# Patient Record
Sex: FEMALE | Race: WHITE | NOT HISPANIC OR LATINO | Employment: UNEMPLOYED | URBAN - METROPOLITAN AREA
[De-identification: names, ages, dates, MRNs, and addresses within clinical notes are randomized per-mention and may not be internally consistent; named-entity substitution may affect disease eponyms.]

---

## 2018-06-15 ENCOUNTER — OFFICE VISIT (OUTPATIENT)
Dept: URGENT CARE | Facility: URGENT CARE | Age: 23
End: 2018-06-15
Payer: COMMERCIAL

## 2018-06-15 VITALS
SYSTOLIC BLOOD PRESSURE: 115 MMHG | TEMPERATURE: 97.7 F | HEART RATE: 98 BPM | DIASTOLIC BLOOD PRESSURE: 82 MMHG | OXYGEN SATURATION: 99 % | WEIGHT: 235.8 LBS

## 2018-06-15 DIAGNOSIS — Z77.098 EXPOSURE TO CHEMICAL IRRITANT: ICD-10-CM

## 2018-06-15 DIAGNOSIS — J98.01 ACUTE BRONCHOSPASM: Primary | ICD-10-CM

## 2018-06-15 PROCEDURE — 99203 OFFICE O/P NEW LOW 30 MIN: CPT | Mod: 25 | Performed by: PHYSICIAN ASSISTANT

## 2018-06-15 PROCEDURE — 94640 AIRWAY INHALATION TREATMENT: CPT | Performed by: PHYSICIAN ASSISTANT

## 2018-06-15 RX ORDER — PREDNISONE 20 MG/1
20 TABLET ORAL DAILY
Qty: 5 TABLET | Refills: 0 | Status: SHIPPED | OUTPATIENT
Start: 2018-06-15 | End: 2018-06-20

## 2018-06-15 RX ORDER — ALBUTEROL SULFATE 90 UG/1
AEROSOL, METERED RESPIRATORY (INHALATION)
Qty: 1 INHALER | Refills: 0 | OUTPATIENT
Start: 2018-06-15 | End: 2022-04-13

## 2018-06-15 RX ORDER — IPRATROPIUM BROMIDE AND ALBUTEROL SULFATE 2.5; .5 MG/3ML; MG/3ML
1 SOLUTION RESPIRATORY (INHALATION) ONCE
Qty: 3 ML | Refills: 0 | COMMUNITY
Start: 2018-06-15 | End: 2022-01-13

## 2018-06-15 ASSESSMENT — ENCOUNTER SYMPTOMS
BACK PAIN: 0
WHEEZING: 0
FATIGUE: 0
CHEST TIGHTNESS: 0
COUGH: 0
UNEXPECTED WEIGHT CHANGE: 0
EYE PAIN: 0
CHILLS: 0
RHINORRHEA: 0
EYE REDNESS: 0
SHORTNESS OF BREATH: 0
MYALGIAS: 0
TROUBLE SWALLOWING: 0
SINUS PRESSURE: 1
ARTHRALGIAS: 0
PALPITATIONS: 0
VOMITING: 0
NAUSEA: 0
FEVER: 0
DIARRHEA: 0
ABDOMINAL PAIN: 0
SORE THROAT: 0

## 2018-06-15 NOTE — MR AVS SNAPSHOT
After Visit Summary   6/15/2018    Lidia Gallegos    MRN: [id number]           Patient Information     Date Of Birth          1995        Visit Information        Provider Department      6/15/2018 6:00 PM Erin [address] PA-C [facility]        Today's Diagnoses     Acute bronchospasm    -  1    Exposure to chemical irritant           Follow-ups after your visit        Follow-up notes from your care team     Return if symptoms worsen or fail to improve.      Who to contact     If you have questions or need follow up information about today's clinic visit or your schedule please contact [facility] directly at [phone].  Normal or non-critical lab and imaging results will be communicated to you by MyChart, letter or phone within 4 business days after the clinic has received the results. If you do not hear from us within 7 days, please contact the clinic through MyChart or phone. If you have a critical or abnormal lab result, we will notify you by phone as soon as possible.  Submit refill requests through Answer.To or call your pharmacy and they will forward the refill request to us. Please allow 3 business days for your refill to be completed.          Additional Information About Your Visit        Care EveryWhere ID     This is your Care EveryWhere ID. This could be used by other organizations to access your [facility] medical records  [id number]        Your Vitals Were     Pulse Temperature Pulse Oximetry             98 97.7  F (36.5  C) (Tympanic) 99%          Blood Pressure from Last 3 Encounters:   06/15/18 115/82    Weight from Last 3 Encounters:   06/15/18 235 lb 12.8 oz (107 kg)              We Performed the Following     ALBUTEROL/IPRATROPIUM 3ML NEB - .001     INHALATION/NEBULIZER TREATMENT, INITIAL          Today's Medication Changes          These changes are accurate as of 6/15/18  8:33 PM.  If you have any questions, ask your nurse or doctor.                Start taking these medicines.        Dose/Directions    albuterol 108 (90 Base) MCG/ACT Inhaler   Commonly known as:  PROAIR HFA/PROVENTIL HFA/VENTOLIN HFA   Used for:  Acute bronchospasm        Inhale 2 puffs every 4-6 hours as needed for cough, wheezing, or shortness of breath   Quantity:  1 Inhaler   Refills:  0       ipratropium - albuterol 0.5 mg/2.5 mg/3 mL 0.5-2.5 (3) MG/3ML neb solution   Commonly known as:  DUONEB   Used for:  Acute bronchospasm        Dose:  1 vial   Take 1 vial (3 mLs) by nebulization once for 1 dose   Quantity:  3 mL   Refills:  0       predniSONE 20 MG tablet   Commonly known as:  DELTASONE   Used for:  Acute bronchospasm        Dose:  20 mg   Take 1 tablet (20 mg) by mouth daily for 5 days   Quantity:  5 tablet   Refills:  0            Where to get your medicines      These medications were sent to [place] [id number] [place] [address] [address]     Phone:  [phone]     albuterol 108 (90 Base) MCG/ACT Inhaler    predniSONE 20 MG tablet         Some of these will need a paper prescription and others can be bought over the counter.  Ask your nurse if you have questions.     You don't need a prescription for these medications     ipratropium - albuterol 0.5 mg/2.5 mg/3 mL 0.5-2.5 (3) MG/3ML neb solution                Primary Care Provider Office Phone # Fax #    [facility] [phone] [address] [facility] [address]        Equal Access to Services     [facility] : Hadii francy hare Sodilshad, waaxda luqadaha, qaybta kaalmada sean, cony mcerloy. So [facility] [phone].    ATENCIÓN: Si habla español, tiene a vela disposición servicios gratuitos de asistencia lingüística. Llame al [phone].    We comply with applicable federal civil rights laws and [place] laws. We do not discriminate on the basis of race, color, national origin, age, disability, sex,  sexual orientation, or gender identity.            Thank you!     [facility] you for [facility]  [facility]. Our goal is always to provide you with excellent care. Hearing back from our patients is one way we can continue to improve our services. Please take a few minutes to complete the written survey that you may receive in the mail after your visit with us. Thank you!             Your Updated Medication List - Protect others around you: Learn how to safely use, store and throw away your medicines at www.disposemymeds.org.          This list is accurate as of 6/15/18  8:33 PM.  Always use your most recent med list.                   Brand Name Dispense Instructions for use Diagnosis    albuterol 108 (90 Base) MCG/ACT Inhaler    PROAIR HFA/PROVENTIL HFA/VENTOLIN HFA    1 Inhaler    Inhale 2 puffs every 4-6 hours as needed for cough, wheezing, or shortness of breath    Acute bronchospasm       ipratropium - albuterol 0.5 mg/2.5 mg/3 mL 0.5-2.5 (3) MG/3ML neb solution    DUONEB    3 mL    Take 1 vial (3 mLs) by nebulization once for 1 dose    Acute bronchospasm       predniSONE 20 MG tablet    DELTASONE    5 tablet    Take 1 tablet (20 mg) by mouth daily for 5 days    Acute bronchospasm

## 2018-06-15 NOTE — PROGRESS NOTES
SUBJECTIVE:   Lidia Gallegos is a 23 year old female presenting with a chief complaint of   Chief Complaint   Patient presents with     Sinus Problem     Patient states there is bilateral pain under eyes, and a very dry nose since 2pm today. She was spraying an insectiside earlier today and was wearing eye protection.        She is a new patient of [place].    Sinus and Lung Problem    Onset of symptoms was 4 hour(s) ago.   Location: feels pain in sinus area and burning in lungs   Course of illness is worsening.    Severity mild/moderate  Current and Associated symptoms: lung tightness  Treatment measures tried include none  Context: recently started working at [employer] and just started applying talstar mosquito repellent 2 days ago. Was wearing eye protection. This afternoon she started having sinus pain and a burning in her lungs. No shortness of breath or difficulty breathing. No neurological symptoms. She was not wearing a respirator.     Patient is generally healthy with no significant past medical history, surgical history, or family history.      Review of Systems   Constitutional: Negative for chills, fatigue, fever and unexpected weight change.   HENT: Positive for sinus pressure. Negative for congestion, ear pain, postnasal drip, rhinorrhea, sore throat and trouble swallowing.    Eyes: Negative for pain, redness and visual disturbance.   Respiratory: Negative for cough, chest tightness, shortness of breath and wheezing.         Lung tightness   Cardiovascular: Negative for chest pain and palpitations.   Gastrointestinal: Negative for abdominal pain, diarrhea, nausea and vomiting.   Musculoskeletal: Negative for arthralgias, back pain and myalgias.   Skin: Negative for rash.       No past medical history on file.  No family history on file.  Current Outpatient Prescriptions   Medication Sig Dispense Refill     albuterol (PROAIR HFA/PROVENTIL HFA/VENTOLIN HFA) 108 (90 Base) MCG/ACT Inhaler Inhale 2 puffs  every 4-6 hours as needed for cough, wheezing, or shortness of breath 1 Inhaler 0     ipratropium - albuterol 0.5 mg/2.5 mg/3 mL (DUONEB) 0.5-2.5 (3) MG/3ML neb solution Take 1 vial (3 mLs) by nebulization once for 1 dose 3 mL 0     predniSONE (DELTASONE) 20 MG tablet Take 1 tablet (20 mg) by mouth daily for 5 days 5 tablet 0     Social History   Substance Use Topics     Smoking status: Not on file     Smokeless tobacco: Not on file     Alcohol use Not on file       OBJECTIVE  /82  Pulse 98  Temp 97.7  F (36.5  C) (Tympanic)  Wt 235 lb 12.8 oz (107 kg)  SpO2 99%    Physical Exam   Constitutional: She appears well-developed and well-nourished.   HENT:   Head: Normocephalic.   Right Ear: Tympanic membrane and ear canal normal.   Left Ear: Tympanic membrane and ear canal normal.   Mouth/Throat: Oropharynx is clear and moist.   Eyes: Conjunctivae are normal. Pupils are equal, round, and reactive to light.   Cardiovascular: Normal rate and normal heart sounds.    Pulmonary/Chest: Effort normal and breath sounds normal.   Skin: Skin is warm and dry. No rash noted.   Psychiatric: She has a normal mood and affect. Her behavior is normal.       Labs:  No results found for this or any previous visit (from the past 24 hour(s)).    X-Ray was not done.    ASSESSMENT:      ICD-10-CM    1. Acute bronchospasm J98.01 INHALATION/NEBULIZER TREATMENT, INITIAL     ipratropium - albuterol 0.5 mg/2.5 mg/3 mL (DUONEB) 0.5-2.5 (3) MG/3ML neb solution     ALBUTEROL/IPRATROPIUM 3ML NEB - .001     predniSONE (DELTASONE) 20 MG tablet     albuterol (PROAIR HFA/PROVENTIL HFA/VENTOLIN HFA) 108 (90 Base) MCG/ACT Inhaler   2. Exposure to chemical irritant Z77.098         Medical Decision Making:    Differential Diagnosis:  URI Adult/Peds:  Bronchitis-viral, Bronchospasm and chemical irritation     Serious Comorbid Conditions:  Adult:  None    PLAN:    Likely acute bronchospasm from chemical irritant. She had significant improvement in  symptoms following duoneb given in clinic. SpO2 went from 97% to 99% following neb. Will treat with prednisone 20mg daily x 5 days and albuterol every 4-6 hrs as needed. Discussed how to take these medications and what to expect.  Discussed in detail symptoms that would warrant emergent evaluation in the ED. Patient agrees with plan and will follow up as needed.  Would recommend she wear a respirator at work. Follow up with primary care provider in 1 week to recheck symptoms or sooner if needed.     Followup:    If not improving or if condition worsens, follow up with your Primary Care Provider    There are no Patient Instructions on file for this visit.

## 2018-06-16 NOTE — NURSING NOTE
The following nebulizer treatment was given:     MEDICATION: Duoneb  : [place]  LOT #: [id number]  EXPIRATION DATE:  01/2020  NDC # 2606-7530-17     Nebulizer Start Time:  7:08pm  Nebulizer Stop Time:  7:18pm  Zita Rose MA

## 2021-04-19 ENCOUNTER — HOSPITAL ENCOUNTER (EMERGENCY)
Facility: CLINIC | Age: 26
Discharge: HOME | End: 2021-04-19
Attending: EMERGENCY MEDICINE | Admitting: EMERGENCY MEDICINE
Payer: COMMERCIAL

## 2021-04-19 VITALS
SYSTOLIC BLOOD PRESSURE: 148 MMHG | WEIGHT: 270 LBS | OXYGEN SATURATION: 99 % | RESPIRATION RATE: 18 BRPM | DIASTOLIC BLOOD PRESSURE: 100 MMHG | HEART RATE: 90 BPM | TEMPERATURE: 98.2 F

## 2021-04-19 DIAGNOSIS — M54.50 ACUTE RIGHT-SIDED LOW BACK PAIN WITHOUT SCIATICA: ICD-10-CM

## 2021-04-19 DIAGNOSIS — N30.00 ACUTE CYSTITIS WITHOUT HEMATURIA: ICD-10-CM

## 2021-04-19 LAB
ALBUMIN UR-MCNC: NEGATIVE MG/DL
ANION GAP SERPL CALCULATED.3IONS-SCNC: 5 MMOL/L (ref 3–14)
APPEARANCE UR: CLEAR
BASOPHILS # BLD AUTO: 0.1 10E9/L (ref 0–0.2)
BASOPHILS NFR BLD AUTO: 0.5 %
BILIRUB UR QL STRIP: NEGATIVE
BUN SERPL-MCNC: 10 MG/DL (ref 7–30)
CALCIUM SERPL-MCNC: 8.3 MG/DL (ref 8.5–10.1)
CHLORIDE SERPL-SCNC: 113 MMOL/L (ref 94–109)
CO2 SERPL-SCNC: 25 MMOL/L (ref 20–32)
COLOR UR AUTO: YELLOW
CREAT SERPL-MCNC: 0.77 MG/DL (ref 0.52–1.04)
CRP SERPL-MCNC: 3.6 MG/L (ref 0–8)
D DIMER PPP FEU-MCNC: 0.3 UG/ML FEU (ref 0–0.5)
DIFFERENTIAL METHOD BLD: NORMAL
EOSINOPHIL # BLD AUTO: 0.4 10E9/L (ref 0–0.7)
EOSINOPHIL NFR BLD AUTO: 4 %
ERYTHROCYTE [DISTWIDTH] IN BLOOD BY AUTOMATED COUNT: 13.2 % (ref 10–15)
GFR SERPL CREATININE-BSD FRML MDRD: >90 ML/MIN/{1.73_M2}
GLUCOSE SERPL-MCNC: 82 MG/DL (ref 70–99)
GLUCOSE UR STRIP-MCNC: NEGATIVE MG/DL
HCT VFR BLD AUTO: 41.9 % (ref 35–47)
HGB BLD-MCNC: 14 G/DL (ref 11.7–15.7)
HGB UR QL STRIP: NEGATIVE
IMM GRANULOCYTES # BLD: 0 10E9/L (ref 0–0.4)
IMM GRANULOCYTES NFR BLD: 0.4 %
KETONES UR STRIP-MCNC: NEGATIVE MG/DL
LEUKOCYTE ESTERASE UR QL STRIP: ABNORMAL
LYMPHOCYTES # BLD AUTO: 2.6 10E9/L (ref 0.8–5.3)
LYMPHOCYTES NFR BLD AUTO: 23.3 %
MCH RBC QN AUTO: 27.8 PG (ref 26.5–33)
MCHC RBC AUTO-ENTMCNC: 33.4 G/DL (ref 31.5–36.5)
MCV RBC AUTO: 83 FL (ref 78–100)
MONOCYTES # BLD AUTO: 1 10E9/L (ref 0–1.3)
MONOCYTES NFR BLD AUTO: 9.5 %
MUCOUS THREADS #/AREA URNS LPF: PRESENT /LPF
NEUTROPHILS # BLD AUTO: 6.9 10E9/L (ref 1.6–8.3)
NEUTROPHILS NFR BLD AUTO: 62.3 %
NITRATE UR QL: NEGATIVE
NRBC # BLD AUTO: 0 10*3/UL
NRBC BLD AUTO-RTO: 0 /100
PH UR STRIP: 5 PH (ref 5–7)
PLATELET # BLD AUTO: 343 10E9/L (ref 150–450)
POTASSIUM SERPL-SCNC: 4 MMOL/L (ref 3.4–5.3)
RBC # BLD AUTO: 5.03 10E12/L (ref 3.8–5.2)
RBC #/AREA URNS AUTO: 1 /HPF (ref 0–2)
SODIUM SERPL-SCNC: 143 MMOL/L (ref 133–144)
SOURCE: ABNORMAL
SP GR UR STRIP: 1.02 (ref 1–1.03)
SQUAMOUS #/AREA URNS AUTO: 2 /HPF (ref 0–1)
UROBILINOGEN UR STRIP-MCNC: 2 MG/DL (ref 0–2)
WBC # BLD AUTO: 11 10E9/L (ref 4–11)
WBC #/AREA URNS AUTO: 6 /HPF (ref 0–5)

## 2021-04-19 PROCEDURE — 81001 URINALYSIS AUTO W/SCOPE: CPT | Performed by: EMERGENCY MEDICINE

## 2021-04-19 PROCEDURE — 80048 BASIC METABOLIC PNL TOTAL CA: CPT | Performed by: EMERGENCY MEDICINE

## 2021-04-19 PROCEDURE — 85379 FIBRIN DEGRADATION QUANT: CPT | Performed by: EMERGENCY MEDICINE

## 2021-04-19 PROCEDURE — 250N000011 HC RX IP 250 OP 636: Performed by: EMERGENCY MEDICINE

## 2021-04-19 PROCEDURE — 99285 EMERGENCY DEPT VISIT HI MDM: CPT | Performed by: EMERGENCY MEDICINE

## 2021-04-19 PROCEDURE — 96375 TX/PRO/DX INJ NEW DRUG ADDON: CPT | Performed by: EMERGENCY MEDICINE

## 2021-04-19 PROCEDURE — 99285 EMERGENCY DEPT VISIT HI MDM: CPT | Mod: 25 | Performed by: EMERGENCY MEDICINE

## 2021-04-19 PROCEDURE — 86140 C-REACTIVE PROTEIN: CPT | Performed by: EMERGENCY MEDICINE

## 2021-04-19 PROCEDURE — 96374 THER/PROPH/DIAG INJ IV PUSH: CPT | Performed by: EMERGENCY MEDICINE

## 2021-04-19 PROCEDURE — 36415 COLL VENOUS BLD VENIPUNCTURE: CPT | Performed by: EMERGENCY MEDICINE

## 2021-04-19 PROCEDURE — 85025 COMPLETE CBC W/AUTO DIFF WBC: CPT | Performed by: EMERGENCY MEDICINE

## 2021-04-19 RX ORDER — METHYLPREDNISOLONE 4 MG
TABLET, DOSE PACK ORAL
Qty: 21 TABLET | Refills: 0 | Status: SHIPPED | OUTPATIENT
Start: 2021-04-19 | End: 2022-01-13

## 2021-04-19 RX ORDER — HYDROCODONE BITARTRATE AND ACETAMINOPHEN 5; 325 MG/1; MG/1
1 TABLET ORAL EVERY 6 HOURS PRN
Qty: 12 TABLET | Refills: 0 | Status: SHIPPED | OUTPATIENT
Start: 2021-04-19 | End: 2021-04-22

## 2021-04-19 RX ORDER — DEXAMETHASONE SODIUM PHOSPHATE 10 MG/ML
10 INJECTION, SOLUTION INTRAMUSCULAR; INTRAVENOUS ONCE
Status: COMPLETED | OUTPATIENT
Start: 2021-04-19 | End: 2021-04-19

## 2021-04-19 RX ORDER — IBUPROFEN 200 MG
800 TABLET ORAL 2 TIMES DAILY PRN
COMMUNITY
End: 2022-01-13

## 2021-04-19 RX ORDER — HYDROMORPHONE HYDROCHLORIDE 1 MG/ML
0.5 INJECTION, SOLUTION INTRAMUSCULAR; INTRAVENOUS; SUBCUTANEOUS
Status: DISCONTINUED | OUTPATIENT
Start: 2021-04-19 | End: 2021-04-19 | Stop reason: HOSPADM

## 2021-04-19 RX ORDER — SULFAMETHOXAZOLE/TRIMETHOPRIM 800-160 MG
1 TABLET ORAL 2 TIMES DAILY
Qty: 6 TABLET | Refills: 0 | Status: SHIPPED | OUTPATIENT
Start: 2021-04-19 | End: 2021-04-22

## 2021-04-19 RX ORDER — ONDANSETRON 2 MG/ML
4 INJECTION INTRAMUSCULAR; INTRAVENOUS EVERY 30 MIN PRN
Status: DISCONTINUED | OUTPATIENT
Start: 2021-04-19 | End: 2021-04-19 | Stop reason: HOSPADM

## 2021-04-19 RX ADMIN — DEXAMETHASONE SODIUM PHOSPHATE 10 MG: 10 INJECTION, SOLUTION INTRAMUSCULAR; INTRAVENOUS at 15:01

## 2021-04-19 RX ADMIN — ONDANSETRON 4 MG: 2 INJECTION INTRAMUSCULAR; INTRAVENOUS at 15:00

## 2021-04-19 RX ADMIN — HYDROMORPHONE HYDROCHLORIDE 0.5 MG: 1 INJECTION, SOLUTION INTRAMUSCULAR; INTRAVENOUS; SUBCUTANEOUS at 15:03

## 2021-04-19 NOTE — DISCHARGE INSTRUCTIONS
"Your urine showed evidence of infection, so a prescription for an antibiotic was sent to the pharmacy.  You may start this today and you should finish the complete course even if you begin to feel better    You were given medication for pain, Norco, which can make you drowsy so do not drive or drink alcohol while taking this medicine.  You may take 1 tablet every 6 hours as needed for severe pain.  If you have more mild or moderate pain you may take Tylenol or ibuprofen per bottle instructions but be aware that Norco has Tylenol within it, do not take more than 4000 mg of Tylenol in a 24-hour period.    You were also given a prescription for steroid.  Since you were given a dose of steroid in the emergency room you do not need to take any more today and may start this prescription tomorrow.  Follow the tapering instructions on the package.  Take the dose of medication as early in the morning as possible since it can make you feel \"wound up\" and have difficulty with sleep    Follow-up closely with your primary provider so they may discuss next best steps, and if additional imaging is appropriate for your back.  They may also recommend referral to physical therapy or to a spine specialist to discuss options for your back pain management    If you develop any numbness or tingling in between your thighs or in your groin, if you have any incontinence of bowel or bladder, have any weakness in your legs, or have any new or concerning symptoms, do not hesitate to return to the emergency room for evaluation  "

## 2021-04-19 NOTE — Clinical Note
Lidia Gallegos was seen and treated in our emergency department on 4/19/2021.  She may return to work on 04/21/2021.  May return to work sooner if symptoms have improved     If you have any questions or concerns, please don't hesitate to call.      Jaclyn Price, DO

## 2021-04-19 NOTE — ED PROVIDER NOTES
History     Chief Complaint   Patient presents with     Back Pain     Shortness of Breath     HPI  Lidia Gallegos is a 26 year old female who resents to the emergency room with low back pain and shortness of breath.  She says the low back pain has been ongoing for the last 2 days.  Has had issues with her back in the past, said that she had a disc bulging and degenerative disc disease.  She had been doing back exercises it had improved but recently feels like this injury flared again.  She was seen in urgent care yesterday and was given a muscle relaxer.  She took a dose at 8 PM and midnight, which did not help much with her pain but also made her feel dizzy.  When she was at work today she had more pain and felt short of breath.  She says that when she tries to take a deep breath it makes the pain flareup worse.  Has not been running a fever.  Denies any dysuria.  Denies any known injury to the area.  No numbness or tingling down her leg or in her groin or in between her thighs.  Denies any bowel or bladder incontinence.  Has not been vomiting.  Denies chest pain.       Allergies:  No Known Allergies    Problem List:    There are no active problems to display for this patient.       Past Medical History:    History reviewed. No pertinent past medical history.    Past Surgical History:    History reviewed. No pertinent surgical history.    Family History:    History reviewed. No pertinent family history.    Social History:  Marital Status:  Single [1]  Social History     Tobacco Use     Smoking status: Never Smoker     Smokeless tobacco: Never Used   Substance Use Topics     Alcohol use: Yes     Comment: occ     Drug use: Never        Medications:    HYDROcodone-acetaminophen (NORCO) 5-325 MG tablet  ibuprofen (ADVIL/MOTRIN) 200 MG tablet  methylPREDNISolone (MEDROL DOSEPAK) 4 MG tablet therapy pack  omeprazole (PRILOSEC) 20 MG DR capsule  sulfamethoxazole-trimethoprim (BACTRIM DS) 800-160 MG tablet  albuterol  (PROAIR HFA/PROVENTIL HFA/VENTOLIN HFA) 108 (90 Base) MCG/ACT Inhaler  ipratropium - albuterol 0.5 mg/2.5 mg/3 mL (DUONEB) 0.5-2.5 (3) MG/3ML neb solution          Review of Systems   All other systems reviewed and are negative.      Physical Exam   BP: (!) 152/107  Pulse: 92  Temp: 98.2  F (36.8  C)  Resp: 16  Weight: 122.5 kg (270 lb)  SpO2: 100 %      Physical Exam  Vitals signs and nursing note reviewed.   Constitutional:       Appearance: She is obese.   Neck:      Musculoskeletal: Normal range of motion and neck supple.   Cardiovascular:      Rate and Rhythm: Normal rate and regular rhythm.   Pulmonary:      Effort: Pulmonary effort is normal.      Breath sounds: Normal breath sounds.   Abdominal:      General: Bowel sounds are normal.      Palpations: Abdomen is soft.   Musculoskeletal:      Lumbar back: She exhibits no bony tenderness.        Back:    Skin:     General: Skin is warm and dry.   Neurological:      General: No focal deficit present.      Mental Status: She is alert.      Sensory: Sensation is intact.      Motor: No weakness.      Gait: Gait is intact.         ED Course        Procedures               Critical Care time:  none               Results for orders placed or performed during the hospital encounter of 04/19/21 (from the past 24 hour(s))   Basic metabolic panel   Result Value Ref Range    Sodium 143 133 - 144 mmol/L    Potassium 4.0 3.4 - 5.3 mmol/L    Chloride 113 (H) 94 - 109 mmol/L    Carbon Dioxide 25 20 - 32 mmol/L    Anion Gap 5 3 - 14 mmol/L    Glucose 82 70 - 99 mg/dL    Urea Nitrogen 10 7 - 30 mg/dL    Creatinine 0.77 0.52 - 1.04 mg/dL    GFR Estimate >90 >60 mL/min/[1.73_m2]    GFR Estimate If Black >90 >60 mL/min/[1.73_m2]    Calcium 8.3 (L) 8.5 - 10.1 mg/dL   CBC with platelets differential   Result Value Ref Range    WBC 11.0 4.0 - 11.0 10e9/L    RBC Count 5.03 3.8 - 5.2 10e12/L    Hemoglobin 14.0 11.7 - 15.7 g/dL    Hematocrit 41.9 35.0 - 47.0 %    MCV 83 78 - 100 fl     MCH 27.8 26.5 - 33.0 pg    MCHC 33.4 31.5 - 36.5 g/dL    RDW 13.2 10.0 - 15.0 %    Platelet Count 343 150 - 450 10e9/L    Diff Method Automated Method     % Neutrophils 62.3 %    % Lymphocytes 23.3 %    % Monocytes 9.5 %    % Eosinophils 4.0 %    % Basophils 0.5 %    % Immature Granulocytes 0.4 %    Nucleated RBCs 0 0 /100    Absolute Neutrophil 6.9 1.6 - 8.3 10e9/L    Absolute Lymphocytes 2.6 0.8 - 5.3 10e9/L    Absolute Monocytes 1.0 0.0 - 1.3 10e9/L    Absolute Eosinophils 0.4 0.0 - 0.7 10e9/L    Absolute Basophils 0.1 0.0 - 0.2 10e9/L    Abs Immature Granulocytes 0.0 0 - 0.4 10e9/L    Absolute Nucleated RBC 0.0    CRP inflammation   Result Value Ref Range    CRP Inflammation 3.6 0.0 - 8.0 mg/L   D dimer quantitative   Result Value Ref Range    D Dimer 0.3 0.0 - 0.50 ug/ml FEU   UA with Microscopic   Result Value Ref Range    Color Urine Yellow     Appearance Urine Clear     Glucose Urine Negative NEG^Negative mg/dL    Bilirubin Urine Negative NEG^Negative    Ketones Urine Negative NEG^Negative mg/dL    Specific Gravity Urine 1.024 1.003 - 1.035    Blood Urine Negative NEG^Negative    pH Urine 5.0 5.0 - 7.0 pH    Protein Albumin Urine Negative NEG^Negative mg/dL    Urobilinogen mg/dL 2.0 0.0 - 2.0 mg/dL    Nitrite Urine Negative NEG^Negative    Leukocyte Esterase Urine Moderate (A) NEG^Negative    Source Midstream Urine     WBC Urine 6 (H) 0 - 5 /HPF    RBC Urine 1 0 - 2 /HPF    Squamous Epithelial /HPF Urine 2 (H) 0 - 1 /HPF    Mucous Urine Present (A) NEG^Negative /LPF       Medications   ondansetron (ZOFRAN) injection 4 mg (4 mg Intravenous Given 4/19/21 1500)   HYDROmorphone (PF) (DILAUDID) injection 0.5 mg (0.5 mg Intravenous Given 4/19/21 1503)   dexamethasone PF (DECADRON) injection 10 mg (10 mg Intravenous Given 4/19/21 1501)       Assessments & Plan (with Medical Decision Making)  Lidia is a 26-year-old female with past medical history of low back pain and degenerative disc disease who presents to the  emergency room with a flare of her low back pain and shortness of breath.  See history and focused physical exam as above  26-year-old female in no acute distress but is standing and pacing in pain, blood pressure is elevated at 152/107, but remainder of vital signs are stable.  Oxygen saturation is 99% on room air.  Patient does indicate that she feels the shortness of breath is more due to the amount of pain that she is in and not a separate symptom.  She has normal gait and muscle tone, no midline bony tenderness, no appreciable muscle spasm.  Suspect that this is due to to her known degenerative disc disease and a flare of her low back pain with sciatica.  She is agreeable to trying Decadron as well as Dilaudid for pain, and does have a ride home with her  who is waiting.  She is also agreeable to checking some lab work  Results as above.  CBC, chemistry, CRP, and D-dimer are all within normal limits.  Urine does show moderate leukocyte esterase without nitrites or hematuria.  When questioned further, she does admit to some mild dysuria that has been intermittent but she did not think that it had anything to do with her symptoms today.  She did get relief with the Dilaudid and Decadron given in the emergency room.  Has been up and ambulatory to the bathroom several times.  Since her symptoms are improved and work-up has been negative thus far, will plan for discharge.  I did reassure her that since she did not have any acute injury and does not have any red flag symptoms that imaging would likely not be very helpful here in the emergency room.  She may need to have further imaging with an MRI of her spine scheduled at a later date if she continues to have symptoms or if her provider indicates that it is necessary for for further work-up.  She is agreeable to this.  Will discharge with a small quantity of Norco to help with severe pain, a prescription for Medrol Dosepak, and also 3 days of Bactrim to treat  mildly symptomatic bacteriuria.  Discussed reasons to return to the emergency room.  She understands and agrees and is discharged in no acute distress     I have reviewed the nursing notes.    I have reviewed the findings, diagnosis, plan and need for follow up with the patient.       Discharge Medication List as of 4/19/2021  5:09 PM      START taking these medications    Details   HYDROcodone-acetaminophen (NORCO) 5-325 MG tablet Take 1 tablet by mouth every 6 hours as needed for moderate to severe pain, Disp-12 tablet, R-0, E-Prescribe      methylPREDNISolone (MEDROL DOSEPAK) 4 MG tablet therapy pack Follow Package Directions, Disp-21 tablet, R-0, E-Prescribe      sulfamethoxazole-trimethoprim (BACTRIM DS) 800-160 MG tablet Take 1 tablet by mouth 2 times daily for 3 days, Disp-6 tablet, R-0, E-Prescribe             Final diagnoses:   Acute right-sided low back pain without sciatica   Acute cystitis without hematuria       4/19/2021   [facility] EMERGENCY DEPT     Jaclyn Price DO  04/22/21 9622

## 2021-06-22 ENCOUNTER — TRANSFERRED RECORDS (OUTPATIENT)
Dept: MULTI SPECIALTY CLINIC | Facility: CLINIC | Age: 26
End: 2021-06-22

## 2021-06-22 LAB — PAP-ABSTRACT: ABNORMAL

## 2021-08-08 ENCOUNTER — HEALTH MAINTENANCE LETTER (OUTPATIENT)
Age: 26
End: 2021-08-08

## 2021-10-03 ENCOUNTER — HEALTH MAINTENANCE LETTER (OUTPATIENT)
Age: 26
End: 2021-10-03

## 2022-01-04 ENCOUNTER — NURSE TRIAGE (OUTPATIENT)
Dept: NURSING | Facility: CLINIC | Age: 27
End: 2022-01-04

## 2022-01-04 ENCOUNTER — VIRTUAL VISIT (OUTPATIENT)
Dept: FAMILY MEDICINE | Facility: CLINIC | Age: 27
End: 2022-01-04
Payer: COMMERCIAL

## 2022-01-04 ENCOUNTER — NURSE TRIAGE (OUTPATIENT)
Dept: NURSING | Facility: CLINIC | Age: 27
End: 2022-01-04
Payer: COMMERCIAL

## 2022-01-04 DIAGNOSIS — J01.00 ACUTE NON-RECURRENT MAXILLARY SINUSITIS: Primary | ICD-10-CM

## 2022-01-04 PROCEDURE — 99202 OFFICE O/P NEW SF 15 MIN: CPT | Mod: 95 | Performed by: NURSE PRACTITIONER

## 2022-01-04 NOTE — TELEPHONE ENCOUNTER
Has a cold.    Was seen [facility], robatussin with codeine.    Neg strep, covid and mono.    Not better, seen again prednisone.    Now ST still , tongue burning, ears pounding, congestion in head.    Sinus pain and pressure.      Additional Information    Negative: Sounds like a life-threatening emergency to the triager    Negative: Difficulty breathing, and not from stuffy nose (e.g., not relieved by cleaning out the nose)    Negative: SEVERE headache and has fever    Negative: Patient sounds very sick or weak to the triager    SEVERE sinus pain    Protocols used: SINUS PAIN AND CONGESTION-A-OH

## 2022-01-04 NOTE — PROGRESS NOTES
Lidia is a 26 year old who is being evaluated via a billable telephone visit.      What phone number would you like to be contacted at?   How would you like to obtain your AVS?     Assessment & Plan     (J01.00) Acute non-recurrent maxillary sinusitis  (primary encounter diagnosis)  Comment:   Plan: amoxicillin-clavulanate (AUGMENTIN) 875-125 MG         tablet        Discussed symptomatic treatment measures.  Start Augmentin, discussed the use and indication of this medication as well as potential side effects.   Return to clinic if symptoms persist or worsen.        16 minutes spent on the date of the encounter doing patient visit and documentation            Return if symptoms worsen or fail to improve.    Sindi Sampson NP  [facility]   Lidia is a 26 year old who presents for the following health issues     HPI     Symptoms She was seen at  on 12/22, 12/27 and 12/28.    She did have a sore throat, laryngitis, cough, congestion.   She did have a negative COVID, strep and mono test.   She was treated with Robitussin and codeine, Medrol Dosepak.  She is having a lot nasal congestion, sinus pressure, tongue burning, post nasal drainage.            Review of Systems         Objective           Vitals:  No vitals were obtained today due to virtual visit.    Physical Exam   healthy, alert and no distress  PSYCH: Alert and oriented times 3; coherent speech, normal   rate and volume, able to articulate logical thoughts, able   to abstract reason, no tangential thoughts, no hallucinations   or delusions  Her affect is normal  RESP: No cough, no audible wheezing, able to talk in full sentences  Remainder of exam unable to be completed due to telephone visits                Phone call duration: 15 minutes

## 2022-01-05 NOTE — TELEPHONE ENCOUNTER
"Triage Call:    They got a prescription for 7 tablets, but instructions are to take 1 tablet twice a day for 7 days.  Per medication policy, after double checked math with pharmacist calling, Mireille, advised that could fill for 14 tablets as she was requesting.        Sindi Foreman RN on 1/4/2022 at 6:04 PM        Reason for Disposition    Pharmacy calling with prescription question and triager answers question    Additional Information    Negative: Drug overdose and triager unable to answer question    Negative: Caller requesting information unrelated to medicine    Negative: Caller requesting a prescription for Strep throat and has a positive culture result    Negative: Rash while taking a medication or within 3 days of stopping it    Negative: Immunization reaction suspected    Negative: [1] Asthma and [2] having symptoms of asthma (cough, wheezing, etc.)    Negative: [1] Influenza symptoms AND [2] anti-viral med prescription request, such as Tamiflu    Negative: [1] Symptom of illness (e.g., headache, abdominal pain, earache, vomiting) AND [2] more than mild    Negative: MORE THAN A DOUBLE DOSE of a prescription or over-the-counter (OTC) drug    Negative: [1] DOUBLE DOSE (an extra dose or lesser amount) of over-the-counter (OTC) drug AND [2] any symptoms (e.g., dizziness, nausea, pain, sleepiness)    Negative: [1] DOUBLE DOSE (an extra dose or lesser amount) of prescription drug AND [2] any symptoms (e.g., dizziness, nausea, pain, sleepiness)    Negative: Took another person's prescription drug    Negative: [1] DOUBLE DOSE (an extra dose or lesser amount) of prescription drug AND [2] NO symptoms (Exception: a double dose of antibiotics)    Negative: Diabetes drug error or overdose (e.g., took wrong type of insulin or took extra dose)    Negative: [1] Request for URGENT new prescription or refill of \"essential\" medication (i.e., likelihood of harm to patient if not taken) AND [2] triager unable to fill per " unit policy    Negative: [1] Prescription not at pharmacy AND [2] was prescribed by PCP recently    Negative: [1] Pharmacy calling with prescription questions AND [2] triager unable to answer question    Negative: [1] Caller has URGENT medication question about med that PCP or specialist prescribed AND [2] triager unable to answer question    Negative: [1] Caller has NON-URGENT medication question about med that PCP prescribed AND [2] triager unable to answer question    Negative: [1] Caller requesting a NON-URGENT new prescription or refill AND [2] triager unable to refill per unit policy    Negative: [1] Caller has medication question about med not prescribed by PCP AND [2] triager unable to answer question (e.g., compatibility with other med, storage)    Negative: Caller requesting a CONTROLLED substance prescription refill (e.g., narcotics, ADHD medicines)    Negative: Caller wants to use a complementary or alternative medicine    Negative: [1] Prescription prescribed recently is not at pharmacy AND [2] triager has access to patient's EMR AND [3] prescription is recorded in the EMR    Negative: [1] DOUBLE DOSE (an extra dose or lesser amount) of over-the-counter (OTC) drug AND [2] NO symptoms    Negative: [1] DOUBLE DOSE (an extra dose or lesser amount) of antibiotic drug AND [2] NO symptoms    Negative: Caller requesting a refill, no triage required, and triager able to refill per unit policy    Negative: Caller requesting information about medication use with breastfeeding; neither adult nor infant is ill, triager answers question    Negative: Caller requesting information about medication during pregnancy; adult is not ill AND triager answers question    Negative: Caller has medication question, adult has minor symptoms, caller declines triage, AND triager answers question    Negative: Caller has medication question only, adult not sick, and triager answers question    Protocols used: MEDICATION QUESTION  CALL-A-AH

## 2022-01-11 ENCOUNTER — VIRTUAL VISIT (OUTPATIENT)
Dept: FAMILY MEDICINE | Facility: CLINIC | Age: 27
End: 2022-01-11
Payer: COMMERCIAL

## 2022-01-11 ENCOUNTER — TELEPHONE (OUTPATIENT)
Dept: FAMILY MEDICINE | Facility: CLINIC | Age: 27
End: 2022-01-11
Payer: COMMERCIAL

## 2022-01-11 DIAGNOSIS — J02.9 SORE THROAT: Primary | ICD-10-CM

## 2022-01-11 DIAGNOSIS — R09.82 POSTNASAL DRIP: ICD-10-CM

## 2022-01-11 DIAGNOSIS — R09.81 SINUS CONGESTION: ICD-10-CM

## 2022-01-11 PROCEDURE — 99213 OFFICE O/P EST LOW 20 MIN: CPT | Mod: GT | Performed by: FAMILY MEDICINE

## 2022-01-11 NOTE — TELEPHONE ENCOUNTER
PT has been seen in UC a few times.  Was given prednisone in UC.  Pt has sx like no voice, taste buds on fire.  Mono was negative.        Nicolle Sampson rx amoxicillin.  Pt finished the antibiotic yesterday.  Now sinuses are fine.  Today, scratchy throat.  Congestion, phlegm in throat.  Now has concentrated sx. Feels like strep.    Strep test in  was negative.  Works from home.  covid tests are negative.  Cold and flu sx. C/o of generic sx.    Made video appt for f/u on these sx for today.    Are other tests needed at this time?  DOMINIK Miller

## 2022-01-11 NOTE — PROGRESS NOTES
Lidia is a 26 year old who is being evaluated via a billable video visit.      How would you like to obtain your AVS? MyChart  If the video visit is dropped, the invitation should be resent by: Text to cell phone: [phone]  Will anyone else be joining your video visit? No     Video Start Time: 1:06 PM    Assessment & Plan     Sore throat  Postnasal drip  Sinus congestion  Upper respiratory symptoms started a few weeks ago.  She has been tested for COVID, strep throat and mono.  All were negative.  She was given prednisone, which seemed to make the symptoms worse and she thinks this may have been secondary to acid reflux causing sore throat and burning time.  Then she was given Augmentin and seem to be improving over the past week, but ended the Augmentin yesterday and feels congestion and sore throat again today.  Did recently move into a new place a few weeks ago.  She is unaware of whether she has any allergies.  She did notice improvement previously when she tried Claritin.    Will treat as possible allergies and start Claritin 10 mg daily as well as Flonase 2 sprays each nostril once daily.  If not improving after 1 to 2 weeks, she will follow-up for further evaluation.  To recheck meantime symptoms are worsening despite these treatments.          Return in about 2 weeks (around 1/25/2022) for Follow up if not improving or worsening.    Katty Schreiber MD   [facility]   Lidia is a 26 year old who presents for the following health issues      HPI        Symptoms present since 12/21.  Negative COVID 12/22.  At the time of her urgent care visit on 12/27/2021, she reported she had had a cough for a week.  Associated symptoms include voice hoarseness, sore throat, nasal congestion, sinus congestion, rhinorrhea and vomiting.  She vomited 1 time.  Felt like she had difficulty catching her breath at times.  No fever.  No history of asthma.  She reported she had quit  smoking.  She was given Robitussin-AC for symptoms and oral Sudafed and Flonase nasal spray were recommended for symptoms as well.  She was seen again on the next day in urgent care for sore throat.  She reported those symptoms were worsening the past couple of days.  Reported fatigue and body aches.  She had a strep test at that time as well as a mono spot test.  She was given prednisone for symptomatic relief.  On 1/4/2022 she was seen virtually for ongoing symptoms and was started on Augmentin for sinusitis.  At that time she reported she was having a lot of nasal congestion, sinus pressure, tongue burning, postnasal drainage.    Today, she says symptoms improved on Augmentin which ended yesterday.  Symptoms of sore throat and nasal congestion seem worse and today.  Symptoms have been up and down over the past few weeks.  She notes today that the day after she started prednisone she felt a lot worse including sore throat and burning tongue.  She stopped the prednisone after that.    Denies swollen glands when she touches her neck, though a little bit of tenderness.  She has not noticed any redness or white patches in her throat.    Angela Tamayo RN     DO    1/11/22 9:51 AM  Note  PT has been seen in UC a few times.  Was given prednisone in UC.  Pt has sx like no voice, taste buds on fire.  Mono was negative.           Nicolle Sampson rx amoxicillin.  Pt finished the antibiotic yesterday.  Now sinuses are fine.  Today, scratchy throat.  Congestion, phlegm in throat.  Now has concentrated sx. Feels like strep.     Strep test in  was negative.  Works from home.  covid tests are negative.  Cold and flu sx. C/o of generic sx.     Made video appt for f/u on these sx for today.     Are other tests needed at this time?  DOMINIK Miller            Review of Systems          Objective           Vitals:  No vitals were obtained today due to virtual visit.    Physical Exam   GENERAL: Healthy, alert and no distress,  sounds nasally congested  EYES: Eyes grossly normal to inspection.  No discharge or erythema, or obvious scleral/conjunctival abnormalities.  RESP: No audible wheeze, cough, or visible cyanosis.  No visible retractions or increased work of breathing.    SKIN: Visible skin clear. No significant rash, abnormal pigmentation or lesions.  NEURO: Cranial nerves grossly intact.  Mentation and speech appropriate for age.  PSYCH: Mentation appears normal, affect normal/bright, judgement and insight intact, normal speech and appearance well-groomed.    Admission on 04/19/2021, Discharged on 04/19/2021   Component Date Value Ref Range Status     Color Urine 04/19/2021 Yellow   Final     Appearance Urine 04/19/2021 Clear   Final     Glucose Urine 04/19/2021 Negative  NEG^Negative mg/dL Final     Bilirubin Urine 04/19/2021 Negative  NEG^Negative Final     Ketones Urine 04/19/2021 Negative  NEG^Negative mg/dL Final     Specific Gravity Urine 04/19/2021 1.024  1.003 - 1.035 Final     Blood Urine 04/19/2021 Negative  NEG^Negative Final     pH Urine 04/19/2021 5.0  5.0 - 7.0 pH Final     Protein Albumin Urine 04/19/2021 Negative  NEG^Negative mg/dL Final     Urobilinogen mg/dL 04/19/2021 2.0  0.0 - 2.0 mg/dL Final     Nitrite Urine 04/19/2021 Negative  NEG^Negative Final     Leukocyte Esterase Urine 04/19/2021 Moderate* NEG^Negative Final     Source 04/19/2021 Midstream Urine   Final     WBC Urine 04/19/2021 6* 0 - 5 /HPF Final     RBC Urine 04/19/2021 1  0 - 2 /HPF Final     Squamous Epithelial /HPF Urine 04/19/2021 2* 0 - 1 /HPF Final     Mucous Urine 04/19/2021 Present* NEG^Negative /LPF Final     Sodium 04/19/2021 143  133 - 144 mmol/L Final     Potassium 04/19/2021 4.0  3.4 - 5.3 mmol/L Final     Chloride 04/19/2021 113* 94 - 109 mmol/L Final     Carbon Dioxide 04/19/2021 25  20 - 32 mmol/L Final     Anion Gap 04/19/2021 5  3 - 14 mmol/L Final     Glucose 04/19/2021 82  70 - 99 mg/dL Final     Urea Nitrogen 04/19/2021 10  7 -  30 mg/dL Final     Creatinine 04/19/2021 0.77  0.52 - 1.04 mg/dL Final     GFR Estimate 04/19/2021 >90  >60 mL/min/[1.73_m2] Final    Comment: Non  GFR Calc  Starting 12/18/2018, serum creatinine based estimated GFR (eGFR) will be   calculated using the Chronic Kidney Disease Epidemiology Collaboration   (CKD-EPI) equation.       GFR Estimate If Black 04/19/2021 >90  >60 mL/min/[1.73_m2] Final    Comment:  GFR Calc  Starting 12/18/2018, serum creatinine based estimated GFR (eGFR) will be   calculated using the Chronic Kidney Disease Epidemiology Collaboration   (CKD-EPI) equation.       Calcium 04/19/2021 8.3* 8.5 - 10.1 mg/dL Final     WBC 04/19/2021 11.0  4.0 - 11.0 10e9/L Final     RBC Count 04/19/2021 5.03  3.8 - 5.2 10e12/L Final     Hemoglobin 04/19/2021 14.0  11.7 - 15.7 g/dL Final     Hematocrit 04/19/2021 41.9  35.0 - 47.0 % Final     MCV 04/19/2021 83  78 - 100 fl Final     MCH 04/19/2021 27.8  26.5 - 33.0 pg Final     MCHC 04/19/2021 33.4  31.5 - 36.5 g/dL Final     RDW 04/19/2021 13.2  10.0 - 15.0 % Final     Platelet Count 04/19/2021 343  150 - 450 10e9/L Final     Diff Method 04/19/2021 Automated Method   Final     % Neutrophils 04/19/2021 62.3  % Final     % Lymphocytes 04/19/2021 23.3  % Final     % Monocytes 04/19/2021 9.5  % Final     % Eosinophils 04/19/2021 4.0  % Final     % Basophils 04/19/2021 0.5  % Final     % Immature Granulocytes 04/19/2021 0.4  % Final     Nucleated RBCs 04/19/2021 0  0 /100 Final     Absolute Neutrophil 04/19/2021 6.9  1.6 - 8.3 10e9/L Final     Absolute Lymphocytes 04/19/2021 2.6  0.8 - 5.3 10e9/L Final     Absolute Monocytes 04/19/2021 1.0  0.0 - 1.3 10e9/L Final     Absolute Eosinophils 04/19/2021 0.4  0.0 - 0.7 10e9/L Final     Absolute Basophils 04/19/2021 0.1  0.0 - 0.2 10e9/L Final     Abs Immature Granulocytes 04/19/2021 0.0  0 - 0.4 10e9/L Final     Absolute Nucleated RBC 04/19/2021 0.0   Final     CRP Inflammation 04/19/2021 3.6   0.0 - 8.0 mg/L Final     D Dimer 04/19/2021 0.3  0.0 - 0.50 ug/ml FEU Final    Comment: This D-dimer assay is intended for use in conjunction with a clinical pretest   probability assessment model to exclude pulmonary embolism (PE) and deep   venous thrombosis (DVT) in outpatients suspected of PE or DVT. The cut-off   value is 0.5 ug/mL FEU.                   Video-Visit Details    Type of service:  Video Visit    Video End Time:1:33 PM    Originating Location (pt. Location): Home    Distant Location (provider location):  [facility]     Platform used for Video Visit: [employer]

## 2022-01-11 NOTE — PATIENT INSTRUCTIONS
1) Start flonase 2 sprays each nostril once daily  2) Start claritin 10mg once daily  3) Schedule virtual follow up in about 2 weeks. If persistent symptoms, then additional evaluation is needed.   If worse in the meantime, let us know right away.

## 2022-01-13 ENCOUNTER — APPOINTMENT (OUTPATIENT)
Dept: GENERAL RADIOLOGY | Facility: CLINIC | Age: 27
End: 2022-01-13
Attending: EMERGENCY MEDICINE
Payer: COMMERCIAL

## 2022-01-13 ENCOUNTER — HOSPITAL ENCOUNTER (EMERGENCY)
Facility: CLINIC | Age: 27
Discharge: HOME | End: 2022-01-13
Attending: EMERGENCY MEDICINE | Admitting: EMERGENCY MEDICINE
Payer: COMMERCIAL

## 2022-01-13 VITALS
RESPIRATION RATE: 20 BRPM | SYSTOLIC BLOOD PRESSURE: 165 MMHG | WEIGHT: 275 LBS | TEMPERATURE: 98.9 F | HEART RATE: 110 BPM | OXYGEN SATURATION: 98 % | DIASTOLIC BLOOD PRESSURE: 99 MMHG

## 2022-01-13 DIAGNOSIS — U07.1 SARS-COV-2 POSITIVE: ICD-10-CM

## 2022-01-13 LAB
FLUAV RNA SPEC QL NAA+PROBE: NEGATIVE
FLUBV RNA RESP QL NAA+PROBE: NEGATIVE
SARS-COV-2 RNA RESP QL NAA+PROBE: POSITIVE

## 2022-01-13 PROCEDURE — 71045 X-RAY EXAM CHEST 1 VIEW: CPT

## 2022-01-13 PROCEDURE — 99284 EMERGENCY DEPT VISIT MOD MDM: CPT | Performed by: EMERGENCY MEDICINE

## 2022-01-13 PROCEDURE — 87636 SARSCOV2 & INF A&B AMP PRB: CPT | Performed by: EMERGENCY MEDICINE

## 2022-01-13 PROCEDURE — C9803 HOPD COVID-19 SPEC COLLECT: HCPCS | Performed by: EMERGENCY MEDICINE

## 2022-01-13 PROCEDURE — 99284 EMERGENCY DEPT VISIT MOD MDM: CPT | Mod: 25 | Performed by: EMERGENCY MEDICINE

## 2022-01-13 NOTE — ED PROVIDER NOTES
History     Chief Complaint   Patient presents with     Cough     HPI  Lidia Gallegos is a 26 year old female who presents with continued cough.  States that dates back off-and-on to December 21.  She has been tried on antibiotics with Augmentin, prednisone, Flonase nasal spray.  Continues to have a dry nonproductive cough.  Occasionally produces a scant amount of white to clear phlegm.  Still having chills and fatigue.  Is vaccinated against COVID.    Allergies:  No Known Allergies    Problem List:    There are no problems to display for this patient.       Past Medical History:    No past medical history on file.    Past Surgical History:    No past surgical history on file.    Family History:    No family history on file.    Social History:  Marital Status:   [2]  Social History     Tobacco Use     Smoking status: Never Smoker     Smokeless tobacco: Never Used   Substance Use Topics     Alcohol use: Yes     Comment: occ     Drug use: Never        Medications:    albuterol (PROAIR HFA/PROVENTIL HFA/VENTOLIN HFA) 108 (90 Base) MCG/ACT Inhaler  ibuprofen (ADVIL/MOTRIN) 200 MG tablet  ipratropium - albuterol 0.5 mg/2.5 mg/3 mL (DUONEB) 0.5-2.5 (3) MG/3ML neb solution  methylPREDNISolone (MEDROL DOSEPAK) 4 MG tablet therapy pack  omeprazole (PRILOSEC) 20 MG DR capsule          Review of Systems   All other systems reviewed and are negative.      Physical Exam   BP: 139/79  Pulse: 120  Temp: 98.9  F (37.2  C)  Resp: 24  Weight: 124.7 kg (275 lb)  SpO2: 98 %      Physical Exam  Vitals and nursing note reviewed.   Constitutional:       Appearance: She is obese. She is not ill-appearing.   HENT:      Right Ear: Tympanic membrane normal.      Left Ear: Tympanic membrane normal.      Nose: Congestion and rhinorrhea present.      Mouth/Throat:      Mouth: Mucous membranes are moist.      Pharynx: No oropharyngeal exudate or posterior oropharyngeal erythema.   Eyes:      Conjunctiva/sclera: Conjunctivae normal.       Pupils: Pupils are equal, round, and reactive to light.   Cardiovascular:      Rate and Rhythm: Tachycardia present.      Heart sounds: No murmur heard.      Pulmonary:      Effort: Pulmonary effort is normal.      Breath sounds: Normal breath sounds. No wheezing, rhonchi or rales.   Chest:      Chest wall: No tenderness.   Musculoskeletal:      Cervical back: Normal range of motion and neck supple.   Lymphadenopathy:      Cervical: No cervical adenopathy.   Skin:     General: Skin is warm and dry.      Capillary Refill: Capillary refill takes less than 2 seconds.   Neurological:      General: No focal deficit present.      Mental Status: She is alert and oriented to person, place, and time.   Psychiatric:         Mood and Affect: Mood normal.         Behavior: Behavior normal.           ED Course                 Procedures                Results for orders placed or performed during the hospital encounter of 01/13/22 (from the past 24 hour(s))   Symptomatic; Unknown Influenza A/B & SARS-CoV2 (COVID-19) Virus PCR Multiplex Nose    Specimen: Nose; Swab   Result Value Ref Range    Influenza A PCR Negative Negative    Influenza B PCR Negative Negative    SARS CoV2 PCR Positive (A) Negative    Narrative    Testing was performed using the ankit SARS-CoV-2 & Influenza A/B Assay on the ankit Latricia System. This test should be ordered for the detection of SARS-CoV-2 and influenza viruses in individuals who meet clinical and/or epidemiological criteria. Test performance is unknown in asymptomatic patients. This test is for in vitro diagnostic use under the FDA EUA for laboratories certified under CLIA to perform moderate and/or high complexity testing. This test has not been FDA cleared or approved. A negative result does not rule out the presence of PCR inhibitors in the specimen or target RNA in concentration below the limit of detection for the assay. If only one viral target is positive but coinfection with multiple  targets is suspected, the sample should be re-tested with another FDA cleared, approved or authorized test, if coinfection would change clinical management. [facility] are certified under the Clinical Laboratory Improvement Amendments of 1988 (CLIA-88) as  qualified to perform moderate and/or high complexity laboratory testing.   XR Chest Port 1 View    Narrative    CHEST ONE VIEW  1/13/2022 11:02 AM     HISTORY: Cough x30 days    COMPARISON: None.      Impression    IMPRESSION: No acute disease.    CHAS MELGAR MD         SYSTEM ID:  [id number]       Medications - No data to display    Assessments & Plan (with Medical Decision Making)  Congestion with cough dating back to December 21.  Symptoms been off and on.  Recent recurrence.  She has been on Augmentin, prednisone, Claritin, Flonase nasal spray.  Today she had a fair moderate nasal congestion.  Infra turbinates are boggy.  Cough appear to be primarily postnasal drip.  Her lungs are clear throughout auscultation.  Chest x-ray showed no acute disease and her COVID PCR screening was positive.      Anticipatory guidance and care discussed with patient regarding COVID positive status per       I have reviewed the nursing notes.    I have reviewed the findings, diagnosis, plan and need for follow up with the patient.      New Prescriptions    No medications on file       Final diagnoses:   SARS-CoV-2 positive       1/13/2022   [facility] EMERGENCY DEPT     Buzz Baig,   01/13/22 1220

## 2022-01-13 NOTE — DISCHARGE INSTRUCTIONS
Your chest x-ray does not show any COVID pneumonia.  Your chest x-ray was normal.  COVID PCR test results are positive.  Your lingering cough and congestion is secondary to such.    Continue to rest, cover your cough, wear a mask, wash your hands frequently.  Continue with Flonase nasal spray for sinus inflammation and congestion.  No antibiotics are necessary.  No additional steroids.

## 2022-01-13 NOTE — ED TRIAGE NOTES
Patient presents with month long history of upper respiratory problems, multiple visits, meds etc. She reports since finishing her antibiotics she has continued with cough and now fever X 2 days. She feels SOA. Dipti Bae RN

## 2022-01-17 ENCOUNTER — HOSPITAL ENCOUNTER (EMERGENCY)
Facility: CLINIC | Age: 27
Discharge: HOME | End: 2022-01-17
Attending: EMERGENCY MEDICINE | Admitting: EMERGENCY MEDICINE
Payer: COMMERCIAL

## 2022-01-17 VITALS
OXYGEN SATURATION: 98 % | RESPIRATION RATE: 22 BRPM | BODY MASS INDEX: 39.06 KG/M2 | HEART RATE: 112 BPM | SYSTOLIC BLOOD PRESSURE: 147 MMHG | TEMPERATURE: 98.4 F | HEIGHT: 71 IN | DIASTOLIC BLOOD PRESSURE: 95 MMHG | WEIGHT: 279 LBS

## 2022-01-17 DIAGNOSIS — J01.00 ACUTE NON-RECURRENT MAXILLARY SINUSITIS: ICD-10-CM

## 2022-01-17 DIAGNOSIS — U07.1 INFECTION DUE TO 2019 NOVEL CORONAVIRUS: ICD-10-CM

## 2022-01-17 PROCEDURE — 99284 EMERGENCY DEPT VISIT MOD MDM: CPT | Performed by: EMERGENCY MEDICINE

## 2022-01-17 PROCEDURE — 99283 EMERGENCY DEPT VISIT LOW MDM: CPT | Performed by: EMERGENCY MEDICINE

## 2022-01-17 ASSESSMENT — MIFFLIN-ST. JEOR: SCORE: 2096.67

## 2022-01-17 NOTE — ED TRIAGE NOTES
Has been having shortness of breath since about 1230 today, has been sick since last Monday. Tested positive for covid on Thursday.

## 2022-01-17 NOTE — DISCHARGE INSTRUCTIONS
COVID infection:  -Continue to rest  -Continue to maintain good hydration  -Return if you start having increasing shortness of breath  -Continue to isolate the remainder of this week.  If you have to go out wear a mask    Sinusitis:  -Recommend treating with antibiotic therapy.  Augmentin 875 mg twice daily for a week.    -Continue with your Flonase nasal spray  -Mucinex

## 2022-01-17 NOTE — ED PROVIDER NOTES
"  History     Chief Complaint   Patient presents with     Shortness of Breath     HPI  Lidia Gallegos is a 26 year old female who presents with known COVID-positive status.  She was confirmed on the 13th in the emergency department at [facility].  Came today with sinus pressure, purulent nasal drainage.  Today she had coughing event where she was clearing phlegm from postnasal drip and had a hard time catching her breath.  She came in wanting reassurance that she is not having any worsening of her COVID symptoms.  Reports no fever.  Cough for most part has been minimal.  Appetite good.  Staying well-hydrated.  High resting.  Working from home part-time while she remains in isolation.  Chest x-ray on January 13 was- clear    Allergies:  No Known Allergies    Problem List:    There are no problems to display for this patient.       Past Medical History:    History reviewed. No pertinent past medical history.    Past Surgical History:    History reviewed. No pertinent surgical history.    Family History:    No family history on file.    Social History:  Marital Status:   [2]  Social History     Tobacco Use     Smoking status: Never Smoker     Smokeless tobacco: Never Used   Substance Use Topics     Alcohol use: Yes     Comment: occ     Drug use: Never        Medications:    amoxicillin-clavulanate (AUGMENTIN) 875-125 MG tablet  albuterol (PROAIR HFA/PROVENTIL HFA/VENTOLIN HFA) 108 (90 Base) MCG/ACT Inhaler  omeprazole (PRILOSEC) 20 MG DR capsule          Review of Systems   All other systems reviewed and are negative.      Physical Exam   BP: (!) 147/95  Pulse: 112  Temp: 98.4  F (36.9  C)  Resp: 22  Height: 180.3 cm (5' 11\")  Weight: 126.6 kg (279 lb)  SpO2: 98 %      Physical Exam  Vitals and nursing note reviewed.   Constitutional:       Appearance: She is obese. She is not ill-appearing.   HENT:      Nose: Mucosal edema present.      Right Turbinates: Swollen.      Left Turbinates: Swollen.      " Right Sinus: Maxillary sinus tenderness present.      Left Sinus: Maxillary sinus tenderness present.      Mouth/Throat:      Mouth: Mucous membranes are moist.      Pharynx: No pharyngeal swelling or oropharyngeal exudate.   Eyes:      Extraocular Movements: Extraocular movements intact.      Pupils: Pupils are equal, round, and reactive to light.   Neck:      Vascular: No JVD.   Cardiovascular:      Rate and Rhythm: Regular rhythm.      Heart sounds: No murmur heard.      Pulmonary:      Effort: Pulmonary effort is normal.      Breath sounds: No decreased breath sounds, rhonchi or rales.      Comments: Hyperventilating when she first arrived  Musculoskeletal:      Cervical back: Neck supple.      Right lower leg: No tenderness. No edema.      Left lower leg: No tenderness. No edema.   Neurological:      Mental Status: She is alert.      Comments: Facial and hand paresthesias secondary to hyperventilation.  Had a lot of carpal spasms do the same otherwise neuro exam normal   Psychiatric:         Mood and Affect: Mood is anxious.         Behavior: Behavior normal.         ED Course            Procedures                  No results found for this or any previous visit (from the past 24 hour(s)).    Medications - No data to display    Assessments & Plan (with Medical Decision Making)  Covid + without complications  Secondary sinus infection- treated with Augmentin     I have reviewed the nursing notes.    I have reviewed the findings, diagnosis, plan and need for follow up with the patient.      Discharge Medication List as of 1/17/2022  2:50 PM      START taking these medications    Details   amoxicillin-clavulanate (AUGMENTIN) 875-125 MG tablet Take 1 tablet by mouth 2 times daily for 7 days, Disp-14 tablet, R-0, E-Prescribe             Final diagnoses:   Infection due to 2019 novel coronavirus   Acute non-recurrent maxillary sinusitis       1/17/2022   [facility] EMERGENCY DEPT     Buzz Baig  DO Dorian  01/17/22 0017

## 2022-03-15 ENCOUNTER — HOSPITAL ENCOUNTER (EMERGENCY)
Facility: CLINIC | Age: 27
Discharge: HOME | End: 2022-03-15
Attending: EMERGENCY MEDICINE | Admitting: EMERGENCY MEDICINE
Payer: MEDICAID

## 2022-03-15 VITALS
OXYGEN SATURATION: 98 % | HEART RATE: 102 BPM | RESPIRATION RATE: 20 BRPM | SYSTOLIC BLOOD PRESSURE: 154 MMHG | WEIGHT: 281.7 LBS | TEMPERATURE: 99.1 F | BODY MASS INDEX: 39.29 KG/M2 | DIASTOLIC BLOOD PRESSURE: 91 MMHG

## 2022-03-15 DIAGNOSIS — M54.50 ACUTE BILATERAL LOW BACK PAIN WITHOUT SCIATICA: ICD-10-CM

## 2022-03-15 LAB
ALBUMIN UR-MCNC: 30 MG/DL
APPEARANCE UR: ABNORMAL
BACTERIA #/AREA URNS HPF: ABNORMAL /HPF
BILIRUB UR QL STRIP: NEGATIVE
COLOR UR AUTO: YELLOW
GLUCOSE UR STRIP-MCNC: NEGATIVE MG/DL
HCG UR QL: NEGATIVE
HGB UR QL STRIP: NEGATIVE
KETONES UR STRIP-MCNC: NEGATIVE MG/DL
LEUKOCYTE ESTERASE UR QL STRIP: ABNORMAL
MUCOUS THREADS #/AREA URNS LPF: PRESENT /LPF
NITRATE UR QL: NEGATIVE
PH UR STRIP: 5 [PH] (ref 5–7)
RBC URINE: 0 /HPF
SP GR UR STRIP: 1.03 (ref 1–1.03)
SQUAMOUS EPITHELIAL: 7 /HPF
UROBILINOGEN UR STRIP-MCNC: NORMAL MG/DL
WBC URINE: 4 /HPF

## 2022-03-15 PROCEDURE — 81003 URINALYSIS AUTO W/O SCOPE: CPT | Performed by: EMERGENCY MEDICINE

## 2022-03-15 PROCEDURE — 99282 EMERGENCY DEPT VISIT SF MDM: CPT | Performed by: EMERGENCY MEDICINE

## 2022-03-15 PROCEDURE — 81025 URINE PREGNANCY TEST: CPT | Performed by: EMERGENCY MEDICINE

## 2022-03-15 PROCEDURE — 99283 EMERGENCY DEPT VISIT LOW MDM: CPT | Performed by: EMERGENCY MEDICINE

## 2022-03-15 RX ORDER — CYCLOBENZAPRINE HCL 10 MG
10 TABLET ORAL 3 TIMES DAILY PRN
Qty: 20 TABLET | Refills: 0 | Status: SHIPPED | OUTPATIENT
Start: 2022-03-15 | End: 2022-03-21

## 2022-03-15 RX ORDER — TRAMADOL HYDROCHLORIDE 50 MG/1
50-100 TABLET ORAL EVERY 6 HOURS PRN
Qty: 20 TABLET | Refills: 0 | Status: SHIPPED | OUTPATIENT
Start: 2022-03-15 | End: 2022-03-18

## 2022-03-15 RX ORDER — LANSOPRAZOLE 30 MG/1
30 CAPSULE, DELAYED RELEASE ORAL DAILY
COMMUNITY
Start: 2022-02-14 | End: 2023-08-29

## 2022-03-15 RX ORDER — IBUPROFEN 200 MG
800 TABLET ORAL EVERY 8 HOURS PRN
COMMUNITY
End: 2022-04-28

## 2022-03-15 NOTE — ED TRIAGE NOTES
Pt reports low back pain, mostly on the left but does go to right some, has hx of degenerative disc disease and feels she is having a flare, she was unable to get out of bed for an hour this am

## 2022-03-15 NOTE — DISCHARGE INSTRUCTIONS
May use ibuprofen up to 600 mg 4 times a day.  May also use Tylenol up to 1000 mg 4 times a day.  May try Salonpas patches to the area.

## 2022-03-15 NOTE — ED PROVIDER NOTES
History     Chief Complaint   Patient presents with     Back Pain     HPI  Lidia Gallegos is a 26 year old female who presents for evaluation of low back pain.  This is been present for 10 years but worse this morning.  Hurts to move.  There is been no recent trauma.  No fever.  No dysuria.  No loss of bowel or bladder.  Legs to work.  She tried ibuprofen this morning without relief.  Diagnosis previous degenerative disc disease.    Allergies:  No Known Allergies    Problem List:    There are no problems to display for this patient.       Past Medical History:    History reviewed. No pertinent past medical history.    Past Surgical History:    History reviewed. No pertinent surgical history.    Family History:    History reviewed. No pertinent family history.    Social History:  Marital Status:   [2]  Social History     Tobacco Use     Smoking status: Never Smoker     Smokeless tobacco: Never Used   Substance Use Topics     Alcohol use: Yes     Comment: occ     Drug use: Never        Medications:    albuterol (PROAIR HFA/PROVENTIL HFA/VENTOLIN HFA) 108 (90 Base) MCG/ACT Inhaler  ascorbic acid 1000 MG TABS tablet  cyclobenzaprine (FLEXERIL) 10 MG tablet  ibuprofen (ADVIL/MOTRIN) 200 MG tablet  LANsoprazole (PREVACID) 30 MG DR capsule  traMADol (ULTRAM) 50 MG tablet  omeprazole (PRILOSEC) 20 MG DR capsule          Review of Systems  All other systems are reviewed and are negative    Physical Exam   BP: (!) 157/97  Pulse: 104  Temp: 99.1  F (37.3  C)  Resp: 20  Weight: 127.8 kg (281 lb 11.2 oz)  SpO2: 98 %      Physical Exam  Vitals and nursing note reviewed.   Constitutional:       General: She is not in acute distress.     Appearance: She is well-developed. She is not diaphoretic.   HENT:      Head: Normocephalic and atraumatic.   Eyes:      General: No scleral icterus.     Pupils: Pupils are equal, round, and reactive to light.   Cardiovascular:      Rate and Rhythm: Normal rate and regular rhythm.       Heart sounds: Normal heart sounds. No murmur heard.  Pulmonary:      Effort: No respiratory distress.      Breath sounds: No stridor. No wheezing or rales.   Abdominal:      Palpations: Abdomen is soft.      Tenderness: There is no abdominal tenderness.   Musculoskeletal:      Cervical back: Normal range of motion and neck supple.      Comments: Some tenderness in the bilateral lumbar musculature of the low back.  No ecchymosis, swelling or deformity.  Strength intact in lower extremities   Skin:     General: Skin is warm and dry.      Coloration: Skin is not pale.      Findings: No erythema or rash.   Neurological:      Mental Status: She is alert.         ED Course                 Procedures              Critical Care time:  none               Results for orders placed or performed during the hospital encounter of 03/15/22 (from the past 24 hour(s))   UA with Microscopic reflex to Culture    Specimen: Urine, Clean Catch   Result Value Ref Range    Color Urine Yellow Colorless, Straw, Light Yellow, Yellow    Appearance Urine Slightly Cloudy (A) Clear    Glucose Urine Negative Negative mg/dL    Bilirubin Urine Negative Negative    Ketones Urine Negative Negative mg/dL    Specific Gravity Urine 1.034 1.003 - 1.035    Blood Urine Negative Negative    pH Urine 5.0 5.0 - 7.0    Protein Albumin Urine 30  (A) Negative mg/dL    Urobilinogen Urine Normal Normal, 2.0 mg/dL    Nitrite Urine Negative Negative    Leukocyte Esterase Urine Small (A) Negative    Bacteria Urine Few (A) None Seen /HPF    Mucus Urine Present (A) None Seen /LPF    RBC Urine 0 <=2 /HPF    WBC Urine 4 <=5 /HPF    Squamous Epithelials Urine 7 (H) <=1 /HPF    Narrative    Urine Culture not indicated   HCG qualitative urine (UPT)   Result Value Ref Range    hCG Urine Qualitative Negative Negative       Medications - No data to display    Assessments & Plan (with Medical Decision Making)  26-year-old female with low back pain- acute exacerbation of chronic  pain.  No prescribed tramadol and Flexeril as needed.  Red flag symptoms.  No trauma.  Urine does not appear infected.  Follow-up in primary care as needed if not improving in 1 week.  Return anytime sooner if condition worsens or other concern     I have reviewed the nursing notes.    I have reviewed the findings, diagnosis, plan and need for follow up with the patient.       New Prescriptions    CYCLOBENZAPRINE (FLEXERIL) 10 MG TABLET    Take 1 tablet (10 mg) by mouth 3 times daily as needed for muscle spasms    TRAMADOL (ULTRAM) 50 MG TABLET    Take 1-2 tablets ( mg) by mouth every 6 hours as needed for severe pain       Final diagnoses:   Acute bilateral low back pain without sciatica       3/15/2022   [facility] EMERGENCY DEPT     Jim Bear MD  03/15/22 [id number]

## 2022-04-13 ENCOUNTER — HOSPITAL ENCOUNTER (EMERGENCY)
Facility: CLINIC | Age: 27
Discharge: HOME | End: 2022-04-13
Attending: EMERGENCY MEDICINE | Admitting: EMERGENCY MEDICINE
Payer: MEDICAID

## 2022-04-13 ENCOUNTER — APPOINTMENT (OUTPATIENT)
Dept: GENERAL RADIOLOGY | Facility: CLINIC | Age: 27
End: 2022-04-13
Attending: EMERGENCY MEDICINE
Payer: MEDICAID

## 2022-04-13 VITALS
WEIGHT: 281.7 LBS | DIASTOLIC BLOOD PRESSURE: 95 MMHG | TEMPERATURE: 97 F | RESPIRATION RATE: 16 BRPM | HEART RATE: 84 BPM | OXYGEN SATURATION: 100 % | SYSTOLIC BLOOD PRESSURE: 138 MMHG | BODY MASS INDEX: 39.29 KG/M2

## 2022-04-13 DIAGNOSIS — R06.02 SHORTNESS OF BREATH: ICD-10-CM

## 2022-04-13 LAB
ALBUMIN SERPL-MCNC: 3.2 G/DL (ref 3.4–5)
ALP SERPL-CCNC: 93 U/L (ref 40–150)
ALT SERPL W P-5'-P-CCNC: 67 U/L (ref 0–50)
ANION GAP SERPL CALCULATED.3IONS-SCNC: 7 MMOL/L (ref 3–14)
AST SERPL W P-5'-P-CCNC: 29 U/L (ref 0–45)
BASOPHILS # BLD AUTO: 0.1 10E3/UL (ref 0–0.2)
BASOPHILS NFR BLD AUTO: 1 %
BILIRUB SERPL-MCNC: 0.7 MG/DL (ref 0.2–1.3)
BUN SERPL-MCNC: 9 MG/DL (ref 7–30)
CALCIUM SERPL-MCNC: 8.5 MG/DL (ref 8.5–10.1)
CHLORIDE BLD-SCNC: 110 MMOL/L (ref 94–109)
CO2 SERPL-SCNC: 24 MMOL/L (ref 20–32)
CREAT SERPL-MCNC: 0.73 MG/DL (ref 0.52–1.04)
D DIMER PPP FEU-MCNC: 0.4 UG/ML FEU (ref 0–0.5)
EOSINOPHIL # BLD AUTO: 0.4 10E3/UL (ref 0–0.7)
EOSINOPHIL NFR BLD AUTO: 3 %
ERYTHROCYTE [DISTWIDTH] IN BLOOD BY AUTOMATED COUNT: 13.8 % (ref 10–15)
GFR SERPL CREATININE-BSD FRML MDRD: >90 ML/MIN/1.73M2
GLUCOSE BLD-MCNC: 100 MG/DL (ref 70–99)
HCT VFR BLD AUTO: 43 % (ref 35–47)
HGB BLD-MCNC: 14.7 G/DL (ref 11.7–15.7)
IMM GRANULOCYTES # BLD: 0.1 10E3/UL
IMM GRANULOCYTES NFR BLD: 0 %
LIPASE SERPL-CCNC: 77 U/L (ref 73–393)
LYMPHOCYTES # BLD AUTO: 2.7 10E3/UL (ref 0.8–5.3)
LYMPHOCYTES NFR BLD AUTO: 22 %
MCH RBC QN AUTO: 27.8 PG (ref 26.5–33)
MCHC RBC AUTO-ENTMCNC: 34.2 G/DL (ref 31.5–36.5)
MCV RBC AUTO: 81 FL (ref 78–100)
MONOCYTES # BLD AUTO: 0.9 10E3/UL (ref 0–1.3)
MONOCYTES NFR BLD AUTO: 7 %
NEUTROPHILS # BLD AUTO: 8 10E3/UL (ref 1.6–8.3)
NEUTROPHILS NFR BLD AUTO: 67 %
NRBC # BLD AUTO: 0 10E3/UL
NRBC BLD AUTO-RTO: 0 /100
PLATELET # BLD AUTO: 364 10E3/UL (ref 150–450)
POTASSIUM BLD-SCNC: 4 MMOL/L (ref 3.4–5.3)
PROT SERPL-MCNC: 6.5 G/DL (ref 6.8–8.8)
RBC # BLD AUTO: 5.28 10E6/UL (ref 3.8–5.2)
SODIUM SERPL-SCNC: 141 MMOL/L (ref 133–144)
TROPONIN I SERPL HS-MCNC: 8 NG/L
WBC # BLD AUTO: 12 10E3/UL (ref 4–11)

## 2022-04-13 PROCEDURE — 71046 X-RAY EXAM CHEST 2 VIEWS: CPT

## 2022-04-13 PROCEDURE — 80053 COMPREHEN METABOLIC PANEL: CPT | Performed by: EMERGENCY MEDICINE

## 2022-04-13 PROCEDURE — 99285 EMERGENCY DEPT VISIT HI MDM: CPT | Mod: 25 | Performed by: EMERGENCY MEDICINE

## 2022-04-13 PROCEDURE — 84484 ASSAY OF TROPONIN QUANT: CPT | Performed by: EMERGENCY MEDICINE

## 2022-04-13 PROCEDURE — 93005 ELECTROCARDIOGRAM TRACING: CPT | Performed by: EMERGENCY MEDICINE

## 2022-04-13 PROCEDURE — 36415 COLL VENOUS BLD VENIPUNCTURE: CPT | Performed by: EMERGENCY MEDICINE

## 2022-04-13 PROCEDURE — 85025 COMPLETE CBC W/AUTO DIFF WBC: CPT | Performed by: EMERGENCY MEDICINE

## 2022-04-13 PROCEDURE — 93010 ELECTROCARDIOGRAM REPORT: CPT | Performed by: EMERGENCY MEDICINE

## 2022-04-13 PROCEDURE — 83690 ASSAY OF LIPASE: CPT | Performed by: EMERGENCY MEDICINE

## 2022-04-13 PROCEDURE — 85379 FIBRIN DEGRADATION QUANT: CPT | Performed by: EMERGENCY MEDICINE

## 2022-04-13 NOTE — ED TRIAGE NOTES
Pt presents with shortness of breath that started while giving herself enema for constipation 1 hour ago.  Pt states called nurse line and they said to come right in. Pt had some results after enema however still feels bloated. Pt concerned she may be allergic. Pt states skin seems redder. No hives noted.

## 2022-04-13 NOTE — ED PROVIDER NOTES
History     Chief Complaint   Patient presents with     Shortness of Breath     Pt states while giving herself fleets enema pt became short of breath. Pt still feels constipated and feels short of breath.      HPI  History per patient    This is a 27-year-old female, reported history of GERD, asthma presenting with shortness of breath.  Patient states that she has been concerned that she has not been able to completely empty herself of stool recently.  She is having bowel movements but still has been feeling slightly constipated.  She is also noted a lot of acid reflux.  She has taken a lot of different medications to try to manage this including PPIs.  She decided to try an over-the-counter enema, sodium phosphate/saline, tonight.  She was lying on her side after giving herself the enema and noted that her chest began to feel very tight and she had a hard time catching her breath.  She tried changing positions to see if this would improve it but it seemed to continue.  She sat up and had some improvement and then took a cool shower which seemed to improve things even more.  However she still has some sensation of not being able to get a good breath.  She did pass some stool but still has a little bit of stomach discomfort.  She notes she has had history of panic attack in the past but this does not feel the same.  She did not use an inhaler or any medications for breathing during this time.  She does not have any reported cardiac history.  She does not smoke.  She had COVID 2 months ago and notes that when today's symptoms were at its worst it felt similar to when she had COVID.  No other recent illnesses, fevers, chills, vomiting, cough.    Allergies:  Allergies   Allergen Reactions     No Known Allergies        Problem List:    There are no problems to display for this patient.       Past Medical History:    No past medical history on file.    Past Surgical History:    No past surgical history on  file.    Family History:    No family history on file.    Social History:  Marital Status:   [2]  Social History     Tobacco Use     Smoking status: Never Smoker     Smokeless tobacco: Never Used   Substance Use Topics     Alcohol use: Yes     Comment: occ     Drug use: Never        Medications:    ascorbic acid 1000 MG TABS tablet  ibuprofen (ADVIL/MOTRIN) 200 MG tablet  LANsoprazole (PREVACID) 30 MG DR capsule  omeprazole (PRILOSEC) 20 MG DR capsule          Review of Systems   All other ROS reviewed and are negative or non-contributory except as stated in HPI.     Physical Exam   BP: (!) 138/107  Pulse: 100  Temp: 97  F (36.1  C)  Resp: 16  Weight: 127.8 kg (281 lb 11.2 oz)  SpO2: 99 %      Physical Exam  Vitals and nursing note reviewed.   Constitutional:       General: She is not in acute distress.     Appearance: She is not diaphoretic.      Comments: Pleasant, obese, healthy-appearing female sitting upright in the bed.  She is able to speak in full and complete sentences.   HENT:      Head: Normocephalic.      Nose: Nose normal.      Mouth/Throat:      Mouth: Mucous membranes are moist.      Pharynx: Oropharynx is clear.      Comments: No tongue swelling  Eyes:      Extraocular Movements: Extraocular movements intact.      Conjunctiva/sclera: Conjunctivae normal.   Cardiovascular:      Rate and Rhythm: Normal rate and regular rhythm.      Pulses: Normal pulses.      Heart sounds: Normal heart sounds.   Pulmonary:      Effort: Pulmonary effort is normal.      Breath sounds: Normal breath sounds.   Abdominal:      Palpations: Abdomen is soft.      Tenderness: There is no abdominal tenderness.   Musculoskeletal:         General: No tenderness. Normal range of motion.      Cervical back: Normal range of motion and neck supple.      Right lower leg: No edema.      Left lower leg: No edema.   Skin:     General: Skin is warm and dry.      Findings: No rash.   Neurological:      General: No focal deficit  "present.      Mental Status: She is alert and oriented to person, place, and time.   Psychiatric:         Behavior: Behavior normal.      Comments: Mildly anxious         ED Course (with Medical Decision Making)    Pt seen and examined by me.  RN and EPIC notes reviewed.       Patient with sensation of shortness of breath while lying on her side after giving herself an enema.  She was initially concerned with possible allergic reaction although I think this would be very unusual with this sort of enema.  I discussed this with her and noted she had no tongue swelling, no throat swelling, normal breath sounds, 100% oxygen saturations, no significant bowel changes to suggest anaphylaxis.  She does have history of GERD and this may be the actual cause of her symptoms.  She had COVID a couple of months ago and this can put 1 at risk for DVT/PE.  Review of medical record shows that patient has family history of DVT on her father's side.    I am going to err on the side of caution and check labs, EKG.  If D-dimer is elevated plan PE study.    EKG shows normal sinus rhythm with a rate of 82.  There is no ectopy.  No acute ST segment elevation.  Machine reads \"poor R wave progression\".  Mild T wave flattening in lead*3.  Nothing concerning.  No old EKG to compare.  This was read by myself at 1753.    CMP shows very slight elevation in ALT but no other abnormalities, lipase normal.  Troponin is normal.  White count mildly elevated 12.  D-dimer normal.    Chest x-ray was done which shows no infiltrates, normal heart size.    Results discussed with the patient.  We talked about her reflux.  She is under the care of a gastroenterologist so I defer to that provider for her continued symptoms.  With regards to the constipation, recommend MiraLAX or magnesium citrate or similar oral medication.  Drink plenty of fluids.  Consider increasing fiber.  She is currently working with the dietitian so I will defer to the dietitian for " further recommendations.    Follow-up with her provider for continued symptoms and return at anytime for worsening, changes or concerns.   Procedures      Results for orders placed or performed during the hospital encounter of 04/13/22 (from the past 24 hour(s))   D dimer quantitative   Result Value Ref Range    D-Dimer Quantitative 0.40 0.00 - 0.50 ug/mL FEU    Narrative    This D-dimer assay is intended for use in conjunction with a clinical pretest probability assessment model to exclude pulmonary embolism (PE) and deep venous thrombosis (DVT) in outpatients suspected of PE or DVT. The cut-off value is 0.50 ug/mL FEU.   Comprehensive metabolic panel   Result Value Ref Range    Sodium 141 133 - 144 mmol/L    Potassium 4.0 3.4 - 5.3 mmol/L    Chloride 110 (H) 94 - 109 mmol/L    Carbon Dioxide (CO2) 24 20 - 32 mmol/L    Anion Gap 7 3 - 14 mmol/L    Urea Nitrogen 9 7 - 30 mg/dL    Creatinine 0.73 0.52 - 1.04 mg/dL    Calcium 8.5 8.5 - 10.1 mg/dL    Glucose 100 (H) 70 - 99 mg/dL    Alkaline Phosphatase 93 40 - 150 U/L    AST 29 0 - 45 U/L    ALT 67 (H) 0 - 50 U/L    Protein Total 6.5 (L) 6.8 - 8.8 g/dL    Albumin 3.2 (L) 3.4 - 5.0 g/dL    Bilirubin Total 0.7 0.2 - 1.3 mg/dL    GFR Estimate >90 >60 mL/min/1.73m2   Lipase   Result Value Ref Range    Lipase 77 73 - 393 U/L   Troponin I   Result Value Ref Range    Troponin I High Sensitivity 8 <54 ng/L   CBC with platelets differential    Narrative    The following orders were created for panel order CBC with platelets differential.  Procedure                               Abnormality         Status                     ---------                               -----------         ------                     CBC with platelets and d...[[id number]]  Abnormal            Final result                 Please view results for these tests on the individual orders.   CBC with platelets and differential   Result Value Ref Range    WBC Count 12.0 (H) 4.0 - 11.0 10e3/uL    RBC Count 5.28  (H) 3.80 - 5.20 10e6/uL    Hemoglobin 14.7 11.7 - 15.7 g/dL    Hematocrit 43.0 35.0 - 47.0 %    MCV 81 78 - 100 fL    MCH 27.8 26.5 - 33.0 pg    MCHC 34.2 31.5 - 36.5 g/dL    RDW 13.8 10.0 - 15.0 %    Platelet Count 364 150 - 450 10e3/uL    % Neutrophils 67 %    % Lymphocytes 22 %    % Monocytes 7 %    % Eosinophils 3 %    % Basophils 1 %    % Immature Granulocytes 0 %    NRBCs per 100 WBC 0 <1 /100    Absolute Neutrophils 8.0 1.6 - 8.3 10e3/uL    Absolute Lymphocytes 2.7 0.8 - 5.3 10e3/uL    Absolute Monocytes 0.9 0.0 - 1.3 10e3/uL    Absolute Eosinophils 0.4 0.0 - 0.7 10e3/uL    Absolute Basophils 0.1 0.0 - 0.2 10e3/uL    Absolute Immature Granulocytes 0.1 <=0.4 10e3/uL    Absolute NRBCs 0.0 10e3/uL   XR Chest 2 Views    Narrative    EXAM: XR CHEST 2 VW  LOCATION: [facility]  DATE/TIME: 4/13/2022 6:51 PM    INDICATION: Shortness of breath.  COMPARISON: 01/13/2022.      Impression    IMPRESSION: Negative chest. Lungs are clear. Heart size is normal.       Medications - No data to display    Assessments & Plan      I have reviewed the findings, diagnosis, plan and need for follow up with the patient.    Discharge Medication List as of 4/13/2022  7:41 PM          Final diagnoses:   Shortness of breath     Disposition: Patient discharged home in stable condition.  Plan as above.  Return for concerns.     Note: Chart documentation done in part with Dragon Voice Recognition software. Although reviewed after completion, some word and grammatical errors may remain.     4/13/2022   [facility] EMERGENCY DEPT     Joellen Young MD  04/14/22 0121

## 2022-04-14 NOTE — DISCHARGE INSTRUCTIONS
Your chest x-ray is clear.    I do not think this was an allergic reaction, but if you are concerned I would not use that version of an enema in the future.  If you are having issues with constipation I recommend trying MiraLAX orally or magnesium citrate.    Follow-up with your primary care provider for continued symptoms.  Return for significant new, changes or concerns.    I hope that you feel better and enjoy the rest of the week!!

## 2022-04-26 ENCOUNTER — OFFICE VISIT (OUTPATIENT)
Dept: FAMILY MEDICINE | Facility: CLINIC | Age: 27
End: 2022-04-26
Payer: MEDICAID

## 2022-04-26 ENCOUNTER — HOSPITAL ENCOUNTER (OUTPATIENT)
Dept: GENERAL RADIOLOGY | Facility: CLINIC | Age: 27
Discharge: HOME | End: 2022-04-26
Attending: FAMILY MEDICINE | Admitting: FAMILY MEDICINE
Payer: MEDICAID

## 2022-04-26 VITALS
HEART RATE: 136 BPM | SYSTOLIC BLOOD PRESSURE: 132 MMHG | WEIGHT: 280 LBS | TEMPERATURE: 97.6 F | DIASTOLIC BLOOD PRESSURE: 82 MMHG | OXYGEN SATURATION: 99 % | BODY MASS INDEX: 39.05 KG/M2

## 2022-04-26 DIAGNOSIS — Z97.5 NEXPLANON IN PLACE: ICD-10-CM

## 2022-04-26 DIAGNOSIS — Z97.5 NEXPLANON IN PLACE: Primary | ICD-10-CM

## 2022-04-26 PROCEDURE — 73060 X-RAY EXAM OF HUMERUS: CPT | Mod: LT

## 2022-04-26 PROCEDURE — 99214 OFFICE O/P EST MOD 30 MIN: CPT | Performed by: FAMILY MEDICINE

## 2022-04-26 ASSESSMENT — PAIN SCALES - GENERAL: PAINLEVEL: NO PAIN (0)

## 2022-04-26 NOTE — PROGRESS NOTES
"  Assessment & Plan     ASSESSMENT/ORDERS:    ICD-10-CM    1. Nexplanon in place  Z97.5 XR Humerus Left G/E 2 Views     PLAN:  1.  I was unable to palpate patient's Nexplanon today.  I verified this with another colleague who also was unable to palpate device.  Therefore, I am unable to remove it.  I recommended x-ray today to verify that it is physically still in the intended place of the left upper extremity.  If seen on x-ray, will refer to surgery for discussion on plan for removal is it was likely placed too deep.             BMI:   Estimated body mass index is 39.05 kg/m  as calculated from the following:    Height as of 1/17/22: 1.803 m (5' 11\").    Weight as of this encounter: 127 kg (280 lb).           No follow-ups on file.    Deion Price MD  [facility] CenterMILLICENT Murillo is a 27 year old who presents for the following health issues     History of Present Illness       Reason for visit:  Taking out nexplanon    She eats 0-1 servings of fruits and vegetables daily.She consumes 1 sweetened beverage(s) daily.She exercises with enough effort to increase her heart rate 20 to 29 minutes per day.  She exercises with enough effort to increase her heart rate 4 days per week. She is missing 7 dose(s) of medications per week.             Review of Systems         Objective    /82   Pulse (!) 136   Temp 97.6  F (36.4  C) (Temporal)   Wt 127 kg (280 lb)   LMP 03/01/2022   SpO2 99%   BMI 39.05 kg/m    Body mass index is 39.05 kg/m .  Physical Exam  Musculoskeletal:      Comments: Area of previously placed Nexplanon device inspected.  Arm palpated and no Nexplanon device was felt.  Scar where device was placed was noted in typical location of recommended device insertion.                        "

## 2022-04-28 DIAGNOSIS — Z97.5 NEXPLANON IN PLACE: Primary | ICD-10-CM

## 2022-04-28 NOTE — RESULT ENCOUNTER NOTE
Lidia,  Your results do indeed show that your Nexplanon device is in your arm.  As we discussed at your visit, since I cannot feel it, I will need to send you to one our surgeons to discuss removal.  I placed referral and you will need to set up an appointment for evaluation and discussion.  They will let you know what the next steps are in removal.  Please let me know if you have any questions.    Sincerely,  Dr. Price

## 2022-05-09 ENCOUNTER — VIRTUAL VISIT (OUTPATIENT)
Dept: FAMILY MEDICINE | Facility: CLINIC | Age: 27
End: 2022-05-09
Payer: COMMERCIAL

## 2022-05-09 DIAGNOSIS — G89.29 CHRONIC BILATERAL LOW BACK PAIN, UNSPECIFIED WHETHER SCIATICA PRESENT: Primary | ICD-10-CM

## 2022-05-09 DIAGNOSIS — M54.50 CHRONIC BILATERAL LOW BACK PAIN, UNSPECIFIED WHETHER SCIATICA PRESENT: Primary | ICD-10-CM

## 2022-05-09 PROBLEM — F41.8 ANXIETY WITH DEPRESSION: Status: ACTIVE | Noted: 2017-11-15

## 2022-05-09 PROBLEM — Z97.5 NEXPLANON IN PLACE: Status: ACTIVE | Noted: 2019-09-17

## 2022-05-09 PROBLEM — F84.5 ASPERGER'S SYNDROME: Status: ACTIVE | Noted: 2017-11-15

## 2022-05-09 PROCEDURE — 99213 OFFICE O/P EST LOW 20 MIN: CPT | Mod: GT | Performed by: PHYSICIAN ASSISTANT

## 2022-05-09 NOTE — PROGRESS NOTES
Lidia is a 27 year old who is being evaluated via a billable video visit.      How would you like to obtain your AVS? MyChart  If the video visit is dropped, the invitation should be resent by: Text to cell phone: [phone]  Will anyone else be joining your video visit? No    Video Start Time: Start: 05/09/2022 11:32 am  Stop: 05/09/2022 11:41 am    Assessment & Plan     Chronic bilateral low back pain, unspecified whether sciatica present  Patient has been struggling with chronic back pain for many years. She had initially been following with [facility]. Unfortunately, I do not see that the previous MRI scans are available in care everywhere.I do see two lumbar xrays which are normal. The patient is interested in meeting with PT to work on her low back pain prior to pursuing any additional imaging or other workup. I am in support of this request and have placed the order. I also attached back care tips to her AVS. She will reach out if not improving as expected.    - Physical Therapy Referral; Future    Bin Johnson PA-C  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  Did not know much about safe lifting methods  Per the patient this caused severe muscle spasms   Seen at an ED following this first episode   She had been working with [facility] over the years to repeat imaging and completed several sessions PT/chiro care   She has been told that she has degenerative disc disease  Has not needed PT or similar therapy for the past 3 years  Was sedentary over the covid years, both at work and at home  Reception and phone management at work  Did not continue the home therapy exercises   Had an episode of spasm and increased  pain about 1 month ago   Patient was seen at [facility] on 03/15   Treated with flexeril ands tramadol     Recently   Interested in removal of nexaplanon due to desire to become pregnant    Review of Systems   Constitutional, HEENT, cardiovascular, pulmonary, gi and gu systems are negative, except as otherwise noted.      Objective           Vitals:  No vitals were obtained today due to virtual visit.    Physical Exam   GENERAL: Healthy, alert and no distress  EYES: Eyes grossly normal to inspection.  No discharge or erythema, or obvious scleral/conjunctival abnormalities.  RESP: No audible wheeze, cough, or visible cyanosis.  No visible retractions or increased work of breathing.    SKIN: Visible skin clear. No significant rash, abnormal pigmentation or lesions.  NEURO: Cranial nerves grossly intact.  Mentation and speech appropriate for age.  PSYCH: Mentation appears normal, affect normal/bright, judgement and insight intact, normal speech and appearance well-groomed.            Video-Visit Details    Type of service:  Video Visit    Video End Time:Start: 05/09/2022 11:32 am  Stop: 05/09/2022 11:41 am    Originating Location (pt. Location): Home    Distant Location (provider location):  [facility]     Platform used for Video Visit: [employer]

## 2022-05-11 ENCOUNTER — OFFICE VISIT (OUTPATIENT)
Dept: SURGERY | Facility: CLINIC | Age: 27
End: 2022-05-11
Attending: FAMILY MEDICINE
Payer: COMMERCIAL

## 2022-05-11 ENCOUNTER — TELEPHONE (OUTPATIENT)
Dept: SURGERY | Facility: CLINIC | Age: 27
End: 2022-05-11

## 2022-05-11 VITALS
BODY MASS INDEX: 39.06 KG/M2 | TEMPERATURE: 97.8 F | HEIGHT: 71 IN | WEIGHT: 279 LBS | SYSTOLIC BLOOD PRESSURE: 114 MMHG | DIASTOLIC BLOOD PRESSURE: 72 MMHG

## 2022-05-11 DIAGNOSIS — Z97.5 NEXPLANON IN PLACE: ICD-10-CM

## 2022-05-11 PROCEDURE — 99202 OFFICE O/P NEW SF 15 MIN: CPT | Performed by: SURGERY

## 2022-05-11 NOTE — LETTER
5/11/2022         RE: Lidia Gallegos  [address] [facility] [address]        Dear Colleague,    Thank you for referring your patient, Lidia Gallegos, to the [facility]. Please see a copy of my visit note below.    Patient seen in consultation for nexplanon removal    HPI:  Patient is a 27 year old female with history of nexplanon in place for ~3 years. She is interested in trying for pregnancy so would like this removed. It is no longer palpable but seen on xray so I have been consulted for removal. No blood thinning medications. No allergies.    Review Of Systems    Skin: negative  Ears/Nose/Throat: negative  Respiratory: No shortness of breath, dyspnea on exertion, cough, or hemoptysis  Cardiovascular: negative  Gastrointestinal: negative  Genitourinary: negative  Musculoskeletal: negative  Neurologic: negative  Hematologic/Lymphatic/Immunologic: negative  Endocrine: negative      No past medical history on file.    No past surgical history on file.    No family history on file.    Social History     Socioeconomic History     Marital status:      Spouse name: Not on file     Number of children: Not on file     Years of education: Not on file     Highest education level: Not on file   Occupational History     Not on file   Tobacco Use     Smoking status: Never Smoker     Smokeless tobacco: Never Used   Vaping Use     Vaping Use: Never used   Substance and Sexual Activity     Alcohol use: Yes     Comment: occ     Drug use: Never     Sexual activity: Not on file   Other Topics Concern     Not on file   Social History Narrative     Not on file     Social Determinants of Health     Financial Resource Strain: Not on file   Food Insecurity: Not on file   Transportation Needs: Not on file   Physical Activity: Not on file   Stress: Not on file   Social Connections: Not on file   Intimate Partner Violence: Not on file   Housing Stability: Not on file       Current  "Outpatient Medications   Medication Sig Dispense Refill     ascorbic acid 1000 MG TABS tablet Take 1,000 mg by mouth every other day       LANsoprazole (PREVACID) 30 MG DR capsule Take 30 mg by mouth daily         Medications and history reviewed    Physical exam:  Vitals: /72   Temp 97.8  F (36.6  C) (Temporal)   Ht 1.803 m (5' 11\")   Wt 126.6 kg (279 lb)   BMI 38.91 kg/m    BMI= Body mass index is 38.91 kg/m .    Constitutional: Healthy, alert, non-distressed   Head: Normo-cephalic, atraumatic, no lesions, masses or tenderness   Cardiovascular: RRR, no new murmurs, +S1, +S2 heart sounds, no clicks, rubs or gallops   Respiratory: CTAB, no rales, rhonchi or wheezing, equal chest rise, good respiratory effort   Gastrointestinal: Soft, non-tender, non distended, no rebound rigidity or guarding, no masses or hernias palpated   : Deferred  Musculoskeletal: Moves all extremities, normal  strength, no deformities noted   Skin: I could also not definitively palpate the implant   Psychiatric: Mentation appears normal, affect appropriate   Hematologic/Lymphatic/Immunologic: Normal cervical and supraclavicular lymph nodes   Patient able to get up on table without difficulty.    Labs show:  No results found for this or any previous visit (from the past 24 hour(s)).    Imaging shows:  Recent Results (from the past 744 hour(s))   XR Chest 2 Views    Narrative    EXAM: XR CHEST 2 VW  LOCATION: [facility]  DATE/TIME: 4/13/2022 6:51 PM    INDICATION: Shortness of breath.  COMPARISON: 01/13/2022.      Impression    IMPRESSION: Negative chest. Lungs are clear. Heart size is normal.   XR Humerus Left G/E 2 Views    Narrative    HUMERUS LEFT TWO OR MORE VIEWS  DATE/TIME: 4/26/2022 4:47 PM    INDICATION: Nexplanon not palpable in the upper extremity; Nexplanon  in place.    COMPARISON: None available.      Impression    IMPRESSION: Linear radiopaque presumed contraceptive device " projects  in the medial left arm soft tissues. Anatomic alignment left humerus.  No left humerus fracture identified. No appreciable elbow joint  effusion.    MATIAS SCHNEIDER MD         SYSTEM ID:  [id number]        Assessment:     ICD-10-CM    1. Nexplanon in place  Z97.5 Adult General Surg Referral     Case Request: ultrasound guided nexplanon removal left upper extremity     Case Request: ultrasound guided nexplanon removal left upper extremity     Plan: I recommend US guided removal of the implant in the operating room with local anesthesia. We discussed the procedure in detail. We also discussed the risks, benefits, alternatives and post-op care and restrictions. After our informed discussion we decided to proceed with the proposed surgery.    20 minutes spent on the date of the encounter doing chart review, history and exam, documentation and further activities per the note    Pankaj Ziegler DO        Again, thank you for allowing me to participate in the care of your patient.        Sincerely,        Pankaj Ziegler DO

## 2022-05-11 NOTE — PROGRESS NOTES
Patient seen in consultation for nexplanon removal    HPI:  Patient is a 27 year old female with history of nexplanon in place for ~3 years. She is interested in trying for pregnancy so would like this removed. It is no longer palpable but seen on xray so I have been consulted for removal. No blood thinning medications. No allergies.    Review Of Systems    Skin: negative  Ears/Nose/Throat: negative  Respiratory: No shortness of breath, dyspnea on exertion, cough, or hemoptysis  Cardiovascular: negative  Gastrointestinal: negative  Genitourinary: negative  Musculoskeletal: negative  Neurologic: negative  Hematologic/Lymphatic/Immunologic: negative  Endocrine: negative      No past medical history on file.    No past surgical history on file.    No family history on file.    Social History     Socioeconomic History     Marital status:      Spouse name: Not on file     Number of children: Not on file     Years of education: Not on file     Highest education level: Not on file   Occupational History     Not on file   Tobacco Use     Smoking status: Never Smoker     Smokeless tobacco: Never Used   Vaping Use     Vaping Use: Never used   Substance and Sexual Activity     Alcohol use: Yes     Comment: occ     Drug use: Never     Sexual activity: Not on file   Other Topics Concern     Not on file   Social History Narrative     Not on file     Social Determinants of Health     Financial Resource Strain: Not on file   Food Insecurity: Not on file   Transportation Needs: Not on file   Physical Activity: Not on file   Stress: Not on file   Social Connections: Not on file   Intimate Partner Violence: Not on file   Housing Stability: Not on file       Current Outpatient Medications   Medication Sig Dispense Refill     ascorbic acid 1000 MG TABS tablet Take 1,000 mg by mouth every other day       LANsoprazole (PREVACID) 30 MG DR capsule Take 30 mg by mouth daily         Medications and history reviewed    Physical  "exam:  Vitals: /72   Temp 97.8  F (36.6  C) (Temporal)   Ht 1.803 m (5' 11\")   Wt 126.6 kg (279 lb)   BMI 38.91 kg/m    BMI= Body mass index is 38.91 kg/m .    Constitutional: Healthy, alert, non-distressed   Head: Normo-cephalic, atraumatic, no lesions, masses or tenderness   Cardiovascular: RRR, no new murmurs, +S1, +S2 heart sounds, no clicks, rubs or gallops   Respiratory: CTAB, no rales, rhonchi or wheezing, equal chest rise, good respiratory effort   Gastrointestinal: Soft, non-tender, non distended, no rebound rigidity or guarding, no masses or hernias palpated   : Deferred  Musculoskeletal: Moves all extremities, normal  strength, no deformities noted   Skin: I could also not definitively palpate the implant   Psychiatric: Mentation appears normal, affect appropriate   Hematologic/Lymphatic/Immunologic: Normal cervical and supraclavicular lymph nodes   Patient able to get up on table without difficulty.    Labs show:  No results found for this or any previous visit (from the past 24 hour(s)).    Imaging shows:  Recent Results (from the past 744 hour(s))   XR Chest 2 Views    Narrative    EXAM: XR CHEST 2 VW  LOCATION: [facility]  DATE/TIME: 4/13/2022 6:51 PM    INDICATION: Shortness of breath.  COMPARISON: 01/13/2022.      Impression    IMPRESSION: Negative chest. Lungs are clear. Heart size is normal.   XR Humerus Left G/E 2 Views    Narrative    HUMERUS LEFT TWO OR MORE VIEWS  DATE/TIME: 4/26/2022 4:47 PM    INDICATION: Nexplanon not palpable in the upper extremity; Nexplanon  in place.    COMPARISON: None available.      Impression    IMPRESSION: Linear radiopaque presumed contraceptive device projects  in the medial left arm soft tissues. Anatomic alignment left humerus.  No left humerus fracture identified. No appreciable elbow joint  effusion.    MATIAS SCHNEIDER MD         SYSTEM ID:  [id number]        Assessment:     ICD-10-CM    1. Nexplanon in place  " Z97.5 Adult General Surg Referral     Case Request: ultrasound guided nexplanon removal left upper extremity     Case Request: ultrasound guided nexplanon removal left upper extremity     Plan: I recommend US guided removal of the implant in the operating room with local anesthesia. We discussed the procedure in detail. We also discussed the risks, benefits, alternatives and post-op care and restrictions. After our informed discussion we decided to proceed with the proposed surgery.    20 minutes spent on the date of the encounter doing chart review, history and exam, documentation and further activities per the note    Pankaj Ziegler, DO

## 2022-05-11 NOTE — TELEPHONE ENCOUNTER
Type of surgery: ultrasound guided nexplanon removal left upper extremity (Left    Location of surgery: [facility] OR  Date and time of surgery: 6/3  Surgeon: Toney  Pre-Op Appt Date: NA Local  Post-Op Appt Date: NA   Packet sent out: Yes  Pre-cert/Authorization completed:  Not Applicable  Date: na

## 2022-05-17 ENCOUNTER — HOSPITAL ENCOUNTER (OUTPATIENT)
Dept: PHYSICAL THERAPY | Facility: CLINIC | Age: 27
Setting detail: THERAPIES SERIES
Discharge: HOME | End: 2022-05-17
Attending: PHYSICIAN ASSISTANT
Payer: COMMERCIAL

## 2022-05-17 DIAGNOSIS — M54.50 CHRONIC BILATERAL LOW BACK PAIN, UNSPECIFIED WHETHER SCIATICA PRESENT: ICD-10-CM

## 2022-05-17 DIAGNOSIS — G89.29 CHRONIC BILATERAL LOW BACK PAIN, UNSPECIFIED WHETHER SCIATICA PRESENT: ICD-10-CM

## 2022-05-17 PROCEDURE — 97112 NEUROMUSCULAR REEDUCATION: CPT | Mod: GP | Performed by: PHYSICAL THERAPIST

## 2022-05-17 PROCEDURE — 97161 PT EVAL LOW COMPLEX 20 MIN: CPT | Mod: GP | Performed by: PHYSICAL THERAPIST

## 2022-05-18 NOTE — PROGRESS NOTES
[facility]    OUTPATIENT PHYSICAL THERAPY ORTHOPEDIC EVALUATION  PLAN OF TREATMENT FOR OUTPATIENT REHABILITATION  (COMPLETE FOR INITIAL CLAIMS ONLY)  Patient's Last Name, First Name, M.I.  YOB: 1995  Lidia Gallegos    Provider s Name:  [facility]   Medical Record No.  [id number]   Start of Care Date:  05/17/22   Onset Date:  05/03/22 (Approximate date of recurrence)   Type:     _X__PT   ___OT   ___SLP Medical Diagnosis:  Chronic bilateral low back pain, unspecified whether sciatica present (M54.50, G89.29)     PT Diagnosis:  Low back pain with decreased core, posture   Visits from SOC:  1      _________________________________________________________________________________  Plan of Treatment/Functional Goals:  manual therapy, neuromuscular re-education, ROM, strengthening, stretching  Complete pelvic assessment,        Goals  Goal Identifier: Home program  Goal Description: Lidia will complete a home program to improve her core strength, allow better pelvic mobility allowing her to change position in sitting (school and home work) and tools to use if she has a return of the spasms.  Target Date: 06/09/22       Therapy Frequency:     Predicted Duration of Therapy Intervention:  1 time every 1-2 weeks x 6 weeks    Nicole Oneal, PT                 I CERTIFY THE NEED FOR THESE SERVICES FURNISHED UNDER        THIS PLAN OF TREATMENT AND WHILE UNDER MY CARE     (Physician co-signature of this document indicates review and certification of the therapy plan).                       Certification Date From:  03/17/22   Certification Date To:  06/28/22    Referring Provider:  Bin DOW    Initial Assessment        See Epic Evaluation Start of Care Date: 05/17/22

## 2022-05-18 NOTE — PROGRESS NOTES
"   05/17/22 1107   General Information   Type of Visit Initial OP Ortho PT Evaluation   Start of Care Date 05/17/22   Referring Physician Bin DOW   Patient/Family Goals Statement Prevent spasms and pain from returning   Orders Evaluate and Treat   Orders Comment H/o of chronic low back pain. Spasm of muscles in March 2022. Has benefited from therapy   Date of Order 05/09/22   Certification Required? Yes  ([facility], [facility])   Medical Diagnosis Chronic bilateral low back pain, unspecified whether sciatica present (M54.50, G89.29)   Surgical/Medical history reviewed Yes   Precautions/Limitations no known precautions/limitations   Weight-Bearing Status - LUE full weight-bearing   Weight-Bearing Status - RUE full weight-bearing   Weight-Bearing Status - LLE full weight-bearing   Weight-Bearing Status - RLE full weight-bearing   General Information Comments History: chronic back pain, anxiety, Nexplanon in place (plans to have it removed in future)       Present No   Body Part(s)   Body Part(s) Lumbar Spine/SI   Presentation and Etiology   Pertinent history of current problem (include personal factors and/or comorbidities that impact the POC) 28 yo female referred to PT for low back pain. 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 Her legs were sore from the run so she was lifting with her back. She noted muscle spasms and her back seizing with inability to get off the floor. She went to the ED and understands her \"disc is out of socket\" and \"went into the muscles on her L side\" causing her to lean to her L.  Early May 2022, she got  and went to [email or web address] for [employer] and noted slight spasms again. She did work x 1 month at [employer] and thinks she  messed something up . PAST TREATMENT: PT at [facility] x 3 yrs for exercises and " "it \"seemed fix\". DIET: eating less and choosing healthy food. She has lost 60# at this time. ACTIVITY: walks from [employer] in [place] (approx. 30 minutes) every other day, swimming 30-45 minutes 1 time every few weeks. Wants to get back into biking and completed the squat challenge of 100 squats x day. She continues the squatting with less reps and frequency, sit ups for TA. 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 She is studying for her [employer] and plans to attend school in the fall. No red flags.   Impairments A. Pain;D. Decreased ROM;F. Decreased strength and endurance;E. Decreased flexibility   Functional Limitations perform activities of daily living;perform required work activities;perform desired leisure / sports activities   Symptom Location Mid to lower quads, around knees, notes legs hurt and then back pain from relying on back to move/lift to calm legs. Low back: L3-5 bilateral paraspinal muscles, not along spine. Spasms.   How/Where did it occur Other  (see above)   Onset date of current episode/exacerbation 05/03/22  (Approximate date of recurrence)   Chronicity Chronic   Pain rating (0-10 point scale) Denies pain  (at this time.)   Pain quality H. Other   Pain quality comment Spasm   Frequency of pain/symptoms B. Intermittent   Pain/symptoms exacerbated by M. Other   Pain exacerbation comment Lifting, walking on treadmill and less so on firm ground, running,   Pain/symptoms eased by I. OTC medication(s);K. Other   Pain eased by comment squatting   Progression of symptoms since onset: Improved   Prior Level of Function   Functional Level Prior Comment independent. Is careful to prevent back symptoms   Current Level of Function   Current Community Support Family/friend caregiver  (significant other)   Patient role/employment history B. 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 No   Have you fallen and had an injury in the past year? No   Is patient a fall risk? No   Abuse Screen (yes response referral indicated)   Feels Unsafe at Home or Work/School no   Feels Threatened by Someone no   Does Anyone Try to Keep You From Having Contact with Others or Doing Things Outside Your Home? no   Physical Signs of Abuse Present no   System Outcome Measures   Outcome Measures Low Back Pain (see Oswestry and Sonia)   Lumbar Spine/SI Objective Findings   Gait/Locomotion increased frontal plane motion   Flexion ROM standing: flexion AROM - 60->80% with repeated movements, L deviation; extension AROM: WNL no change; L SB tight and \"awkward\", R: SB: full   Lumbar ROM Comment standing: flexion AROM - 60->80% with repeated movements, L deviation; extension AROM: WNL no change; L SB tight and \"awkward\", R: SB: full   Lumbar/SI Special Tests Comments poor ability to engage the TAs.   Observation sitting comfortable on mat   Integumentary negative   Posture Standing: L hip ER>R, L foot eversion, L hip abduction, elevation L iliac crest, increased lumbar lordosis, forward head and shoulders.   Planned Therapy Interventions   Planned Therapy Interventions manual therapy;neuromuscular re-education;ROM;strengthening;stretching   Planned Therapy Interventions Comment Complete pelvic assessment,   Clinical Impression   Criteria for Skilled Therapeutic Interventions Met yes, treatment indicated   PT Diagnosis Low back pain with decreased core, posture   Influenced by the following impairments posture, weak core, chronic pain with concern around activities   Functional limitations due to impairments lifting, sitting long period, walking, running   Clinical Presentation Stable/Uncomplicated   Clinical Presentation Rationale clinical judgement   Clinical Decision Making (Complexity) Low complexity   Predicted Duration of Therapy Intervention (days/wks) 1 " time every 1-2 weeks x 6 weeks   Risk & Benefits of therapy have been explained Yes   Patient, Family & other staff in agreement with plan of care Yes   Clinical Impression Comments Lidia is not having low back pain that is stopping her from activities. She is completing a home program as noted above and was given other exercises for core from previous PTs. She has difficulty engaging her core so at this time she is asked to focus on that. She is planning to go to school in the fall and she and her  are planning for a child. Given this we discussed importance of improving her core and pelvic awareness. Planning on setting her up with a home program for core, flexibilty especially hip flexors and low back to counter act sitting time.   Education Assessment   Preferred Learning Style Listening;Reading;Demonstration;Pictures/video   Barriers to Learning No barriers   ORTHO GOALS   PT Ortho Eval Goals 1   Ortho Goal 1   Goal Identifier Home program   Goal Description Lidia will complete a home program to improve her core strength, allow better pelvic mobility allowing her to change position in sitting (school and home work) and tools to use if she has a return of the spasms.   Target Date 06/09/22   Total Evaluation Time   PT Eval, Low Complexity Minutes (33067) 36   Therapy Certification   Certification date from 03/17/22   Certification date to 06/28/22   Medical Diagnosis Chronic bilateral low back pain, unspecified whether sciatica present (M54.50, G89.29)

## 2022-05-26 ENCOUNTER — MYC MEDICAL ADVICE (OUTPATIENT)
Dept: SURGERY | Facility: CLINIC | Age: 27
End: 2022-05-26
Payer: COMMERCIAL

## 2022-05-26 DIAGNOSIS — Z11.59 ENCOUNTER FOR SCREENING FOR OTHER VIRAL DISEASES: Primary | ICD-10-CM

## 2022-05-27 ENCOUNTER — MYC MEDICAL ADVICE (OUTPATIENT)
Dept: PHYSICAL THERAPY | Facility: CLINIC | Age: 27
End: 2022-05-27
Payer: COMMERCIAL

## 2022-05-31 ENCOUNTER — MYC MEDICAL ADVICE (OUTPATIENT)
Dept: FAMILY MEDICINE | Facility: CLINIC | Age: 27
End: 2022-05-31
Payer: COMMERCIAL

## 2022-06-01 ENCOUNTER — HOSPITAL ENCOUNTER (OUTPATIENT)
Dept: PHYSICAL THERAPY | Facility: CLINIC | Age: 27
Setting detail: THERAPIES SERIES
Discharge: HOME | End: 2022-06-01
Attending: PHYSICIAN ASSISTANT
Payer: COMMERCIAL

## 2022-06-01 PROCEDURE — 97112 NEUROMUSCULAR REEDUCATION: CPT | Mod: GP | Performed by: PHYSICAL THERAPIST

## 2022-06-03 ENCOUNTER — HOSPITAL ENCOUNTER (OUTPATIENT)
Facility: CLINIC | Age: 27
Discharge: HOME | End: 2022-06-03
Attending: SURGERY | Admitting: SURGERY
Payer: COMMERCIAL

## 2022-06-03 VITALS
SYSTOLIC BLOOD PRESSURE: 148 MMHG | HEIGHT: 71 IN | HEART RATE: 84 BPM | BODY MASS INDEX: 32.2 KG/M2 | WEIGHT: 230 LBS | OXYGEN SATURATION: 99 % | RESPIRATION RATE: 17 BRPM | DIASTOLIC BLOOD PRESSURE: 95 MMHG

## 2022-06-03 PROCEDURE — 88300 SURGICAL PATH GROSS: CPT | Mod: 26 | Performed by: PATHOLOGY

## 2022-06-03 PROCEDURE — 88300 SURGICAL PATH GROSS: CPT | Mod: TC | Performed by: SURGERY

## 2022-06-03 PROCEDURE — 999N000141 HC STATISTIC PRE-PROCEDURE NURSING ASSESSMENT: Performed by: SURGERY

## 2022-06-03 PROCEDURE — 360N000075 HC SURGERY LEVEL 2, PER MIN: Performed by: SURGERY

## 2022-06-03 PROCEDURE — 76998 US GUIDE INTRAOP: CPT | Mod: 26 | Performed by: SURGERY

## 2022-06-03 PROCEDURE — 272N000001 HC OR GENERAL SUPPLY STERILE: Performed by: SURGERY

## 2022-06-03 PROCEDURE — 710N000012 HC RECOVERY PHASE 2, PER MINUTE: Performed by: SURGERY

## 2022-06-03 PROCEDURE — 250N000009 HC RX 250: Performed by: SURGERY

## 2022-06-03 PROCEDURE — 11982 REMOVE DRUG IMPLANT DEVICE: CPT | Mod: LT | Performed by: SURGERY

## 2022-06-03 RX ORDER — BUPIVACAINE HYDROCHLORIDE AND EPINEPHRINE 2.5; 5 MG/ML; UG/ML
INJECTION, SOLUTION INFILTRATION; PERINEURAL PRN
Status: DISCONTINUED | OUTPATIENT
Start: 2022-06-03 | End: 2022-06-03 | Stop reason: HOSPADM

## 2022-06-03 RX ORDER — LIDOCAINE HYDROCHLORIDE AND EPINEPHRINE 10; 10 MG/ML; UG/ML
INJECTION, SOLUTION INFILTRATION; PERINEURAL PRN
Status: DISCONTINUED | OUTPATIENT
Start: 2022-06-03 | End: 2022-06-03 | Stop reason: HOSPADM

## 2022-06-03 NOTE — BRIEF OP NOTE
[facility] Brief Operative Note    Pre-operative diagnosis: Nexplanon in place [Z97.5]   Post-operative diagnosis nexplanon in place    Procedure: Procedure(s):  ultrasound guided nexplanon removal left upper extremity   Surgeon(s): Surgeon(s) and Role:     * Pankaj Ziegler,  - Primary   Estimated blood loss: * No values recorded between 6/3/2022  1:24 PM and 6/3/2022  1:38 PM *    Specimens: ID Type Source Tests Collected by Time Destination   1 : Nexplanon from Left Arm Tissue Explant SURGICAL PATHOLOGY EXAM Pankaj Ziegler,  6/3/2022  1:29 PM       Findings: Successful removal of intact nexplanon

## 2022-06-03 NOTE — DISCHARGE INSTRUCTIONS
[facility]      Care of the Incision:  If surgical glue was used on your incision, keep it dry for 24 hours.  Then you may shower but don t submerge under water for at least 2 week.  Gently pat your incision dry with a freshly laundered towel.  Do not touch your incision with bare hands or pick at scabs.  Leave your incision open to air.  Cover it only if draining, clothing rubs or irritates it.    Activity:  Gradually increase your activity.  Walk short distances several times each day and increase the distance as your strength allows.    Diet:  Return to the diet you were on before surgery.    Call Your Physician if You Have:  Redness, increased swelling or cloudy drainage from your incision.  A temperature of more than 101 degrees F.  Worsening pain in your incision not relieved by your prescription pain pills and/or a short rest.  Any questions or concerns about your recovery, please call      Business hours [phone]     After hours [phone] Nurse Advice Line (24 hours a day)

## 2022-06-03 NOTE — OP NOTE
Date of Service: 6/3/2022     STAFF SURGEON: Pankaj Ziegler DO     ASSISTANT:  None.     PREOPERATIVE DIAGNOSIS:  nexplanon in place     POSTOPERATIVE DIAGNOSIS:  Same     NAME OF PROCEDURE(S):   Ultrasound-guided removal of Nexplanon of the left upper extremity     INDICATIONS FOR PROCEDURE:  The patient is a 27-year-old female who no longer wants her Nexplanon.  Unfortunately this is not palpable so could not be done in the office.  We discussed ultrasound-guided removal in the operating room with local anesthesia.     EBL: 1 cc    ANESTHESIA: Local anesthesia only    COMPLICATIONS: None     DRAINS:  None.     SPECIMENS:   Nexplanon implant     PROCEDURE DETAIL:  Following consent, the patient was brought from the preoperative holding area to the operating suite and laid in supine position.  Her left upper extremity was prepped and draped in the normal sterile fashion.  Timeout was performed.  After the correct patient and correct procedure was verified we began by locating the Nexplanon using the ultrasound.  We marked this area with a marking pen.  This area was then anesthetized with quarter percent Marcaine with epinephrine.  Small incision was made.  Using ultrasound guidance blunt dissection was used to find the Nexplanon.  It was grasped and  retrieved without issue.  Minimal to no bleeding.  The incision was closed with inverted interrupted 4-0 Monocryl suture.  Exofin dressing was applied.           Pankaj Ziegler DO

## 2022-06-07 LAB
PATH REPORT.COMMENTS IMP SPEC: NORMAL
PATH REPORT.COMMENTS IMP SPEC: NORMAL
PATH REPORT.FINAL DX SPEC: NORMAL
PATH REPORT.GROSS SPEC: NORMAL
PATH REPORT.RELEVANT HX SPEC: NORMAL
PHOTO IMAGE: NORMAL

## 2022-06-27 ENCOUNTER — MYC MEDICAL ADVICE (OUTPATIENT)
Dept: FAMILY MEDICINE | Facility: CLINIC | Age: 27
End: 2022-06-27

## 2022-06-29 ENCOUNTER — MYC MEDICAL ADVICE (OUTPATIENT)
Dept: FAMILY MEDICINE | Facility: CLINIC | Age: 27
End: 2022-06-29

## 2022-06-29 NOTE — TELEPHONE ENCOUNTER
I have replied to pt's [employer] message to call us as we cannot document in her chart. Rebecca Solorzano, CMA

## 2022-06-30 NOTE — DISCHARGE SUMMARY
"[facility]    Outpatient Physical Therapy Discharge Note  Patient: Lidia Gallegos  : 1995    Beginning/End Dates of Reporting Period:  2 sessions 2022 and 2022      Referring Provider: Bin Johnson PAC    Therapy Diagnosis: Low back pain with decreased core, posture     Client Self Report: See initial evaluation. Canceled 3 rd session as she was \"feeling much better\". DId not feel more PT was needed.     Objective Measurements:   See initial eval. No recent measurements.      Goals:  Goal Identifier Home program   Goal Description Lidia will complete a home program to improve her core strength, allow better pelvic mobility allowing her to change position in sitting (school and home work) and tools to use if she has a return of the spasms.   Target Date 22   Date Met   2022   Progress (detail required for progress note):       Plan:  Discharge from therapy.    Discharge:    Reason for Discharge: Patient has met all goals.    Equipment Issued: none    Discharge Plan: Patient to continue home program.  "

## 2022-07-27 ENCOUNTER — TELEPHONE (OUTPATIENT)
Dept: FAMILY MEDICINE | Facility: CLINIC | Age: 27
End: 2022-07-27

## 2022-07-27 NOTE — TELEPHONE ENCOUNTER
Please abstract the following data from this visit with this patient into the appropriate field in Epic:    Tests that can be patient reported without a hard copy:        Other Tests found in the patient's chart through Chart Review/Care Everywhere:    Pap smear done by [facility] this date: 06/22/2021    Note to Abstraction: If this section is blank, no results were found via Chart Review/Care Everywhere.

## 2022-09-10 ENCOUNTER — HEALTH MAINTENANCE LETTER (OUTPATIENT)
Age: 27
End: 2022-09-10

## 2022-09-15 ENCOUNTER — MYC MEDICAL ADVICE (OUTPATIENT)
Dept: FAMILY MEDICINE | Facility: CLINIC | Age: 27
End: 2022-09-15

## 2022-10-02 ENCOUNTER — HOSPITAL ENCOUNTER (EMERGENCY)
Facility: CLINIC | Age: 27
Discharge: HOME | End: 2022-10-03
Attending: FAMILY MEDICINE | Admitting: FAMILY MEDICINE
Payer: COMMERCIAL

## 2022-10-02 DIAGNOSIS — M54.50 LEFT-SIDED LOW BACK PAIN WITHOUT SCIATICA, UNSPECIFIED CHRONICITY: ICD-10-CM

## 2022-10-02 PROCEDURE — 96372 THER/PROPH/DIAG INJ SC/IM: CPT | Performed by: FAMILY MEDICINE

## 2022-10-02 PROCEDURE — 99285 EMERGENCY DEPT VISIT HI MDM: CPT | Performed by: FAMILY MEDICINE

## 2022-10-02 PROCEDURE — 250N000011 HC RX IP 250 OP 636: Performed by: FAMILY MEDICINE

## 2022-10-02 PROCEDURE — 250N000013 HC RX MED GY IP 250 OP 250 PS 637: Performed by: FAMILY MEDICINE

## 2022-10-02 RX ORDER — LIDOCAINE 4 G/G
1 PATCH TOPICAL ONCE
Status: DISCONTINUED | OUTPATIENT
Start: 2022-10-02 | End: 2022-10-03 | Stop reason: HOSPADM

## 2022-10-02 RX ORDER — ORPHENADRINE CITRATE 30 MG/ML
60 INJECTION INTRAMUSCULAR; INTRAVENOUS ONCE
Status: COMPLETED | OUTPATIENT
Start: 2022-10-02 | End: 2022-10-02

## 2022-10-02 RX ADMIN — LIDOCAINE 1 PATCH: 560 PATCH PERCUTANEOUS; TOPICAL; TRANSDERMAL at 23:51

## 2022-10-02 RX ADMIN — HYDROMORPHONE HYDROCHLORIDE 1 MG: 1 INJECTION, SOLUTION INTRAMUSCULAR; INTRAVENOUS; SUBCUTANEOUS at 23:51

## 2022-10-02 RX ADMIN — ORPHENADRINE CITRATE 60 MG: 30 INJECTION INTRAMUSCULAR; INTRAVENOUS at 23:51

## 2022-10-03 VITALS
SYSTOLIC BLOOD PRESSURE: 135 MMHG | BODY MASS INDEX: 30.68 KG/M2 | RESPIRATION RATE: 18 BRPM | WEIGHT: 220 LBS | OXYGEN SATURATION: 98 % | TEMPERATURE: 97.9 F | DIASTOLIC BLOOD PRESSURE: 96 MMHG | HEART RATE: 86 BPM

## 2022-10-03 PROCEDURE — 250N000011 HC RX IP 250 OP 636: Performed by: FAMILY MEDICINE

## 2022-10-03 RX ORDER — ONDANSETRON 4 MG/1
4 TABLET, ORALLY DISINTEGRATING ORAL ONCE
Status: COMPLETED | OUTPATIENT
Start: 2022-10-03 | End: 2022-10-03

## 2022-10-03 RX ORDER — ONDANSETRON 4 MG/1
4 TABLET, ORALLY DISINTEGRATING ORAL EVERY 8 HOURS PRN
Qty: 10 TABLET | Refills: 0 | Status: SHIPPED | OUTPATIENT
Start: 2022-10-03 | End: 2023-04-18

## 2022-10-03 RX ORDER — CYCLOBENZAPRINE HCL 10 MG
10 TABLET ORAL 3 TIMES DAILY PRN
Qty: 15 TABLET | Refills: 0 | Status: SHIPPED | OUTPATIENT
Start: 2022-10-03 | End: 2023-02-11

## 2022-10-03 RX ORDER — TRAMADOL HYDROCHLORIDE 50 MG/1
50 TABLET ORAL EVERY 6 HOURS PRN
Qty: 15 TABLET | Refills: 0 | Status: SHIPPED | OUTPATIENT
Start: 2022-10-03 | End: 2023-04-18

## 2022-10-03 RX ADMIN — ONDANSETRON 4 MG: 4 TABLET, ORALLY DISINTEGRATING ORAL at 00:54

## 2022-10-03 ASSESSMENT — ACTIVITIES OF DAILY LIVING (ADL): ADLS_ACUITY_SCORE: 37

## 2022-10-03 NOTE — ED TRIAGE NOTES
Patient is here with lower back pain for the past 20 minutes. She states she was twisting wrong and now has pain.      Triage Assessment     Row Name 10/02/22 3841       Triage Assessment (Adult)    Airway WDL WDL       Respiratory WDL    Respiratory WDL WDL       Skin Circulation/Temperature WDL    Skin Circulation/Temperature WDL WDL       Cardiac WDL    Cardiac WDL WDL       Peripheral/Neurovascular WDL    Peripheral Neurovascular WDL WDL       Cognitive/Neuro/Behavioral WDL    Cognitive/Neuro/Behavioral WDL WDL
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
## 2022-10-21 ENCOUNTER — TRANSFERRED RECORDS (OUTPATIENT)
Dept: HEALTH INFORMATION MANAGEMENT | Facility: CLINIC | Age: 27
End: 2022-10-21

## 2022-11-01 ASSESSMENT — ANXIETY QUESTIONNAIRES
IF YOU CHECKED OFF ANY PROBLEMS ON THIS QUESTIONNAIRE, HOW DIFFICULT HAVE THESE PROBLEMS MADE IT FOR YOU TO DO YOUR WORK, TAKE CARE OF THINGS AT HOME, OR GET ALONG WITH OTHER PEOPLE: SOMEWHAT DIFFICULT
GAD7 TOTAL SCORE: 14
7. FEELING AFRAID AS IF SOMETHING AWFUL MIGHT HAPPEN: NEARLY EVERY DAY
8. IF YOU CHECKED OFF ANY PROBLEMS, HOW DIFFICULT HAVE THESE MADE IT FOR YOU TO DO YOUR WORK, TAKE CARE OF THINGS AT HOME, OR GET ALONG WITH OTHER PEOPLE?: SOMEWHAT DIFFICULT
5. BEING SO RESTLESS THAT IT IS HARD TO SIT STILL: SEVERAL DAYS
1. FEELING NERVOUS, ANXIOUS, OR ON EDGE: NEARLY EVERY DAY
7. FEELING AFRAID AS IF SOMETHING AWFUL MIGHT HAPPEN: NEARLY EVERY DAY
4. TROUBLE RELAXING: MORE THAN HALF THE DAYS
2. NOT BEING ABLE TO STOP OR CONTROL WORRYING: MORE THAN HALF THE DAYS
GAD7 TOTAL SCORE: 14
3. WORRYING TOO MUCH ABOUT DIFFERENT THINGS: MORE THAN HALF THE DAYS
6. BECOMING EASILY ANNOYED OR IRRITABLE: SEVERAL DAYS
GAD7 TOTAL SCORE: 14

## 2022-11-08 ENCOUNTER — VIRTUAL VISIT (OUTPATIENT)
Dept: PSYCHOLOGY | Facility: CLINIC | Age: 27
End: 2022-11-08
Payer: COMMERCIAL

## 2022-11-08 DIAGNOSIS — F33.1 MAJOR DEPRESSIVE DISORDER, RECURRENT EPISODE, MODERATE (H): ICD-10-CM

## 2022-11-08 DIAGNOSIS — F88 DEVELOPMENTAL MENTAL DISORDER: Primary | ICD-10-CM

## 2022-11-08 DIAGNOSIS — F41.1 GENERALIZED ANXIETY DISORDER: ICD-10-CM

## 2022-11-08 DIAGNOSIS — F84.0 AUTISM SPECTRUM DISORDER: ICD-10-CM

## 2022-11-08 PROCEDURE — 90834 PSYTX W PT 45 MINUTES: CPT | Performed by: PSYCHOLOGIST

## 2022-11-08 ASSESSMENT — PATIENT HEALTH QUESTIONNAIRE - PHQ9
SUM OF ALL RESPONSES TO PHQ QUESTIONS 1-9: 9
10. IF YOU CHECKED OFF ANY PROBLEMS, HOW DIFFICULT HAVE THESE PROBLEMS MADE IT FOR YOU TO DO YOUR WORK, TAKE CARE OF THINGS AT HOME, OR GET ALONG WITH OTHER PEOPLE: SOMEWHAT DIFFICULT
SUM OF ALL RESPONSES TO PHQ QUESTIONS 1-9: 9

## 2022-11-08 ASSESSMENT — COLUMBIA-SUICIDE SEVERITY RATING SCALE - C-SSRS
4. HAVE YOU HAD THESE THOUGHTS AND HAD SOME INTENTION OF ACTING ON THEM?: NO
REASONS FOR IDEATION PAST MONTH: DOES NOT APPLY
2. HAVE YOU ACTUALLY HAD ANY THOUGHTS OF KILLING YOURSELF?: NO
5. HAVE YOU STARTED TO WORK OUT OR WORKED OUT THE DETAILS OF HOW TO KILL YOURSELF? DO YOU INTEND TO CARRY OUT THIS PLAN?: NO
1. HAVE YOU WISHED YOU WERE DEAD OR WISHED YOU COULD GO TO SLEEP AND NOT WAKE UP?: YES
ATTEMPT LIFETIME: NO
3. HAVE YOU BEEN THINKING ABOUT HOW YOU MIGHT KILL YOURSELF?: NO
TOTAL  NUMBER OF ABORTED OR SELF INTERRUPTED ATTEMPTS LIFETIME: NO
6. HAVE YOU EVER DONE ANYTHING, STARTED TO DO ANYTHING, OR PREPARED TO DO ANYTHING TO END YOUR LIFE?: NO
1. IN THE PAST MONTH, HAVE YOU WISHED YOU WERE DEAD OR WISHED YOU COULD GO TO SLEEP AND NOT WAKE UP?: NO
2. HAVE YOU ACTUALLY HAD ANY THOUGHTS OF KILLING YOURSELF?: YES
TOTAL  NUMBER OF INTERRUPTED ATTEMPTS LIFETIME: NO

## 2022-11-08 NOTE — PROGRESS NOTES
[facility]   Mental Health & Addiction Services     Progress Note - Initial Visit    Client Name:  Lidia Gallegos Date: 2022         Service Type: Individual     Visit Start Time: 9:01AM  Visit End Time: 9:52am    Visit #: 1    Attendees: Client attended alone    Service Modality:  Video Visit:      Provider verified identity through the following two step process.  Patient provided:  Patient  and Patient address    Telemedicine Visit: The patient's condition can be safely assessed and treated via synchronous audio and visual telemedicine encounter.      Reason for Telemedicine Visit: Services only offered telehealth    Originating Site (Patient Location): Patient's home    Distant Site (Provider Location): Provider Remote Setting- Home Office    Consent:  The patient/guardian has verbally consented to: the potential risks and benefits of telemedicine (video visit) versus in person care; bill my insurance or make self-payment for services provided; and responsibility for payment of non-covered services.     Patient would like the video invitation sent by:  Send to e-mail at: [email or web address]    Mode of Communication:  Video Conference via [facility]    Distant Location (Provider):  Off-site    As the provider I attest to compliance with applicable laws and regulations related to telemedicine.       DATA:  Extended Session (53+ minutes): No  Interactive Complexity: No   Crisis: No     Presenting Concerns/Current Stressors:   Patient presented to session to initiate the ADHD evaluation process.       PHQ 2022   PHQ-9 Total Score 9   Q9: Thoughts of better off dead/self-harm past 2 weeks Not at all        JIGAR-7 SCORE 2022   Total Score 14 (moderate anxiety)   Total Score 14       ASSESSMENT:  Mental Status Assessment:  Appearance:   Appropriate   Eye Contact:   Good   Psychomotor Behavior: Normal   Attitude:   Cooperative   Orientation:   All  Speech   Rate /  Production: Normal/ Responsive   Volume:  Normal   Mood:    Normal  Affect:    Appropriate   Thought Content:  Clear   Thought Form:  Logical   Insight:    Good       Safety Issues and Plan for Safety and Risk Management:   [place] Suicide Severity Rating Scale (Lifetime/Recent)  [place] Suicide Severity Rating (Lifetime/Recent) 11/8/2022   1. Wish to be Dead (Lifetime) 1   Wish to be Dead Description (Lifetime) In 2017. Had ideas to cut self with a knife or jump off a bridge. Closest bridge is 30 minutes away.   1. Wish to be Dead (Past 1 Month) 0   2. Non-Specific Active Suicidal Thoughts (Lifetime) 1   2. Non-Specific Active Suicidal Thoughts (Past 1 Month) 0   3. Active Suicidal Ideation with any Methods (Not Plan) Without Intent to Act (Lifetime) 0   4. Active Suicidal Ideation with Some Intent to Act, Without Specific Plan (Lifetime) 0   5. Active Suicidal Ideation with Specific Plan and Intent (Lifetime) 0   Deterrents (Past 1 Month) 0   Reasons for Ideation (Past 1 Month) 0   Actual Attempt (Lifetime) 0   Has subject engaged in non-suicidal self-injurious behavior? (Lifetime) 0   Interrupted Attempts (Lifetime) 0   Aborted or Self-Interrupted Attempt (Lifetime) 0   Preparatory Acts or Behavior (Lifetime) 0   Calculated C-SSRS Risk Score (Lifetime/Recent) No Risk Indicated     Patient denies current fears or concerns for personal safety.  Patient denies current or recent suicidal ideation or behaviors.  Patient denies current or recent homicidal ideation or behaviors.  Patient denies current or recent self injurious behavior or ideation.  Patient denies other safety concerns.  Recommended that patient call 911 or go to the local ED should there be a change in any of these risk factors.   Patient reports there are no firearms in the house.    Diagnostic Criteria:  F88:  A. A persistent pattern of inattention and/or hyperactivity-impulsivity that interferes with functioning or development, as characterized by  (1) and/or (2):   1. Six or more inattention symptoms that have persisted for at least 6 months to a degree that is inconsistent with developmental level and that negatively impacts directly on social and academic/occupational activities.   2. Six or more hyperactivity and impulsivity symptoms that have persisted for at least 6 months to a degree that is inconsistent with developmental level and that negatively impacts directly on social and academic/occupational activities.  B. Several symptoms (inattentive or hyperactive/impulsive) were present before the age of 12 years.  C. Several symptoms (inattentive or hyperactive/impulsive) present in ?2 settings (eg, at home, school, or work; with friends or relatives; in other activities).  D. There is clear evidence that the symptoms interfere with or reduce the quality of social, academic, or occupational functioning.  E. Symptoms do not occur exclusively during the course of schizophrenia or another psychotic disorder, and are not better explained by another mental disorder (eg, mood disorder, anxiety disorder, dissociative disorder, personality disorder, substance intoxication, or withdrawal).    F41.1:  A. Excessive anxiety and worry, occurring more days than not for at least 6 months about a number of events or activities.   B. The individual finds it difficult to control the worry.  C. The anxiety and worry are associated with 3 or more of 6 symptoms.  D. The anxiety, worry, or physical symptoms cause clinically significant distress or impairment in social, occupational, or other important areas of functioning.  E. The disturbance is not attributable to the physiological effects of a substance (e.g., a drug of abuse, a medication) or another medical condition (e.g., hyperthyroidism).  F. The disturbance is not better explained by another mental disorder (e.g., anxiety or worry about having panic attacks in panic disorder, negative evaluation in social anxiety  disorder [social phobia], contamination or other obsessions in obsessive-compulsive disorder, separation from attachment figures in separation anxiety disorder, reminders of traumatic events in posttraumatic stress disorder, gaining weight in anorexia nervosa, physical complaints in somatic symptom disorder, perceived appearance flaws in body dysmorphic disorder, having a serious illness in illness anxiety disorder, or the content of delusional beliefs in schizophrenia or delusional disorder).    F33.1:  A. Five (or more) symptoms have been present during the same 2-week period and represent a change from previous functioning; at least one of the symptoms is either (1) depressed mood or (2) loss of interest or pleasure.   1. Depressed mood.   2. Diminished interest or pleasure in all, or almost all, activities.   3. Significant appetite change.  4. Significant sleep change.   5. Fatigue or loss of energy.   6. Feelings of worthlessness or inappropriate guilt.    B. The symptoms cause clinically significant distress or impairment in social, occupational, or other important areas of functioning.  C. The episode is not attributable to the physiological effects of a substance or to another medical condition.  D. The occurence of major depressive episode is not better explained by other thought / psychotic disorders.  E. There has never been a manic episode or hypomanic episode.     DSM5 Diagnoses: (Sustained by DSM5 Criteria Listed Above)  Diagnoses:   1. Developmental mental disorder    2. Generalized anxiety disorder    3. Major depressive disorder, recurrent episode, moderate (H)    4. Autism spectrum disorder    Rule out trauma disorder  Psychosocial & Contextual Factors: social support, new step-parent, in grad school  WHODAS 2.0 (12 item):   WHODAS 2.0 Total Score 11/1/2022   Total Score 27   Total Score [facility] 27       PROMIS-10 Scores  Global Mental Health Score: 10  Global Physical Health Score: 15   PROMIS  TOTAL - SUBSCORES: 25      Intervention:              Reviewed symptoms and history of presenting concern. Patient endorsed symptoms consistent with depression , anxiety , trauma, ADHD and Autism. ASD reportedly diagnosed at 7 years old. Asked patient to obtain report. Patient denied symptoms associated with enriqueta, panic, OCD and perceptual difficulties. Unable to complete diagnostic intake, will be completed in next session.  CBT: socratic questioning, positive reinforcement  EFT: empathetic attunement, emotion checking, emotion naming  MI: open ended questions, affirmations, reflections        Attendance Agreement:  Client has not signed the attendance agreement. Discussed expectations at beginning of this first session and patient agreed.       PLAN:  Provider will continue Diagnostic Assessment in next session. Patient will complete Isabel questionnaires  and CNS Vital Signs prior to next session (11/29/2022).    Patient meets the following risk assessment and triage: Patient denied any current/recent/lifetime history of suicidal ideation and/or behaviors.  No safety plan indicated at this time.     Medical necessity criteria is warranted in order to: Measure a psychological disorder and its severity and functional impairment to determine psychiatric diagnosis when a mental illness is suspected, or to achieve a differential diagnosis from a range of medical/psychological disorders that present with similar constellations of symptoms (e.g., determination and measurement of anxiety severity and impact in the presence of ongoing asthma or heart disease), Perform symptom measurement to objectively measure treatment effectiveness and/or determine the need to refer for pharmacological treatment or other medical evaluation (e.g., based on severity and chronicity of symptoms) and Evaluate primary symptoms of impaired attention and concentration that can occur in many neurological and psychiatric conditions.    Medical  necessity for psychological assessment is warranted as a result of the following: (1) A specific clinical question is posed that relates to the condition/symptoms being addressed (2) The question cannot be adequately addressed by clinical interview and/or behavioral observation (3) Results of psychological testing are reasonably expected to provide an answer to the query (4) It is reasonably expected that the testing will provide information leading to a clearer diagnosis and/or guide treatment planning with an expectation of improved clinical outcome.    I acknowledge that, based upon current clinical information, the patient and I have reviewed and discussed issues pertaining to the purpose of therapy/testing, potential therapeutic goals, procedures, risks and benefits, and estimated duration of therapy/testing. Issues pertaining to fees/insurance and confidentiality were also addressed with the patient, who indicated understanding and elected to continue with appointments. I will not be providing any experimental procedures and, if we agree that a change in clinical procedure would be more beneficial, I will obtain specific consent for that procedure or refer you to another provider who has expertise in that area.       Hanane Lux PsyD,   Clinical Psychologist

## 2022-11-25 ENCOUNTER — OFFICE VISIT (OUTPATIENT)
Dept: SURGERY | Facility: CLINIC | Age: 27
End: 2022-11-25
Payer: COMMERCIAL

## 2022-11-25 VITALS
HEIGHT: 71 IN | BODY MASS INDEX: 41.02 KG/M2 | SYSTOLIC BLOOD PRESSURE: 122 MMHG | WEIGHT: 293 LBS | DIASTOLIC BLOOD PRESSURE: 74 MMHG

## 2022-11-25 DIAGNOSIS — G89.18 POST-OP PAIN: Primary | ICD-10-CM

## 2022-11-25 PROCEDURE — 99212 OFFICE O/P EST SF 10 MIN: CPT | Performed by: SURGERY

## 2022-11-25 NOTE — PROGRESS NOTES
General Surgery Follow Up    Pt returns for follow up visit s/p nexplanon removal    HPI:  Lidia returns to discuss occasional pain at her nexplanon site. She describes it as sharp, brief and sometimes only a small action will cause the pain. She denies drainage or a lump. She also reports more strength exercise recently.      No past medical history on file.    No past surgical history on file.    Social History     Socioeconomic History     Marital status:      Spouse name: Not on file     Number of children: Not on file     Years of education: Not on file     Highest education level: Not on file   Occupational History     Not on file   Tobacco Use     Smoking status: Never     Smokeless tobacco: Never   Vaping Use     Vaping Use: Never used   Substance and Sexual Activity     Alcohol use: Yes     Comment: occ     Drug use: Never     Sexual activity: Not on file   Other Topics Concern     Not on file   Social History Narrative     Not on file     Social Determinants of Health     Financial Resource Strain: Not on file   Food Insecurity: Not on file   Transportation Needs: Not on file   Physical Activity: Not on file   Stress: Not on file   Social Connections: Not on file   Intimate Partner Violence: Not on file   Housing Stability: Not on file       Current Outpatient Medications   Medication Sig Dispense Refill     ascorbic acid 1000 MG TABS tablet Take 1,000 mg by mouth every other day       cyclobenzaprine (FLEXERIL) 10 MG tablet Take 1 tablet (10 mg) by mouth 3 times daily as needed for muscle spasms 15 tablet 0     LANsoprazole (PREVACID) 30 MG DR capsule Take 30 mg by mouth daily       ondansetron (ZOFRAN ODT) 4 MG ODT tab Take 1 tablet (4 mg) by mouth every 8 hours as needed for nausea 10 tablet 0     traMADol (ULTRAM) 50 MG tablet Take 1 tablet (50 mg) by mouth every 6 hours as needed for severe pain 15 tablet 0       Medications and history reviewed    Physical exam:  Vitals: /74   Ht 1.803  "m (5' 11\")   Wt 133.8 kg (295 lb)   BMI 41.14 kg/m    BMI= Body mass index is 41.14 kg/m .    HEART: RRR, no new murmurs  LUNGS: CTAB, equal chest rise, good effort  ABD: soft, non tender, non distended  INCISIONS: c/d/i looks well healed, no erythema, no induration  EXT: ZAPATA, no deformities    PATHOLOGY:  None new    Assessment:     ICD-10-CM    1. Post-op pain  G89.18         Plan: I reassured the patient that overall this seems to be healing well.  We talked about the healing process and how there is not any remaining suture material in the incision.  No signs of infection, hematoma, seroma or any other complication of surgery.  Sometimes hypersensitivity of an incision can occur which at times will resolve but other times may persist.  She understands.    Pankaj Ziegler, DO    "

## 2022-11-25 NOTE — LETTER
11/25/2022         RE: Lidia Gallegos  [address] [facility] [address]        Dear Colleague,    Thank you for referring your patient, Lidia Gallegos, to the [facility]. Please see a copy of my visit note below.    General Surgery Follow Up    Pt returns for follow up visit s/p nexplanon removal    HPI:  Lidia returns to discuss occasional pain at her nexplanon site. She describes it as sharp, brief and sometimes only a small action will cause the pain. She denies drainage or a lump. She also reports more strength exercise recently.      No past medical history on file.    No past surgical history on file.    Social History     Socioeconomic History     Marital status:      Spouse name: Not on file     Number of children: Not on file     Years of education: Not on file     Highest education level: Not on file   Occupational History     Not on file   Tobacco Use     Smoking status: Never     Smokeless tobacco: Never   Vaping Use     Vaping Use: Never used   Substance and Sexual Activity     Alcohol use: Yes     Comment: occ     Drug use: Never     Sexual activity: Not on file   Other Topics Concern     Not on file   Social History Narrative     Not on file     Social Determinants of Health     Financial Resource Strain: Not on file   Food Insecurity: Not on file   Transportation Needs: Not on file   Physical Activity: Not on file   Stress: Not on file   Social Connections: Not on file   Intimate Partner Violence: Not on file   Housing Stability: Not on file       Current Outpatient Medications   Medication Sig Dispense Refill     ascorbic acid 1000 MG TABS tablet Take 1,000 mg by mouth every other day       cyclobenzaprine (FLEXERIL) 10 MG tablet Take 1 tablet (10 mg) by mouth 3 times daily as needed for muscle spasms 15 tablet 0     LANsoprazole (PREVACID) 30 MG DR capsule Take 30 mg by mouth daily       ondansetron (ZOFRAN ODT) 4 MG ODT tab Take 1 tablet (4  "mg) by mouth every 8 hours as needed for nausea 10 tablet 0     traMADol (ULTRAM) 50 MG tablet Take 1 tablet (50 mg) by mouth every 6 hours as needed for severe pain 15 tablet 0       Medications and history reviewed    Physical exam:  Vitals: /74   Ht 1.803 m (5' 11\")   Wt 133.8 kg (295 lb)   BMI 41.14 kg/m    BMI= Body mass index is 41.14 kg/m .    HEART: RRR, no new murmurs  LUNGS: CTAB, equal chest rise, good effort  ABD: soft, non tender, non distended  INCISIONS: c/d/i looks well healed, no erythema, no induration  EXT: ZAPATA, no deformities    PATHOLOGY:  None new    Assessment:     ICD-10-CM    1. Post-op pain  G89.18         Plan: I reassured the patient that overall this seems to be healing well.  We talked about the healing process and how there is not any remaining suture material in the incision.  No signs of infection, hematoma, seroma or any other complication of surgery.  Sometimes hypersensitivity of an incision can occur which at times will resolve but other times may persist.  She understands.    Pankaj Ziegler, DO        Again, thank you for allowing me to participate in the care of your patient.        Sincerely,        Pankaj Ziegler, DO    "

## 2022-11-28 ASSESSMENT — PATIENT HEALTH QUESTIONNAIRE - PHQ9
10. IF YOU CHECKED OFF ANY PROBLEMS, HOW DIFFICULT HAVE THESE PROBLEMS MADE IT FOR YOU TO DO YOUR WORK, TAKE CARE OF THINGS AT HOME, OR GET ALONG WITH OTHER PEOPLE: SOMEWHAT DIFFICULT
SUM OF ALL RESPONSES TO PHQ QUESTIONS 1-9: 6
SUM OF ALL RESPONSES TO PHQ QUESTIONS 1-9: 6

## 2022-11-29 ENCOUNTER — VIRTUAL VISIT (OUTPATIENT)
Dept: PSYCHOLOGY | Facility: CLINIC | Age: 27
End: 2022-11-29
Payer: COMMERCIAL

## 2022-11-29 DIAGNOSIS — F33.1 MAJOR DEPRESSIVE DISORDER, RECURRENT EPISODE, MODERATE (H): ICD-10-CM

## 2022-11-29 DIAGNOSIS — F84.0 AUTISM SPECTRUM DISORDER: ICD-10-CM

## 2022-11-29 DIAGNOSIS — F41.1 GENERALIZED ANXIETY DISORDER: ICD-10-CM

## 2022-11-29 DIAGNOSIS — F88 DEVELOPMENTAL MENTAL DISORDER: Primary | ICD-10-CM

## 2022-11-29 PROCEDURE — 90791 PSYCH DIAGNOSTIC EVALUATION: CPT | Performed by: PSYCHOLOGIST
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
## 2022-12-22 ENCOUNTER — VIRTUAL VISIT (OUTPATIENT)
Dept: PSYCHOLOGY | Facility: CLINIC | Age: 27
End: 2022-12-22
Payer: COMMERCIAL

## 2022-12-22 DIAGNOSIS — F41.1 GENERALIZED ANXIETY DISORDER: Primary | ICD-10-CM

## 2022-12-22 DIAGNOSIS — F84.0 AUTISM SPECTRUM DISORDER: ICD-10-CM

## 2022-12-22 PROCEDURE — 96130 PSYCL TST EVAL PHYS/QHP 1ST: CPT | Performed by: PSYCHOLOGIST

## 2022-12-22 PROCEDURE — 96131 PSYCL TST EVAL PHYS/QHP EA: CPT | Performed by: PSYCHOLOGIST

## 2022-12-22 NOTE — PROGRESS NOTES
[facility]   Mental Health & Addiction Services     Progress Note - ADHD Feedback Session     Patient Name: Lidia Gallegos  Date: 2022       Service Type:  Individual       Session Start Time: 3:00pm  Session End Time:  3:23pm     Session Length: 23 minutes    Session #: 3    Attendees: Patient attended alone    Service Modality: Video Visit:      Provider verified identity through the following two step process.  Patient provided:  Patient  and Patient address    Telemedicine Visit: The patient's condition can be safely assessed and treated via synchronous audio and visual telemedicine encounter.      Reason for Telemedicine Visit: Services only offered telehealth    Originating Site (Patient Location): Patient's home    Distant Site (Provider Location): Provider Remote Setting- Home Office    Consent:  The patient/guardian has verbally consented to: the potential risks and benefits of telemedicine (video visit) versus in person care; bill my insurance or make self-payment for services provided; and responsibility for payment of non-covered services.     Patient would like the video invitation sent by:  Send to e-mail at: [email or web address]    Mode of Communication:  Video Conference via [facility]    Distant Location (Provider):  Off-site    As the provider I attest to compliance with applicable laws and regulations related to telemedicine.      PHQ 2022   PHQ-9 Total Score 9 6   Q9: Thoughts of better off dead/self-harm past 2 weeks Not at all Not at all       JIGAR-7 SCORE 2022   Total Score 14 (moderate anxiety)   Total Score 14         DATA      Progress Since Last Session (Related to Symptoms / Goals / Homework):   Symptoms: Stable.    Homework: Completed.      Treatment Objective(s) Addressed in This Session:   Provided feedback on ADHD evaluation. Reviewed test results in depth. Plan of care and recommendations  were discussed based on testing data. See full report attached on secondary note in this encounter.     Intervention:   Provided feedback to patient regarding testing results, diagnoses, and treatment recommendations. Test results are not consistent with an ADHD diagnosis. Symptoms are better explained by ASD. Personalized suggestions regarding symptoms were offered. Patient had the opportunity to ask questions; she expressed understanding.        ASSESSMENT: Current Emotional / Mental Status (status of significant symptoms):   Risk status (Self / Other harm or suicidal ideation)   Patient denies current fears or concerns for personal safety.   Patient denies current or recent suicidal ideation or behaviors.   Patient denies current or recent homicidal ideation or behaviors.   Patient denies current or recent self injurious behavior or ideation.   Patient denies other safety concerns.   Patient reports there has been no change in risk factors since their last session.     Patient reports there has been no change in protective factors since their last session.     Recommended that patient call 911 or go to the local ED should there be a change in any of these risk factors.     Appearance:   Appropriate    Eye Contact:   Good    Psychomotor Behavior: Normal    Attitude:   Cooperative    Orientation:   All   Speech    Rate / Production: Normal     Volume:  Normal    Mood:    Normal   Affect:    Appropriate    Thought Content:  Clear    Thought Form:  Coherent  Logical    Insight:    Good      Medication Review:   No current psychiatric medications prescribed     Medication Compliance:   NA     Changes in Health Issues:   None reported     Chemical Use Review:   Substance Use: Chemical use reviewed, no active concerns identified      Nicotine Use: No current tobacco use.      Diagnosis:  1. Generalized anxiety disorder    2. Autism spectrum disorder        PLAN:   Recommendations are outlined in full evaluation report  (attached to this encounter).   Patient indicated understanding and will contact the clinic if there are further questions.    Report routed to referring provider.    Parts of this documentation may have been completed using dictation software. Potential errors may result and are unintentional.       Hanane Lux PsyD,   Clinical Psychologist         Psychological Testing Services Summary       Testing Evaluation Services Base: 76278  (first 60 mins) Add-on: 98659  (each addtl 60 mins)   Record Review and Clarify Referral Question   7:50am-8:00am on 11/8/2022 10 minutes   Clinical Decision Making/Battery Modification   2:45pm-3:00pm on 11/29/2022 15 minutes   Integration/Report Generation   5:00pm-6:00pm on 12/21/2022 ([facility])  6:00pm-6:30pm on 12/21/2022 ([facility])  6:30pm-7:30pm on 12/21/2022 (Final Report)   60 minutes  30 minutes  60 minutes   Interactive Feedback Session   3:00pm-3:23pm on 12/22/2022 23 minutes   Post-Service Work   3:23pm-3:38pm on 12/22/2022 15 minutes   Total Time: 213 minutes   Total Units: 1 3       Diagnoses:   F41.1 Generalized Anxiety Disorder  History of F84.0 Autism spectrum disorder

## 2022-12-22 NOTE — PROGRESS NOTES
Psychological Assessment Report    Patient: Lidia Gallegos  YOB: 1995  MRN: [id number]  Date(s) of assessment: 11/8/2022 and 11/29/2022  Referral Source: self  Reason for Referral: assessing reported deficits in executive functioning     IDENTIFYING INFORMATION AND BRIEF HISTORY OF PRESENTING PROBLEM: Lidia Gallegos is a 27-year-old White woman who presented to the initial diagnostic intake appointments on 11/8/2022 and 11/29/2022 (see diagnostic intake dated 11/29/2022 for more detailed background information) due to primary concerns with increasing self-awareness and needing to shift priorities. The patient stated that she has recently officially become a step-parent and has had difficulties adjusting. She described inattention as being a barrier in adjusting and is seeking diagnostic clarification.     As a child, the patient reported that she was a bit  day-dreamy  and engaged in repetitive behaviors, consistent with her autism spectrum disorder diagnosis. She denied problems with organization and stated that she was  militant  about order. She further denied problems with being disruptive in class, misplacing/losing items, and completing homework. She had an IEP in place all through school. After high school, the patient completed a bachelor s degree in biology at [employer]. She described that paying attention was  fine  unless he was in a longer, 3-hour lecture. She is currently enrolled in an MA program and is unemployed. She has previously worked a variety of contract jobs. The patient denied significant problems with inattention or hyperactivity at work. Currently, she reported experiencing the following symtpoms: difficulty sustaining attention (needs mental break every 20-30 minutes), avoiding/disliking tasks that require sustained mental effort (procrastinates coursework), is fidgety, talk excessively, blurts out information, and often interrupts others. The patient is  seeking diagnostic clarification and updated treatment recommendations.    Mental Health History: The patient reported a history of depression and anxiety symptoms that began around 2017. She has previously worked in individual psychotherapy to help manage symptoms. Her previous Prozac prescription caused hallucinations. She was also diagnosed with autism spectrum disorder at 7 years old. The patient denied a history of manic symptoms, social anxiety, phobic responses, symptoms of obsessive-compulsive disorder, trauma, and perceptual difficulties. The patient denied issues with sexual compulsivity, gambling, and disordered eating.    Developmental History: The patient reported that she believes that she is the product of a full-term pregnancy and there were no complications during her mother's pregnancy or birth. The patient reported that she walked on time, but was not talking until 2-2.5 years old. As a result of the ASD diagnosis, she had an IEP in place all through school. She grew up with her mother, father, older sister, and younger brother in the home. Parents remain . She recalled that her family moved often in an attempt to find the best school possible for her. Relationships remain positive.     Chemical Dependence/Substance Abuse History: The patient denied a history of chemical or substance abuse.     SOURCES OF DATA/ASSESSMENT: Review of medical and psychiatric records, consideration of behavioral observations during the testing (if applicable), and the results of the psychological tests are all considered in the preparation of this psychological test report. It is important to note that test results comprise a hypothesis of the patient s mental health concerns and are not an independent or conclusive assessment. Test results are combined with the patient s available medical, psychological, behavioral data for an integrated interpretation and report. Due to virtual/remote administration, certain  aspects of the assessment process were impacted, such as access to direct patient observation, and maintaining an environment conducive to testing. As such, external factors have the potential to affect the validity of data collected.    TESTS ADMINISTERED:    CNS Vital Signs Neurocognitive Battery    Isabel Adult ADHD Rating Scale-IV: Self and Other Reports (BAARS-IV)    Isabel Functional Impairment Scale: Self and Other Reports (BFIS)    Isabel Deficits in Executive Functioning Scale (BDEFS)    Generalized Anxiety Disorder Questionnaire (JIGAR-7)    Patient Health Questionnaire- 9 (PHQ-9)    [place] Multiphasic Personality Inventory - 2 (MMPI - 2)     BEHAVIORAL OBSERVATIONS: The patient was pleasant and cooperative throughout all interview and explanation of testing process. The patient was oriented to person, place, and time. Mood was neutral. Eye contact was adequate and speech was at normal rate and rhythm. Motor activity was appropriate. Due to virtual/remote administration, direct patient observation was not possible during the testing process, and it is unknown if the patient was able to maintain an environment conducive to testing. As such, external factors have the potential to affect the validity of data collected.     TEST RESULTS: Test results comprise a hypothesis of the patient s mental health concerns and are not an independent or conclusive assessment. Test results are combined with the patient s available medical, psychological, behavioral, and observational data for an integrated interpretation and report.    CNS Vital Signs Neurocognitive Battery  The CNS Vital Signs Neurocognitive Battery is a remotely-administered assessment comprised of seven core subtests to individually measure the patient's verbal memory, visual memory, motor speed, psychomotor speed, reaction time, focus, ability to sustain attention and ability to adapt to changing rules and tasks.      Above average domain scores  indicate a standard score (SS) greater than 109 or a Percentile Rank (MD) greater than 74, indicating a high functioning test subject. Average is a SS  or MD 25-74, indicating normal function. Low Average is a SS 80-89 or MD 9-24 indicating a slight deficit or impairment. Below Average is a SS 70-79 or MD 2-8, indicating a moderate level of deficit or impairment. Very Low is a SS less than 70 or a MD less than 2, indicating a deficit and impairment.  Validity Indicator denotes a guideline for representing the possibility of an invalid test or domain score, and can be influenced by patient understanding, effort, or other conditions.    The patient s results are detailed below:    Domain Standard Score Percentile Description Validity   Neurocognitive Index 101 53 Average Yes   Composite Memory Measure 113 81 Above Average Yes   Verbal Memory 111 77 Above Average Yes   Visual Memory 110 75 Above Average Yes   Psychomotor Speed 118 88 Above Average Yes   Reaction Time 98 45 Average Yes   Complex Attention 95 37 Average Yes   Cognitive Flexibility 82 12 Low Average Yes   Processing Speed 113 81 Above Average Yes   Executive Function 84 14 Low Average Yes   Reasoning 98 45 Average Yes   Working Memory 119 90 Above Average Yes   Sustained Attention  115 84 Above Average Yes   Simple Attention 85 16 Low Average Yes   Motor Speed 116 86 Above Average Yes     Neurocognitive Index (NCI): Measures an average score derived from the domain scores or a general assessment of the overall neurocognitive status of the patient. The patient's NCI score is 101, with a percentile of 53, and falls within the average range.    Composite Memory: Measures how well subject can recognize, remember, and retrieve words and geometric figures, and is comprised of the Visual and Verbal Memory domains. The patient's Composite Memory score is 113, with a percentile of 81, and falls within the above average range.    Verbal Memory: Measures how  well subject can recognize, remember, and retrieve words. The patient's Verbal Memory score is 111, with a percentile of 77, and falls within the above average range.    Visual Memory: Measures how well subject can recognize, remember and retrieve geometric figures. The patient's Visual Memory score is 110, with a percentile of 75, and falls within the above average range.    Psychomotor Speed: Measures how well a subject perceives, attends, responds to complex visual-perceptual information and performs simple fine motor coordination, and is comprised of the Motor Speed and Processing Speed indexes. The patient's Psychomotor Speed score is 118, with a percentile of 88, and falls within the above average range.    Reaction Time: Measures how quickly the subject can react, in milliseconds, to a simple and increasingly complex direction set. The patient's Reaction Time score is 98, with a percentile of 45, and falls within the average range.    Complex Attention: Measures the ability to track and respond to a variety of stimuli over lengthy periods of time and/or perform complex mental tasks requiring vigilance quickly and accurately. The patient's Complex Attention score is 95, with a percentile of 37, and falls within the average range.    Cognitive Flexibility: Measures how well subject is able to adapt to rapidly changing and increasingly complex set of directions and/or to manipulate the information. The patient's Cognitive Flexibility score is 82, with a percentile of 12, and falls within the low average range.    Processing Speed: Measures how well a subject recognizes and processes information i.e., perceiving, attending/responding to incoming information, motor speed, fine motor coordination, and visual-perceptual ability. The patient's Processing Speed score is 113, with a percentile of 81, and falls within the above average range.    Executive Function: Measures how well a subject recognizes rules,  categories, and manages or navigates rapid decision making. The patient's Executive Function score is 84, with a percentile of 14, and falls within the low average range.    Reasoning: Measures how well a subject can perceive and understand the meaning of visual or abstract information and recognizing relationships between visual-abstract concepts. The patient's Reasoning score is 98, with a percentile of 45, and falls in the average range.     Working Memory: Measures how well a subject can perceive and attend to symbols using short-term memory processes. Also measures the ability to carry out short-term memory tasks that support decision making, problem solving, planning, and execution. The patient's Working Memory score is 119, with a percentile of 90, and falls in the above average range.    Sustained Attention: Measures how well a subject can direct and focus cognitive activity on specific stimuli. Also measurs how well a subject can focus and complete task or activity, sequence action, and focus during complex thought. The patient's Sustained Attention score is 115, with a percentile of 84, and falls in the above average range.    Simple Attention: Measures the ability to track and respond to a single defined stimulus over lengthy periods of time while performing vigilance and response inhibition quickly and accurately to a simple task. The patient's Simple Attention score is 85, with a percentile of 16, and falls within the low average range.    Motor Speed: Measure: Ability to perform simple movements to produce and satisfy an intention towards a manual action and goal. The patient's Motor Speed score is 116, with a percentile of 86, and falls within the above average range.    Isabel Adult ADHD Rating Scale-IV: Self and Other Reports (BAARS-IV)  The BAARS-IV assesses for symptoms of ADHD that are experienced in one's daily life. This assessment measure includes self and collateral rating scales designed to  "provide information regarding current and childhood symptoms of ADHD including inattention, hyperactivity, and impulsivity. Self-report scores are reported as percentiles. Scores at the 76th-83rd percentile are considered marginal, scores at the 84th-92nd percentile are considered borderline, scores at the 93rd-95th percentile are considered mild, scores at the 96th-98th percentile are considered moderate, and those at the 99th percentile are considered severe. Collateral or \"other\" rating scales are reported as number of symptoms observed in comparison to those reported by the client. Norms and percentile scores are not available for collateral reports.     Current Symptoms Scale--Self Report:   Client completed the self-report inventory of current symptoms. The results indicate that the client's Total ADHD Score was 40 which places the patient in the 93rd percentile for overall ADHD symptoms. In addition, the client endorsed 3/9 (92nd percentile) Inattention symptoms, 3/9 (93rd percentile) Hyperactivity-Impulsivity symptoms, and 3/9 (88th percentile) Sluggish Cognitive Tempo symptoms. Client indicated that the reported symptoms have resulted in impaired functioning in school, home, and social relationships. Overall, the results suggest the client is experiencing borderline ADHD symptoms.     Current Symptoms Scale--Other Report:  Client's  completed the collateral report inventory of current symptoms. Based on the collateral contact's observation of symptoms, the client demonstrates 0/9 Inattention symptoms, 1/5 Hyperactivity symptoms, 0/4 Impulsivity symptoms, and 0/9 Sluggish Cognitive Tempo symptoms. The client's Total ADHD Score was 29. The collateral contact indicated the client demonstrates impaired functioning in no areas.  The collateral- and self-report scores are not significantly different.    Childhood Symptoms Scale--Self-Report:  Client completed the self-report inventory of childhood " "symptoms. The results indicate that the client's Total ADHD Score was 38 which places the patient in the 85th percentile for overall ADHD symptoms in childhood. In addition, the client endorsed 2/9 (83rd percentile) Inattention symptoms and 4/9 (89th percentile) Hyperactivity-Impulsivity symptoms. Client indicated that the reported symptoms resulted in impaired functioning in school, home, and social relationships. Overall, the results suggest the client experienced borderline symptoms of ADHD as a child.     Childhood Symptoms Scale--Other Report:  Client's mother completed the collateral report inventory of childhood symptoms. Based on the collateral contact's recollection of client's childhood symptoms, the client demonstrated 0/9 Inattention symptoms and 4/9 Hyperactivity-Impulsivity symptoms. The client's Total ADHD Score was 35. The collateral contact indicated the client demonstrates impaired functioning in no areas.  The collateral- and self-report scores are not significantly different.                           Isabel Functional Impairment Scale: Self and Other Reports (BFIS)  The BFIS is used to assess an individuals' psychosocial impairment in major life/daily activities that may be due to a mental health disorder. This assessment measure includes self and collateral rating scales. Self-report scores are reported as percentiles. Scores at the 76th-83rd percentile are considered marginal, scores at the 84th-92nd percentile are considered borderline, scores at the 93rd-95th percentile are considered mild, scores at the 96th-98th percentile are considered moderate, and those at the 99th percentile are considered severe. Collateral or \"other\" rating scales are reported as number of symptoms observed in comparison to those reported by the client. Norms and percentile scores are not available for collateral reports.     Results indicate the client identified impairment (scores at or greater than 93rd " "percentile) in the following areas: social-strangers and marriage/cohabiting/dating.  The client's Mean Impairment Score was 3.1 (51-75th percentile) indicating the client is reporting 13% impairment in functioning across domains. Client's  completed the collateral rating scale, which indicated similar results.      Isabel Deficits in Executive Functioning Scale (BDEFS)  The BDEFS is a measure used for evaluating dimensions of adult executive functioning in daily life. This assessment measure includes self and collateral rating scales. Self-report scores are reported as percentiles. Scores at the 76th-83rd percentile are considered marginal, scores at the 84th-92nd percentile are considered borderline, scores at the 93rd-95th percentile are considered mild, scores at the 96th-98th percentile are considered moderate, and those at the 99th percentile are considered severe. Collateral or \"other\" rating scales are reported as number of symptoms observed in comparison to those reported by the client. Norms and percentile scores are not available for collateral reports.     Results indicate the client's Total Executive Functioning Score was 188 (90th percentile). The ADHD-Executive Functioning Index score was 22 (81st percentile). These scores suggest the client has marginal deficits in executive functioning. Results indicate the client identified significant deficits in the following areas: self-management to time (no significant deficits), self-organization/problem-solving (borderline deficits), self-restraint (borderline deficits), self-motivation (no significant deficits) and self-regulation of emotions (moderate deficits). Client's  completed the collateral rating scale, which indicated similar results. The collateral contact's scores were generally lower than the client's report.    Generalized Anxiety Disorder Questionnaire (JGIAR-7)  This questionnaire is designed to assess for anxiety in adults. " Based on the score, the patient is experiencing moderate symptoms of anxiety. Client identified the following symptoms of anxiety: feeling on edge/nervous/anxious, difficulty controlling worry, worrying about many different things, trouble relaxing, being restless, becoming easily annoyed or irritable, and feeling something awful might happen.    Patient Health Questionnaire- 9 (PHQ-9)   This questionnaire is designed to assess for depression in adults. Based on the score, the patient is experiencing mild symptoms of depression. Client identified the following symptoms of depression: depressed mood, difficulty with sleep, feeling tired or having little energy, and feeling bad about self.    [place] Multiphasic Personality Inventory - 2 (MMPI-2)    The MMPI-2 was administered to evaluate current level of emotional distress. Validity profile indicates that the patient appears to have answered in an inconsistent manner.  At these levels, results cannot be considered a reliable measure of the patient's current psychological functioning.  As a result, no interpretation is able to be provided.  No items were omitted.    SUMMARY: Lidia Gallegos is a 27-year-old White woman who completed psychological testing remotely/virtually due to the COVID-19 pandemic. Testing was requested to provide updated diagnostic clarification and necessary treatment recommendations.    Patient first completed a diagnostic interview in which mental health symptoms, ADHD symptoms, and background information was gathered. Patient self-reported two symptoms of inattention, four symptoms of hyperactivity, and indicated that her abilities to function effectively at home and school are significantly impaired. Further, her self-reported symptoms on Isabel measures of ADHD symptoms were consistent with this information. Both the patient and her mother recalled some hyperacitivity symptoms in childhood.     An objective measure of neurocognitive  functioning was also administered. Results first indicate verbal memory capabilities to be scored in the above average range. Indeed, the patient was able to recognize words from a target word list immediately and after a delay in the above average range. Visual memory also scored to be in the overall above average range. She was able to recognize target shapes immediately in the average range and after a delay in the above average range. Motor functioning appears to be in tact, as motor speed and psychomotor speed were scored to be in the above average range. Reasoning was scored to be in the average range, indicating the patient was able to solve nonverbal puzzle matrices comparable to peers.  Processing speed was scored to be in the above average range.  This indicates the patient was able to scan and respond to visual stimuli more effectively than peers.  On the Stroop test, the patient responded to stimuli in a timely manner and was able to inhibit the salient, but incorrect, response in the low average range.  On a shifting attention test, the patient was able to adjust her responses according to changing rules sets in the average range.  As such, complex attention was scored to be in the average range while cognitive flexibility and executive functioning were scored to be in the low average range.  Finally, on a continuous performance test, the patient was able to hold her attention in the average range but made impulsive errors in the low range.  Simple attention was then overall scored to be in the low average range.  On CNS vital signs, ADHD is associated with significant deficits in complex attention, cognitive flexibility, processing speed, executive functioning, and simple attention capabilities.  Although the patient demonstrated deficits in 3 of these 5 areas, it is likely that her performance in these areas is better attributed to her previous ASD diagnosis.  That is especially the case in light of  lack of current and childhood ADHD symptoms.  See recommendations below.    Referral Question Response: DSM-5 criteria for ADHD:   A. Symptom Count - Are there sufficient symptoms for the diagnosis? No, patient did not endorse sufficient inattention or hyperactivity symptoms.   B. Onset - Were several symptoms present before 12 years of age? No, hyperactivity symptoms were subclinical.  C. Pervasiveness - Are several symptoms present in at least two settings? Yes, patient reported that symptoms are problematic at home and school.  D. Impairment - Do symptoms interfere with or reduce the quality of functioning? Yes, patient is unable to complete daily tasks and coursework effectively.   E. Exclusions - Are symptoms better explained by another disorder or factor? Yes, symptoms are better explained by anxiety and ASD symptoms.     The patient meets the following DSM-5 criteria for generalized anxiety disorder:  A. Excessive anxiety and worry, occurring more days than not for at least 6 months about a number of events or activities.   B. The individual finds it difficult to control the worry.  C. The anxiety and worry are associated with 3 or more of 6 symptoms.  D. The anxiety, worry, or physical symptoms cause clinically significant distress or impairment in social, occupational, or other important areas of functioning.  E. The disturbance is not attributable to the physiological effects of a substance (e.g., a drug of abuse, a medication) or another medical condition (e.g., hyperthyroidism).  F. The disturbance is not better explained by another mental disorder.    DIAGNOSES:  F41.1 Generalized Anxiety Disorder  History of F84.0 Autism spectrum disorder    PLAN OF CARE:  1. Discuss the following with your primary care provider:  a. Consider a trial of a stimulant medication. This may help alleviate some of the patient's attentional symptoms.  Research indicates that stimulant medication may be indicated for autism  spectrum disorder.    2. Consider initiating individual psychotherapy to help alleviate mood symptoms. Research indicates that outcomes are best with both medication and therapy. You can call the  [facility] line at [phone].     RECOMMENDATIONS:  1. Due to the patient's reported attention, concentration, and mood difficulties, the following health/lifestyle changes when combined, can significantly improve symptoms:   a. Avoid simple carbohydrates at breakfast. Aim for only complex carbohydrates and lean protein for your morning meal.   b. Engage in aerobic exercise 3 times per week for 30 minutes, ensuring that your heart rate stays within your training zone. Further, reading the book,  Spark,  by Jeffry Keith M.D can help the patient understand the benefits of exercise on the brain.   c. Research suggest that taking a high-quality multi-vitamin and antioxidant (1/2 cup of blueberries) daily in conjunction with balanced nutrition can be helpful.  d. Aim for the high end of daily water intake: around 72 ounces per day.  e. Ensure regular meals and snacks to maintain optimal attention.    2. The following may be beneficial in managing some of the patient's attention and concentration difficulties:  a. Due to the patient's difficulties with attention and concentration, consider working in a completely distraction-free area while completing tasks. Workspaces should be completely clear except for the materials needed for the current task. Both visual and auditory distractions should be decreased as much as possible.  b. Considering decreased ability to focus and maintain attention, it is recommended that the patient take frequent breaks while completing tasks. This will help to maintain attention and effort. The patient may benefit from the use of a [employer]ara[employer] Timer. The timer works by using built-in break times. After working on a task consistently for 25 minutes, the timer reminds the user to  "take a five-minute break before continuing, etc. A [employer] timer can be downloaded as a free leatha to a phone or tablet.  c. Due to the patient's attentional and concentration symptoms, it is recommended to increase organization with the use of lists and calendars. Significantly increasing structure to the day and adhering to a set schedule can increase your ability to complete responsibilities, track deadline, etc. Breaking these tasks down into their component parts and recording them in a calendar/planner will likely be beneficial. Patient would benefit from setting feasible timelines for completion of activities. By establishing clear priorities for completing tasks, you can more likely complete the most important tasks first. The patient may also choose to elect to a friend or family member to help hold them accountable.    3. Avoid multitasking. Attempting to work on multiple tasks and projects the same increases the likelihood that an error will occur. Focus on one task at a time.    4. The patient may benefit from engaging in mindfulness practices. This may include breathing techniques, apps that provide guided meditation, or more interactive activities such as coloring.    5. Develop a \"coping skills jar/box.\" This entails designating a certain container to hold slips of paper with distraction technique ideas written on each slip of paper. Distraction techniques may include listening to a certain type of music, playing on game on your phone, doing a breathing exercise, spending time with a pet, calling a certain individual, looking at a magazine, working on a puzzle, etc. When feeling distressed, choose a slip of paper from the container and engage in that activity rather than focusing on the problem.      Hanane Lux PsyD,   Clinical Psychologist    "

## 2023-02-11 ENCOUNTER — HOSPITAL ENCOUNTER (EMERGENCY)
Facility: CLINIC | Age: 28
Discharge: HOME | End: 2023-02-11
Attending: FAMILY MEDICINE | Admitting: FAMILY MEDICINE
Payer: COMMERCIAL

## 2023-02-11 ENCOUNTER — NURSE TRIAGE (OUTPATIENT)
Dept: NURSING | Facility: CLINIC | Age: 28
End: 2023-02-11
Payer: COMMERCIAL

## 2023-02-11 VITALS
SYSTOLIC BLOOD PRESSURE: 133 MMHG | OXYGEN SATURATION: 100 % | BODY MASS INDEX: 41.14 KG/M2 | HEIGHT: 71 IN | TEMPERATURE: 98.3 F | RESPIRATION RATE: 18 BRPM | HEART RATE: 78 BPM | DIASTOLIC BLOOD PRESSURE: 91 MMHG

## 2023-02-11 DIAGNOSIS — R07.9 ACUTE CHEST PAIN: ICD-10-CM

## 2023-02-11 DIAGNOSIS — U07.1 INFECTION DUE TO 2019 NOVEL CORONAVIRUS: ICD-10-CM

## 2023-02-11 LAB
ANION GAP SERPL CALCULATED.3IONS-SCNC: 10 MMOL/L (ref 7–15)
BASOPHILS # BLD AUTO: 0.1 10E3/UL (ref 0–0.2)
BASOPHILS NFR BLD AUTO: 1 %
BUN SERPL-MCNC: 7.5 MG/DL (ref 6–20)
CALCIUM SERPL-MCNC: 8.7 MG/DL (ref 8.6–10)
CHLORIDE SERPL-SCNC: 103 MMOL/L (ref 98–107)
CREAT SERPL-MCNC: 0.68 MG/DL (ref 0.51–0.95)
CRP SERPL-MCNC: <3 MG/L
D DIMER PPP FEU-MCNC: 0.52 UG/ML FEU (ref 0–0.5)
DEPRECATED HCO3 PLAS-SCNC: 22 MMOL/L (ref 22–29)
EOSINOPHIL # BLD AUTO: 0.5 10E3/UL (ref 0–0.7)
EOSINOPHIL NFR BLD AUTO: 5 %
ERYTHROCYTE [DISTWIDTH] IN BLOOD BY AUTOMATED COUNT: 14.2 % (ref 10–15)
GFR SERPL CREATININE-BSD FRML MDRD: >90 ML/MIN/1.73M2
GLUCOSE SERPL-MCNC: 157 MG/DL (ref 70–99)
HCT VFR BLD AUTO: 39.2 % (ref 35–47)
HGB BLD-MCNC: 12.9 G/DL (ref 11.7–15.7)
IMM GRANULOCYTES # BLD: 0.1 10E3/UL
IMM GRANULOCYTES NFR BLD: 1 %
LACTATE SERPL-SCNC: 1.6 MMOL/L (ref 0.7–2)
LYMPHOCYTES # BLD AUTO: 2.4 10E3/UL (ref 0.8–5.3)
LYMPHOCYTES NFR BLD AUTO: 23 %
MCH RBC QN AUTO: 27.4 PG (ref 26.5–33)
MCHC RBC AUTO-ENTMCNC: 32.9 G/DL (ref 31.5–36.5)
MCV RBC AUTO: 83 FL (ref 78–100)
MONOCYTES # BLD AUTO: 0.8 10E3/UL (ref 0–1.3)
MONOCYTES NFR BLD AUTO: 8 %
NEUTROPHILS # BLD AUTO: 6.5 10E3/UL (ref 1.6–8.3)
NEUTROPHILS NFR BLD AUTO: 62 %
NRBC # BLD AUTO: 0 10E3/UL
NRBC BLD AUTO-RTO: 0 /100
PLATELET # BLD AUTO: 328 10E3/UL (ref 150–450)
POTASSIUM SERPL-SCNC: 3.9 MMOL/L (ref 3.4–5.3)
RBC # BLD AUTO: 4.71 10E6/UL (ref 3.8–5.2)
SODIUM SERPL-SCNC: 135 MMOL/L (ref 136–145)
TROPONIN T SERPL HS-MCNC: <6 NG/L
WBC # BLD AUTO: 10.3 10E3/UL (ref 4–11)

## 2023-02-11 PROCEDURE — 85025 COMPLETE CBC W/AUTO DIFF WBC: CPT | Performed by: FAMILY MEDICINE

## 2023-02-11 PROCEDURE — 93010 ELECTROCARDIOGRAM REPORT: CPT | Performed by: FAMILY MEDICINE

## 2023-02-11 PROCEDURE — 99283 EMERGENCY DEPT VISIT LOW MDM: CPT | Mod: CS | Performed by: FAMILY MEDICINE

## 2023-02-11 PROCEDURE — 80048 BASIC METABOLIC PNL TOTAL CA: CPT | Performed by: FAMILY MEDICINE

## 2023-02-11 PROCEDURE — 99284 EMERGENCY DEPT VISIT MOD MDM: CPT | Performed by: FAMILY MEDICINE

## 2023-02-11 PROCEDURE — 83605 ASSAY OF LACTIC ACID: CPT | Performed by: FAMILY MEDICINE

## 2023-02-11 PROCEDURE — 93005 ELECTROCARDIOGRAM TRACING: CPT | Performed by: FAMILY MEDICINE

## 2023-02-11 PROCEDURE — 84484 ASSAY OF TROPONIN QUANT: CPT | Performed by: FAMILY MEDICINE

## 2023-02-11 PROCEDURE — 85379 FIBRIN DEGRADATION QUANT: CPT | Performed by: FAMILY MEDICINE

## 2023-02-11 PROCEDURE — 86140 C-REACTIVE PROTEIN: CPT | Performed by: FAMILY MEDICINE

## 2023-02-11 PROCEDURE — 36415 COLL VENOUS BLD VENIPUNCTURE: CPT | Performed by: FAMILY MEDICINE

## 2023-02-11 RX ORDER — ACETAMINOPHEN 500 MG
500-1000 TABLET ORAL EVERY 6 HOURS PRN
Refills: 0 | COMMUNITY
Start: 2023-02-11 | End: 2023-02-15

## 2023-02-11 ASSESSMENT — ENCOUNTER SYMPTOMS
MYALGIAS: 1
FATIGUE: 1
PALPITATIONS: 0

## 2023-02-11 ASSESSMENT — ACTIVITIES OF DAILY LIVING (ADL): ADLS_ACUITY_SCORE: 35

## 2023-02-11 NOTE — ED TRIAGE NOTES
"Pt tested positive for COVID Thursday.  C/o nausea, headache, body aches, and SOB.  Woke up from sleep due to left arm pain, pt states \"deep pain like someone hit me.\"  RR 16-20, Sp02 99% on RA.  Alert and oriented.     Triage Assessment     Row Name 02/11/23 0554       Triage Assessment (Adult)    Airway WDL WDL       Respiratory WDL    Respiratory WDL WDL       Cognitive/Neuro/Behavioral WDL    Cognitive/Neuro/Behavioral WDL WDL              "

## 2023-02-11 NOTE — TELEPHONE ENCOUNTER
Nurse Triage    Is this a 2nd Level Triage? NO    Situation: Patient calling with concerns for chest pain.  Consent: not needed    Background: Patient reports that she just tested positive for Covid yesterday with 2 at home Covid tests. Her top concerns tonight is chest pain. She reports she has a history of acid reflux, but she isn't sure if this is the same thing.     Assessment: Pt denies severe difficulty breathing, feeling like she's in shock or a personal history of heart disease. She states that her chest pain is pressure-like, but intermittent and lasting 1 - 2 minutes at a time. She states that it's happened x4 in the past hour. She also has pain in her left arm, from her fingers to her shoulder.     Protocol Recommended Disposition:   Go to ED Now    Recommendation: Advised patient to Call 911. Care advice given. Reviewed concerning symptoms and when to call back.     Anastasia Kee RN [facility] 2/11/2023 4:51 AM    Reason for Disposition    Pain also in shoulder(s) or arm(s) or jaw (Exception: pain is clearly made worse by movement)    [1] Chest pain lasts > 5 minutes AND [2] described as crushing, pressure-like, or heavy     Intermittent pain, pain lasts 1 -2 minutes at a time. Pain rated at a 4-5/10    Additional Information    Negative: SEVERE difficulty breathing (e.g., struggling for each breath, speaks in single words)    Negative: Difficult to awaken or acting confused (e.g., disoriented, slurred speech)    Negative: Shock suspected (e.g., cold/pale/clammy skin, too weak to stand, low BP, rapid pulse)    Negative: Passed out (i.e., lost consciousness, collapsed and was not responding)    Negative: [1] Chest pain lasts > 5 minutes AND [2] age > 44    Negative: [1] Chest pain lasts > 5 minutes AND [2] age > 30 AND [3] one or more cardiac risk factors (e.g., diabetes, high blood pressure, high cholesterol, smoker, or strong family history of heart disease)    Negative: [1] Chest pain  "lasts > 5 minutes AND [2] history of heart disease (i.e., angina, heart attack, heart failure, bypass surgery, takes nitroglycerin)    Negative: Heart beating < 50 beats per minute OR > 140 beats per minute    Negative: Visible sweat on face or sweat dripping down face    Negative: Sounds like a life-threatening emergency to the triager    Negative: SEVERE chest pain    Negative: Followed a chest injury    Negative: [1] Chest pain (or \"angina\") comes and goes AND [2] is happening more often (increasing in frequency) or getting worse (increasing in severity) (Exception: chest pains that last only a few seconds)    Protocols used: CHEST PAIN-A-AH      "

## 2023-02-11 NOTE — ED PROVIDER NOTES
History     Chief Complaint   Patient presents with     Arm Pain     Chest Wall Pain     HPI  Lidia Gallegos is a 27 year old female who presents to the emergency room today secondary to concerns of being COVID-positive.  She stated she tested positive last Thursday, 2 days ago.  She states that she has had a hard time sleeping through the night starting about 11:00 last night because she has had intermittent episodes of left arm pain and some occasional midsternal chest pain.  Pain episodes last about 2 minutes but then go away but keep recurring and waking her up from sleep at times through the night.  She states that she is to had generalized body aches and low-grade fever and just has not felt well since diagnosing herself with a COVID-19.  She denies any significant shortness of breath symptoms, however.  Asked if she felt that her substernal chest pain might be associated with any reflux of acid and she states that it does not feel that way.  She states that she has had reflux in the past and this feels different.    Allergies:  No Known Allergies    Problem List:    Patient Active Problem List    Diagnosis Date Noted     Nexplanon in place 09/17/2019     Priority: Medium     Anxiety with depression 11/15/2017     Priority: Medium     Asperger's syndrome 11/15/2017     Priority: Medium        Past Medical History:    No past medical history on file.    Past Surgical History:    No past surgical history on file.    Family History:    No family history on file.    Social History:  Marital Status:   [2]  Social History     Tobacco Use     Smoking status: Never     Smokeless tobacco: Never   Vaping Use     Vaping Use: Never used   Substance Use Topics     Alcohol use: Yes     Comment: occ     Drug use: Never        Medications:    acetaminophen (TYLENOL) 500 MG tablet  ascorbic acid 1000 MG TABS tablet  LANsoprazole (PREVACID) 30 MG DR capsule  ondansetron (ZOFRAN ODT) 4 MG ODT tab  traMADol (ULTRAM) 50  "MG tablet          Review of Systems   Constitutional: Positive for fatigue.   HENT: Positive for congestion.    Cardiovascular: Positive for chest pain. Negative for palpitations and leg swelling.   Musculoskeletal: Positive for myalgias.   All other systems reviewed and are negative.      Physical Exam   BP: (!) 147/99  Pulse: 86  Temp: 98.4  F (36.9  C)  Resp: 20  Height: 180.3 cm (5' 11\")  SpO2: 99 %      Physical Exam  Vitals and nursing note reviewed.   Constitutional:       Appearance: Normal appearance. She is not ill-appearing, toxic-appearing or diaphoretic.   HENT:      Head: Normocephalic and atraumatic.      Nose: Congestion present.   Eyes:      General: No scleral icterus.     Extraocular Movements: Extraocular movements intact.      Conjunctiva/sclera: Conjunctivae normal.      Pupils: Pupils are equal, round, and reactive to light.   Cardiovascular:      Rate and Rhythm: Normal rate and regular rhythm.      Pulses: Normal pulses.      Heart sounds: No murmur heard.  Pulmonary:      Effort: Pulmonary effort is normal. No respiratory distress.      Breath sounds: Normal breath sounds. No wheezing or rhonchi.   Musculoskeletal:         General: No swelling or tenderness. Normal range of motion.      Cervical back: Normal range of motion and neck supple.      Right lower leg: No edema.      Left lower leg: No edema.   Skin:     Capillary Refill: Capillary refill takes less than 2 seconds.      Findings: No rash.   Neurological:      Mental Status: She is alert and oriented to person, place, and time.   Psychiatric:         Mood and Affect: Mood normal.         Behavior: Behavior normal.         ED Course                 Procedures              EKG Interpretation:      Interpreted by Barrie Muñoz DO  Time reviewed: 6:30 AM  Symptoms at time of EKG: chest pain, intermittent   Rhythm: normal sinus   Rate: normal  Axis: normal  Ectopy: none  Conduction: normal  ST Segments/ T Waves: No ST-T wave " changes  Q Waves: none  Comparison to prior: Unchanged from 4/13/22    Clinical Impression: normal EKG          Critical Care time:  none               Results for orders placed or performed during the hospital encounter of 02/11/23 (from the past 24 hour(s))   CBC with platelets differential    Narrative    The following orders were created for panel order CBC with platelets differential.  Procedure                               Abnormality         Status                     ---------                               -----------         ------                     CBC with platelets and d...[[id number]]                      Final result                 Please view results for these tests on the individual orders.   D dimer quantitative   Result Value Ref Range    D-Dimer Quantitative 0.52 (H) 0.00 - 0.50 ug/mL FEU    Narrative    This D-dimer assay is intended for use in conjunction with a clinical pretest probability assessment model to exclude pulmonary embolism (PE) and deep venous thrombosis (DVT) in outpatients suspected of PE or DVT. The cut-off value is 0.50 ug/mL FEU.   Lactic acid whole blood   Result Value Ref Range    Lactic Acid 1.6 0.7 - 2.0 mmol/L   Basic metabolic panel   Result Value Ref Range    Sodium 135 (L) 136 - 145 mmol/L    Potassium 3.9 3.4 - 5.3 mmol/L    Chloride 103 98 - 107 mmol/L    Carbon Dioxide (CO2) 22 22 - 29 mmol/L    Anion Gap 10 7 - 15 mmol/L    Urea Nitrogen 7.5 6.0 - 20.0 mg/dL    Creatinine 0.68 0.51 - 0.95 mg/dL    Calcium 8.7 8.6 - 10.0 mg/dL    Glucose 157 (H) 70 - 99 mg/dL    GFR Estimate >90 >60 mL/min/1.73m2   CRP inflammation   Result Value Ref Range    CRP Inflammation <3.00 <5.00 mg/L   CBC with platelets and differential   Result Value Ref Range    WBC Count 10.3 4.0 - 11.0 10e3/uL    RBC Count 4.71 3.80 - 5.20 10e6/uL    Hemoglobin 12.9 11.7 - 15.7 g/dL    Hematocrit 39.2 35.0 - 47.0 %    MCV 83 78 - 100 fL    MCH 27.4 26.5 - 33.0 pg    MCHC 32.9 31.5 - 36.5 g/dL    RDW  14.2 10.0 - 15.0 %    Platelet Count 328 150 - 450 10e3/uL    % Neutrophils 62 %    % Lymphocytes 23 %    % Monocytes 8 %    % Eosinophils 5 %    % Basophils 1 %    % Immature Granulocytes 1 %    NRBCs per 100 WBC 0 <1 /100    Absolute Neutrophils 6.5 1.6 - 8.3 10e3/uL    Absolute Lymphocytes 2.4 0.8 - 5.3 10e3/uL    Absolute Monocytes 0.8 0.0 - 1.3 10e3/uL    Absolute Eosinophils 0.5 0.0 - 0.7 10e3/uL    Absolute Basophils 0.1 0.0 - 0.2 10e3/uL    Absolute Immature Granulocytes 0.1 <=0.4 10e3/uL    Absolute NRBCs 0.0 10e3/uL           Assessments & Plan (with Medical Decision Making)  27-year-old female to the ER secondary concerns of positive COVID-19 test with some intermittent episodes of chest pain through the night tonight.  Patient's exam was reassuring.  No signs of ACS noted.  Patient's heart rate was normal through the ER stay and her oxygenation was also normal on room air.  Less concern after monitoring that she could have a pulmonary embolus as a reason for her chest pain.  The pain is also not pleuritic.  Patient was signed out to Dr. Young at shift change pending results of her troponin level.  If elevated then Dr. Young will pursue additional evaluation of the patient.  If not elevated then the patient be discharged home with the instructions and handouts that I had set up for the patient prior to shift change.     I have reviewed the nursing notes.    I have reviewed the findings, diagnosis, plan and need for follow up with the patient.       Medical Decision Making  The patient's presentation is strongly suggestive of an acute illness with systemic symptoms.    The patient's evaluation involved:  ordering and/or review of 3+ test(s) in this encounter (see separate area of note for details)    The patient's management involved only low risk treatment.        New Prescriptions    ACETAMINOPHEN (TYLENOL) 500 MG TABLET    Take 1-2 tablets (500-1,000 mg) by mouth every 6 hours as needed for pain             I verbally discussed the findings of the evaluation today in the ER. I have verbally discussed with Lidia the suggested treatment(s) as described in the discharge instructions and handouts. I have prescribed the above listed medications and instructed her on appropriate use of these medications.      I have verbally suggested she follow-up in her clinic or return to the ER for increased symptoms. See the follow-up recommendations documented  in the after visit summary in this visit's EPIC chart.      Disclaimer: This note consists of words and symbols derived from keyboarding and dictation using voice recognition software.  As a result, there may be errors that have gone undetected.  Please consider this when interpreting information found in this note.    Final diagnoses:   Infection due to 2019 novel coronavirus   Acute chest pain       2/11/2023   [facility] EMERGENCY DEPT     Barrie Muñoz,   02/11/23 0717

## 2023-02-11 NOTE — DISCHARGE INSTRUCTIONS
Please read and follow the handout(s) instructions. Return, if needed, for increased or worsening symptoms and as directed by the handout(s).    Tylenol can safely be used for pain when you have COVID.

## 2023-02-28 ENCOUNTER — ALLIED HEALTH/NURSE VISIT (OUTPATIENT)
Dept: FAMILY MEDICINE | Facility: CLINIC | Age: 28
End: 2023-02-28
Payer: COMMERCIAL

## 2023-02-28 DIAGNOSIS — Z23 NEED FOR VACCINATION: Primary | ICD-10-CM

## 2023-02-28 PROCEDURE — 99207 PR NO CHARGE NURSE ONLY: CPT

## 2023-02-28 PROCEDURE — 90471 IMMUNIZATION ADMIN: CPT

## 2023-02-28 PROCEDURE — 90715 TDAP VACCINE 7 YRS/> IM: CPT

## 2023-02-28 NOTE — PROGRESS NOTES
Prior to immunization administration, verified patients identity using patient s name and date of birth. Please see Immunization Activity for additional information.     Screening Questionnaire for Adult Immunization    Are you sick today?   No   Do you have allergies to medications, food, a vaccine component or latex?   No   Have you ever had a serious reaction after receiving a vaccination?   No   Do you have a long-term health problem with heart, lung, kidney, or metabolic disease (e.g., diabetes), asthma, a blood disorder, no spleen, complement component deficiency, a cochlear implant, or a spinal fluid leak?  Are you on long-term aspirin therapy?   No   Do you have cancer, leukemia, HIV/AIDS, or any other immune system problem?   No   Do you have a parent, brother, or sister with an immune system problem?   No   In the past 3 months, have you taken medications that affect  your immune system, such as prednisone, other steroids, or anticancer drugs; drugs for the treatment of rheumatoid arthritis, Crohn s disease, or psoriasis; or have you had radiation treatments?   No   Have you had a seizure, or a brain or other nervous system problem?   No   During the past year, have you received a transfusion of blood or blood    products, or been given immune (gamma) globulin or antiviral drug?   No   For women: Are you pregnant or is there a chance you could become       pregnant during the next month?   No   Have you received any vaccinations in the past 4 weeks?   No     Immunization questionnaire answers were all negative.        Per orders of Dr. Price, injection of Tdap given by Mercedes Lau. Patient instructed to remain in clinic for 15 minutes afterwards, and to report any adverse reaction to me immediately.       Screening performed by Mercedes Lau on 2/28/2023 at 1:31 PM.

## 2023-03-03 ENCOUNTER — MYC MEDICAL ADVICE (OUTPATIENT)
Dept: FAMILY MEDICINE | Facility: CLINIC | Age: 28
End: 2023-03-03
Payer: COMMERCIAL

## 2023-03-03 NOTE — TELEPHONE ENCOUNTER
"Called patient and discussed her concerns. Patient states she is newly pregnant and was disappointed that her message was not sent over to PCP right away.      Patient states this is her first pregnancy but that PCP has taken care of her significant other's three children and they trust him and his opinion on \"me as a woman and new mother.\" Did not think anything was urgent and \"I did not need to schedule with this nurse right away\" and thought it was perfectly ok to wait a few days for PCP to respond.     States will her medical conditions, she trusts PCP and wants him to give his opinion before moving forward. States she's had some bad experiences as \"being a woman\" and being disregarded for her concerns.    Explained that the process is to start with OB intake, this is what the nurse was following. How she requested this be sent over to PCP is appropriate and it was sent over to PCP. Explained, in the future if she wants to go straight to PCP, she can note that in message, and it can go straight over. Explained PCP will respond once he is in clinic. Patient is ok to wait.     Advised she start by calling OB intake and getting on the schedule and PCP will respond by early next week. Patient states understanding and agrees to do this. Expressed her appreciation in the call.     Ariane Joshi, REYNAN, RN, PHN   Clinical Supervisor  [facility]  March 3, 2023    "

## 2023-03-06 ENCOUNTER — VIRTUAL VISIT (OUTPATIENT)
Dept: FAMILY MEDICINE | Facility: CLINIC | Age: 28
End: 2023-03-06
Payer: COMMERCIAL

## 2023-03-06 DIAGNOSIS — O09.90 SUPERVISION OF HIGH RISK PREGNANCY, ANTEPARTUM: Primary | ICD-10-CM

## 2023-03-06 PROCEDURE — 99207 PR NO CHARGE NURSE ONLY: CPT | Mod: 93

## 2023-03-06 RX ORDER — PRENATAL VIT/IRON FUM/FOLIC AC 27MG-0.8MG
1 TABLET ORAL DAILY
COMMUNITY

## 2023-03-06 NOTE — TELEPHONE ENCOUNTER
Patient called.  Discussed where I am at with OB care at this point in my career.  She will come see me for initial prenatal care and we will discuss delivery options and other care as needed.    Deion Price MD

## 2023-03-18 ENCOUNTER — HOSPITAL ENCOUNTER (EMERGENCY)
Facility: CLINIC | Age: 28
Discharge: HOME | End: 2023-03-18
Attending: EMERGENCY MEDICINE | Admitting: EMERGENCY MEDICINE
Payer: COMMERCIAL

## 2023-03-18 ENCOUNTER — NURSE TRIAGE (OUTPATIENT)
Dept: NURSING | Facility: CLINIC | Age: 28
End: 2023-03-18
Payer: COMMERCIAL

## 2023-03-18 ENCOUNTER — APPOINTMENT (OUTPATIENT)
Dept: ULTRASOUND IMAGING | Facility: CLINIC | Age: 28
End: 2023-03-18
Attending: EMERGENCY MEDICINE
Payer: COMMERCIAL

## 2023-03-18 VITALS
WEIGHT: 293 LBS | DIASTOLIC BLOOD PRESSURE: 99 MMHG | BODY MASS INDEX: 41 KG/M2 | RESPIRATION RATE: 18 BRPM | OXYGEN SATURATION: 98 % | TEMPERATURE: 98.4 F | SYSTOLIC BLOOD PRESSURE: 131 MMHG | HEART RATE: 20 BPM

## 2023-03-18 DIAGNOSIS — O20.0 THREATENED MISCARRIAGE IN EARLY PREGNANCY: ICD-10-CM

## 2023-03-18 LAB
ABO/RH(D): NORMAL
ALBUMIN UR-MCNC: NEGATIVE MG/DL
ANION GAP SERPL CALCULATED.3IONS-SCNC: 11 MMOL/L (ref 7–15)
ANTIBODY SCREEN: NEGATIVE
APPEARANCE UR: CLEAR
BASOPHILS # BLD AUTO: 0.1 10E3/UL (ref 0–0.2)
BASOPHILS NFR BLD AUTO: 1 %
BILIRUB UR QL STRIP: NEGATIVE
BUN SERPL-MCNC: 7.9 MG/DL (ref 6–20)
CALCIUM SERPL-MCNC: 8.6 MG/DL (ref 8.6–10)
CHLORIDE SERPL-SCNC: 102 MMOL/L (ref 98–107)
COLOR UR AUTO: YELLOW
CREAT SERPL-MCNC: 0.63 MG/DL (ref 0.51–0.95)
DEPRECATED HCO3 PLAS-SCNC: 21 MMOL/L (ref 22–29)
EOSINOPHIL # BLD AUTO: 0.4 10E3/UL (ref 0–0.7)
EOSINOPHIL NFR BLD AUTO: 4 %
ERYTHROCYTE [DISTWIDTH] IN BLOOD BY AUTOMATED COUNT: 13.7 % (ref 10–15)
GFR SERPL CREATININE-BSD FRML MDRD: >90 ML/MIN/1.73M2
GLUCOSE SERPL-MCNC: 109 MG/DL (ref 70–99)
GLUCOSE UR STRIP-MCNC: NEGATIVE MG/DL
HCG INTACT+B SERPL-ACNC: 4111 MIU/ML
HCT VFR BLD AUTO: 40.8 % (ref 35–47)
HGB BLD-MCNC: 13.4 G/DL (ref 11.7–15.7)
HGB UR QL STRIP: NEGATIVE
IMM GRANULOCYTES # BLD: 0 10E3/UL
IMM GRANULOCYTES NFR BLD: 0 %
KETONES UR STRIP-MCNC: NEGATIVE MG/DL
LEUKOCYTE ESTERASE UR QL STRIP: NEGATIVE
LYMPHOCYTES # BLD AUTO: 2.3 10E3/UL (ref 0.8–5.3)
LYMPHOCYTES NFR BLD AUTO: 22 %
MCH RBC QN AUTO: 27.4 PG (ref 26.5–33)
MCHC RBC AUTO-ENTMCNC: 32.8 G/DL (ref 31.5–36.5)
MCV RBC AUTO: 83 FL (ref 78–100)
MONOCYTES # BLD AUTO: 0.8 10E3/UL (ref 0–1.3)
MONOCYTES NFR BLD AUTO: 7 %
MUCOUS THREADS #/AREA URNS LPF: PRESENT /LPF
NEUTROPHILS # BLD AUTO: 7.1 10E3/UL (ref 1.6–8.3)
NEUTROPHILS NFR BLD AUTO: 66 %
NITRATE UR QL: NEGATIVE
NRBC # BLD AUTO: 0 10E3/UL
NRBC BLD AUTO-RTO: 0 /100
PH UR STRIP: 8 [PH] (ref 5–7)
PLATELET # BLD AUTO: 339 10E3/UL (ref 150–450)
POTASSIUM SERPL-SCNC: 4.1 MMOL/L (ref 3.4–5.3)
RBC # BLD AUTO: 4.89 10E6/UL (ref 3.8–5.2)
RBC URINE: 1 /HPF
SODIUM SERPL-SCNC: 134 MMOL/L (ref 136–145)
SP GR UR STRIP: 1.02 (ref 1–1.03)
SPECIMEN EXPIRATION DATE: NORMAL
SQUAMOUS EPITHELIAL: 2 /HPF
UROBILINOGEN UR STRIP-MCNC: NORMAL MG/DL
WBC # BLD AUTO: 10.6 10E3/UL (ref 4–11)
WBC URINE: 2 /HPF

## 2023-03-18 PROCEDURE — 86850 RBC ANTIBODY SCREEN: CPT | Performed by: EMERGENCY MEDICINE

## 2023-03-18 PROCEDURE — 36415 COLL VENOUS BLD VENIPUNCTURE: CPT | Performed by: EMERGENCY MEDICINE

## 2023-03-18 PROCEDURE — 84702 CHORIONIC GONADOTROPIN TEST: CPT | Performed by: EMERGENCY MEDICINE

## 2023-03-18 PROCEDURE — 99284 EMERGENCY DEPT VISIT MOD MDM: CPT | Mod: 25 | Performed by: EMERGENCY MEDICINE

## 2023-03-18 PROCEDURE — 76801 OB US < 14 WKS SINGLE FETUS: CPT

## 2023-03-18 PROCEDURE — 80048 BASIC METABOLIC PNL TOTAL CA: CPT | Performed by: EMERGENCY MEDICINE

## 2023-03-18 PROCEDURE — 85025 COMPLETE CBC W/AUTO DIFF WBC: CPT | Performed by: EMERGENCY MEDICINE

## 2023-03-18 PROCEDURE — 86901 BLOOD TYPING SEROLOGIC RH(D): CPT | Performed by: EMERGENCY MEDICINE

## 2023-03-18 PROCEDURE — 250N000013 HC RX MED GY IP 250 OP 250 PS 637: Performed by: EMERGENCY MEDICINE

## 2023-03-18 PROCEDURE — 81003 URINALYSIS AUTO W/O SCOPE: CPT | Performed by: EMERGENCY MEDICINE

## 2023-03-18 PROCEDURE — 99284 EMERGENCY DEPT VISIT MOD MDM: CPT | Performed by: EMERGENCY MEDICINE

## 2023-03-18 RX ORDER — ACETAMINOPHEN 325 MG/1
975 TABLET ORAL ONCE
Status: COMPLETED | OUTPATIENT
Start: 2023-03-18 | End: 2023-03-18

## 2023-03-18 RX ADMIN — ACETAMINOPHEN 975 MG: 325 TABLET ORAL at 12:48

## 2023-03-18 ASSESSMENT — ACTIVITIES OF DAILY LIVING (ADL): ADLS_ACUITY_SCORE: 35

## 2023-03-18 NOTE — DISCHARGE INSTRUCTIONS
Your ultrasound showed a fluid collection in the uterus.  They did not see a fetal pole or yolk sac to confirm a pregnancy.  Since you are pregnancy hormone levels fall within the range of a 5 to 7-week pregnancy, it may be too early to confirm viability.    A message was sent to Dr. Deion Price to follow-up with you next week.  I recommend that you get a repeat ultrasound and repeat blood test to check hormone levels in approximately 1 week.  If pregnancy hormone levels are increasing, or if ultrasound is able to identify fetal pole or yolk sac, this will support a viable pregnancy.    You may continue to take over-the-counter Tylenol per bottle instructions as needed for any aches or pains.  Tylenol is safe to take during pregnancy.  You should avoid ibuprofen or naproxen over-the-counter    If you develop any new or worsening symptoms, such as a fever, heavier bleeding or worsening pain, vomiting, or any new concerns, do not hesitate to return promptly to the ER for evaluation

## 2023-03-18 NOTE — ED TRIAGE NOTES
Pt here with cramping and vaginal bleeding since yesterday.  9 weeks pregnant.         Triage Assessment     Row Name 03/18/23 1146       Triage Assessment (Adult)    Airway WDL WDL       Respiratory WDL    Respiratory WDL WDL

## 2023-03-18 NOTE — ED PROVIDER NOTES
History     Chief Complaint   Patient presents with     Abdominal Pain     Vaginal Bleeding     HPI  Lidia Gallegos is a 27 year old female who presents to the emergency room for concern of abdominal pain and cramping as well as vaginal bleeding.  Is approximately 9 weeks pregnant, was due to have her initial OB appointment with ultrasound on Monday.  Noticed last night that she started having some vaginal bleeding.  Says that she needed to wear a pad and continues to have some bleeding, and is noticing clots.  Also has generalized abdominal cramping, and upper abdomenal discomfort feels similar to her previous discomfort she experiences with reflux.  Took her home dose of PPI and tried some Tums, but it did not help with the rest of the abdominal cramping.  Is concerned that it may be due to miscarriage, anxiety, or both.  Has not been running a fever, no vomiting.  This is her first pregnancy.    Allergies:  No Known Allergies    Problem List:    Patient Active Problem List    Diagnosis Date Noted     Nexplanon in place 09/17/2019     Priority: Medium     Anxiety with depression 11/15/2017     Priority: Medium     Asperger's syndrome 11/15/2017     Priority: Medium        Past Medical History:    Past Medical History:   Diagnosis Date     Anxiety      Autism      Depression      History of anemia        Past Surgical History:    Past Surgical History:   Procedure Laterality Date     INNER EAR SURGERY Right     age 2     WISDOM TOOTH EXTRACTION         Family History:    Family History   Problem Relation Age of Onset     No Known Problems Mother      Hypertension Father      Mental Illness Sister         bipolar     Attention Deficit Disorder Sister      Birth defects Brother         bladder - reportedly related to nuchal cord     Cancer Maternal Grandfather      Diabetes Paternal Grandmother      Diabetes Paternal Grandfather      Kidney failure Paternal Grandfather        Social History:  Marital Status:    [2]  Social History     Tobacco Use     Smoking status: Former     Types: Cigarettes     Quit date: 2022     Years since quittin.2     Smokeless tobacco: Never   Vaping Use     Vaping Use: Never used   Substance Use Topics     Alcohol use: Yes     Comment: occ     Drug use: Never        Medications:    ascorbic acid 1000 MG TABS tablet  LANsoprazole (PREVACID) 30 MG DR capsule  ondansetron (ZOFRAN ODT) 4 MG ODT tab  Prenatal Vit-Fe Fumarate-FA (PRENATAL MULTIVITAMIN W/IRON) 27-0.8 MG tablet  traMADol (ULTRAM) 50 MG tablet          Review of Systems   All other systems reviewed and are negative.      Physical Exam   BP: (!) 132/100  Pulse: 84  Temp: 98.4  F (36.9  C)  Resp: 18  Weight: 133.4 kg (294 lb)  SpO2: 99 %      Physical Exam  Vitals and nursing note reviewed.   Constitutional:       General: She is not in acute distress.     Appearance: She is obese. She is not diaphoretic.   HENT:      Head: Atraumatic.      Mouth/Throat:      Pharynx: No oropharyngeal exudate.   Eyes:      General: No scleral icterus.     Pupils: Pupils are equal, round, and reactive to light.   Cardiovascular:      Heart sounds: Normal heart sounds.   Pulmonary:      Effort: No respiratory distress.      Breath sounds: Normal breath sounds.   Abdominal:      General: Bowel sounds are normal.      Palpations: Abdomen is soft.      Tenderness: There is no abdominal tenderness. There is no guarding or rebound.   Genitourinary:     Comments: Deferred for ultrasound  Musculoskeletal:         General: No tenderness.   Skin:     General: Skin is warm.      Findings: No rash.   Neurological:      Mental Status: She is alert.   Psychiatric:         Mood and Affect: Mood is anxious.         Behavior: Behavior is cooperative.         ED Course                 Procedures              Critical Care time:  none               Results for orders placed or performed during the hospital encounter of 23 (from the past 24 hour(s))    CBC with platelets differential    Narrative    The following orders were created for panel order CBC with platelets differential.  Procedure                               Abnormality         Status                     ---------                               -----------         ------                     CBC with platelets and d...[[id number]]                      Final result                 Please view results for these tests on the individual orders.   ABO/Rh type and screen    Narrative    The following orders were created for panel order ABO/Rh type and screen.  Procedure                               Abnormality         Status                     ---------                               -----------         ------                     Adult Type and Screen[[id number]]                            Edited Result - FINAL        Please view results for these tests on the individual orders.   Basic metabolic panel   Result Value Ref Range    Sodium 134 (L) 136 - 145 mmol/L    Potassium 4.1 3.4 - 5.3 mmol/L    Chloride 102 98 - 107 mmol/L    Carbon Dioxide (CO2) 21 (L) 22 - 29 mmol/L    Anion Gap 11 7 - 15 mmol/L    Urea Nitrogen 7.9 6.0 - 20.0 mg/dL    Creatinine 0.63 0.51 - 0.95 mg/dL    Calcium 8.6 8.6 - 10.0 mg/dL    Glucose 109 (H) 70 - 99 mg/dL    GFR Estimate >90 >60 mL/min/1.73m2   HCG quantitative pregnancy   Result Value Ref Range    hCG Quantitative 4,111 (H) <5 mIU/mL   CBC with platelets and differential   Result Value Ref Range    WBC Count 10.6 4.0 - 11.0 10e3/uL    RBC Count 4.89 3.80 - 5.20 10e6/uL    Hemoglobin 13.4 11.7 - 15.7 g/dL    Hematocrit 40.8 35.0 - 47.0 %    MCV 83 78 - 100 fL    MCH 27.4 26.5 - 33.0 pg    MCHC 32.8 31.5 - 36.5 g/dL    RDW 13.7 10.0 - 15.0 %    Platelet Count 339 150 - 450 10e3/uL    % Neutrophils 66 %    % Lymphocytes 22 %    % Monocytes 7 %    % Eosinophils 4 %    % Basophils 1 %    % Immature Granulocytes 0 %    NRBCs per 100 WBC 0 <1 /100    Absolute Neutrophils 7.1 1.6 -  8.3 10e3/uL    Absolute Lymphocytes 2.3 0.8 - 5.3 10e3/uL    Absolute Monocytes 0.8 0.0 - 1.3 10e3/uL    Absolute Eosinophils 0.4 0.0 - 0.7 10e3/uL    Absolute Basophils 0.1 0.0 - 0.2 10e3/uL    Absolute Immature Granulocytes 0.0 <=0.4 10e3/uL    Absolute NRBCs 0.0 10e3/uL   Adult Type and Screen   Result Value Ref Range    ABO/RH(D) O NEG     Antibody Screen Negative Negative    SPECIMEN EXPIRATION DATE [id number]    UA with Microscopic reflex to Culture    Specimen: Urine, Midstream   Result Value Ref Range    Color Urine Yellow Colorless, Straw, Light Yellow, Yellow    Appearance Urine Clear Clear    Glucose Urine Negative Negative mg/dL    Bilirubin Urine Negative Negative    Ketones Urine Negative Negative mg/dL    Specific Gravity Urine 1.025 1.003 - 1.035    Blood Urine Negative Negative    pH Urine 8.0 (H) 5.0 - 7.0    Protein Albumin Urine Negative Negative mg/dL    Urobilinogen Urine Normal Normal, 2.0 mg/dL    Nitrite Urine Negative Negative    Leukocyte Esterase Urine Negative Negative    Mucus Urine Present (A) None Seen /LPF    RBC Urine 1 <=2 /HPF    WBC Urine 2 <=5 /HPF    Squamous Epithelials Urine 2 (H) <=1 /HPF    Narrative    Urine Culture not indicated   US OB <14 Weeks W Transvaginal    Narrative    EXAM: US OB <14 WEEKS WITH TRANSVAGINAL SINGLE  LOCATION: [facility]  DATE/TIME: 3/18/2023 1:37 PM    INDICATION: Vaginal bleeding, abdominal crampin, 9 wks preg  COMPARISON: None.  TECHNIQUE: Transabdominal scans were performed. Endovaginal ultrasound was performed to better visualize the embryo.    FINDINGS:  UTERUS: Fluid collection in the endometrial canal measuring 1.6 x 1.7 x 0.9 cm. No yolk sac or fetal pole.  RIGHT OVARY: Normal.  LEFT OVARY: Normal.      Impression    IMPRESSION:   Slightly irregular fluid collection in the endometrial canal could represent a gestational sac, but no visualized fetal pole or yolk sac to confirm intrauterine pregnancy. In  the setting of positive beta hCG, findings represent pregnancy of unknown   location with possibilities including very early intrauterine pregnancy, occult ectopic, and completed spontaneous . In a hemodynamically stable patient, recommend serial beta-hCG and repeat ultrasound in 7-14 days.           Medications   acetaminophen (TYLENOL) tablet 975 mg (975 mg Oral $Given 3/18/23 8250)       Assessments & Plan (with Medical Decision Making)  Lidia is a 27-year-old G1, P0 female presenting to the emergency room for concerns of abdominal pain and cramping, vaginal bleeding, and concern for miscarriage in early pregnancy.  See history and focused physical exam as above  Pleasant 27-year-old female, mildly anxious, otherwise vitally stable and afebrile.  No abdominal rigidity or signs of acute abdomen.  Due to her report of positive pregnancy and and vaginal bleeding, will get an ultrasound.  We will also check some lab work here  Labs and imaging as above.  No definitive intrauterine pregnancy confirmed, as no fetal pole or yolk sac was identified.  She does have a positive beta hCG level at over 4000, though this would worse likely correlate with pregnancy of 5 to 7 weeks duration rather than 9 weeks.  I had a discussion with the patient about the findings.  Suspecting a threatened miscarriage since no fetal pole or yolk sac was identified.  I did recommend close follow-up with her primary provider for repeat ultrasound and beta-hCG testing.  She states that she has an appointment on Monday for ultrasound, and can keep that appointment.  A message was also sent to her primary provider to help schedule follow-up for repeat lab draw and ultrasound.  It is possible that it is too early to identify the appropriate structures and she is very early in pregnancy despite thinking that she is 9 weeks along, and was expressed how repeat lab work and ultrasound will help determine what is occurring.  She understands and  agrees plan for discharge and follow-up.  Advised to return promptly to the ER if bleeding worsens, pain worsens, or if she has any new concerns.  All questions were answered and discharged in no distress     I have reviewed the nursing notes.    I have reviewed the findings, diagnosis, plan and need for follow up with the patient.           Medical Decision Making  The patient's presentation was of moderate complexity (an undiagnosed new problem with uncertain diagnosis).    The patient's evaluation involved:  ordering and/or review of 3+ test(s) in this encounter (see separate area of note for details)    The patient's management necessitated moderate risk (prescription drug management including medications given in the ED).        Discharge Medication List as of 3/18/2023  2:18 PM          Final diagnoses:   Threatened miscarriage in early pregnancy       3/18/2023   [facility] EMERGENCY DEPT     Jaclyn Price DO  03/18/23 2019

## 2023-03-18 NOTE — TELEPHONE ENCOUNTER
"Patient is 9 weeks pregnant.  First OB visit is on Monday March 30 th.  Vaginal bleeding is on going spotting.  Patient is wearing a pad.  Patient denies too weak to stand and denies disoriented.  Denies severe abdominal pain and denies dizziness.  Patient states that she will wait for her appointment on Monday.      Reason for Disposition    MILD vaginal bleeding (i.e., less than 1 pad / hour; less than patient's usual menstrual bleeding; not just spotting)    Additional Information    Negative: Shock suspected (e.g., cold/pale/clammy skin, too weak to stand, low BP, rapid pulse)    Negative: Difficult to awaken or acting confused (e.g., disoriented, slurred speech)    Negative: Passed out (i.e., lost consciousness, collapsed and was not responding)    Negative: Sounds like a life-threatening emergency to the triager    Negative: SEVERE abdominal pain    Negative: [1] SEVERE vaginal bleeding (e.g., soaking 2 pads / hour, large blood clots) AND [2] present 2 or more hours    Negative: SEVERE dizziness (e.g., unable to stand, requires support to walk, feels like passing out)    Negative: [1] MODERATE vaginal bleeding (e.g., soaking 1 pad per hour; clots) AND [2] present > 6 hours    Negative: [1] MODERATE vaginal bleeding (e.g., soaking 1 pad per hour; clots) AND [2] pregnant > 12 weeks    Negative: Passed tissue (e.g., gray-white)    Negative: Shoulder pain    Negative: Pale skin (pallor) of new-onset or worsening    Negative: Patient sounds very sick or weak to the triager    Negative: [1] Constant abdominal pain AND [2] present > 2 hours    Negative: Fever > 100.4 F (38.0 C)    Negative: [1] Intermittent lower abdominal pain (e.g., cramping) AND [2] present > 24 hours    Negative: Prior history of \"ectopic pregnancy\" or previous tubal surgery (e.g., tubal ligation)    Negative: Pain or burning with passing urine (urination)    Negative: MODERATE vaginal bleeding (e.g., soaking 1 pad per hour; clots)    Negative: " Has IUD    Protocols used: PREGNANCY - VAGINAL BLEEDING LESS THAN 20 WEEKS EGA-A-AH

## 2023-03-19 LAB
ABO/RH(D): NORMAL
ANTIBODY SCREEN: NEGATIVE
SPECIMEN EXPIRATION DATE: NORMAL

## 2023-03-20 ENCOUNTER — MYC MEDICAL ADVICE (OUTPATIENT)
Dept: FAMILY MEDICINE | Facility: CLINIC | Age: 28
End: 2023-03-20

## 2023-03-20 ENCOUNTER — LAB (OUTPATIENT)
Dept: LAB | Facility: CLINIC | Age: 28
End: 2023-03-20
Payer: COMMERCIAL

## 2023-03-20 ENCOUNTER — TELEPHONE (OUTPATIENT)
Dept: FAMILY MEDICINE | Facility: CLINIC | Age: 28
End: 2023-03-20

## 2023-03-20 ENCOUNTER — ALLIED HEALTH/NURSE VISIT (OUTPATIENT)
Dept: FAMILY MEDICINE | Facility: CLINIC | Age: 28
End: 2023-03-20
Payer: COMMERCIAL

## 2023-03-20 ENCOUNTER — HOSPITAL ENCOUNTER (OUTPATIENT)
Dept: ULTRASOUND IMAGING | Facility: CLINIC | Age: 28
Discharge: HOME | End: 2023-03-20
Attending: FAMILY MEDICINE | Admitting: FAMILY MEDICINE
Payer: COMMERCIAL

## 2023-03-20 DIAGNOSIS — Z67.91 RH NEGATIVE STATE IN ANTEPARTUM PERIOD: Primary | ICD-10-CM

## 2023-03-20 DIAGNOSIS — O09.90 SUPERVISION OF HIGH RISK PREGNANCY, ANTEPARTUM: ICD-10-CM

## 2023-03-20 DIAGNOSIS — O26.899 RH NEGATIVE STATE IN ANTEPARTUM PERIOD: ICD-10-CM

## 2023-03-20 DIAGNOSIS — O20.0 THREATENED MISCARRIAGE: ICD-10-CM

## 2023-03-20 DIAGNOSIS — Z67.91 RH NEGATIVE STATE IN ANTEPARTUM PERIOD: ICD-10-CM

## 2023-03-20 DIAGNOSIS — O20.0 THREATENED MISCARRIAGE: Primary | ICD-10-CM

## 2023-03-20 DIAGNOSIS — O26.899 RH NEGATIVE STATE IN ANTEPARTUM PERIOD: Primary | ICD-10-CM

## 2023-03-20 LAB
HBA1C MFR BLD: 6.1 %
HBV SURFACE AG SERPL QL IA: NONREACTIVE
HCG INTACT+B SERPL-ACNC: 2703 MIU/ML
HCV AB SERPL QL IA: NONREACTIVE
HIV 1+2 AB+HIV1 P24 AG SERPL QL IA: NONREACTIVE
HOLD SPECIMEN: NORMAL

## 2023-03-20 PROCEDURE — 99207 PR NO CHARGE NURSE ONLY: CPT

## 2023-03-20 PROCEDURE — 86850 RBC ANTIBODY SCREEN: CPT

## 2023-03-20 PROCEDURE — 83036 HEMOGLOBIN GLYCOSYLATED A1C: CPT

## 2023-03-20 PROCEDURE — 86901 BLOOD TYPING SEROLOGIC RH(D): CPT

## 2023-03-20 PROCEDURE — 87389 HIV-1 AG W/HIV-1&-2 AB AG IA: CPT

## 2023-03-20 PROCEDURE — 87340 HEPATITIS B SURFACE AG IA: CPT

## 2023-03-20 PROCEDURE — 86762 RUBELLA ANTIBODY: CPT

## 2023-03-20 PROCEDURE — 84702 CHORIONIC GONADOTROPIN TEST: CPT

## 2023-03-20 PROCEDURE — 36415 COLL VENOUS BLD VENIPUNCTURE: CPT

## 2023-03-20 PROCEDURE — 76801 OB US < 14 WKS SINGLE FETUS: CPT

## 2023-03-20 PROCEDURE — 87086 URINE CULTURE/COLONY COUNT: CPT

## 2023-03-20 PROCEDURE — 86900 BLOOD TYPING SEROLOGIC ABO: CPT

## 2023-03-20 PROCEDURE — 96372 THER/PROPH/DIAG INJ SC/IM: CPT | Performed by: FAMILY MEDICINE

## 2023-03-20 PROCEDURE — 86780 TREPONEMA PALLIDUM: CPT

## 2023-03-20 PROCEDURE — 86803 HEPATITIS C AB TEST: CPT

## 2023-03-20 NOTE — TELEPHONE ENCOUNTER
Patient called.  Discussed situation.  She will monitor for symptoms of worsening bleeding, follow-up if still bleeding in 2 weeks, sooner if worsening.  ER perameters discussed.    Rh negative.  Needs rhogam as not given in ER.    Will have staff schedule patient with time as I discussed this with patient and she will be here at 2:40.    Deion Price MD

## 2023-03-20 NOTE — PROGRESS NOTES
Clinic Administered Medication Documentation    Administrations This Visit     rho(D) immune globulin (RHOGAM) injection 300 mcg     Admin Date  03/20/2023 Action  $Given Dose  300 mcg Route  Intramuscular Site  Right Gluteus David Administered By  Mercedes Lau    Ordering Provider: Deion Price MD    Patient Supplied?: No                  Injectable Medication Documentation    Patient was given Rhogam. Prior to medication administration, verified patients identity using patient s name and date of birth. Please see MAR and medication order for additional information. Patient instructed to remain in clinic for 15 minutes and report any adverse reaction to staff immediately .      Was entire vial of medication used? Yes  Vial/Syringe: Single dose vial  Expiration Date:  9/29/25  Was this medication supplied by the patient? No

## 2023-03-20 NOTE — TELEPHONE ENCOUNTER
----- Message from Jaclyn Price DO sent at 3/18/2023  2:16 PM CDT -----  Regarding: Threatened miscarriage  Marvin Albarran,    I saw Lidia today in the ER for concern of possible miscarriage. She had her OB intake appointment, and is scheduled for US on Monday 3/20/23.     Couldn't confirm viable pregnancy today, no fetal pole or yolk sac on US. HCG level only in 4000s, so she might be too early to see anything. I recommended she follow up within 7 days for repeat beta-HCG and US, if you could follow up with her that would be great!    Thanks,  Jaclyn     1.91

## 2023-03-20 NOTE — TELEPHONE ENCOUNTER
See ER note to me.  Patient needs repeat hcg.  Added to labs.  Will have staff notify patient.     Deion Price MD

## 2023-03-20 NOTE — TELEPHONE ENCOUNTER
I see your note in the telephone encounter from today as well. Do you want patient to keep appointment scheduled with you on 4/17 just in case?    REYNA HuynhN, RN

## 2023-03-21 LAB
BACTERIA UR CULT: NORMAL
RUBV IGG SERPL QL IA: 2.5 INDEX
RUBV IGG SERPL QL IA: POSITIVE
T PALLIDUM AB SER QL: NONREACTIVE

## 2023-03-22 PROBLEM — R73.03 PREDIABETES: Status: ACTIVE | Noted: 2023-03-22

## 2023-03-22 NOTE — RESULT ENCOUNTER NOTE
Lidia,  I hope you are doing okay since we spoke on Monday and got the rhogam shot fine.  In addition to your decreasing hcg, your hemoglobin A1c was elevated to a level  of prediabetes.  There is nothing specific you need to do other than work on your diet/exercise management goals.  You can set up a telephone, video, or office-based visit to discuss this with me in more detail if you want.    The rest of your results were within normal limits.  Please let me know if you have any questions.    Sincerely,  Dr. Price

## 2023-04-12 ENCOUNTER — HOSPITAL ENCOUNTER (EMERGENCY)
Facility: CLINIC | Age: 28
Discharge: HOME | End: 2023-04-12
Attending: FAMILY MEDICINE | Admitting: FAMILY MEDICINE
Payer: COMMERCIAL

## 2023-04-12 ENCOUNTER — NURSE TRIAGE (OUTPATIENT)
Dept: NURSING | Facility: CLINIC | Age: 28
End: 2023-04-12
Payer: COMMERCIAL

## 2023-04-12 ENCOUNTER — APPOINTMENT (OUTPATIENT)
Dept: ULTRASOUND IMAGING | Facility: CLINIC | Age: 28
End: 2023-04-12
Attending: FAMILY MEDICINE
Payer: COMMERCIAL

## 2023-04-12 VITALS
OXYGEN SATURATION: 97 % | DIASTOLIC BLOOD PRESSURE: 95 MMHG | RESPIRATION RATE: 18 BRPM | TEMPERATURE: 98.1 F | SYSTOLIC BLOOD PRESSURE: 134 MMHG | HEART RATE: 100 BPM

## 2023-04-12 DIAGNOSIS — O03.4 INCOMPLETE MISCARRIAGE: ICD-10-CM

## 2023-04-12 LAB
ALBUMIN UR-MCNC: NEGATIVE MG/DL
ANION GAP SERPL CALCULATED.3IONS-SCNC: 10 MMOL/L (ref 7–15)
APPEARANCE UR: CLEAR
BASOPHILS # BLD AUTO: 0.1 10E3/UL (ref 0–0.2)
BASOPHILS NFR BLD AUTO: 1 %
BILIRUB UR QL STRIP: NEGATIVE
BUN SERPL-MCNC: 12 MG/DL (ref 6–20)
CALCIUM SERPL-MCNC: 8.7 MG/DL (ref 8.6–10)
CHLORIDE SERPL-SCNC: 105 MMOL/L (ref 98–107)
COLOR UR AUTO: YELLOW
CREAT SERPL-MCNC: 0.68 MG/DL (ref 0.51–0.95)
DEPRECATED HCO3 PLAS-SCNC: 22 MMOL/L (ref 22–29)
EOSINOPHIL # BLD AUTO: 0.5 10E3/UL (ref 0–0.7)
EOSINOPHIL NFR BLD AUTO: 4 %
ERYTHROCYTE [DISTWIDTH] IN BLOOD BY AUTOMATED COUNT: 13.4 % (ref 10–15)
GFR SERPL CREATININE-BSD FRML MDRD: >90 ML/MIN/1.73M2
GLUCOSE SERPL-MCNC: 139 MG/DL (ref 70–99)
GLUCOSE UR STRIP-MCNC: NEGATIVE MG/DL
HCG INTACT+B SERPL-ACNC: 68 MIU/ML
HCT VFR BLD AUTO: 40.9 % (ref 35–47)
HGB BLD-MCNC: 13.7 G/DL (ref 11.7–15.7)
HGB UR QL STRIP: ABNORMAL
IMM GRANULOCYTES # BLD: 0 10E3/UL
IMM GRANULOCYTES NFR BLD: 0 %
KETONES UR STRIP-MCNC: NEGATIVE MG/DL
LEUKOCYTE ESTERASE UR QL STRIP: NEGATIVE
LYMPHOCYTES # BLD AUTO: 3 10E3/UL (ref 0.8–5.3)
LYMPHOCYTES NFR BLD AUTO: 23 %
MCH RBC QN AUTO: 27.5 PG (ref 26.5–33)
MCHC RBC AUTO-ENTMCNC: 33.5 G/DL (ref 31.5–36.5)
MCV RBC AUTO: 82 FL (ref 78–100)
MONOCYTES # BLD AUTO: 0.9 10E3/UL (ref 0–1.3)
MONOCYTES NFR BLD AUTO: 7 %
MUCOUS THREADS #/AREA URNS LPF: PRESENT /LPF
NEUTROPHILS # BLD AUTO: 8.2 10E3/UL (ref 1.6–8.3)
NEUTROPHILS NFR BLD AUTO: 65 %
NITRATE UR QL: NEGATIVE
NRBC # BLD AUTO: 0 10E3/UL
NRBC BLD AUTO-RTO: 0 /100
PH UR STRIP: 6 [PH] (ref 5–7)
PLATELET # BLD AUTO: 363 10E3/UL (ref 150–450)
POTASSIUM SERPL-SCNC: 4.3 MMOL/L (ref 3.4–5.3)
RBC # BLD AUTO: 4.99 10E6/UL (ref 3.8–5.2)
RBC URINE: 9 /HPF
SODIUM SERPL-SCNC: 137 MMOL/L (ref 136–145)
SP GR UR STRIP: 1.02 (ref 1–1.03)
SQUAMOUS EPITHELIAL: 1 /HPF
TSH SERPL DL<=0.005 MIU/L-ACNC: 2.08 UIU/ML (ref 0.3–4.2)
UROBILINOGEN UR STRIP-MCNC: NORMAL MG/DL
WBC # BLD AUTO: 12.6 10E3/UL (ref 4–11)
WBC URINE: <1 /HPF

## 2023-04-12 PROCEDURE — 76801 OB US < 14 WKS SINGLE FETUS: CPT

## 2023-04-12 PROCEDURE — 81001 URINALYSIS AUTO W/SCOPE: CPT | Performed by: FAMILY MEDICINE

## 2023-04-12 PROCEDURE — 84702 CHORIONIC GONADOTROPIN TEST: CPT | Performed by: FAMILY MEDICINE

## 2023-04-12 PROCEDURE — 36415 COLL VENOUS BLD VENIPUNCTURE: CPT | Performed by: FAMILY MEDICINE

## 2023-04-12 PROCEDURE — 85025 COMPLETE CBC W/AUTO DIFF WBC: CPT | Performed by: FAMILY MEDICINE

## 2023-04-12 PROCEDURE — 80048 BASIC METABOLIC PNL TOTAL CA: CPT | Performed by: FAMILY MEDICINE

## 2023-04-12 PROCEDURE — 96361 HYDRATE IV INFUSION ADD-ON: CPT | Performed by: FAMILY MEDICINE

## 2023-04-12 PROCEDURE — 99284 EMERGENCY DEPT VISIT MOD MDM: CPT | Mod: 25 | Performed by: FAMILY MEDICINE

## 2023-04-12 PROCEDURE — 96360 HYDRATION IV INFUSION INIT: CPT | Performed by: FAMILY MEDICINE

## 2023-04-12 PROCEDURE — 99283 EMERGENCY DEPT VISIT LOW MDM: CPT | Performed by: FAMILY MEDICINE

## 2023-04-12 PROCEDURE — 258N000003 HC RX IP 258 OP 636: Performed by: FAMILY MEDICINE

## 2023-04-12 PROCEDURE — 84443 ASSAY THYROID STIM HORMONE: CPT | Performed by: FAMILY MEDICINE

## 2023-04-12 RX ORDER — SODIUM CHLORIDE 9 MG/ML
INJECTION, SOLUTION INTRAVENOUS CONTINUOUS
Status: DISCONTINUED | OUTPATIENT
Start: 2023-04-12 | End: 2023-04-13 | Stop reason: HOSPADM

## 2023-04-12 RX ADMIN — SODIUM CHLORIDE 1000 ML: 9 INJECTION, SOLUTION INTRAVENOUS at 20:07

## 2023-04-12 ASSESSMENT — ENCOUNTER SYMPTOMS
MUSCULOSKELETAL NEGATIVE: 1
CHILLS: 1
RESPIRATORY NEGATIVE: 1
LIGHT-HEADEDNESS: 1
CARDIOVASCULAR NEGATIVE: 1
GASTROINTESTINAL NEGATIVE: 1
SHORTNESS OF BREATH: 0
PSYCHIATRIC NEGATIVE: 1
DYSURIA: 0

## 2023-04-12 ASSESSMENT — ACTIVITIES OF DAILY LIVING (ADL): ADLS_ACUITY_SCORE: 35

## 2023-04-12 NOTE — TELEPHONE ENCOUNTER
Caller states she thought she had a complete  spontaneous AB  3  weeks ago ; passed what she thought was tissue  but  had no bleeding after;     Today started vaginal bleeding for past  4 hrs ;  Thought she was getting menses but very heavy  (pad < 1 hr) for past hour; blood is dark red and  foul smelling and has chilling and body aches   Triage protocol reviewed   Advised ED assessment now   Caller will comply   Annmarie Hyatt RN  FNA          Reason for Disposition    SEVERE abdominal pain    Additional Information    Negative: Shock suspected (e.g., cold/pale/clammy skin, too weak to stand, low BP, rapid pulse)    Negative: Difficult to awaken or acting confused (e.g., disoriented, slurred speech)    Negative: Passed out (i.e., lost consciousness, collapsed and was not responding)    Negative: Sounds like a life-threatening emergency to the triager    Negative: Followed a genital area injury (e.g., vagina, vulva)    Negative: Pregnant 20 or more weeks    Negative: Pregnant < 20 weeks  (less than 5 months)    Negative: Postpartum (from 0 to 6 weeks after delivery)    Negative: Bleeding occurring > 12 months after menopause    Negative: Bleeding from sexual abuse or rape    Negative: [1] Vaginal discharge is main symptom AND [2] small amount of blood    Protocols used: VAGINAL BLEEDING - [id number]

## 2023-04-13 NOTE — ED PROVIDER NOTES
History     Chief Complaint   Patient presents with     Vaginal Bleeding     HPI  Lidia Gallegos is a 28 year old female who presented to the emergency room today secondary to concerns of increased vaginal bleeding.  Patient states that she was told that she miscarried her first pregnancy in the end of March of this year.  She stated that on 3/20/2023 she had a vaginal bleed associated with a fairly large clot and thought it was possibly mixed with some of the products of the pregnancy.  She has had no bleeding since that time until today when she started bleeding about 4 hours ago.  She states that the bleeding is quite aggressive more so than her typical menstrual cycle.  She stated that she soaked through 2 pads and is now on her third pad in the last 4 hours.  She admits to feeling slightly lightheaded but denies shortness of breath or dizziness symptoms.  She did have some chills earlier today as well.  She denies any fever.  She admits to some lower abdominal cramping symptoms that started about 4 hours at the same time of the vaginal bleeding.  She refused offer pain medication at the time of exam.  Patient states that she is chronically anemic but has been keeping her hemoglobin on the low end of normal by taking an over-the-counter iron supplement every other day and eating increased iron in her diet.          I reviewed the following nurse triage note:  Patient been having vaginal bleeding. Two pads worth.       Patient found to have a decreasing quantitative beta-hCG level at the end of March suggestive of miscarriage.  I copied those levels below:    Component      Latest Ref Rng 3/18/2023 3/20/2023   hCG Quantitative      <5 mIU/mL 4,111 (H)  2,703 (H)       Legend:  (H) High        In reviewing the patient's epic record it appears that she received a dose of RhoGAM 300 mcg on 4/3/2023.  I also reviewed her most recent OB ultrasound report from 3/20/2023 and copied that report below:      US OB <14  Weeks w Transvaginal Single [[id number]]Resulted: 03/20/23 1152 Order Status: CompletedUpdated: 03/20/23 1153 Narrative:  ULTRASOUND OBSTETRIC LESS THAN FOURTEEN WEEKS WITH TRANSVAGINAL   IMAGING, SINGLE 3/20/2023 9:30 AM     HISTORY: Supervision of high-risk pregnancy, antepartum.     COMPARISON: Ultrasound 3/18/2023.     FINDINGS: Again seen is a cystic structure along the lower uterine   segment endometrial canal measuring 1.6 x 1.3 x 2.0 cm. No visible   yolk sac or fetal pole. Viability cannot be established. Assuming that   this is a gestational sac, gestational age measures 6 weeks 3 days.   The cervical canal appears to be minimally open and distended with   fluid. No free pelvic fluid.     Maternal adnexa: Right and left maternal ovaries appear normal. No   suspicious adnexal lesion.     Patient reported LMP: 1/18/2023   LMP gestational age: 8 weeks 5   Impression:  IMPRESSION: Again seen is a cystic structure without visible fetal   pole or yolk sac, now localizing to the lower uterine segment. This   could be nonviable pregnancy with movement of an empty gestational sac   to the lower uterine segment compared to 3/18/2023. The endocervical   canal appears patent. Recommend correlation with serial beta hCG   assessment and repeat imaging as needed.     Allergies:  No Known Allergies    Problem List:    Patient Active Problem List    Diagnosis Date Noted     Prediabetes 03/22/2023     Priority: Medium     Nexplanon in place 09/17/2019     Priority: Medium     Anxiety with depression 11/15/2017     Priority: Medium     Asperger's syndrome 11/15/2017     Priority: Medium        Past Medical History:    Past Medical History:   Diagnosis Date     Anxiety      Autism      Depression      History of anemia        Past Surgical History:    Past Surgical History:   Procedure Laterality Date     INNER EAR SURGERY Right     age 2     WISDOM TOOTH EXTRACTION         Family History:    Family History   Problem Relation  Age of Onset     No Known Problems Mother      Hypertension Father      Mental Illness Sister         bipolar     Attention Deficit Disorder Sister      Birth defects Brother         bladder - reportedly related to nuchal cord     Cancer Maternal Grandfather      Diabetes Paternal Grandmother      Diabetes Paternal Grandfather      Kidney failure Paternal Grandfather        Social History:  Marital Status:   [2]  Social History     Tobacco Use     Smoking status: Former     Types: Cigarettes     Quit date: 2022     Years since quittin.2     Smokeless tobacco: Never   Vaping Use     Vaping status: Never Used     Passive vaping exposure: Yes   Substance Use Topics     Alcohol use: Yes     Comment: occ     Drug use: Never        Medications:    ascorbic acid 1000 MG TABS tablet  LANsoprazole (PREVACID) 30 MG DR capsule  ondansetron (ZOFRAN ODT) 4 MG ODT tab  Prenatal Vit-Fe Fumarate-FA (PRENATAL MULTIVITAMIN W/IRON) 27-0.8 MG tablet  traMADol (ULTRAM) 50 MG tablet          Review of Systems   Constitutional: Positive for chills.   HENT: Negative.    Respiratory: Negative.  Negative for shortness of breath.    Cardiovascular: Negative.    Gastrointestinal: Negative.    Genitourinary: Positive for pelvic pain (Cramping) and vaginal bleeding. Negative for dysuria and vaginal discharge.   Musculoskeletal: Negative.    Skin: Negative.    Neurological: Positive for light-headedness (Mild).   Psychiatric/Behavioral: Negative.    All other systems reviewed and are negative.      Physical Exam   BP: (!) 134/95  Pulse: 100  Temp: 98.1  F (36.7  C)  Resp: 18  SpO2: 97 %      Physical Exam  Vitals and nursing note reviewed. Exam conducted with a chaperone present (S.O.).   Constitutional:       General: She is not in acute distress.     Appearance: She is not ill-appearing or toxic-appearing.   HENT:      Head: Normocephalic and atraumatic.   Eyes:      General: No scleral icterus.     Extraocular Movements:  Extraocular movements intact.      Conjunctiva/sclera: Conjunctivae normal.      Pupils: Pupils are equal, round, and reactive to light.   Cardiovascular:      Rate and Rhythm: Normal rate.   Pulmonary:      Effort: Pulmonary effort is normal. No respiratory distress.   Abdominal:      General: Abdomen is protuberant. There is no distension.      Palpations: Abdomen is soft.      Tenderness: There is abdominal tenderness in the right lower quadrant, suprapubic area and left lower quadrant. There is no guarding or rebound.       Genitourinary:     Exam position: Lithotomy position.      Pubic Area: No rash or pubic lice.       Labia:         Right: No rash, tenderness, lesion or injury.         Left: No rash, tenderness, lesion or injury.       Vagina: No signs of injury and foreign body. Bleeding (small amount) present. No vaginal discharge, tenderness or lesions.      Cervix: Cervical bleeding (Mild) present.      Uterus: Enlarged. Not tender.       Adnexa: Right adnexa normal and left adnexa normal.   Musculoskeletal:      Cervical back: Normal range of motion and neck supple.   Neurological:      Mental Status: She is alert.         ED Course                 Procedures              Critical Care time:  none               Results for orders placed or performed during the hospital encounter of 04/12/23 (from the past 24 hour(s))   CBC with platelets differential    Narrative    The following orders were created for panel order CBC with platelets differential.  Procedure                               Abnormality         Status                     ---------                               -----------         ------                     CBC with platelets and d...[[id number]]  Abnormal            Final result                 Please view results for these tests on the individual orders.   Basic metabolic panel   Result Value Ref Range    Sodium 137 136 - 145 mmol/L    Potassium 4.3 3.4 - 5.3 mmol/L    Chloride 105 98 - 107  mmol/L    Carbon Dioxide (CO2) 22 22 - 29 mmol/L    Anion Gap 10 7 - 15 mmol/L    Urea Nitrogen 12.0 6.0 - 20.0 mg/dL    Creatinine 0.68 0.51 - 0.95 mg/dL    Calcium 8.7 8.6 - 10.0 mg/dL    Glucose 139 (H) 70 - 99 mg/dL    GFR Estimate >90 >60 mL/min/1.73m2   HCG quantitative pregnancy   Result Value Ref Range    hCG Quantitative 68 (H) <5 mIU/mL   TSH with free T4 reflex   Result Value Ref Range    TSH 2.08 0.30 - 4.20 uIU/mL   CBC with platelets and differential   Result Value Ref Range    WBC Count 12.6 (H) 4.0 - 11.0 10e3/uL    RBC Count 4.99 3.80 - 5.20 10e6/uL    Hemoglobin 13.7 11.7 - 15.7 g/dL    Hematocrit 40.9 35.0 - 47.0 %    MCV 82 78 - 100 fL    MCH 27.5 26.5 - 33.0 pg    MCHC 33.5 31.5 - 36.5 g/dL    RDW 13.4 10.0 - 15.0 %    Platelet Count 363 150 - 450 10e3/uL    % Neutrophils 65 %    % Lymphocytes 23 %    % Monocytes 7 %    % Eosinophils 4 %    % Basophils 1 %    % Immature Granulocytes 0 %    NRBCs per 100 WBC 0 <1 /100    Absolute Neutrophils 8.2 1.6 - 8.3 10e3/uL    Absolute Lymphocytes 3.0 0.8 - 5.3 10e3/uL    Absolute Monocytes 0.9 0.0 - 1.3 10e3/uL    Absolute Eosinophils 0.5 0.0 - 0.7 10e3/uL    Absolute Basophils 0.1 0.0 - 0.2 10e3/uL    Absolute Immature Granulocytes 0.0 <=0.4 10e3/uL    Absolute NRBCs 0.0 10e3/uL   US OB <14 Weeks W Transvaginal    Narrative    EXAM: US OB <14 WEEKS WITH TRANSVAGINAL SINGLE  LOCATION: [facility]  DATE/TIME: 4/12/2023 9:14 PM CDT    INDICATION: Pregnant. Aggressive vaginal bleeding. History of spontaneous miscarriage.  COMPARISON: 03/20/2023  TECHNIQUE: Transabdominal scans were performed. Endovaginal ultrasound was performed to better visualize the embryo.    FINDINGS:  UTERUS: Increased size of elongated anechoic fluid-filled structure in the endometrial canal in the lower uterine segment measuring 3.5 x 1.6 x 1.1 cm. This is most likely a gestational sac, with a mean sac diameter measuring 2.1 cm, for an estimated    gestational age of 7 weeks 1 day. No fetal pole or yolk sac identified.  CRL: No fetal pole identified.  FETAL HEART RATE: No fetal pole identified.  PLACENTA: Not yet formed. No evidence for sub-chorionic hemorrhage.    RIGHT OVARY: Normal. Measures 2.8 x 2.6 x 2.4 cm.  LEFT OVARY: Normal. Measures 2.5 x 2.5 x 2.1 cm.      Impression    IMPRESSION:   1.  Increased size of fluid-filled structure in the endometrial canal, likely representing gestational sac. However, no fetal pole identified. Findings diagnostic of pregnancy failure (see reference below).    Findings Diagnostic of Pregnancy Failure    CRL >7mm and no FHR  MSD >25 mm and no embryo  Absence of embryo with FHR >11 days after a previous US showed a gestational sac with a yolk sac.  Absence of embryo with FHR >2 weeks after a previous US showed a gestational sac without a yolk sac.    Reference: Diagnostic Criteria for Nonviable Pregnancy Early in the First Trimester. Ultrasound Quarterly; 30(1): 3-9, March 2014   UA with Microscopic reflex to Culture    Specimen: Urine, Midstream   Result Value Ref Range    Color Urine Yellow Colorless, Straw, Light Yellow, Yellow    Appearance Urine Clear Clear    Glucose Urine Negative Negative mg/dL    Bilirubin Urine Negative Negative    Ketones Urine Negative Negative mg/dL    Specific Gravity Urine 1.023 1.003 - 1.035    Blood Urine Large (A) Negative    pH Urine 6.0 5.0 - 7.0    Protein Albumin Urine Negative Negative mg/dL    Urobilinogen Urine Normal Normal, 2.0 mg/dL    Nitrite Urine Negative Negative    Leukocyte Esterase Urine Negative Negative    Mucus Urine Present (A) None Seen /LPF    RBC Urine 9 (H) <=2 /HPF    WBC Urine <1 <=5 /HPF    Squamous Epithelials Urine 1 <=1 /HPF    Narrative    Urine Culture not indicated       Medications   0.9% sodium chloride BOLUS (0 mLs Intravenous Stopped 4/12/23 6670)       Assessments & Plan (with Medical Decision Making)  28-year-old female to the ER secondary  concerns of sudden onset of vaginal bleeding and pelvic cramping symptoms.  Patient with a known history of for blighted ovum/miscarriage.  She thought she completed the miscarriage was 2 weeks ago with an episode of bleeding.  She had no additional bleeding until today associated with the cramping pains.  She bled through 2 large pads but now the bleeding has slowed.  Her exam revealed very little evidence of active bleeding.  She had no significant bleeding through the ER stay of over 3-1/2 hours.  Exam findings as noted above suggesting incomplete miscarriage.  We discussed different options for treatment including dilatation and curettage procedure with anesthesia.  Patient prefers to see if she can complete the miscarriage without intervention.  She was given instructions and handouts that discussed incomplete miscarriage.  Have asked her to read and follow instructions and return to the ER for increase or worsening symptoms if needed.  Patient has an appointment scheduled with her primary care physician and she plans to keep that appointment and follow-up to this incomplete miscarriage.  She knows to return to the ER should she develop fever, lightheadedness, shortness of breath, aggressive vaginal bleeding, or dizziness symptoms.     I have reviewed the nursing notes.    I have reviewed the findings, diagnosis, plan and need for follow up with the patient.           Medical Decision Making  The patient's presentation was of moderate complexity (a chronic illness mild to moderate exacerbation, progression, or side effect of treatment).    The patient's evaluation involved:  ordering and/or review of 3+ test(s) in this encounter (see separate area of note for details)  review of 2 test result(s) ordered prior to this encounter (see separate area of note for details)    The patient's management necessitated only low risk treatment.           I verbally discussed the findings of the evaluation today in the ER. I  have verbally discussed with Lidia the suggested treatment(s) as described in the discharge instructions and handouts.       I have verbally suggested she follow-up in her clinic or return to the ER for increased symptoms. See the follow-up recommendations documented  in the after visit summary in this visit's EPIC chart.      Disclaimer: This note consists of words and symbols derived from keyboarding and dictation using voice recognition software.  As a result, there may be errors that have gone undetected.  Please consider this when interpreting information found in this note.      Final diagnoses:   Incomplete miscarriage       4/12/2023   [facility] EMERGENCY DEPT     Barrie Muñoz, DO  04/13/23 0324

## 2023-04-13 NOTE — DISCHARGE INSTRUCTIONS
Please read and follow the handout(s) instructions. Return, if needed, for increased or worsening symptoms and as directed by the handout(s).    The urine test was OK

## 2023-04-18 ENCOUNTER — HOSPITAL ENCOUNTER (OUTPATIENT)
Dept: ULTRASOUND IMAGING | Facility: CLINIC | Age: 28
Discharge: HOME | End: 2023-04-18
Attending: FAMILY MEDICINE | Admitting: FAMILY MEDICINE
Payer: COMMERCIAL

## 2023-04-18 ENCOUNTER — OFFICE VISIT (OUTPATIENT)
Dept: FAMILY MEDICINE | Facility: CLINIC | Age: 28
End: 2023-04-18
Payer: COMMERCIAL

## 2023-04-18 VITALS
TEMPERATURE: 97.6 F | DIASTOLIC BLOOD PRESSURE: 78 MMHG | SYSTOLIC BLOOD PRESSURE: 112 MMHG | WEIGHT: 286.25 LBS | BODY MASS INDEX: 39.92 KG/M2 | OXYGEN SATURATION: 97 % | HEART RATE: 84 BPM

## 2023-04-18 DIAGNOSIS — O03.4 INCOMPLETE MISCARRIAGE: Primary | ICD-10-CM

## 2023-04-18 DIAGNOSIS — O03.4 INCOMPLETE MISCARRIAGE: ICD-10-CM

## 2023-04-18 PROBLEM — Z97.5 NEXPLANON IN PLACE: Status: RESOLVED | Noted: 2019-09-17 | Resolved: 2023-04-18

## 2023-04-18 LAB — HCG INTACT+B SERPL-ACNC: 15 MIU/ML

## 2023-04-18 PROCEDURE — 76817 TRANSVAGINAL US OBSTETRIC: CPT

## 2023-04-18 PROCEDURE — 99213 OFFICE O/P EST LOW 20 MIN: CPT | Performed by: FAMILY MEDICINE

## 2023-04-18 PROCEDURE — 84702 CHORIONIC GONADOTROPIN TEST: CPT | Performed by: FAMILY MEDICINE

## 2023-04-18 PROCEDURE — 36415 COLL VENOUS BLD VENIPUNCTURE: CPT | Performed by: FAMILY MEDICINE

## 2023-04-18 ASSESSMENT — PATIENT HEALTH QUESTIONNAIRE - PHQ9
SUM OF ALL RESPONSES TO PHQ QUESTIONS 1-9: 1
SUM OF ALL RESPONSES TO PHQ QUESTIONS 1-9: 1
10. IF YOU CHECKED OFF ANY PROBLEMS, HOW DIFFICULT HAVE THESE PROBLEMS MADE IT FOR YOU TO DO YOUR WORK, TAKE CARE OF THINGS AT HOME, OR GET ALONG WITH OTHER PEOPLE: NOT DIFFICULT AT ALL

## 2023-04-18 NOTE — PROGRESS NOTES
"  Assessment & Plan     ASSESSMENT/ORDERS:    ICD-10-CM    1. Incomplete miscarriage  O03.4 hCG Quantitative Pregnancy     hCG Quantitative Pregnancy     CANCELED: US OB <14 Weeks w Transvaginal Single        PLAN:  1.  Discussed that symptoms are consistent passing of incomplete miscarriage. Quantitative HCG is 15 today on recheck, which has trended down from 68 one week ago. Repeat transvaginal US showed fluid collection in lower endometrial canal with no discrete embryo or yolk sac. Endometrium thickened and heterogenous and hypervascular. Findings reassuring that patient is completing miscarriage. Will recheck HCG in 2 weeks and follow up with me in 4 weeks.             MED REC REQUIRED  Post Medication Reconciliation Status: discharge medications reconciled, continue medications without change  BMI:   Estimated body mass index is 39.92 kg/m  as calculated from the following:    Height as of 2/11/23: 1.803 m (5' 11\").    Weight as of this encounter: 129.8 kg (286 lb 4 oz).   Weight management plan: Patient was referred to their PCP to discuss a diet and exercise plan.    FUTURE APPOINTMENTS:       - Follow-up visit in 4 weeks    Patient was seen and examined by myself and Deion Price MD. The note was then scribed by me.     Deanne Stanley, MS3  [employer]      This patient was seen and examined by myself as well as the medical student.  The medical student scribed the note and I have reviewed it, edited it appropriately, and agree with the final documentation.     Deion Price MD   [facility] PATRICIA Murillo is a 28 year old, presenting for the following health issues:  ER F/U        4/18/2023     9:06 AM   Additional Questions   Roomed by russ barrera ma     [facility]       ED/UC Followup:    Facility:   [facility]  Date of visit: 4/12/23  Reason for visit: vaginal bleeding  Current Status: not bleeding    Was in the emergency " room last week with cramping and bleeding. Was found to have incomplete miscarriage with ultrasound follow up. Since then has continued have bleeding and cramping since then. Now slowing down and having some very light spotting. Feels like the end of a period. Cramping stopped about 2 days ago.     Review of Systems         Objective    /78   Pulse 84   Temp 97.6  F (36.4  C) (Temporal)   Wt 129.8 kg (286 lb 4 oz)   LMP 01/18/2023 (Approximate)   SpO2 97%   Breastfeeding Unknown   BMI 39.92 kg/m    Body mass index is 39.92 kg/m .  Physical Exam  Constitutional:       Appearance: She is well-developed.   HENT:      Head: Normocephalic.   Pulmonary:      Effort: Pulmonary effort is normal.   Neurological:      Mental Status: She is alert.   Psychiatric:         Mood and Affect: Mood normal.         Behavior: Behavior normal.            Results for orders placed or performed during the hospital encounter of 04/18/23   US OB Transvaginal Only     Status: None    Narrative    US OB TRANSVAGINAL ONLY 4/18/2023 11:18 AM    CLINICAL HISTORY: Incomplete miscarriage. Clinical gestational age 12  weeks 6 day.  TECHNIQUE: Transvaginal sonography only.     COMPARISON: 4/12/2023, 3/20/2023    FINDINGS:  UTERUS: Elongated, anechoic fluid structure in the lower endometrial  canal and endocervical canal. Amorphous material within this fluid  collection; however, no discrete embryo or yolk sac.    ENDOMETRIUM: Thickened, heterogeneous, hypervascular endometrium in  the mid body of the uterus measuring up to 1.9 cm AP.    RIGHT OVARY: Normal.  LEFT OVARY: Normal.      Impression    IMPRESSION:   1.  Fluid collection in the lower endometrial canal and endocervical  canal which may represent incomplete evacuation of a gestational sac.  Amorphous material within this fluid collection; however, no discrete  embryo or yolk sac.  2.  Thickened, heterogeneous, hypervascular endometrium in the mid  body of the uterus measuring up  to 1.9 cm AP.       GARCIA STAFFORD MD         SYSTEM ID:  [id number]   Results for orders placed or performed in visit on 04/18/23   hCG Quantitative Pregnancy     Status: Abnormal   Result Value Ref Range    hCG Quantitative 15 (H) <5 mIU/mL                   Answers for HPI/ROS submitted by the patient on 4/18/2023  If you checked off any problems, how difficult have these problems made it for you to do your work, take care of things at home, or get along with other people?: Not difficult at all  PHQ9 TOTAL SCORE: 1

## 2023-04-19 ENCOUNTER — TELEPHONE (OUTPATIENT)
Dept: FAMILY MEDICINE | Facility: CLINIC | Age: 28
End: 2023-04-19
Payer: COMMERCIAL

## 2023-04-19 DIAGNOSIS — O03.4 INCOMPLETE MISCARRIAGE: Primary | ICD-10-CM

## 2023-04-19 NOTE — RESULT ENCOUNTER NOTE
Lidia,  Your results show your body is continuing to progress through the miscarriage.  You may still have some tissue passage and bleeding periodically for the next couple of weeks but the products of conception have moved from the actual main part of the uterus and into the cervical canal.  This has been progress since ultrasound in the ER.    Your hcg result shows this as well.  I recommend a follow-up hcg blood test in 2 weeks.  Orders are placed and you just need to make a lab appointment.  Please let me know if you have any questions.    Sincerely,  Dr. Price

## 2023-04-19 NOTE — TELEPHONE ENCOUNTER
General Call    Contacts       Type Contact Phone/Fax    04/19/2023 11:14 AM CDT Phone (Incoming) Hunter Gallegossamina BEJARANO (Self) [phone] (H)        Reason for Call:  Follow up from appointment on 4/18    What are your questions or concerns:  Patient calling to question next step as she is continuing to bleed from incomplete miscarriage.  Ultrasound was completed yesterday, 4/18    Date of last appointment with provider: 4/18/23    Could we send this information to you in [place] or would you prefer to receive a phone call?:   Patient would prefer a phone call   Okay to leave a detailed message?: Yes at Cell number on file:    Telephone Information:   Mobile [phone]

## 2023-05-15 ENCOUNTER — OFFICE VISIT (OUTPATIENT)
Dept: FAMILY MEDICINE | Facility: CLINIC | Age: 28
End: 2023-05-15
Payer: COMMERCIAL

## 2023-05-15 VITALS
RESPIRATION RATE: 18 BRPM | HEART RATE: 70 BPM | DIASTOLIC BLOOD PRESSURE: 81 MMHG | SYSTOLIC BLOOD PRESSURE: 122 MMHG | WEIGHT: 290.2 LBS | HEIGHT: 71 IN | OXYGEN SATURATION: 99 % | BODY MASS INDEX: 40.63 KG/M2

## 2023-05-15 DIAGNOSIS — M54.50 CHRONIC BILATERAL LOW BACK PAIN WITHOUT SCIATICA: Primary | ICD-10-CM

## 2023-05-15 DIAGNOSIS — E66.01 MORBID OBESITY (H): ICD-10-CM

## 2023-05-15 DIAGNOSIS — Z86.14 HISTORY OF MRSA INFECTION: ICD-10-CM

## 2023-05-15 DIAGNOSIS — G89.29 CHRONIC BILATERAL LOW BACK PAIN WITHOUT SCIATICA: Primary | ICD-10-CM

## 2023-05-15 LAB
MRSA DNA SPEC QL NAA+PROBE: NEGATIVE
SA TARGET DNA: NEGATIVE

## 2023-05-15 PROCEDURE — 96127 BRIEF EMOTIONAL/BEHAV ASSMT: CPT | Performed by: FAMILY MEDICINE

## 2023-05-15 PROCEDURE — 99214 OFFICE O/P EST MOD 30 MIN: CPT | Performed by: FAMILY MEDICINE

## 2023-05-15 PROCEDURE — 87640 STAPH A DNA AMP PROBE: CPT | Performed by: FAMILY MEDICINE

## 2023-05-15 RX ORDER — CYCLOBENZAPRINE HCL 10 MG
10 TABLET ORAL 3 TIMES DAILY PRN
Qty: 20 TABLET | Refills: 3 | Status: SHIPPED | OUTPATIENT
Start: 2023-05-15 | End: 2023-12-01

## 2023-05-15 ASSESSMENT — ANXIETY QUESTIONNAIRES
7. FEELING AFRAID AS IF SOMETHING AWFUL MIGHT HAPPEN: NOT AT ALL
4. TROUBLE RELAXING: SEVERAL DAYS
3. WORRYING TOO MUCH ABOUT DIFFERENT THINGS: SEVERAL DAYS
8. IF YOU CHECKED OFF ANY PROBLEMS, HOW DIFFICULT HAVE THESE MADE IT FOR YOU TO DO YOUR WORK, TAKE CARE OF THINGS AT HOME, OR GET ALONG WITH OTHER PEOPLE?: SOMEWHAT DIFFICULT
GAD7 TOTAL SCORE: 5
6. BECOMING EASILY ANNOYED OR IRRITABLE: SEVERAL DAYS
5. BEING SO RESTLESS THAT IT IS HARD TO SIT STILL: SEVERAL DAYS
IF YOU CHECKED OFF ANY PROBLEMS ON THIS QUESTIONNAIRE, HOW DIFFICULT HAVE THESE PROBLEMS MADE IT FOR YOU TO DO YOUR WORK, TAKE CARE OF THINGS AT HOME, OR GET ALONG WITH OTHER PEOPLE: SOMEWHAT DIFFICULT
GAD7 TOTAL SCORE: 5
GAD7 TOTAL SCORE: 5
7. FEELING AFRAID AS IF SOMETHING AWFUL MIGHT HAPPEN: NOT AT ALL
2. NOT BEING ABLE TO STOP OR CONTROL WORRYING: NOT AT ALL
1. FEELING NERVOUS, ANXIOUS, OR ON EDGE: SEVERAL DAYS

## 2023-05-15 ASSESSMENT — PAIN SCALES - GENERAL: PAINLEVEL: MILD PAIN (3)

## 2023-05-15 ASSESSMENT — PATIENT HEALTH QUESTIONNAIRE - PHQ9
10. IF YOU CHECKED OFF ANY PROBLEMS, HOW DIFFICULT HAVE THESE PROBLEMS MADE IT FOR YOU TO DO YOUR WORK, TAKE CARE OF THINGS AT HOME, OR GET ALONG WITH OTHER PEOPLE: SOMEWHAT DIFFICULT
SUM OF ALL RESPONSES TO PHQ QUESTIONS 1-9: 2
SUM OF ALL RESPONSES TO PHQ QUESTIONS 1-9: 2

## 2023-05-15 ASSESSMENT — ENCOUNTER SYMPTOMS: BACK PAIN: 1

## 2023-05-15 NOTE — PROGRESS NOTES
Assessment & Plan     ASSESSMENT/ORDERS:    ICD-10-CM    1. Chronic bilateral low back pain without sciatica  M54.50 cyclobenzaprine (FLEXERIL) 10 MG tablet    G89.29       2. History of MRSA infection  Z86.14 MRSA MSSA PCR, Nasal Swab      3. Morbid obesity (H)  E66.01         PLAN:  1.  Reviewed back pain management.  Since pain level 3/10 today, no current recommendations needed as she is at her baseline, functional level of pain.  I do not recommend anything further at this time as she does not feel this amoutn of pain is limiting her.  We discussed management for pain flares, recommended cyclobenzaprine (previously effective) as well as referral to physical therapy for help with acute flares.  if symptoms persist and or worsen then referral to neurosurgery team appropriate.  She can MyChart message us when she has flares that are not well managed with acetaminophen and ibuprofen with cyclobenzaprine.    Patient Instructions   Can take 600 mg of ibuprofen with 1000 mg of acetaminophen every 6 hours for pain management.             33 minutes spent by me on the date of the encounter doing chart review, patient visit and documentation            Deion Price MD  [facility]       Libia Murillo is a 28 year old, presenting for the following health issues:  Back Pain        4/18/2023     9:06 AM   Additional Questions   Roomed by russ barrera ma     Back Pain     History of Present Illness       Back Pain:  She presents for follow up of back pain. Patient's back pain is a chronic problem.  Location of back pain:  Right lower back  Description of back pain: dull ache and sharp  Back pain spreads: nowhere    Since patient first noticed back pain, pain is: gradually worsening  Does back pain interfere with her job:  Yes      She eats 2-3 servings of fruits and vegetables daily.She consumes 1 sweetened beverage(s) daily.She exercises with enough effort to increase her heart rate 30  to 60 minutes per day.  She exercises with enough effort to increase her heart rate 4 days per week.   She is taking medications regularly.    Today's PHQ-9         PHQ-9 Total Score: 2    PHQ-9 Q9 Thoughts of better off dead/self-harm past 2 weeks :   Not at all    How difficult have these problems made it for you to do your work, take care of things at home, or get along with other people: Somewhat difficult  Today's JIGAR-7 Score: 5       Pain History:  When did you first notice your pain? 4-5 years ago, slowly worsening.    Have you seen this provider for your pain in the past?   Yes   Where in your body do you have pain? Lower back.   Are you seeing anyone else for your pain? No    Patient states about a week ago back started feeling like it was tingling/vibrating.         11/28/2022    12:35 PM 4/18/2023     8:56 AM 5/15/2023     9:22 AM   PHQ-9 SCORE   PHQ-9 Total Score MyChart 6 (Mild depression) 1 (Minimal depression) 2 (Minimal depression)   PHQ-9 Total Score 6 1 2           11/1/2022     5:04 PM 5/15/2023     9:23 AM   JIGAR-7 SCORE   Total Score 14 (moderate anxiety) 5 (mild anxiety)   Total Score 14 5                       Back pain for over past decade.  Has been to physical therapy roughly annually for this for flare ups.  Has been here as well.   Bilateral low back pain, right worse than left.  Disc between L4/L5 is injured and causing muscle spasms.  Noted to have degenerative disc disease.    X-ray reviewed from 12/16/2019 shows the L4/5 issue with degenerative changes.      Overall physical therapy is partially effective.  Not fully better by end of 8 weekly sessions, but pain down to 2-3/10 starting from 6-7/10.  Does continue exercises after she completes physical therapy.    Patient is aware of her morbid obesity but notes her back pain was present at lower weights.      Today pain is noted as 3/10    Patient reports no abdominal pain, change in bowel/bladder function, or saddle anesthesia.  "    Review of Systems   Musculoskeletal: Positive for back pain.            Objective    /81   Pulse 70   Resp 18   Ht 1.803 m (5' 11\")   Wt 131.6 kg (290 lb 3.2 oz)   LMP  (LMP Unknown)   SpO2 99%   BMI 40.47 kg/m    Body mass index is 40.47 kg/m .  Physical Exam  Constitutional:       Appearance: She is morbidly obese.   Musculoskeletal:      Comments: Right side of thoracic and lumbar paraspinous muscles with palpable areas of muscle spasm that are tender to palpation.  Right lower extremity flexion/extension at knee 4/5 strength, 4/5 hip flexion.  Negative straight leg raise, but tightness in calf noted.     Neurological:      Mental Status: She is alert.                            "

## 2023-05-22 NOTE — RESULT ENCOUNTER NOTE
Lidia,  Your results were negative.  We will need to do this one more time and then we can  officially clear you of your past MRSA infection.   Please let me know if you have any questions.    Sincerely,  Dr. Price

## 2023-05-25 ENCOUNTER — MYC MEDICAL ADVICE (OUTPATIENT)
Dept: FAMILY MEDICINE | Facility: CLINIC | Age: 28
End: 2023-05-25
Payer: COMMERCIAL

## 2023-05-25 DIAGNOSIS — L23.3: Primary | ICD-10-CM

## 2023-05-25 NOTE — TELEPHONE ENCOUNTER
Patient is wondering about allergy testing. Thinks she may be allergic to shaving cream.  Had tingly, burning feeling on arms and hands along with numb feeling in nose and around mouth.

## 2023-05-26 NOTE — TELEPHONE ENCOUNTER
[facility] message sent to patient to inform of providers response and recommendations. Lanny Leone LPN

## 2023-05-26 NOTE — TELEPHONE ENCOUNTER
Patient only needs testing if there is an occupational exposure reason or some sort of documentation required,  She may want to look into alternative options as not all products have the same ingrediants.    Will have staff notify patient.    Deion Price MD

## 2023-06-02 ENCOUNTER — LAB (OUTPATIENT)
Dept: LAB | Facility: CLINIC | Age: 28
End: 2023-06-02
Payer: COMMERCIAL

## 2023-06-02 DIAGNOSIS — O03.4 INCOMPLETE MISCARRIAGE: ICD-10-CM

## 2023-06-02 LAB — HCG INTACT+B SERPL-ACNC: <1 MIU/ML

## 2023-06-02 PROCEDURE — 36415 COLL VENOUS BLD VENIPUNCTURE: CPT

## 2023-06-02 PROCEDURE — 84702 CHORIONIC GONADOTROPIN TEST: CPT

## 2023-06-02 NOTE — TELEPHONE ENCOUNTER
Referral orders signed.  Sending back to team to follow-up on notification and/or subsequent scheduling.  She should check with work comp if an official in-person office visit with me is needed for her referral.    Deion Price MD

## 2023-06-05 NOTE — RESULT ENCOUNTER NOTE
Lidia,  Your results show that you no longer are pregnant.  Please let me know if you have any questions.    Sincerely,  Dr. Price

## 2023-06-10 ENCOUNTER — NURSE TRIAGE (OUTPATIENT)
Dept: NURSING | Facility: CLINIC | Age: 28
End: 2023-06-10
Payer: COMMERCIAL

## 2023-06-10 ENCOUNTER — HOSPITAL ENCOUNTER (EMERGENCY)
Facility: CLINIC | Age: 28
Discharge: HOME | End: 2023-06-10
Attending: EMERGENCY MEDICINE | Admitting: EMERGENCY MEDICINE
Payer: COMMERCIAL

## 2023-06-10 VITALS
BODY MASS INDEX: 40.47 KG/M2 | OXYGEN SATURATION: 98 % | HEIGHT: 71 IN | DIASTOLIC BLOOD PRESSURE: 104 MMHG | RESPIRATION RATE: 18 BRPM | SYSTOLIC BLOOD PRESSURE: 144 MMHG | HEART RATE: 72 BPM | TEMPERATURE: 98 F

## 2023-06-10 DIAGNOSIS — R20.2 PARESTHESIA: ICD-10-CM

## 2023-06-10 DIAGNOSIS — F41.8 ANXIETY WITH DEPRESSION: ICD-10-CM

## 2023-06-10 LAB
ALBUMIN SERPL BCG-MCNC: 4.1 G/DL (ref 3.5–5.2)
ALBUMIN UR-MCNC: 30 MG/DL
ALP SERPL-CCNC: 107 U/L (ref 35–104)
ALT SERPL W P-5'-P-CCNC: 36 U/L (ref 10–35)
AMPHETAMINES UR QL: NOT DETECTED
ANION GAP SERPL CALCULATED.3IONS-SCNC: 11 MMOL/L (ref 7–15)
APPEARANCE UR: ABNORMAL
AST SERPL W P-5'-P-CCNC: 20 U/L (ref 10–35)
BACTERIA #/AREA URNS HPF: ABNORMAL /HPF
BARBITURATES UR QL SCN: NOT DETECTED
BASOPHILS # BLD AUTO: 0 10E3/UL (ref 0–0.2)
BASOPHILS NFR BLD AUTO: 0 %
BENZODIAZ UR QL SCN: NOT DETECTED
BILIRUB SERPL-MCNC: 1.3 MG/DL
BILIRUB UR QL STRIP: NEGATIVE
BUN SERPL-MCNC: 9.2 MG/DL (ref 6–20)
BUPRENORPHINE UR QL: NOT DETECTED
CALCIUM SERPL-MCNC: 9 MG/DL (ref 8.6–10)
CANNABINOIDS UR QL: NOT DETECTED
CHLORIDE SERPL-SCNC: 109 MMOL/L (ref 98–107)
COCAINE UR QL SCN: NOT DETECTED
COLOR UR AUTO: YELLOW
CREAT SERPL-MCNC: 0.89 MG/DL (ref 0.51–0.95)
D DIMER PPP FEU-MCNC: 0.49 UG/ML FEU (ref 0–0.5)
D-METHAMPHET UR QL: NOT DETECTED
DEPRECATED HCO3 PLAS-SCNC: 21 MMOL/L (ref 22–29)
EOSINOPHIL # BLD AUTO: 0.4 10E3/UL (ref 0–0.7)
EOSINOPHIL NFR BLD AUTO: 4 %
ERYTHROCYTE [DISTWIDTH] IN BLOOD BY AUTOMATED COUNT: 13.9 % (ref 10–15)
GFR SERPL CREATININE-BSD FRML MDRD: 90 ML/MIN/1.73M2
GLUCOSE SERPL-MCNC: 107 MG/DL (ref 70–99)
GLUCOSE UR STRIP-MCNC: NEGATIVE MG/DL
HCG UR QL: NEGATIVE
HCT VFR BLD AUTO: 40.5 % (ref 35–47)
HGB BLD-MCNC: 13.5 G/DL (ref 11.7–15.7)
HGB UR QL STRIP: ABNORMAL
IMM GRANULOCYTES # BLD: 0 10E3/UL
IMM GRANULOCYTES NFR BLD: 0 %
KETONES UR STRIP-MCNC: NEGATIVE MG/DL
LEUKOCYTE ESTERASE UR QL STRIP: ABNORMAL
LYMPHOCYTES # BLD AUTO: 2.5 10E3/UL (ref 0.8–5.3)
LYMPHOCYTES NFR BLD AUTO: 25 %
MAGNESIUM SERPL-MCNC: 1.9 MG/DL (ref 1.7–2.3)
MCH RBC QN AUTO: 27 PG (ref 26.5–33)
MCHC RBC AUTO-ENTMCNC: 33.3 G/DL (ref 31.5–36.5)
MCV RBC AUTO: 81 FL (ref 78–100)
METHADONE UR QL SCN: NOT DETECTED
MONOCYTES # BLD AUTO: 0.8 10E3/UL (ref 0–1.3)
MONOCYTES NFR BLD AUTO: 8 %
MUCOUS THREADS #/AREA URNS LPF: PRESENT /LPF
NEUTROPHILS # BLD AUTO: 6.3 10E3/UL (ref 1.6–8.3)
NEUTROPHILS NFR BLD AUTO: 63 %
NITRATE UR QL: NEGATIVE
NRBC # BLD AUTO: 0 10E3/UL
NRBC BLD AUTO-RTO: 0 /100
OPIATES UR QL SCN: NOT DETECTED
OXYCODONE UR QL SCN: NOT DETECTED
PCP UR QL SCN: NOT DETECTED
PH UR STRIP: 5 [PH] (ref 5–7)
PLATELET # BLD AUTO: 387 10E3/UL (ref 150–450)
POTASSIUM SERPL-SCNC: 3.7 MMOL/L (ref 3.4–5.3)
PROPOXYPH UR QL: NOT DETECTED
PROT SERPL-MCNC: 7.1 G/DL (ref 6.4–8.3)
RBC # BLD AUTO: 5 10E6/UL (ref 3.8–5.2)
RBC URINE: 8 /HPF
SODIUM SERPL-SCNC: 141 MMOL/L (ref 136–145)
SP GR UR STRIP: 1.03 (ref 1–1.03)
TRICYCLICS UR QL SCN: NOT DETECTED
TROPONIN T SERPL HS-MCNC: <6 NG/L
TSH SERPL DL<=0.005 MIU/L-ACNC: 1.38 UIU/ML (ref 0.3–4.2)
UROBILINOGEN UR STRIP-MCNC: NORMAL MG/DL
WBC # BLD AUTO: 10 10E3/UL (ref 4–11)
WBC URINE: 11 /HPF

## 2023-06-10 PROCEDURE — 36415 COLL VENOUS BLD VENIPUNCTURE: CPT | Performed by: EMERGENCY MEDICINE

## 2023-06-10 PROCEDURE — 85004 AUTOMATED DIFF WBC COUNT: CPT | Performed by: EMERGENCY MEDICINE

## 2023-06-10 PROCEDURE — 87086 URINE CULTURE/COLONY COUNT: CPT | Performed by: EMERGENCY MEDICINE

## 2023-06-10 PROCEDURE — 80053 COMPREHEN METABOLIC PANEL: CPT | Performed by: EMERGENCY MEDICINE

## 2023-06-10 PROCEDURE — 99284 EMERGENCY DEPT VISIT MOD MDM: CPT | Mod: 25 | Performed by: EMERGENCY MEDICINE

## 2023-06-10 PROCEDURE — 83735 ASSAY OF MAGNESIUM: CPT | Performed by: EMERGENCY MEDICINE

## 2023-06-10 PROCEDURE — 93005 ELECTROCARDIOGRAM TRACING: CPT | Performed by: EMERGENCY MEDICINE

## 2023-06-10 PROCEDURE — 80306 DRUG TEST PRSMV INSTRMNT: CPT | Performed by: EMERGENCY MEDICINE

## 2023-06-10 PROCEDURE — 81025 URINE PREGNANCY TEST: CPT | Performed by: EMERGENCY MEDICINE

## 2023-06-10 PROCEDURE — 84443 ASSAY THYROID STIM HORMONE: CPT | Performed by: EMERGENCY MEDICINE

## 2023-06-10 PROCEDURE — 84484 ASSAY OF TROPONIN QUANT: CPT | Performed by: EMERGENCY MEDICINE

## 2023-06-10 PROCEDURE — 81001 URINALYSIS AUTO W/SCOPE: CPT | Mod: XU | Performed by: EMERGENCY MEDICINE

## 2023-06-10 PROCEDURE — 93010 ELECTROCARDIOGRAM REPORT: CPT | Performed by: EMERGENCY MEDICINE

## 2023-06-10 PROCEDURE — 99284 EMERGENCY DEPT VISIT MOD MDM: CPT | Performed by: EMERGENCY MEDICINE

## 2023-06-10 PROCEDURE — 85379 FIBRIN DEGRADATION QUANT: CPT | Performed by: EMERGENCY MEDICINE

## 2023-06-10 RX ORDER — BUSPIRONE HYDROCHLORIDE 7.5 MG/1
TABLET ORAL
COMMUNITY
Start: 2023-06-02 | End: 2023-06-15

## 2023-06-10 RX ORDER — BUPROPION HYDROCHLORIDE 150 MG/1
TABLET ORAL
COMMUNITY
Start: 2023-06-02 | End: 2023-06-15

## 2023-06-10 ASSESSMENT — ACTIVITIES OF DAILY LIVING (ADL): ADLS_ACUITY_SCORE: 35

## 2023-06-10 NOTE — ED PROVIDER NOTES
"  History     Chief Complaint   Patient presents with     Numbness     HPI  Lidia Gallegos is a 28 year old female who presents to the emergency room with concerns of intermittent numbness and episodes of blacking out.  She says that she has been having episodes where entire body will go numb.  Starts with her hands, then will be her lower extremities, and the entire body.  Feels like a pins and needle sensation.  Also feels like she cannot remember exactly what happens, and will have a brief episode where she completely blacks out.  Says that she has not fallen or hit her head, but this does feel similar to symptoms she had after sustaining a concussion.  She does get blurry vision where she describes things looking like \"a watercolor painting\".  Notes that she started on Wellbutrin and BuSpar approximately 1 week ago, has not previously been on these medications.  Is unsure if it has something to do with these medicines or if she is \"still technically miscarrying\" and having symptoms from that.  Does not noticed a specific trigger for these symptoms    Allergies:  No Known Allergies    Problem List:    Patient Active Problem List    Diagnosis Date Noted     Prediabetes 03/22/2023     Priority: Medium     Anxiety with depression 11/15/2017     Priority: Medium     Asperger's syndrome 11/15/2017     Priority: Medium     Chronic bilateral low back pain without sciatica 03/28/2016     Priority: Medium        Past Medical History:    Past Medical History:   Diagnosis Date     Anxiety      Autism      Depression      History of anemia        Past Surgical History:    Past Surgical History:   Procedure Laterality Date     INNER EAR SURGERY Right     age 2     WISDOM TOOTH EXTRACTION         Family History:    Family History   Problem Relation Age of Onset     No Known Problems Mother      Hypertension Father      Mental Illness Sister         bipolar     Attention Deficit Disorder Sister      Birth defects Brother        " " bladder - reportedly related to nuchal cord     Cancer Maternal Grandfather      Diabetes Paternal Grandmother      Diabetes Paternal Grandfather      Kidney failure Paternal Grandfather        Social History:  Marital Status:   [2]  Social History     Tobacco Use     Smoking status: Former     Types: Cigarettes     Quit date: 2022     Years since quittin.4     Smokeless tobacco: Never   Vaping Use     Vaping status: Never Used     Passive vaping exposure: Yes   Substance Use Topics     Alcohol use: Yes     Comment: occ     Drug use: Never        Medications:    buPROPion (WELLBUTRIN XL) 150 MG 24 hr tablet  busPIRone (BUSPAR) 7.5 MG tablet  cyclobenzaprine (FLEXERIL) 10 MG tablet  LANsoprazole (PREVACID) 30 MG DR capsule  Prenatal Vit-Fe Fumarate-FA (PRENATAL MULTIVITAMIN W/IRON) 27-0.8 MG tablet          Review of Systems   All other systems reviewed and are negative.      Physical Exam   BP: (!) 144/109  Pulse: 86  Temp: 98  F (36.7  C)  Resp: 18  Height: 180.3 cm (5' 11\")  SpO2: 98 %      Physical Exam  Vitals and nursing note reviewed.   Constitutional:       General: She is not in acute distress.     Appearance: Normal appearance. She is not diaphoretic.   HENT:      Head: Normocephalic and atraumatic.      Mouth/Throat:      Mouth: Mucous membranes are moist.   Eyes:      General: No scleral icterus.     Conjunctiva/sclera: Conjunctivae normal.   Cardiovascular:      Rate and Rhythm: Normal rate.      Heart sounds: Normal heart sounds.   Pulmonary:      Effort: Pulmonary effort is normal. No respiratory distress.      Breath sounds: Normal breath sounds.   Abdominal:      General: Abdomen is flat.   Musculoskeletal:      Cervical back: Neck supple.   Skin:     General: Skin is warm.      Findings: No rash.   Neurological:      General: No focal deficit present.      Mental Status: She is alert.      Cranial Nerves: No cranial nerve deficit.   Psychiatric:         Mood and Affect: Mood is " anxious.         Behavior: Behavior normal.         ED Course                 Procedures              EKG Interpretation:      Interpreted by Jaclyn Price DO  Time reviewed: 1630  Symptoms at time of EKG: Numbness, vision changes, altered mental status  Rhythm: normal sinus   Rate: normal  Axis: normal  Ectopy: none  Conduction: normal  ST Segments/ T Waves: No ST-T wave changes  Q Waves: none  Comparison to prior: Unchanged    Clinical Impression: normal EKG          Critical Care time:  none               Results for orders placed or performed during the hospital encounter of 06/10/23 (from the past 24 hour(s))   CBC with platelets differential    Narrative    The following orders were created for panel order CBC with platelets differential.  Procedure                               Abnormality         Status                     ---------                               -----------         ------                     CBC with platelets and d...[[id number]]                      Final result                 Please view results for these tests on the individual orders.   D dimer quantitative   Result Value Ref Range    D-Dimer Quantitative 0.49 0.00 - 0.50 ug/mL FEU    Narrative    This D-dimer assay is intended for use in conjunction with a clinical pretest probability assessment model to exclude pulmonary embolism (PE) and deep venous thrombosis (DVT) in outpatients suspected of PE or DVT. The cut-off value is 0.50 ug/mL FEU.   Comprehensive metabolic panel   Result Value Ref Range    Sodium 141 136 - 145 mmol/L    Potassium 3.7 3.4 - 5.3 mmol/L    Chloride 109 (H) 98 - 107 mmol/L    Carbon Dioxide (CO2) 21 (L) 22 - 29 mmol/L    Anion Gap 11 7 - 15 mmol/L    Urea Nitrogen 9.2 6.0 - 20.0 mg/dL    Creatinine 0.89 0.51 - 0.95 mg/dL    Calcium 9.0 8.6 - 10.0 mg/dL    Glucose 107 (H) 70 - 99 mg/dL    Alkaline Phosphatase 107 (H) 35 - 104 U/L    AST 20 10 - 35 U/L    ALT 36 (H) 10 - 35 U/L    Protein Total 7.1 6.4 -  8.3 g/dL    Albumin 4.1 3.5 - 5.2 g/dL    Bilirubin Total 1.3 (H) <=1.2 mg/dL    GFR Estimate 90 >60 mL/min/1.73m2   Troponin T, High Sensitivity   Result Value Ref Range    Troponin T, High Sensitivity <6 <=14 ng/L   Magnesium   Result Value Ref Range    Magnesium 1.9 1.7 - 2.3 mg/dL   TSH with free T4 reflex   Result Value Ref Range    TSH 1.38 0.30 - 4.20 uIU/mL   CBC with platelets and differential   Result Value Ref Range    WBC Count 10.0 4.0 - 11.0 10e3/uL    RBC Count 5.00 3.80 - 5.20 10e6/uL    Hemoglobin 13.5 11.7 - 15.7 g/dL    Hematocrit 40.5 35.0 - 47.0 %    MCV 81 78 - 100 fL    MCH 27.0 26.5 - 33.0 pg    MCHC 33.3 31.5 - 36.5 g/dL    RDW 13.9 10.0 - 15.0 %    Platelet Count 387 150 - 450 10e3/uL    % Neutrophils 63 %    % Lymphocytes 25 %    % Monocytes 8 %    % Eosinophils 4 %    % Basophils 0 %    % Immature Granulocytes 0 %    NRBCs per 100 WBC 0 <1 /100    Absolute Neutrophils 6.3 1.6 - 8.3 10e3/uL    Absolute Lymphocytes 2.5 0.8 - 5.3 10e3/uL    Absolute Monocytes 0.8 0.0 - 1.3 10e3/uL    Absolute Eosinophils 0.4 0.0 - 0.7 10e3/uL    Absolute Basophils 0.0 0.0 - 0.2 10e3/uL    Absolute Immature Granulocytes 0.0 <=0.4 10e3/uL    Absolute NRBCs 0.0 10e3/uL   UA with Microscopic reflex to Culture    Specimen: Urine, Midstream   Result Value Ref Range    Color Urine Yellow Colorless, Straw, Light Yellow, Yellow    Appearance Urine Slightly Cloudy (A) Clear    Glucose Urine Negative Negative mg/dL    Bilirubin Urine Negative Negative    Ketones Urine Negative Negative mg/dL    Specific Gravity Urine 1.026 1.003 - 1.035    Blood Urine Moderate (A) Negative    pH Urine 5.0 5.0 - 7.0    Protein Albumin Urine 30 (A) Negative mg/dL    Urobilinogen Urine Normal Normal, 2.0 mg/dL    Nitrite Urine Negative Negative    Leukocyte Esterase Urine Trace (A) Negative    Bacteria Urine Few (A) None Seen /HPF    Mucus Urine Present (A) None Seen /LPF    RBC Urine 8 (H) <=2 /HPF    WBC Urine 11 (H) <=5 /HPF     Narrative    Urine Culture ordered based on laboratory criteria   HCG qualitative urine   Result Value Ref Range    hCG Urine Qualitative Negative Negative   Urine Drugs of Abuse Screen    Narrative    The following orders were created for panel order Urine Drugs of Abuse Screen.  Procedure                               Abnormality         Status                     ---------                               -----------         ------                     Urine Drugs of Abuse Scr...[[id number]]  Normal              Final result                 Please view results for these tests on the individual orders.   Urine Drugs of Abuse Screen Panel 13   Result Value Ref Range    Cannabinoids (77-ldj-7-carboxy-9-THC) Not Detected Not Detected, Indeterminate    Phencyclidine Not Detected Not Detected, Indeterminate    Cocaine (Benzoylecgonine) Not Detected Not Detected, Indeterminate    Methamphetamine (d-Methamphetamine) Not Detected Not Detected, Indeterminate    Opiates (Morphine) Not Detected Not Detected, Indeterminate    Amphetamine (d-Amphetamine) Not Detected Not Detected, Indeterminate    Benzodiazepines (Nordiazepam) Not Detected Not Detected, Indeterminate    Tricyclic Antidepressants (Desipramine) Not Detected Not Detected, Indeterminate    Methadone Not Detected Not Detected, Indeterminate    Barbiturates (Butalbital) Not Detected Not Detected, Indeterminate    Oxycodone Not Detected Not Detected, Indeterminate    Propoxyphene (Norpropoxyphene) Not Detected Not Detected, Indeterminate    Buprenorphine Not Detected Not Detected, Indeterminate       Medications - No data to display    Assessments & Plan (with Medical Decision Making)  Lidia is a 28-year-old female presenting to the emergency room for intermittent episodes of numbness, alteration in level of consciousness, and vision changes.  Has been ongoing since she started new medication Wellbutrin and BuSpar.  See history and focused physical exam as  above  Pleasant, appropriately anxious 28-year-old female in no acute distress.  Mildly hypertensive with blood pressure of 144/109, but the remainder of vital signs are stable and she is afebrile.  No focal neurologic findings on exam.  Symptoms do not fit pattern of concern for TIA or CVA.  Symptoms fit better with pattern of anxiety or panic attack.  She may be having side effects to recently started medication.  We will check blood work, EKG, and may need to consider CT scan.  She is agreeable to this plan  EKG shows normal sinus rhythm without acute arrhythmia, ST elevations, or ectopy.  Is unchanged from previous EKGs within the last 1 year.  Labs as above, no acute emergent medical condition noted.  Pregnancy test was negative, and see that patient has had serial beta hCGs following her miscarriage that had normalized.  Not suspect any retained products of conception or evidence for missed miscarriage.  Suspect that her symptoms are due to panic and anxiety, or may be side effect from recently started medication.  Difficult to state if this is due to Wellbutrin, BuSpar, or both, since she started these both at the same time.  Should contact the primary prescribing provider regarding these medications and if they should be changed, discontinued, or adjusted.  Return to the emergency room if any new or worsening symptoms develop.  Discharged in no distress     I have reviewed the nursing notes.    I have reviewed the findings, diagnosis, plan and need for follow up with the patient.           Medical Decision Making  The patient's presentation was of low complexity (an acute and uncomplicated illness or injury).    The patient's evaluation involved:  ordering and/or review of 3+ test(s) in this encounter (see separate area of note for details)    The patient's management necessitated only low risk treatment.        Discharge Medication List as of 6/10/2023  6:33 PM          Final diagnoses:   Anxiety with  depression   Paresthesia       6/10/2023   [facility] EMERGENCY DEPT     Jaclyn Price DO  06/10/23 1917

## 2023-06-10 NOTE — DISCHARGE INSTRUCTIONS
Your blood work did not show any worrisome findings.  Your EKG also looked normal    I suspect your symptoms are due to a medication reaction.  It is difficult to tell which medicine is causing this, since you started both the Wellbutrin and BuSpar at the same time.  You should contact the provider who prescribed these to determine next best steps, if you need to discontinue 1 or both, or if you need to change the dosing    If you develop any new or worsening symptoms, do not hesitate to return to the emergency room for evaluation

## 2023-06-10 NOTE — ED TRIAGE NOTES
"Pt states that yesterday pt as been having an episode where her 'entire body goes numb' and she gets vision changes and having a 'split second blackout episode\". Pt states last Friday started wellbutrin and buspar for anxiety - hx of panic attacks. Hx anxiety and depression per pt.      Triage Assessment     Row Name 06/10/23 9533       Triage Assessment (Adult)    Airway WDL WDL       Respiratory WDL    Respiratory WDL WDL       Cardiac WDL    Cardiac WDL WDL              "

## 2023-06-10 NOTE — TELEPHONE ENCOUNTER
Situation: Pt is describing black out feelings, and vision loss. Neurological deficits over the past couple days.       IAssessment:   Pt reports these symptoms started at different times.  Intermittent black out, loss of vision, and numbness in her whole body, and at times cannot feel her lower extermities. Feelings of confusion, unable to completely describe this. Pt reports these symptoms have been going on at different times, and she cannot find a timeline that they all began.     Protocol Recommended Disposition:   Go to ED Now- Call 911 now per nursing judgement.    Recommendation:   Pt is currently in the car driving to an ED, and reports she is 10 about minutes away. Pt was advised that she should actually be calling 911. Pt was advised to go to ED now, but if possible pull over and call 911. Pt will stop and call 911, and then call her  to come get the car. Pt was advised to call back if she needs further assistance. Pt verbalized understanding.    Ant Verduzco RN on 6/10/2023 at 3:19 PM      Reason for Disposition    Complete loss of vision in 1 or both eyes    Additional Information    Negative: Weakness of the face, arm or leg on one side of the body    Negative: Followed getting substance in the eye    Negative: Foreign body or object is or was lodged in the eye    Negative: Followed an eye injury    Negative: Followed sun lamp or sun exposure (UV keratitis)    Negative: Pregnant    Negative: Postpartum (from 0 to 6 weeks after delivery)    Negative: Yellow or green discharge (pus) in the eye    Protocols used: VISION LOSS OR CHANGE-A-AH

## 2023-06-12 LAB — BACTERIA UR CULT: NO GROWTH

## 2023-06-15 ENCOUNTER — OFFICE VISIT (OUTPATIENT)
Dept: FAMILY MEDICINE | Facility: CLINIC | Age: 28
End: 2023-06-15
Payer: COMMERCIAL

## 2023-06-15 ENCOUNTER — NURSE TRIAGE (OUTPATIENT)
Dept: FAMILY MEDICINE | Facility: CLINIC | Age: 28
End: 2023-06-15

## 2023-06-15 ENCOUNTER — MYC MEDICAL ADVICE (OUTPATIENT)
Dept: FAMILY MEDICINE | Facility: CLINIC | Age: 28
End: 2023-06-15

## 2023-06-15 VITALS
RESPIRATION RATE: 20 BRPM | WEIGHT: 282 LBS | BODY MASS INDEX: 40.37 KG/M2 | HEIGHT: 70 IN | OXYGEN SATURATION: 100 % | HEART RATE: 80 BPM | TEMPERATURE: 97.5 F | SYSTOLIC BLOOD PRESSURE: 130 MMHG | DIASTOLIC BLOOD PRESSURE: 80 MMHG

## 2023-06-15 DIAGNOSIS — I80.9 PHLEBITIS: Primary | ICD-10-CM

## 2023-06-15 PROCEDURE — 99213 OFFICE O/P EST LOW 20 MIN: CPT | Performed by: FAMILY MEDICINE

## 2023-06-15 ASSESSMENT — PAIN SCALES - GENERAL: PAINLEVEL: MODERATE PAIN (4)

## 2023-06-15 NOTE — TELEPHONE ENCOUNTER
Patient returned call and informed that Dr. Price will see her today at 4:40 pm and if not she needs to go to urgent care or the ED and be seen today. Patient stating the appointment with Dr. Price today will work, she will be here at 4:40 pm. Lanny Leone LPN     Urology Progress Note  [facility]    Provider: PRANAV Carrillo - CNP  Patient ID:  Admission Date: 2021 Name: Antonio Bright Date: 2021 MRN: [id number]   Patient Location: [id number] : 1945  Attending: Wei De La Garza MD Date of Service: 2021  PCP: Carol Gamez MD     Diagnoses:  Acute Urinary Retention    Assessment/Plan:  68 yo male admitted with HTN and intractable back pain 2/2 to T4 pathologic fracture with metastatic disease. No hx of dysuria, or BPH with LUTS, not taking prostate medications at home, has never seen a urologist in the past. Admitted 2021 but began to have increasing suprapubic pain 2021. PVR was obtained showing bladder distension and he was catheterized for over 1000cc's. CT a/p 2021 shows Left >Right hydronephrosis and a partially obstructing mid left ureteral stone measuring 3mm. No leukocytosis, Ua appears sterile, afebrile. Recc  -Continue florentino  -Continue Flomax  -OOB as tolerated  -Voiding trial 1-2 days  -He has a high chance of passing this left ureteral stone in the coming days to weeks. -He will continue flomax outpatient for BPH and Ureteral stone. Follow up in the office x1-2 weeks for discussion about incomplete bladder emptying and plan of care with left kidney stone- likely repeat imaging - requested    The patient had a chance to ask questions which were answered. he understands the above plan. Subjective: Topher Yañez is a 67 y.o. male. He was seen and examined this morning. Today we discussed retention and removing florentino tomorrow or the next. Discussed having to replace florentino if still having trouble. Discussed f/u in the office and likely need for cystoscopy to evaluate for obstruction.       Objective:   Vitals:  Vitals:    21 0830   BP: (!) 156/68   Pulse: 78   Resp: (!) 78   Temp: 97.8 °F (36.6 °C)   SpO2: 94%       Intake/Output Summary (Last 24 hours) at 2021 1016  Last data filed at 9/13/2021 1000  Gross per 24 hour   Intake 1467.93 ml   Output 4425 ml   Net -2957.07 ml     Physical Exam:  Gen: Alert and oriented x3, no acute distress  CV: Regular rate   Resp: unlabored respirations  Abd: Soft, non-distended, non-tender, no masses  Ext: no peripheral edema noted, moves upper and lower extremities spontaneously  Skin: warmand well perfused, no rashes noted on the face, or arms.      Labs:  Lab Results   Component Value Date    WBC 9.6 09/13/2021    HGB 9.0 (L) 09/13/2021    HCT 26.0 (L) 09/13/2021    MCV 82.7 09/13/2021     09/13/2021     Lab Results   Component Value Date    CREATININE 0.7 (L) 09/13/2021    BUN 9 09/13/2021     09/13/2021    K 3.4 (L) 09/13/2021     09/13/2021    CO2 24 09/13/2021       PRANAV Morales - CNP   9/13/2021

## 2023-06-15 NOTE — TELEPHONE ENCOUNTER
Patient was in the ED on 06/10/23 where they tried to initiate an IV; patient ended up with a hematoma.    Patient states the following regarding her hematoma:  - Size of a normal strawberry (bigger than a quarter)  - flat  - black and blue   - can visibly see her vein going up her arm from the hematoma on the back of her hand, up the back of her forearm, to a little above her elbow  - very painful to the touch and in general  Taking ibuprofen and is not helping much  - has been resting, ice/heat, compression, elevation, and massage  - does not feel it is getting better, staying the same.    Please advise on next steps.

## 2023-06-15 NOTE — PROGRESS NOTES
"  Assessment & Plan     ASSESSMENT/ORDERS:    ICD-10-CM    1. Phlebitis  I80.9         PLAN:  1.  Heat/ice ibuprofen for discomfort of left upper extremity.  May take 1-2 weeks for continued resolution.                  Deion Price MD  [facility] is a 28 year [facility], presenting for the following health issues:  Musculoskeletal Problem (Left arm hematoma)        6/15/2023     4:43 PM   Additional Questions   Roomed by Hazel TREJO     Musculoskeletal Problem    History of Present Illness       Reason for visit:  No idea, was just scheduled for me    She eats 0-1 servings of fruits and vegetables daily.She consumes 0 sweetened beverage(s) daily.She exercises with enough effort to increase her heart rate 30 to 60 minutes per day.  She exercises with enough effort to increase her heart rate 4 days per week.   She is taking medications regularly.           Had multiple unsuccessful blood draws at recent ER visit 5 days ago and now has large bruise on proximal forearm near elbow with pronounced vein proximal to elbow.  Minimal pain, but concerning enough for her to call us for triage.  No fevers, chills, nausea, or vomiting.  Pain is bothersome to her, mostly due to still 5days out from ER visit.    Review of Systems         Objective    /80   Pulse 80   Temp 97.5  F (36.4  C) (Temporal)   Resp 20   Ht 1.768 m (5' 9.6\")   Wt 127.9 kg (282 lb)   LMP 06/11/2023   SpO2 100%   BMI 40.93 kg/m    Body mass index is 40.93 kg/m .  Physical Exam  Skin:     Comments: Dollar sized bruising noted on proximal lateral/posterior forearm with mild tenderness to palpation.  Does not feel hot or firm.  There is a prominent vein from area of bruising that is not palpable or feeling thrombototic. No warmth noted.    Bruising also noted on dorsal left hand.                              "

## 2023-06-15 NOTE — TELEPHONE ENCOUNTER
Can she come today before 4:45 to have this looked at?   If not, she should get seen at urgent care or ER.    Deion Price MD

## 2023-06-22 ENCOUNTER — HOSPITAL ENCOUNTER (EMERGENCY)
Facility: CLINIC | Age: 28
Discharge: HOME | End: 2023-06-22
Attending: FAMILY MEDICINE | Admitting: FAMILY MEDICINE
Payer: COMMERCIAL

## 2023-06-22 VITALS
DIASTOLIC BLOOD PRESSURE: 88 MMHG | TEMPERATURE: 97 F | HEART RATE: 76 BPM | SYSTOLIC BLOOD PRESSURE: 128 MMHG | OXYGEN SATURATION: 98 % | RESPIRATION RATE: 18 BRPM | BODY MASS INDEX: 40.99 KG/M2 | WEIGHT: 282.4 LBS

## 2023-06-22 DIAGNOSIS — K62.5 RECTAL BLEEDING: ICD-10-CM

## 2023-06-22 DIAGNOSIS — K60.2 ANAL FISSURE: ICD-10-CM

## 2023-06-22 PROCEDURE — 99282 EMERGENCY DEPT VISIT SF MDM: CPT | Performed by: FAMILY MEDICINE

## 2023-06-22 ASSESSMENT — ACTIVITIES OF DAILY LIVING (ADL)
ADLS_ACUITY_SCORE: 33
ADLS_ACUITY_SCORE: 35

## 2023-06-22 NOTE — ED TRIAGE NOTES
Pt has history of rectal and colon fissures. Started to bleed late yesterday and has constipation. Overnight started to continue to bleed and cannot have a BM. Slight 2/10 abdominal pain, 3/10 rectal pain.  Alert and oriented.     Triage Assessment     Row Name 06/22/23 0814       Triage Assessment (Adult)    Airway WDL WDL       Respiratory WDL    Respiratory WDL WDL       Cognitive/Neuro/Behavioral WDL    Cognitive/Neuro/Behavioral WDL WDL

## 2023-06-22 NOTE — DISCHARGE INSTRUCTIONS
You have a small anal fissure that is likely causing your pain and bleeding.  Please see the attached handout.  Take a stool softener such as Senokot or Colace, and add MiraLAX.  Do a sitz bath as we discussed twice a day.  Avoid straining and pushing with bowel movement.  Follow-up with Dr. Price in 1 to 2 weeks if not improving.  Return to the emergency department if your symptoms worsen.

## 2023-06-22 NOTE — ED PROVIDER NOTES
ED Provider Note     Lidia Gallegos  [id number]  June 22, 2023      CC:     Chief Complaint   Patient presents with     Rectal Bleeding          History is obtained from the patient.    HPI: Lidia Gallegos is a 28 year old female presenting with rectal bleeding yesterday, associated with constipation.  Patient was sitting on the toilet and was pushing for about 30 minutes.  She had a moderate amount of bleeding into the toilet.  She also noticed bright red blood when she wiped.  She currently has slight abdominal pain rated 2 out of 10, and rectal pain rated 3 out of 10.  She has a history of rectal fissures.      PMH/Problem List:   Past Medical History:   Diagnosis Date     Anxiety      Autism      Depression      History of anemia        PSH:   Past Surgical History:   Procedure Laterality Date     INNER EAR SURGERY Right     age 2     WISDOM TOOTH EXTRACTION         MEDS: No current facility-administered medications on file prior to encounter.  cyclobenzaprine (FLEXERIL) 10 MG tablet, Take 1 tablet (10 mg) by mouth 3 times daily as needed for muscle spasms  LANsoprazole (PREVACID) 30 MG DR capsule, Take 30 mg by mouth daily  Prenatal Vit-Fe Fumarate-FA (PRENATAL MULTIVITAMIN W/IRON) 27-0.8 MG tablet, Take 1 tablet by mouth daily  [DISCONTINUED] albuterol (PROAIR HFA/PROVENTIL HFA/VENTOLIN HFA) 108 (90 Base) MCG/ACT Inhaler, Inhale 2 puffs every 4-6 hours as needed for cough, wheezing, or shortness of breath        Allergies: Patient has no known allergies.    Triage and nursing notes were reviewed.    ROS: All other systems were reviewed and are negative    Physical Exam:  Vitals:    06/22/23 0812   BP: (!) 129/90   Pulse: 78   Resp: 14   Temp: 97  F (36.1  C)   TempSrc: Oral   SpO2: 98%   Weight: 128.1 kg (282 lb 6.4 oz)     GENERAL APPEARANCE: Alert, no distress, cooperative  HEAD: atraumatic  HENT: Normal external exam  RESP: lungs clear to  auscultation   ABDOMEN: soft, obese, nontender  RECTAL: Small anal fissure at 6 o'clock position.  No active bleeding.  SKIN: no rash; as above          Labs/Imaging Results:  No results found for this or any previous visit (from the past 24 hour(s)).          Impression:  Final diagnoses:   Rectal bleeding   Anal fissure         ED Course & Medical Decision Making (Plan):  Lidia Gallegos is a 28 year old female with rectal pain and an episode of rectal bleeding yesterday.  Patient feels constipated.  She has had anal fissures in the past.  Patient states that there seem to be a lot of blood in the toilet yesterday.  She has not been able to have a bowel movement today due to rectal pain.  Vital signs are stable.  Exam reveals a small anal fissure at 6 o'clock position.  Patient was reassured that this could be from hard stools or pushing.  Try to soften the stools, use sitz bath's, and change her diet.  Patient expressed understanding and agreement with discharge instructions below.  Adjunctive therapies can be added if not improving.  Follow-up with Dr. Price in 1 week as needed.    Written after-visit summary and instructions were given at the time of discharge.          Follow Up Plan:  Deion Price MD  [address] [place] [address] [phone]    In 1 week      [facility] Emergency Dept  [address] [place] [id number] [phone]    If symptoms worsen        Discharge Instructions:  You have a small anal fissure that is likely causing your pain and bleeding.  Please see the attached handout.  Take a stool softener such as Senokot or Colace, and add MiraLAX.  Do a sitz bath as we discussed twice a day.  Avoid straining and pushing with bowel movement.  Follow-up with Dr. Price in 1 to 2 weeks if not improving.  Return to the emergency department if your symptoms worsen.        Disclaimer: This note consists of words and symbols derived from  keyboarding and dictation using voice recognition software.  As a result, there may be errors that have gone undetected.  Please consider this when interpreting information found in this note.       Louisa Wells MD  06/22/23 7589

## 2023-07-24 ENCOUNTER — MYC MEDICAL ADVICE (OUTPATIENT)
Dept: FAMILY MEDICINE | Facility: CLINIC | Age: 28
End: 2023-07-24
Payer: COMMERCIAL

## 2023-07-25 ENCOUNTER — NURSE TRIAGE (OUTPATIENT)
Dept: FAMILY MEDICINE | Facility: CLINIC | Age: 28
End: 2023-07-25
Payer: COMMERCIAL

## 2023-07-25 NOTE — TELEPHONE ENCOUNTER
Reason for Disposition    Intermittent pains shoot into chest, with sour taste in mouth    Protocols used: Abdominal Pain - Upper-A-OH

## 2023-07-25 NOTE — TELEPHONE ENCOUNTER
S: Heartburn    B: Patient sent in OANDA message regarding heartburn.  Patient states has had heartburn for many years. Has been on Omeprazole in the last and now is on Lansoprazole. Patient states she does not feel the lansoprazole is working as well lately.  Upper abdomen burning and teeth breaking down. Feels like her normal heartburn just isn't being managed.  Denies chest pain or shortness of breath. Denies irregular or extra heart beats.  Had an endoscopy one year ago, was negative. Patient is looking for a new medication.     A: Advised patient to be seen in next few weeks per protocol. RN reviewed red flag symptoms with patient and when to seek emergency care.     R: patient verbalized understanding and is agreeable. She already made an appointment with PCP next Friday.  Feels comfortable waiting until the appointment. Will call back if any changes.         Additional Information   Negative: Passed out (i.e., fainted, collapsed and was not responding)   Negative: Shock suspected (e.g., cold/pale/clammy skin, too weak to stand, low BP, rapid pulse)   Negative: Visible sweat on face or sweat is dripping down   Negative: Chest pain   Negative: SEVERE abdominal pain (e.g., excruciating)   Negative: Pain lasting > 10 minutes and over 50 years old   Negative: Pain lasting > 10 minutes and over 40 years old and associated chest, arm, neck, upper back, or jaw pain   Negative: Pain lasting > 10 minutes and over 35 years old and at least one cardiac risk factor (i.e., hypertension, diabetes, obesity, smoker or strong family history of heart disease)   Negative: Pain lasting > 10 minutes and history of heart disease (i.e., heart attack, bypass surgery, angina, angioplasty, CHF)   Negative: Recent injury to the abdomen   Negative: Vomiting red blood or black (coffee ground) material   Negative: Bloody, black, or tarry bowel movements  (Exception: Chronic-unchanged black-grey bowel movements and is taking iron pills or  Pepto-Bismol.)   Negative: Constant abdominal pain lasting > 2 hours   Negative: Vomiting bile (green color)   Negative: Patient sounds very sick or weak to the triager   Negative: Vomiting and abdomen looks much more swollen than usual   Negative: White of the eyes have turned yellow (i.e., jaundice)   Negative: Fever > 103 F (39.4 C)   Negative: Fever > 101 F (38.3 C) and over 60 years of age   Negative: Fever > 100.0 F (37.8 C) and has diabetes mellitus or a weak immune system (e.g., HIV positive, cancer chemotherapy, organ transplant, splenectomy, chronic steroids)   Negative: Fever > 100.0 F (37.8 C) and bedridden (e.g., nursing home patient, stroke, chronic illness, recovering from surgery)   Negative: Age > 60 years   Negative: Patient wants to be seen   Negative: MILD TO MODERATE pain that comes and goes (cramps) lasts > 24 hours   Negative: Unhealthy alcohol use, known or suspected   Negative: Abdominal pain is a chronic symptom (recurrent or ongoing AND lasting > 4 weeks)    Protocols used: Abdominal Pain - Upper-A-OH

## 2023-08-01 ENCOUNTER — HOSPITAL ENCOUNTER (EMERGENCY)
Facility: CLINIC | Age: 28
Discharge: PSYCHIATRIC OR SUBSTANCE USE FACILITY | End: 2023-08-02
Attending: FAMILY MEDICINE | Admitting: FAMILY MEDICINE
Payer: COMMERCIAL

## 2023-08-01 VITALS
RESPIRATION RATE: 18 BRPM | TEMPERATURE: 98 F | DIASTOLIC BLOOD PRESSURE: 102 MMHG | SYSTOLIC BLOOD PRESSURE: 137 MMHG | OXYGEN SATURATION: 99 % | HEART RATE: 98 BPM

## 2023-08-01 DIAGNOSIS — R45.851 SUICIDAL IDEATION: ICD-10-CM

## 2023-08-01 PROCEDURE — 90791 PSYCH DIAGNOSTIC EVALUATION: CPT

## 2023-08-01 PROCEDURE — 99285 EMERGENCY DEPT VISIT HI MDM: CPT | Mod: 25 | Performed by: FAMILY MEDICINE

## 2023-08-01 PROCEDURE — 99285 EMERGENCY DEPT VISIT HI MDM: CPT | Performed by: FAMILY MEDICINE

## 2023-08-01 ASSESSMENT — ACTIVITIES OF DAILY LIVING (ADL): ADLS_ACUITY_SCORE: 33

## 2023-08-02 ENCOUNTER — TELEPHONE (OUTPATIENT)
Dept: BEHAVIORAL HEALTH | Facility: CLINIC | Age: 28
End: 2023-08-02

## 2023-08-02 LAB
ALBUMIN SERPL BCG-MCNC: 4 G/DL (ref 3.5–5.2)
ALBUMIN UR-MCNC: NEGATIVE MG/DL
ALP SERPL-CCNC: 113 U/L (ref 35–104)
ALT SERPL W P-5'-P-CCNC: 32 U/L (ref 0–50)
AMPHETAMINES UR QL: NOT DETECTED
ANION GAP SERPL CALCULATED.3IONS-SCNC: 12 MMOL/L (ref 7–15)
APPEARANCE UR: CLEAR
AST SERPL W P-5'-P-CCNC: 18 U/L (ref 0–45)
BARBITURATES UR QL SCN: NOT DETECTED
BASOPHILS # BLD AUTO: 0.1 10E3/UL (ref 0–0.2)
BASOPHILS NFR BLD AUTO: 1 %
BENZODIAZ UR QL SCN: NOT DETECTED
BILIRUB SERPL-MCNC: 0.7 MG/DL
BILIRUB UR QL STRIP: NEGATIVE
BUN SERPL-MCNC: 10.7 MG/DL (ref 6–20)
BUPRENORPHINE UR QL: NOT DETECTED
CALCIUM SERPL-MCNC: 8.8 MG/DL (ref 8.6–10)
CANNABINOIDS UR QL: NOT DETECTED
CHLORIDE SERPL-SCNC: 105 MMOL/L (ref 98–107)
COCAINE UR QL SCN: NOT DETECTED
COLOR UR AUTO: YELLOW
CREAT SERPL-MCNC: 0.86 MG/DL (ref 0.51–0.95)
D-METHAMPHET UR QL: NOT DETECTED
DEPRECATED HCO3 PLAS-SCNC: 21 MMOL/L (ref 22–29)
EOSINOPHIL # BLD AUTO: 0.5 10E3/UL (ref 0–0.7)
EOSINOPHIL NFR BLD AUTO: 3 %
ERYTHROCYTE [DISTWIDTH] IN BLOOD BY AUTOMATED COUNT: 13.8 % (ref 10–15)
GFR SERPL CREATININE-BSD FRML MDRD: >90 ML/MIN/1.73M2
GLUCOSE SERPL-MCNC: 114 MG/DL (ref 70–99)
GLUCOSE UR STRIP-MCNC: NEGATIVE MG/DL
HCG UR QL: NEGATIVE
HCT VFR BLD AUTO: 42.1 % (ref 35–47)
HGB BLD-MCNC: 14.3 G/DL (ref 11.7–15.7)
HGB UR QL STRIP: NEGATIVE
HOLD SPECIMEN: NORMAL
IMM GRANULOCYTES # BLD: 0.2 10E3/UL
IMM GRANULOCYTES NFR BLD: 1 %
KETONES UR STRIP-MCNC: NEGATIVE MG/DL
LEUKOCYTE ESTERASE UR QL STRIP: ABNORMAL
LYMPHOCYTES # BLD AUTO: 3.7 10E3/UL (ref 0.8–5.3)
LYMPHOCYTES NFR BLD AUTO: 25 %
MCH RBC QN AUTO: 27.8 PG (ref 26.5–33)
MCHC RBC AUTO-ENTMCNC: 34 G/DL (ref 31.5–36.5)
MCV RBC AUTO: 82 FL (ref 78–100)
METHADONE UR QL SCN: NOT DETECTED
MONOCYTES # BLD AUTO: 1.2 10E3/UL (ref 0–1.3)
MONOCYTES NFR BLD AUTO: 8 %
MUCOUS THREADS #/AREA URNS LPF: PRESENT /LPF
NEUTROPHILS # BLD AUTO: 9 10E3/UL (ref 1.6–8.3)
NEUTROPHILS NFR BLD AUTO: 62 %
NITRATE UR QL: NEGATIVE
NRBC # BLD AUTO: 0 10E3/UL
NRBC BLD AUTO-RTO: 0 /100
OPIATES UR QL SCN: NOT DETECTED
OXYCODONE UR QL SCN: NOT DETECTED
PCP UR QL SCN: NOT DETECTED
PH UR STRIP: 6 [PH] (ref 5–7)
PLATELET # BLD AUTO: 426 10E3/UL (ref 150–450)
POTASSIUM SERPL-SCNC: 4 MMOL/L (ref 3.4–5.3)
PROPOXYPH UR QL: NOT DETECTED
PROT SERPL-MCNC: 7 G/DL (ref 6.4–8.3)
RBC # BLD AUTO: 5.15 10E6/UL (ref 3.8–5.2)
RBC URINE: 1 /HPF
SARS-COV-2 RNA RESP QL NAA+PROBE: NEGATIVE
SODIUM SERPL-SCNC: 138 MMOL/L (ref 136–145)
SP GR UR STRIP: 1.02 (ref 1–1.03)
SQUAMOUS EPITHELIAL: 1 /HPF
TRICYCLICS UR QL SCN: NOT DETECTED
TSH SERPL DL<=0.005 MIU/L-ACNC: 2.2 UIU/ML (ref 0.3–4.2)
UROBILINOGEN UR STRIP-MCNC: NORMAL MG/DL
WBC # BLD AUTO: 14.6 10E3/UL (ref 4–11)
WBC URINE: 1 /HPF

## 2023-08-02 PROCEDURE — 87635 SARS-COV-2 COVID-19 AMP PRB: CPT | Performed by: FAMILY MEDICINE

## 2023-08-02 PROCEDURE — 81001 URINALYSIS AUTO W/SCOPE: CPT | Performed by: FAMILY MEDICINE

## 2023-08-02 PROCEDURE — 84443 ASSAY THYROID STIM HORMONE: CPT | Performed by: FAMILY MEDICINE

## 2023-08-02 PROCEDURE — 81025 URINE PREGNANCY TEST: CPT | Performed by: FAMILY MEDICINE

## 2023-08-02 PROCEDURE — 80053 COMPREHEN METABOLIC PANEL: CPT | Performed by: FAMILY MEDICINE

## 2023-08-02 PROCEDURE — 36415 COLL VENOUS BLD VENIPUNCTURE: CPT | Performed by: FAMILY MEDICINE

## 2023-08-02 PROCEDURE — 85004 AUTOMATED DIFF WBC COUNT: CPT | Performed by: FAMILY MEDICINE

## 2023-08-02 PROCEDURE — 80306 DRUG TEST PRSMV INSTRMNT: CPT | Performed by: FAMILY MEDICINE

## 2023-08-02 ASSESSMENT — ACTIVITIES OF DAILY LIVING (ADL)
ADLS_ACUITY_SCORE: 35

## 2023-08-02 NOTE — TELEPHONE ENCOUNTER
R:  Intake received a call from [facility] @4:34am Intake as they want to interview pt in ED. Intake transferred to RN in ED for the pt @ 4:35am.   Awaiting review determination.    Mary Ann Krzysztof called again @ 5:08a to be connected to ED to talk to pt again.  Intake transferred call.    RN Called from [facility] ER  @ 5:25am reporting Krzysztof called them accepting the pt.       Intake called Krzysztof @ 5:26am for accepting information - Health    Number to call for report [phone]  Accepting Dr. Is Dr Dorothy Tim    Intake called RN (Dafne) @ [facility] and gave accepting information and number to call for nurse to nurse report.    Pt added to admit board.
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
## 2023-08-02 NOTE — ED NOTES
Report to Alberto LEHMAN pt will go to [facility] in [place] to [facility] unit 4th floor. Pt is calm, cooperative, stable. Waiting for ambulance transport.

## 2023-08-02 NOTE — TELEPHONE ENCOUNTER
[facility] Access Inpatient Bed Call Log 8/2/2023 2:41 AM      Intake has called facilities that have not updated their bed status within the last 12 hours.     Adults:       [facility] is posting 0 beds.    [facility] is posting 0 beds. [phone]    [facility] is posting 0 beds. [phone]   [facility] is posting 0 beds. [phone] - 2:50am Per Emmanuel, they are capped.  [facility] is posting 0 beds. [phone]   [facility] is posting 0 bed. [phone]    [facility] is posting 0 beds. [phone]   [facility] is posting 0 beds. [phone]   [facility] is posting 0 beds. [phone]    [facility] is posting 0 beds. [phone]     [facility] is posting 2 bed. Voluntary only- no holds/commitments, No Hx of aggression, violence, or assault. No sexual offenders. No 72 hr holds. [phone]   [facility] is posting 0 beds. No aggression.  [phone]   [facility] is posting 0 beds. [phone]    [facility] is posting 1 beds. [phone]    [facility] is posting 1 bed. [phone]    [facility] is posting 2 beds. Low acuity. [phone]   [facility] is posting 1 beds. 72 hr hold preferred. [phone]   [facility] is posting 3 bed.  [phone]       Pt remains on work list pending appropriate bed availability.     3:40am Intake called [facility]. This pt is under review.

## 2023-08-02 NOTE — CONSULTS
"Diagnostic Evaluation Consultation  Crisis Assessment    Patient Name: Lidia Gallegos  Age:  28 year old  Legal Sex: female  Gender Identity: female  Pronouns:   Race: White  Ethnicity: Not  or   Language: English      Patient was assessed: Virtual: YouTab Telemedicine Start Time: 2325 Telemedicine Stop Time: 2359  Patient location: [facility] EMERGENCY DEPT                             ED04    Referral Data and Chief Complaint  Lidia Gallegos presents to the ED with family/friends. Patient is presenting to the ED for the following concerns: Suicidal ideation, Depression, Suicide attempt.   Factors that make the mental health crisis life threatening or complex are:   .      Informed Consent and Assessment Methods  Explained the crisis assessment process, including applicable information disclosures and limits to confidentiality, assessed understanding of the process, and obtained consent to proceed with the assessment.  Assessment methods included conducting a formal interview with patient, review of medical records, collaboration with medical staff, and obtaining relevant collateral information from family and community providers when available.  : done     Patient response to interventions: acceptance expressed, verbalizes understanding  Coping skills were attempted to reduce the crisis:  None     History of the Crisis   Pt reports that she has been feeling suicidal for the past day and a half. Pt reports she has not been eating, sleeping, or caring for herself normally. Pt reports she called into work sick yesterday, \"to stay home and think\". Pt reports she spent all day, Monday, thinking about killing herself, \"I just couldn't stop thinking about it. I couldn't stop thinking about killing myself.\" Pt reports last night she had a panic attack and went to her garage to attempt to kill herself via carbon monoxide from her car. Pt reports her  found her and interrupted the " "attempt. Pt reports maintained suicide ideation with plans to kill herself via carbon monoxide or jumping out of a window. Pt reports that she has access to these items, and denies access to a gun. Pt reports she is not sure about her intent, \"I just feel really numb. I have had so much trauma that I just don't care anymore.\" Pt was observed to be laughing inappropriately in assessment when talking about her history of suicide ideation and curernt interrupted attmept. Pt reports she quit her job today, \"I did that for my mental health, that place is toxic\".    Brief Psychosocial History  Family:  , Children yes (pt reports she has 3 stepchildren)  Support System:   (Pt reports her parents are not supportive to her, due to her interracial marriage)  Employment Status:  student, employed part-time  Source of Income:  salary/wages  Financial Environmental Concerns:  No concerns identified  Current Hobbies:  television/movies/videos  Barriers in Personal Life:  lack of time, mental health concerns    Significant Clinical History  Current Anxiety Symptoms:     Current Depression/Trauma:  thoughts of death/suicide, negativistic, hoplessness, helplessness, sense of doom, sadness  Current Somatic Symptoms:     Current Psychosis/Thought Disturbance:     Current Eating Symptoms:  loss of appetite  Chemical Use History:  Alcohol: None  Benzodiazepines: None  Opiates: None  Cocaine: None  Marijuana: None  Other Use: None   Past diagnosis:  Autism, Anxiety Disorder, Depression, PTSD, Other (Pt reports history of dissociative identify disorder.)  Family history:     Past treatment:  Individual therapy, Psychiatric Medication Management  Details of most recent treatment:  Pt reports 2 previous ED visits, last in June 2023. Pt reports she is currently in therapy weekly.  Other relevant history:  Pt reports she stopped her medications one month ago due to side effects.       Collateral Information  Is there collateral " information: No     Collateral information name, relationship, phone number:       What happened today:       What is different about patient's functioning:       Concern about alcohol/drug use:      What do you think the patient needs:      Has patient made comments about wanting to kill themselves/others:      If d/c is recommended, can they take part in safety/aftercare planning:       Additional collateral information:        Risk Assessment  [place] Suicide Severity Rating Scale Full Clinical Version:  Suicidal Ideation  Q1 Wish to be Dead (Lifetime): Yes  Q2 Non-Specific Active Suicidal Thoughts (Lifetime): Yes  3. Active Suicidal Ideation with any Methods (Not Plan) Without Intent to Act (Lifetime): Yes  Q4 Active Suicidal Ideation with Some Intent to Act, Without Specific Plan (Lifetime): Yes  Q5 Active Suicidal Ideation with Specific Plan and Intent (Lifetime): Yes  Q6 Suicide Behavior (Lifetime): yes     Suicidal Behavior (Lifetime)  Actual Attempt (Lifetime): No  Has subject engaged in non-suicidal self-injurious behavior? (Lifetime): No  Interrupted Attempts (Lifetime): Yes  Total Number of Interrupted Attempts (Lifetime): 1  Aborted or Self-Interrupted Attempt (Lifetime): No  Preparatory Acts or Behavior (Lifetime): Yes  Total Number of Preparatory Acts (Lifetime): 1    [place] Suicide Severity Rating Scale Recent:   Suicidal Ideation (Recent)  Q1 Wished to be Dead (Past Month): yes  Q2 Suicidal Thoughts (Past Month): yes  Q3 Suicidal Thought Method: yes  Q4 Suicidal Intent without Specific Plan: yes  Q5 Suicide Intent with Specific Plan: yes  Within the Past 3 Months?: yes  Level of Risk per Screen: high risk  Intensity of Ideation (Recent)  Most Severe Ideation Rating (Past 1 Month): 5  Frequency (Past 1 Month): Many times each day  Duration (Past 1 Month): More than 8 hours/persistent or continuous  Controllability (Past 1 Month): Unable to control thoughts  Deterrents (Past 1 Month): Deterrents  definitely did not stop you  Reasons for Ideation (Past 1 Month): Completely to end or stop the pain (You couldn't go on living with the pain or how you were feeling)  Suicidal Behavior (Recent)  Actual Attempt (Past 3 Months): No  Has subject engaged in non-suicidal self-injurious behavior? (Past 3 Months): No  Interrupted Attempts (Past 3 Months): Yes  Total Number of Interrupted Attempts (Past 3 Months): 1  Aborted or Self-Interrupted Attempt (Past 3 Months): No  Preparatory Acts or Behavior (Past 3 Months): Yes  Total Number of Preparatory Acts (Past 3 Months): 1    Environmental or Psychosocial Events: helplessness/hopelessness, impulsivity/recklessness, work or task failure  Protective Factors: Protective Factors: responsibilities and duties to others, including pets and children, intact marriage or domestic partnership, lives in a responsibly safe and stable environment, help seeking    Does the patient have thoughts of harming others? Feels Like Hurting Others: no  Previous Attempt to Hurt Others: no  Is the patient engaging in sexually inappropriate behavior?: no    Is the patient engaging in sexually inappropriate behavior?  no        Mental Status Exam   Affect: Labile  Appearance: Appropriate  Attention Span/Concentration: Inattentive  Eye Contact: Variable    Fund of Knowledge: Appropriate   Language /Speech Content: Fluent  Language /Speech Volume: Normal  Language /Speech Rate/Productions: Normal  Recent Memory: Intact  Remote Memory: Intact  Mood: Depressed  Orientation to Person: Yes   Orientation to Place: Yes  Orientation to Time of Day: Yes  Orientation to Date: Yes     Situation (Do they understand why they are here?): Yes  Psychomotor Behavior: Normal  Thought Content: Suicidal  Thought Form: Intact     Mini-Cog Assessment  Number of Words Recalled:    Clock-Drawing Test:     Three Item Recall:    Mini-Cog Total Score:       Medication  Psychotropic medications:   Medication Orders -  Psychiatric (From admission, onward)      None             Current Care Team  Patient Care Team:  Deion Price MD as PCP - Pankaj Godoy DO as Assigned Surgical Provider  Deion Price MD as Assigned PCP  Hanane Lux PsyD as Assigned Behavioral Health Provider    Diagnosis  Patient Active Problem List   Diagnosis Code    Chronic bilateral low back pain without sciatica M54.50, G89.29    Anxiety with depression F41.8    Asperger's syndrome F84.5    Prediabetes R73.03    Major depressive disorder, recurrent episode, moderate (H) F33.1    Autism F84.0       Primary Problem This Admission  Active Hospital Problems    *Major depressive disorder, recurrent episode, moderate (H)      Autism        Clinical Summary and Substantiation of Recommendations   It is the recommendation of this clinician that pt admit to IP MH for safety and stabilization. Pt displays the following risk factors that support IP admission: Pt presents with suicide ideation with access and plans to kill herself by carbon monoxide or jumping out of a window. Pt reports her  interrupted her attempt last night, and her suicide ideation has not decreased. Pt reports she went to her garage last night and tried to start to her car to die by carbon monoxide and her  interrupted. Pt reports she feels numb and wants to die. Pt was inconfruent in assessment, and was observed to be laughing during her explaination of her attempt last night. Pt is unable to engage in safety planning to mitigate risk level in a non-secure setting. Lower levels of care is not sufficient. Due to this IP is the least restrictive option of care for pt. Pt should remain in IP until deemed safe to return to the community and engage in OP  supports.       Imminent risk of harm: Suicidal Behavior  Severe psychiatric, behavioral or other comorbid conditions are appropriate for management at inpatient mental health as indicated  by at least one of the following: Psychiatric Symptoms, Impaired impulse control, judgement, or insight  Severe dysfunction in daily living is present as indicated by at least one of the following: Other evidence of severe dysfunction  Situation and expectations are appropriate for inpatient care: Biopsychosocial stresses potentially contributing to clinical presentation (co morbidities) have been assessed and are absent or manageable at proposed level of care  Inpatient mental health services are necessary to meet patient needs and at least one of the following: Specific condition related to admission diagnosis is present and judged likely to further improve at proposed level of care      Patient coping skills attempted to reduce the crisis:  None    Disposition  Recommended disposition: Inpatient Mental Health        Reviewed case and recommendations with attending provider. Attending Name: Russell Baxter       Attending concurs with disposition: yes       Patient and/or validated legal guardian concurs with disposition:   yes       Final disposition:  inpatient mental health    Legal status on admission: Voluntary/Patient has signed consent for treatment    Assessment Details   Total duration spent on the patient case in minutes: 34 min     CPT code(s) utilized: 83264 - Psychotherapy for Crisis - 60 (30-74*) min    RIKI Mario, ROBERTAC, Psychotherapist  [facility] - Triage & Transition Services  Callback: [phone]

## 2023-08-02 NOTE — PLAN OF CARE
Lidia CARLEE El  August 2, 2023  Plan of Care Hand-off Note     Patient Care Path: inpatient mental health    Plan for Care:   It is the recommendation of this clinician that pt admit to IP MH for safety and stabilization. Pt displays the following risk factors that support IP admission: Pt presents with suicide ideation with access and plans to kill herself by carbon monoxide or jumping out of a window. Pt reports her  interrupted her attempt last night, and her suicide ideation has not decreased. Pt reports she went to her garage last night and tried to start to her car to die by carbon monoxide and her  interrupted. Pt reports she feels numb and wants to die. Pt was inconfruent in assessment, and was observed to be laughing during her explaination of her attempt last night. Pt is unable to engage in safety planning to mitigate risk level in a non-secure setting. Lower levels of care is not sufficient. Due to this IP is the least restrictive option of care for pt. Pt should remain in IP until deemed safe to return to the community and engage in OP  supports.    Identified Goals and Safety Issues: pt had an interrupted attempt via her car and carbon monoxide in her garage yesterday. Pt maintains suicide ideation with plans to kill herself. Pt displays apathy and has not been on medications for the past month.    Overview:  Robles Arzola (Spouse) [phone]            Legal Status: Legal Status at Admission: Voluntary/Patient has signed consent for treatment    Psychiatry Consult: yes       Updated   regarding plan of care.           Birdie Perera, JULIAC, LADC

## 2023-08-02 NOTE — ED TRIAGE NOTES
Pt states yesterday was feeling depressed - took off work yesterday has been feeling stressed and not wanting to go into work, has been 'toxic environment'. Last night sat in the car in the garage with door closed, pt wanted to turn the car on and 'tried to kill herself' but SO came out before pt started the car. Today continues feeling suicidal and having thoughts like 'jumping out of a window' and 'running the car in the garage'. Pt has taken depression meds in the past but does not anymore 'because it does not work'.      Triage Assessment       Row Name 08/01/23 4988       Triage Assessment (Adult)    Airway WDL WDL       Respiratory WDL    Respiratory WDL WDL       Cardiac WDL    Cardiac WDL WDL

## 2023-08-02 NOTE — TELEPHONE ENCOUNTER
S: [facility] ED , DEC  Birdie  calling at 1:01am about a 28 year old/Female presenting with SI.     B: Pt arrived via Family. Presenting problem, stressors: Pt had an interrupted suicide attempt on Monday night via carbon monoxide poisoning.  dropped her off today due to continued SI. Stressors: Work and stopped her medications a few months ago.    Pt affect in ED: Flat  Pt Dx: Major Depressive Disorder, PTSD, and Autism Spectrum Disorder  Previous IPMH hx? No  Pt endorses SI with a plan to leander monoxide or to jump out of a window    Hx of suicide attempt? No  Pt denies SIB  Pt denies HI   Pt denies hallucinations .   Pt RARS Score: 4    Hx of aggression/violence, sexual offenses, legal concerns, Epic care plan? describe: No  Current concerns for aggression this visit? No  Does pt have a history of Civil Commitment? No  Is Pt their own guardian? Yes    Pt is prescribed medication. Is patient medication compliant? Pt recently decided to stop medications on their own  Pt endorses OP services: Therapist  CD concerns: None  Acute or chronic medical concerns: No  Does Pt present with specific needs, assistive devices, or exclusionary criteria? None      Pt is ambulatory  Pt is able to perform ADLs independently      A: Pt to be reviewed for [facility] admission. Pt is Voluntary  Preferred placement: [facility]- Pt is willing to be placed within an hour of [facility] ed.    COVID Symptoms: No  If yes, COVID test required   Utox: Negative   CMP: WNL  CBC: WNL  HCG: Negative    R: Patient cleared and ready for behavioral bed placement: Yes  Pt placed on IP worklist? Yes

## 2023-08-03 ENCOUNTER — TRANSFERRED RECORDS (OUTPATIENT)
Dept: HEALTH INFORMATION MANAGEMENT | Facility: CLINIC | Age: 28
End: 2023-08-03

## 2023-08-22 ENCOUNTER — TELEPHONE (OUTPATIENT)
Dept: ALLERGY | Facility: OTHER | Age: 28
End: 2023-08-22

## 2023-08-22 ENCOUNTER — TELEPHONE (OUTPATIENT)
Dept: FAMILY MEDICINE | Facility: CLINIC | Age: 28
End: 2023-08-22

## 2023-08-22 DIAGNOSIS — L24.89 IRRITANT CONTACT DERMATITIS DUE TO OTHER AGENTS: Primary | ICD-10-CM

## 2023-08-22 NOTE — TELEPHONE ENCOUNTER
RN called and spoke to patient and discussed that a dermatology referral is what is appropriate for this diagnosis, not allergy. Advised that a message was sent by Dr. Forbes to her PCP as well but it wouldn't hurt for her to reach out request a referral also. Patient is aware that her appointment will be cancelled as we are unable to evaluate for contact dermatitis. Patient verbalized understanding.    Deanne GHOSH, Specialty RN 8/22/2023 9:56 AM

## 2023-08-22 NOTE — TELEPHONE ENCOUNTER
----- Message from Eder Forbes MD sent at 8/22/2023  9:28 AM CDT -----  Allergy team,  Please explain to patient that she was referred for contact dermatitis.  It is a dermatology evaluation, rather than Allergy.  We are not going to be able to perform an evaluation for that.    Sincerely,   Eder Forbes

## 2023-08-22 NOTE — TELEPHONE ENCOUNTER
Contacted by allergy team that dermatology referral is needed to have her tested for contact dermatitis from shaving cream at work.      Will have staff notify patient that referral was placed for derm.    Deion Price MD

## 2023-08-23 NOTE — TELEPHONE ENCOUNTER
Patient notified and given number to schedule this if she does not receive a call within 3-5 business days

## 2023-08-25 ENCOUNTER — NURSE TRIAGE (OUTPATIENT)
Dept: FAMILY MEDICINE | Facility: CLINIC | Age: 28
End: 2023-08-25

## 2023-08-25 ENCOUNTER — MYC MEDICAL ADVICE (OUTPATIENT)
Dept: FAMILY MEDICINE | Facility: CLINIC | Age: 28
End: 2023-08-25

## 2023-08-25 NOTE — TELEPHONE ENCOUNTER
Provider: NIKIA for appointment on Tuesday. Do you have any other recommendations prior to appointment? Thank you!     S: Mouth Symptom's.     B: Patient sent in below [employer] message. Patient states she has had bad teeth for 10 years.  Denies abscess pain.  Does note small area of numbness on the tip of her tongue, has been present that she is aware of for the last 2 days but could have been longer.  Denies any other numbness. Denies sore throat. Denies swallowing difficulties. Denies difficulty breathing. Wanting a letter from provider to state medical necessity for dentures.  Has appointment with provider on Tuesday.      A: Advised patient that provider could address at appointment on Tuesday. RN reviewed red flag symptoms with patient and when to seek emergency care.     R: Patient verbalizes understanding, feels it can go until then as well.  She will call back if any symptom's worsen or change. Would like this sent to provider as FYI for Tuesday.           Lidia Gallegos   to [employer] (supporting Deion Price MD)   AB      8/25/23 11:22 AM  I have had a lot of gum pain and just high sensitivity but nothing like abscess pain.  I have been evaluated by a dentist for this though but I think they just don't want me to do it.  I've already been told my teeth were degrading back in 2014 saying in about 10 years my teeth will get worse but I've been brushing them and flossing.  I also have been having numbness in my tongue though which seems weird.    Lidia Gallegos   to [employer] (supporting Deion Price MD)   AB      8/25/23 10:30 AM  Hey Dr. Price, I was wondering if you could write me a letter stating it is medically necessary for me to get dentures.  My teeth are badly falling apart and I don't think I can afford all the crowns nor would I want to get them to just continue having to do it for the rest of my life.  I just can't seem to get my dentist to  understand that they want the dentures because I'm starting to eat some of my teeth.  I already have 6 portions of teeth that have fallen out while I was eating and gave swallowed two of those.  Is there a way you can just give me a letter stating it is advised?  If need be I can send pictures and show you at our visit next week.  Reason for Disposition   Patient wants to be seen    Additional Information   Negative: Weak immune system (e.g., HIV positive, cancer chemo, splenectomy, organ transplant, chronic steroids)   Negative: White patches that stick to tongue or inner cheek, which can be wiped off   Negative: Dry mouth and new-onset and unexplained  (Exceptions: Chronic symptom or dry mouth from mild dehydration.)   Negative: Gum bleeding and taking Coumadin (warfarin) or other strong blood thinner, or known bleeding disorder (e.g., thrombocytopenia)   Negative: Dry mouth and urinating more frequently than usual (i.e., frequency)   Negative: Dry mouth and drinking more liquids than usual (thirsty) and present more than 1 day (24 hours)   Negative: Receiving chemotherapy or radiation therapy   Negative: Difficulty breathing and not severe   Negative: Patient sounds very sick or weak to the triager   Negative: Drooling or spitting out saliva (because can't swallow) and new-onset   Negative: Bleeding from mouth and won't stop after 10 minutes of direct pressure   Negative: Electrical burn of mouth   Negative: Chemical burn of mouth   Negative: Injury to tooth or teeth   Negative: Injury to mouth   Negative: Cold sore suspected (i.e., fever blister sore) on the outer lip   Negative: Tooth is painful or swelling around a tooth   Negative: Throat is painful   Negative: SEVERE difficulty breathing (e.g., struggling for each breath, speaks in single words, stridor)   Negative: Sounds like a life-threatening emergency to the triager    Protocols used: Mouth Symptoms-A-OH

## 2023-08-28 ASSESSMENT — PATIENT HEALTH QUESTIONNAIRE - PHQ9
SUM OF ALL RESPONSES TO PHQ QUESTIONS 1-9: 1
10. IF YOU CHECKED OFF ANY PROBLEMS, HOW DIFFICULT HAVE THESE PROBLEMS MADE IT FOR YOU TO DO YOUR WORK, TAKE CARE OF THINGS AT HOME, OR GET ALONG WITH OTHER PEOPLE: NOT DIFFICULT AT ALL
SUM OF ALL RESPONSES TO PHQ QUESTIONS 1-9: 1

## 2023-08-28 ASSESSMENT — ANXIETY QUESTIONNAIRES
5. BEING SO RESTLESS THAT IT IS HARD TO SIT STILL: SEVERAL DAYS
1. FEELING NERVOUS, ANXIOUS, OR ON EDGE: SEVERAL DAYS
6. BECOMING EASILY ANNOYED OR IRRITABLE: NOT AT ALL
7. FEELING AFRAID AS IF SOMETHING AWFUL MIGHT HAPPEN: NOT AT ALL
2. NOT BEING ABLE TO STOP OR CONTROL WORRYING: NOT AT ALL
3. WORRYING TOO MUCH ABOUT DIFFERENT THINGS: NOT AT ALL
GAD7 TOTAL SCORE: 3
IF YOU CHECKED OFF ANY PROBLEMS ON THIS QUESTIONNAIRE, HOW DIFFICULT HAVE THESE PROBLEMS MADE IT FOR YOU TO DO YOUR WORK, TAKE CARE OF THINGS AT HOME, OR GET ALONG WITH OTHER PEOPLE: NOT DIFFICULT AT ALL
GAD7 TOTAL SCORE: 3
4. TROUBLE RELAXING: SEVERAL DAYS

## 2023-08-29 ENCOUNTER — OFFICE VISIT (OUTPATIENT)
Dept: FAMILY MEDICINE | Facility: CLINIC | Age: 28
End: 2023-08-29
Payer: COMMERCIAL

## 2023-08-29 VITALS
BODY MASS INDEX: 38.08 KG/M2 | TEMPERATURE: 97.1 F | SYSTOLIC BLOOD PRESSURE: 120 MMHG | WEIGHT: 272 LBS | OXYGEN SATURATION: 100 % | HEIGHT: 71 IN | DIASTOLIC BLOOD PRESSURE: 88 MMHG | HEART RATE: 86 BPM | RESPIRATION RATE: 16 BRPM

## 2023-08-29 DIAGNOSIS — F41.8 ANXIETY WITH DEPRESSION: Primary | ICD-10-CM

## 2023-08-29 DIAGNOSIS — F33.1 MAJOR DEPRESSIVE DISORDER, RECURRENT EPISODE, MODERATE (H): ICD-10-CM

## 2023-08-29 PROBLEM — F84.5 ASPERGER'S SYNDROME: Status: RESOLVED | Noted: 2017-11-15 | Resolved: 2023-08-29

## 2023-08-29 PROCEDURE — 99213 OFFICE O/P EST LOW 20 MIN: CPT | Performed by: FAMILY MEDICINE

## 2023-08-29 RX ORDER — VENLAFAXINE HYDROCHLORIDE 75 MG/1
75 CAPSULE, EXTENDED RELEASE ORAL DAILY
COMMUNITY
End: 2023-12-01

## 2023-08-29 ASSESSMENT — PAIN SCALES - GENERAL: PAINLEVEL: NO PAIN (0)

## 2023-08-29 NOTE — PROGRESS NOTES
Assessment & Plan     ASSESSMENT/ORDERS:    ICD-10-CM    1. Anxiety with depression  F41.8       2. Major depressive disorder, recurrent episode, moderate (H)  F33.1         PLAN:  1.  Follow-up with [facility] in 3 days as planned.      Follow-up Visit   Expected date:  Nov 29, 2023 (Approximate)      Follow Up Appointment Details:     Follow-up with whom?: Me    Follow-Up for what?: Adult Preventive    How?: In Person                             MED REC REQUIRED  Post Medication Reconciliation Status: discharge medications reconciled and changed, per note/orders      Deion Price MD  [facility] PATRICIA Murillo is a 28 year old, presenting for the following health issues:  Hospital F/U        8/29/2023     9:11 AM   Additional Questions   Roomed by Hazel RAMOS       Hospital Follow-up Visit:    Hospital/Nursing Home/ Rehab Facility:  [facility]  Date of Admission: 08/2/2023  Date of Discharge: 08/4/2023?   Reason(s) for Admission: Sudicidal ideation    Was your hospitalization related to COVID-19? No   Problems taking medications regularly:  None  Medication changes since discharge: Started on venlafaxine 75MG   Problems adhering to non-medication therapy:  None    Summary of hospitalization:  CareEverywhere information obtained and reviewed  Diagnostic Tests/Treatments reviewed.  Follow up needed: with psychiatry  Other Healthcare Providers Involved in Patient s Care:         Specialist appointment - psychiatry   Update since discharge: improved.         Plan of care communicated with patient               Attempted suicide via carbon monoxide poisoning in garage.  She stopped car about 5 min after starting it, opened garage door and sat outside until  found her and they decided to get help in ER.  Then admitted for short stay in hospital.      Started on venlafaxine in hospital.  Has been on buspirone and Wellbutrin through [facility], but  "stopped due to blackouts.  A few weeks after stopping them.  Had been off medication about a month prior to hospitalization.      Still seeing therapist.  Working on coping mechanisms.  Is listing things she likes about herself.  Learning to question the negative thoughts.      Has follow-up planned with [facility].  Next appointment is 3 days from now.    Review of Systems         Objective    /88   Pulse 86   Temp 97.1  F (36.2  C) (Temporal)   Resp 16   Ht 1.803 m (5' 11\")   Wt 123.4 kg (272 lb)   LMP 08/05/2023 (Approximate)   SpO2 100%   BMI 37.94 kg/m    Body mass index is 37.94 kg/m .  Physical Exam  Constitutional:       Appearance: Normal appearance. She is well-developed.   Cardiovascular:      Rate and Rhythm: Normal rate and regular rhythm.      Heart sounds: Normal heart sounds, S1 normal and S2 normal. No murmur heard.  Pulmonary:      Effort: Pulmonary effort is normal. No respiratory distress.      Breath sounds: Normal breath sounds. No wheezing, rhonchi or rales.   Neurological:      Mental Status: She is alert.                            Answers submitted by the patient for this visit:  Patient Health Questionnaire (Submitted on 8/28/2023)  If you checked off any problems, how difficult have these problems made it for you to do your work, take care of things at home, or get along with other people?: Not difficult at all  PHQ9 TOTAL SCORE: 1  JIGAR-7 (Submitted on 8/28/2023)  JIGAR 7 TOTAL SCORE: 3    "

## 2023-09-12 ENCOUNTER — MYC MEDICAL ADVICE (OUTPATIENT)
Dept: FAMILY MEDICINE | Facility: CLINIC | Age: 28
End: 2023-09-12

## 2023-09-12 ENCOUNTER — NURSE TRIAGE (OUTPATIENT)
Dept: FAMILY MEDICINE | Facility: CLINIC | Age: 28
End: 2023-09-12

## 2023-09-12 NOTE — TELEPHONE ENCOUNTER
"  S: Tooth pain/Gum pain    B: Patient is calling in with complaints of gum pain/tooth pain. Patient reports she has very bad dentition. She said one of her teeth chipped and part broke off today. Having bad gum pain. She does report that she \"dug a fork\" into her gums to help the pain and now it is bleeding. Part of the tooth came out when she was \"digging\".  Patient denies any pus or white drainage but it is bleeding.      A: Advised for patient to call the dentist now.     RN did huddle with Dr. Price, he advised that patient also call the dentist now or she can come into the ED if need be.      R: Patient verbalized understanding and is agreeable. She will call around to more dentists to see if she can get in right away.         Reason for Disposition   Chipped tooth (piece missing)   Can see a crack in the tooth    Additional Information   Negative: Knocked out (unconscious) > 1 minute   Negative: Difficult to awaken or acting confused (e.g., disoriented, slurred speech)   Negative: SEVERE neck pain (e.g., excruciating)   Negative: Sounds like a life-threatening emergency to the triager   Negative: Head injury is main concern   Negative: Neck injury is main concern   Negative: Wound looks infected   Negative: Tooth knocked out   Negative: Tooth is almost falling out   Negative: Bleeding won't stop after 10 minutes of direct pressure (using correct technique)   Negative: Sounds like a serious injury to the triager   Negative: SEVERE pain (e.g., excruciating)    Protocols used: Tooth Injury-A-[place]    "

## 2023-09-17 ENCOUNTER — HOSPITAL ENCOUNTER (EMERGENCY)
Facility: CLINIC | Age: 28
Discharge: HOME | End: 2023-09-17
Attending: STUDENT IN AN ORGANIZED HEALTH CARE EDUCATION/TRAINING PROGRAM | Admitting: STUDENT IN AN ORGANIZED HEALTH CARE EDUCATION/TRAINING PROGRAM
Payer: COMMERCIAL

## 2023-09-17 VITALS
HEART RATE: 95 BPM | DIASTOLIC BLOOD PRESSURE: 106 MMHG | SYSTOLIC BLOOD PRESSURE: 136 MMHG | TEMPERATURE: 98.4 F | OXYGEN SATURATION: 100 % | RESPIRATION RATE: 20 BRPM

## 2023-09-17 DIAGNOSIS — K08.89 PAIN, DENTAL: ICD-10-CM

## 2023-09-17 PROCEDURE — 250N000013 HC RX MED GY IP 250 OP 250 PS 637: Performed by: STUDENT IN AN ORGANIZED HEALTH CARE EDUCATION/TRAINING PROGRAM

## 2023-09-17 PROCEDURE — 99284 EMERGENCY DEPT VISIT MOD MDM: CPT | Performed by: STUDENT IN AN ORGANIZED HEALTH CARE EDUCATION/TRAINING PROGRAM

## 2023-09-17 RX ORDER — OXYCODONE HYDROCHLORIDE 5 MG/1
5 TABLET ORAL ONCE
Status: COMPLETED | OUTPATIENT
Start: 2023-09-17 | End: 2023-09-17

## 2023-09-17 RX ORDER — OXYCODONE HYDROCHLORIDE 5 MG/1
5 TABLET ORAL EVERY 6 HOURS PRN
Qty: 6 TABLET | Refills: 0 | Status: SHIPPED | OUTPATIENT
Start: 2023-09-17 | End: 2023-12-01

## 2023-09-17 RX ADMIN — AMOXICILLIN AND CLAVULANATE POTASSIUM 1 TABLET: 875; 125 TABLET, COATED ORAL at 15:34

## 2023-09-17 RX ADMIN — OXYCODONE HYDROCHLORIDE 5 MG: 5 TABLET ORAL at 15:34

## 2023-09-17 ASSESSMENT — ACTIVITIES OF DAILY LIVING (ADL): ADLS_ACUITY_SCORE: 35

## 2023-09-17 NOTE — ED TRIAGE NOTES
Pt comes in with complaints of L lower tooth pain for the last few days.      Triage Assessment       Row Name 09/17/23 6356       Triage Assessment (Adult)    Airway WDL WDL       Respiratory WDL    Respiratory WDL WDL       Skin Circulation/Temperature WDL    Skin Circulation/Temperature WDL WDL       Cardiac WDL    Cardiac WDL WDL       Peripheral/Neurovascular WDL    Peripheral Neurovascular WDL WDL       Cognitive/Neuro/Behavioral WDL    Cognitive/Neuro/Behavioral WDL WDL

## 2023-09-17 NOTE — Clinical Note
Lidia Gallegos was seen and treated in our emergency department on 9/17/2023.  She may return to work on 09/19/2023.       If you have any questions or concerns, please don't hesitate to call.      Royal Boston MD

## 2023-09-17 NOTE — ED PROVIDER NOTES
History     Chief Complaint   Patient presents with    Dental Pain     HPI  Lidia Gallegos is a 28 year old female who presenting with left lower tooth pain.  She notes that she has a dentist appointment this week already and cannot take the pain is able to manage the pain.  She explains that she has some redness started to spread around the tooth of discomfort as well as intermittent bleeding.  She is concerned that there may be an infection due to the pain worsening.  She does not smoke or drink.  He is otherwise pleasant without acute signs of febrile illness.    Allergies:  No Known Allergies    Problem List:    Patient Active Problem List    Diagnosis Date Noted    Major depressive disorder, recurrent episode, moderate (H) 2023     Priority: Medium    Autism spectrum disorder 2023     Priority: Medium    Prediabetes 2023     Priority: Medium    Anxiety with depression 11/15/2017     Priority: Medium    Chronic bilateral low back pain without sciatica 2016     Priority: Medium        Past Medical History:    Past Medical History:   Diagnosis Date    Anxiety     Autism     Depression     History of anemia        Past Surgical History:    Past Surgical History:   Procedure Laterality Date    INNER EAR SURGERY Right     age 2    WISDOM TOOTH EXTRACTION         Family History:    Family History   Problem Relation Age of Onset    No Known Problems Mother     Hypertension Father     Mental Illness Sister         bipolar    Attention Deficit Disorder Sister     Birth defects Brother         bladder - reportedly related to nuchal cord    Cancer Maternal Grandfather     Diabetes Paternal Grandmother     Diabetes Paternal Grandfather     Kidney failure Paternal Grandfather        Social History:  Marital Status:   [2]  Social History     Tobacco Use    Smoking status: Former     Types: Cigarettes     Quit date: 2022     Years since quittin.7    Smokeless tobacco: Never   Vaping  Use    Vaping Use: Never used   Substance Use Topics    Alcohol use: Yes     Comment: occ    Drug use: Never        Medications:    amoxicillin-clavulanate (AUGMENTIN) 875-125 MG tablet  oxyCODONE (ROXICODONE) 5 MG tablet  cyclobenzaprine (FLEXERIL) 10 MG tablet  Prenatal Vit-Fe Fumarate-FA (PRENATAL MULTIVITAMIN W/IRON) 27-0.8 MG tablet  venlafaxine (EFFEXOR XR) 75 MG 24 hr capsule          Review of Systems   HENT:  Positive for dental problem.    All other systems reviewed and are negative.      Physical Exam   BP: (!) 149/113  Pulse: 96  Temp: 98.2  F (36.8  C)  Resp: 16  SpO2: 97 %      Physical Exam  Vitals and nursing note reviewed.   Constitutional:       Appearance: Normal appearance. She is obese.   HENT:      Head: Normocephalic.      Mouth/Throat:        Comments: Cracked tooth with erythema around gumline without signs of abscess or drainable pocket of fluid.  No facial swelling or jaw swelling or a facial symmetry on the face examination.  No underlying submental swelling or tongue deviation.  Uvula midline.  Musculoskeletal:         General: Normal range of motion.   Skin:     General: Skin is warm and dry.   Neurological:      Mental Status: She is alert.         ED Course         No results found for this or any previous visit (from the past 24 hour(s)).    Medications   amoxicillin-clavulanate (AUGMENTIN) 875-125 MG per tablet 1 tablet (has no administration in time range)   oxyCODONE (ROXICODONE) tablet 5 mg (has no administration in time range)       Assessments & Plan (with Medical Decision Making)     I have reviewed the nursing notes.    I have reviewed the findings, diagnosis, plan and need for follow up with the patient.           Medical Decision Making  Pleasant 19-year-old female presenting with dental pain.  On examination there is a fractured dental area to the lower left mandible.  Please see exam.  There is skin breakdown and some erythema around the gumline around tooth.  No  drainable abscess is identified.  No facial asymmetry or signs of Ludewig's angina or any uvular displacement.  Patient otherwise in good spirits and provided with Augmentin and 1 dose of oxycodone.  Prescription provided.  Patient has upcoming appointment with her dentist this week.  Discussed importance of having this tooth removed.  Outpatient follow-up and other supportive care measures discussed.  Patient discharged home      New Prescriptions    AMOXICILLIN-CLAVULANATE (AUGMENTIN) 875-125 MG TABLET    Take 1 tablet by mouth 2 times daily    OXYCODONE (ROXICODONE) 5 MG TABLET    Take 1 tablet (5 mg) by mouth every 6 hours as needed for severe pain       Final diagnoses:   Pain, dental       9/17/2023   [facility] EMERGENCY DEPT       Royal Boston MD  09/17/23 [id number]

## 2023-09-17 NOTE — DISCHARGE INSTRUCTIONS
You were seen today for dental pain and found to have a dental fracture.  Please ensure that you complete your dental appointment to have this tooth extracted this week.  You are provided with antibiotics and pain medications.  Please use pain medications when in severe pain aside from this use for supportive care measures including baseline temperature foods and ibuprofen Tylenol.  Do not exceed 4000 mg of Tylenol a day or 1000 mg per dose.  Recommend between 500 to 1000 mg of Tylenol every 6-8 hours as well as 400 to 600 mg of ibuprofen every 6-8 hours.

## 2023-09-24 ENCOUNTER — HOSPITAL ENCOUNTER (EMERGENCY)
Facility: CLINIC | Age: 28
Discharge: HOME | End: 2023-09-24
Attending: PHYSICIAN ASSISTANT | Admitting: PHYSICIAN ASSISTANT
Payer: COMMERCIAL

## 2023-09-24 VITALS
SYSTOLIC BLOOD PRESSURE: 142 MMHG | TEMPERATURE: 98.2 F | RESPIRATION RATE: 20 BRPM | HEART RATE: 96 BPM | DIASTOLIC BLOOD PRESSURE: 99 MMHG | OXYGEN SATURATION: 99 %

## 2023-09-24 DIAGNOSIS — S02.5XXA CLOSED FRACTURE OF TOOTH, INITIAL ENCOUNTER: ICD-10-CM

## 2023-09-24 PROCEDURE — 99283 EMERGENCY DEPT VISIT LOW MDM: CPT | Performed by: PHYSICIAN ASSISTANT

## 2023-09-24 ASSESSMENT — ACTIVITIES OF DAILY LIVING (ADL): ADLS_ACUITY_SCORE: 35

## 2023-09-25 NOTE — DISCHARGE INSTRUCTIONS
I am glad you are feeling better.  Try to get to a dentist as soon as you can for definitive management of this tooth fracture.  If you do have any worsening concerns however please do not hesitate to return to the emergency department.    Thank you for choosing [facility]'s Emergency Department. It was a pleasure taking care of you today. If you have any questions, please call [phone].    [place]ara [place] PA-C

## 2023-09-25 NOTE — ED PROVIDER NOTES
History     Chief Complaint   Patient presents with    Dental Pain       HPI  Lidia Gallegos is a 28 year old female who presents to the emergency department complaining of left lower dental pain.  She was here about a week ago for the same concern.  She was prescribed Augmentin and oxycodone and discharged home.  She reports that the antibiotics really did not do much for her.  She continues to have pain in her left lower molar where the tooth fractured.  She has been taking ibuprofen and Tylenol and NyQuil and doing everything she can at home without relief.  She has not had any fevers or swelling to the face or gums.  She is able to swallow okay.  No neck swelling.  No breathing difficulties.  She has been trying to get into a dentist but there is a 2-month waiting due to not many places taking her insurance.        Allergies:  No Known Allergies    Problem List:    Patient Active Problem List    Diagnosis Date Noted    Major depressive disorder, recurrent episode, moderate (H) 08/02/2023     Priority: Medium    Autism spectrum disorder 08/02/2023     Priority: Medium    Prediabetes 03/22/2023     Priority: Medium    Anxiety with depression 11/15/2017     Priority: Medium    Chronic bilateral low back pain without sciatica 03/28/2016     Priority: Medium        Past Medical History:    Past Medical History:   Diagnosis Date    Anxiety     Autism     Depression     History of anemia        Past Surgical History:    Past Surgical History:   Procedure Laterality Date    INNER EAR SURGERY Right     age 2    WISDOM TOOTH EXTRACTION         Family History:    Family History   Problem Relation Age of Onset    No Known Problems Mother     Hypertension Father     Mental Illness Sister         bipolar    Attention Deficit Disorder Sister     Birth defects Brother         bladder - reportedly related to nuchal cord    Cancer Maternal Grandfather     Diabetes Paternal Grandmother     Diabetes Paternal Grandfather      Kidney failure Paternal Grandfather        Social History:  Marital Status:   [2]  Social History     Tobacco Use    Smoking status: Former     Types: Cigarettes     Quit date: 2022     Years since quittin.7    Smokeless tobacco: Never   Vaping Use    Vaping Use: Never used   Substance Use Topics    Alcohol use: Yes     Comment: occ    Drug use: Never        Medications:    amoxicillin-clavulanate (AUGMENTIN) 875-125 MG tablet  cyclobenzaprine (FLEXERIL) 10 MG tablet  oxyCODONE (ROXICODONE) 5 MG tablet  Prenatal Vit-Fe Fumarate-FA (PRENATAL MULTIVITAMIN W/IRON) 27-0.8 MG tablet  venlafaxine (EFFEXOR XR) 75 MG 24 hr capsule          Review of Systems   All other systems reviewed and are negative.        Physical Exam   BP: (!) 142/99  Pulse: 96  Temp: 98.2  F (36.8  C)  Resp: 18  SpO2: 99 %      Physical Exam  Vitals and nursing note reviewed.   Constitutional:       General: She is not in acute distress.     Appearance: Normal appearance. She is not ill-appearing, toxic-appearing or diaphoretic.   HENT:      Head: Normocephalic and atraumatic.      Nose: Nose normal.      Mouth/Throat:      Mouth: Mucous membranes are moist.      Pharynx: Oropharynx is clear.      Comments: Tooth #19 with caries, prior dental work to tooth noted but also with fracture present.  Tenderness noted to tooth but no surrounding gingival swelling.  No Sublingual swelling.  Neurological:      Mental Status: She is alert.           ED Course             Procedures         No results found for this or any previous visit (from the past 24 hour(s)).    Medications - No data to display      Assessments & Plan (with Medical Decision Making)  Lidia Gallegos is a 28 year old female who presents to the ED with a 1 week history of persistent left lower dental pain.  No associated fevers or swelling.  She has been taking Augmentin for the last week without much change.  Here today she was afebrile.  She did not appear acutely ill or  toxic. On exam, tooth #19 with presence of caries and decay and associated fracture.  I discussed management of pain with the patient.  I do not think she has an acute infection.  I think she has some nerve exposure from this dental fracture and would benefit from having it covered.  She was agreeable to trying some dental cement here in the ED.  This was applied to the fractured area of the tooth and she noted immediate improvement in her pain.  She will require further dental care and I encouraged her to continue to try to get in with a dentist as soon as possible for more definitive management.  She can use ibuprofen or Tylenol in the meantime for any residual pain issues.  She was given instructions on when to return to the ED.  All questions answered and patient discharged home in stable condition.     I have reviewed the nursing notes.    I have reviewed the findings, diagnosis, plan and need for follow up with the patient.    Discharge Medication List as of 9/24/2023 11:24 PM          Final diagnoses:   Closed fracture of tooth, initial encounter     Note: Chart documentation done in part with Dragon Voice Recognition software. Although reviewed after completion, some word and grammatical errors may remain.     9/24/2023   [facility] EMERGENCY DEPT       [place]ara [place] PA-C  09/24/23 [id number]

## 2023-09-25 NOTE — ED TRIAGE NOTES
L dental pain that has been ongoing but is now throbbing and worsening last couple days.      Triage Assessment       Row Name 09/24/23 4300       Triage Assessment (Adult)    Airway WDL WDL       Respiratory WDL    Respiratory WDL WDL       Cardiac WDL    Cardiac WDL WDL

## 2023-10-01 ENCOUNTER — HEALTH MAINTENANCE LETTER (OUTPATIENT)
Age: 28
End: 2023-10-01

## 2023-10-02 ENCOUNTER — NURSE TRIAGE (OUTPATIENT)
Dept: NURSING | Facility: CLINIC | Age: 28
End: 2023-10-02
Payer: COMMERCIAL

## 2023-10-03 NOTE — TELEPHONE ENCOUNTER
Triage Call:    Caller: Patient  Got a message from her care team and she is 99% sure she is going through a miscarriage as she saw tissue and mucous.  It is the manuel,christopher thing that happened on her first miscarriage.    She remembers last time she had to get a shot of something based on blood typing.    She is having a heavy period flow.    Protocol Recommended Disposition: See PCP in the next 24 hours.  She will call the clinic in the morning to get an appointment.      Caller verbalized understanding of instructions and questions answered.      Sindi Foreman RN on 10/2/2023 at 8:44 PM      Reason for Disposition   Passed tissue (e.g., gray-white)    Additional Information   Negative: Shock suspected (e.g., cold/pale/clammy skin, too weak to stand, low BP, rapid pulse)   Negative: Difficult to awaken or acting confused (e.g., disoriented, slurred speech)   Negative: Passed out (i.e., lost consciousness, collapsed and was not responding)   Negative: Sounds like a life-threatening emergency to the triager   Negative: SEVERE abdominal pain   Negative: SEVERE vaginal bleeding (e.g., soaking 2 pads per hour or large blood clots and present 2 or more hours)   Negative: MODERATE vaginal bleeding (e.g., soaking 1 pad or tampon per hour and present > 6 hours; 1 menstrual cup every 6 hours)   Negative: Pale skin (pallor) of new-onset or worsening   Negative: Patient sounds very sick or weak to the triager   Negative: [1] Constant abdominal pain AND [2] present > 2 hours   Negative: [1] Abdomen pain AND [2] fever 100.4 F (38.0 C) or higher   Negative: [1] Caller has URGENT question AND [2] triager unable to answer question    Protocols used:  - Threatened Miscarriage Follow-up Call-A-

## 2023-11-08 ENCOUNTER — MYC MEDICAL ADVICE (OUTPATIENT)
Dept: FAMILY MEDICINE | Facility: CLINIC | Age: 28
End: 2023-11-08
Payer: COMMERCIAL

## 2023-11-13 ENCOUNTER — VIRTUAL VISIT (OUTPATIENT)
Dept: FAMILY MEDICINE | Facility: CLINIC | Age: 28
End: 2023-11-13
Payer: COMMERCIAL

## 2023-11-13 DIAGNOSIS — O09.90 SUPERVISION OF HIGH RISK PREGNANCY, ANTEPARTUM: Primary | ICD-10-CM

## 2023-11-13 PROCEDURE — 99207 PR NO CHARGE NURSE ONLY: CPT | Mod: 93

## 2023-11-13 NOTE — PATIENT INSTRUCTIONS
Learning About Pregnancy  Your Care Instructions     Your health in the early weeks of your pregnancy is particularly important for your baby's health. Take good care of yourself. Anything you do that harms your body can also harm your baby.  Make sure to go to all of your doctor appointments. Regular checkups will help keep you and your baby healthy.  How can you care for yourself at home?  Diet    Eat a balanced diet. Make sure your diet includes plenty of beans, peas, and leafy green vegetables.     Do not skip meals or go for many hours without eating. If you are nauseated, try to eat a small, healthy snack every 2 to 3 hours.     Do not eat fish that has a high level of mercury, such as shark, swordfish, or mackerel. Do not eat more than one can of tuna each week.     Drink plenty of fluids. If you have kidney, heart, or liver disease and have to limit fluids, talk with your doctor before you increase the amount of fluids you drink.     Cut down on caffeine, such as coffee, tea, and cola.     Do not drink alcohol, such as beer, wine, or hard liquor.     Take a multivitamin that contains at least 400 micrograms (mcg) of folic acid to help prevent birth defects. Fortified cereal and whole wheat bread are good additional sources of folic acid.     Increase the calcium in your diet. Try to drink a quart of skim milk each day. You may also take calcium supplements and choose foods such as cheese and yogurt.   Lifestyle    Make sure you go to your follow-up appointments.     Get plenty of rest. You may be unusually tired while you are pregnant.     Get at least 30 minutes of exercise on most days of the week. Walking is a good choice. If you have not exercised in the past, start out slowly. Take several short walks each day.     Do not smoke. If you need help quitting, talk to your doctor about stop-smoking programs. These can increase your chances of quitting for good.     Do not touch cat feces or litter boxes.  Also, wash your hands after you handle raw meat, and fully cook all meat before you eat it. Wear gloves when you work in the yard or garden, and wash your hands well when you are done. Cat feces, raw or undercooked meat, and contaminated dirt can cause an infection that may harm your baby or lead to a miscarriage.     Avoid things that can make your body too hot and may be harmful to your baby, such as a hot tub or sauna. Or talk with your doctor before doing anything that raises your body temperature. Your doctor can tell you if it's safe.     Avoid chemical fumes, paint fumes, or poisons.     Do not use illegal drugs, marijuana, or alcohol.   Medicines    Review all of your medicines with your doctor. Some of your routine medicines may need to be changed to protect your baby.     Use acetaminophen (Tylenol) to relieve minor problems, such as a mild headache or backache or a mild fever with cold symptoms. Do not use nonsteroidal anti-inflammatory drugs (NSAIDs), such as ibuprofen (Advil, Motrin) or naproxen (Aleve), unless your doctor says it is okay.     Do not take two or more pain medicines at the same time unless the doctor told you to. Many pain medicines have acetaminophen, which is Tylenol. Too much acetaminophen (Tylenol) can be harmful.     Take your medicines exactly as prescribed. Call your doctor if you think you are having a problem with your medicine.   To manage morning sickness    If you feel sick when you first wake up, try eating a small snack (such as crackers) before you get out of bed. Allow some time to digest the snack, and then get out of bed slowly.     Do not skip meals or go for long periods without eating. An empty stomach can make nausea worse.     Eat small, frequent meals instead of three large meals each day.     Drink plenty of fluids.     Eat foods that are high in protein but low in fat.     If you are taking iron supplements, ask your doctor if they are necessary. Iron can make  "nausea worse.     Avoid any smells, such as coffee, that make you feel sick.     Get lots of rest. Morning sickness may be worse when you are tired.   Follow-up care is a key part of your treatment and safety. Be sure to make and go to all appointments, and call your doctor if you are having problems. It's also a good idea to know your test results and keep a list of the medicines you take.  Where can you learn more?  Go to https://www.Not iT.net/patiented  Enter E868 in the search box to learn more about \"Learning About Pregnancy.\"  Current as of: July 11, 2023               Content Version: 13.8    8098-6493 [employer].   Care instructions adapted under license by your healthcare professional. If you have questions about a medical condition or this instruction, always ask your healthcare professional. [employer] disclaims any warranty or liability for your use of this information.      Weeks 6 to 10 of Your Pregnancy: Care Instructions  During these weeks of pregnancy, your body goes through many changes. You may start to feel different, both in your body and your emotions. Each pregnancy is different, so there's no \"right\" way to feel. These early weeks are a time to make healthy choices for you and your pregnancy.    Take a daily prenatal vitamin. Choose one with folic acid in it.   Avoid alcohol, tobacco, and drugs (including marijuana). If you need help quitting, talk to your doctor.     Drink plenty of liquids.  Be sure to drink enough water. And limit sodas, other sweetened drinks, and caffeine.     Choose foods that are good sources of calcium, iron, and folate.  You can try dairy products, dark leafy greens, fortified orange juice and cereals, almonds, broccoli, dried fruit, and beans.     Avoid foods that may be harmful.  Don't eat raw meat, deli meat, raw seafood, or raw eggs. Avoid soft cheese and unpasteurized dairy, like Brie and blue cheese. And don't eat fish that " "contains a lot of mercury, like shark and swordfish.     Don't touch ar litter or cat poop.  They can cause an infection that could be harmful during pregnancy.     Avoid things that can make your body too hot.  For example, avoid hot tubs and saunas.     Soothe morning sickness.  Try eating 5 or 6 small meals a day, getting some fresh air, or using deepak to control symptoms.     Ask your doctor about flu and COVID-19 shots.  Getting them can help protect against infection.   Follow-up care is a key part of your treatment and safety. Be sure to make and go to all appointments, and call your doctor if you are having problems. It's also a good idea to know your test results and keep a list of the medicines you take.  Where can you learn more?  Go to https://www.[employer]/patiented  Enter G112 in the search box to learn more about \"Weeks 6 to 10 of Your Pregnancy: Care Instructions.\"  Current as of: July 11, 2023               Content Version: 13.8 2006-2023 [employer].   Care instructions adapted under license by your healthcare professional. If you have questions about a medical condition or this instruction, always ask your healthcare professional. [employer] disclaims any warranty or liability for your use of this information.         Managing Morning Sickness (01:55)  Your health professional recommends that you watch this short online health video.  Learn tips for dealing with morning sickness, no matter what time of day you have it.  Purpose:  Gives tips for managing morning sickness, including eating small low-fat meals and avoiding caffeine and spicy food.  Goal:  The user will learn tips for dealing with morning sickness during pregnancy.     How to watch the video    Scan the QR code   OR Visit the website    [email or web address]/r/Vqnkrct0syrat   Current as of: July 11, 2023               Content Version: 13.8 2006-2023 [employer].   Care " instructions adapted under license by your healthcare professional. If you have questions about a medical condition or this instruction, always ask your healthcare professional. [employer] disclaims any warranty or liability for your use of this information.      Pregnancy and Heartburn: Care Instructions  Overview     Heartburn is a common problem during pregnancy.  Heartburn happens when stomach acid backs up into the tube that carries food to the stomach. This tube is called the esophagus. Early in pregnancy, heartburn is caused by hormone changes that slow down digestion. Later on, it's also caused by the large uterus pushing up on the stomach.  Even though you can't fix the cause, there are things you can do to get relief. Treating heartburn during pregnancy focuses first on making lifestyle changes, like changing what and how you eat, and on taking medicines.  Heartburn usually improves or goes away after childbirth.  Follow-up care is a key part of your treatment and safety. Be sure to make and go to all appointments, and call your doctor if you are having problems. It's also a good idea to know your test results and keep a list of the medicines you take.  How can you care for yourself at home?  Eat small, frequent meals.  Avoid foods that make your symptoms worse, such as chocolate, peppermint, and spicy foods. Avoid drinks with caffeine, such as coffee, tea, and sodas.  Avoid bending over or lying down after meals.  Take a short walk after you eat.  If heartburn is a problem at night, do not eat for 2 hours before bedtime.  Take antacids like Mylanta, Maalox, Rolaids, or Tums. Do not take antacids that have sodium bicarbonate, magnesium trisilicate, or aspirin. Be careful when you take over-the-counter antacid medicines. Many of these medicines have aspirin in them. While you are pregnant, do not take aspirin or medicines that contain aspirin unless your doctor says it is okay.  If you're not  "getting relief, talk to your doctor. You may be able to take a stronger acid-reducing medicine.  When should you call for help?   Call your doctor now or seek immediate medical care if:    You have new or worse belly pain.     You are vomiting.   Watch closely for changes in your health, and be sure to contact your doctor if:    You have new or worse symptoms of reflux.     You are losing weight.     You have trouble or pain swallowing.     You do not get better as expected.   Where can you learn more?  Go to https://www.[employer].net/patiented  Enter U946 in the search box to learn more about \"Pregnancy and Heartburn: Care Instructions.\"  Current as of: July 11, 2023               Content Version: 13.8    5139-5997 [employer].   Care instructions adapted under license by your healthcare professional. If you have questions about a medical condition or this instruction, always ask your healthcare professional. [employer] disclaims any warranty or liability for your use of this information.      Constipation: Care Instructions  Overview     Constipation means that you have a hard time passing stools (bowel movements). People pass stools from 3 times a day to once every 3 days. What is normal for you may be different. Constipation may occur with pain in the rectum and cramping. The pain may get worse when you try to pass stools. Sometimes there are small amounts of bright red blood on toilet paper or the surface of stools. This is because of enlarged veins near the rectum (hemorrhoids).  A few changes in your diet and lifestyle may help you avoid ongoing constipation. Your doctor may also prescribe medicine to help loosen your stool.  Some medicines can cause constipation. These include pain medicines and antidepressants. Tell your doctor about all the medicines you take. Your doctor may want to make a medicine change to ease your symptoms.  Follow-up care is a key part of your treatment " "and safety. Be sure to make and go to all appointments, and call your doctor if you are having problems. It's also a good idea to know your test results and keep a list of the medicines you take.  How can you care for yourself at home?  Drink plenty of fluids. If you have kidney, heart, or liver disease and have to limit fluids, talk with your doctor before you increase the amount of fluids you drink.  Include high-fiber foods in your diet each day. These include fruits, vegetables, beans, and whole grains.  Get at least 30 minutes of exercise on most days of the week. Walking is a good choice. You also may want to do other activities, such as running, swimming, cycling, or playing tennis or team sports.  Take a fiber supplement, such as Citrucel or Metamucil, every day. Read and follow all instructions on the label.  Schedule time each day for a bowel movement. A daily routine may help. Take your time having a bowel movement, but don't sit for more than 10 minutes at a time. And don't strain too much.  Support your feet with a small step stool when you sit on the toilet. This helps flex your hips and places your pelvis in a squatting position.  Your doctor may recommend an over-the-counter laxative to relieve your constipation. Examples are Milk of Magnesia and MiraLax. Read and follow all instructions on the label. Do not use laxatives on a long-term basis.  When should you call for help?   Call your doctor now or seek immediate medical care if:    You have new or worse belly pain.     You have new or worse nausea or vomiting.     You have blood in your stools.   Watch closely for changes in your health, and be sure to contact your doctor if:    Your constipation is getting worse.     You do not get better as expected.   Where can you learn more?  Go to https://www.healthwise.net/patiented  Enter P343 in the search box to learn more about \"Constipation: Care Instructions.\"  Current as of: March 21, " "2023               Content Version: 13.8 2006-2023 [employer].   Care instructions adapted under license by your healthcare professional. If you have questions about a medical condition or this instruction, always ask your healthcare professional. [employer] disclaims any warranty or liability for your use of this information.      Learning About High-Iron Foods  What foods are high in iron?     The foods you eat contain nutrients, such as vitamins and minerals. Iron is a nutrient. Your body needs the right amount to stay healthy and work as it should. You can use the list below to help you make choices about which foods to eat.  Here are some foods that contain iron. They have 1 to 2 milligrams of iron per serving.  Fruits  Figs (dried), 5 figs  Vegetables  Asparagus (canned), 6 casarez  Osbaldo, beet, Swiss chard, or turnip greens, 1 cup  Dried peas, cooked,   cup  Seaweed, spirulina (dried),   cup  Spinach, (cooked)   cup or (raw) 1 cup  Grains  Cereals, fortified with iron, 1 cup  Grits (instant, cooked), fortified with iron,   cup  Meats and other protein foods  Beans (kidney, lima, navy, white), canned or cooked,   cup  Beef or lamb, 3 oz  Chicken giblets, 3 oz  Chickpeas (garbanzo beans),   cup  Liver of beef, lamb, or pork, 3 oz  Oysters (cooked), 3 oz  Sardines (canned), 3 oz  Soybeans (boiled),   cup  Tofu (firm),   cup  Work with your doctor to find out how much of this nutrient you need. Depending on your health, you may need more or less of it in your diet.  Where can you learn more?  Go to https://www.healthDNP [employer].net/patiented  Enter R005 in the search box to learn more about \"Learning About High-Iron Foods.\"  Current as of: February 28, 2023               Content Version: 13.8 2006-2023 [employer].   Care instructions adapted under license by your healthcare professional. If you have questions about a medical condition or this instruction, always ask your " "healthcare professional. [employer], [facility] disclaims any warranty or liability for your use of this information.      Rh Antibodies Screening During Pregnancy: About This Test  What is it?     The Rh antibodies screening test is a blood test. It checks your blood for Rh antibodies. If you have Rh-negative blood and have been exposed to Rh-positive blood, your immune system may make antibodies to attack the Rh-positive blood. When a pregnant woman has these antibodies, it is called Rh sensitization.  Why is this test done?  The Rh antibodies screening test is done during pregnancy to find out if your baby is at risk for Rh disease. This can happen if you have Rh-negative blood and your baby has Rh-positive blood. If your Rh-negative blood mixes with Rh-positive blood, your immune system will make antibodies to attack the Rh-positive blood.  During pregnancy, these antibodies could attach to the baby's red blood cells. This can cause your baby to have serious health problems. The results of this test will help your doctor know how to best care for you and your baby during your pregnancy.  How do you prepare for the test?  In general, there's nothing you have to do before this test, unless your doctor tells you to.  How is the test done?  A health professional uses a needle to take a blood sample, usually from the arm.  What happens after the test?  You will probably be able to go home right away. It depends on the reason for the test.  You can go back to your usual activities right away.  Follow-up care is a key part of your treatment and safety. Be sure to make and go to all appointments, and call your doctor if you are having problems. It's also a good idea to keep a list of the medicines you take. Ask your doctor when you can expect to have your test results.  Where can you learn more?  Go to https://www.[employer].net/patiented  Enter P722 in the search box to learn more about \"Rh Antibodies Screening " "During Pregnancy: About This Test.\"  Current as of: 2023               Content Version: 13.8    0330-4073 [employer].   Care instructions adapted under license by your healthcare professional. If you have questions about a medical condition or this instruction, always ask your healthcare professional. [employer] disclaims any warranty or liability for your use of this information.      Learning About Preventing Rh Disease  What is Rh disease?     Rh disease can be a serious problem in pregnancy. It happens when substances called antibodies in the mother's blood cause red blood cells in her baby's blood to be destroyed. This can occur when the blood types of a mother and her baby do not match.  All blood has an Rh factor. This is what makes a blood type positive or negative. When you are Rh-negative, your baby may be Rh-negative or Rh-positive. If your baby has Rh-positive blood and it mixes with yours, your body will make antibodies. This is called Rh sensitization.  Most of the time, this is not a problem in a first pregnancy. But in future pregnancies, it could cause Rh disease.  A  with Rh disease has mild anemia and may have jaundice. In severe cases, anemia, jaundice, and swelling can be very dangerous or fatal. Some babies need to be delivered early. Some need special care in the NICU. A very sick baby will need a blood transfusion before or after birth.  Fortunately, Rh sensitization is usually easy to prevent.  That's why it's important to get your Rh status checked in your first trimester. It doesn't cause any warning signs. A blood test is the only way to know if you are Rh-sensitive or are at risk for it.  How can you prevent Rh disease?  If you are Rh-negative, your doctor gives you an Rh immune globulin shot (such as RhoGAM). It helps prevent your body from making the antibodies that attack your baby's red blood cells.  Timing is important. You need the shot " "at certain times during your pregnancy. And you need one anytime there is a chance that your baby's blood might mix with yours. That can happen with certain prenatal tests or when you have pregnancy bleeding, such as:  Right after any pregnancy loss, amniocentesis, or CVS testing.  After turning of a breech baby.  Before and maybe after childbirth. Your doctor gives you a shot around week 28. If your  is Rh-positive, you will have another shot.  Follow-up care is a key part of your treatment and safety. Be sure to make and go to all appointments, and call your doctor if you are having problems. It's also a good idea to know your test results and keep a list of the medicines you take.  Where can you learn more?  Go to https://www.[employer]ptides/patiented  Enter W177 in the search box to learn more about \"Learning About Preventing Rh Disease.\"  Current as of: 2023               Content Version: 13.8    5456-6965 [employer].   Care instructions adapted under license by your healthcare professional. If you have questions about a medical condition or this instruction, always ask your healthcare professional. [employer] disclaims any warranty or liability for your use of this information.      Learning About Rh Immunoglobulin Shots  Introduction     An Rh immunoglobulin shot is given to pregnant women who have Rh-negative blood.  You may have Rh-negative blood, and your baby may have Rh-positive blood. If the two types of blood mix, your body will make antibodies. This is called Rh sensitization. Most of the time, this is not a problem the first time you're pregnant. But it could cause problems in future pregnancies.  This shot keeps your body from making the antibodies. You get the shot around 28 weeks of pregnancy. After the birth, your baby's blood is tested. If the blood is Rh positive, you will get another shot. You may also get the shot if you have vaginal bleeding while " "you are pregnant or if you have a miscarriage. These shots protect future pregnancies.  Women with Rh negative blood will need this shot each time they get pregnant.  Example  Rh immunoglobulin (HypRho-D, MICRhoGAM, and RhoGAM)  Possible side effects  Rare side effects may include:  Some mild pain where you got the shot.  A slight fever.  An allergic reaction.  You may have other side effects not listed here. Check the information that comes with your medicine.  What to know about taking this medicine  You may need more than one shot. You may need the shot again:  After amniocentesis, fetal blood sampling, or chorionic villus sampling tests.  If you have bleeding in your second or third trimester.  After turning of a breech baby.  After an injury to the belly while you are pregnant.  After a miscarriage or an .  Before or right after treatment for an ectopic or a partial molar pregnancy.  Tell your doctor if you have any allergies or have had a bad response to medicines in the past.  If you get this shot within 3 months of getting a live-virus vaccine, the vaccine may not work. Your doctor will tell you if you need more vaccine.  Check with your doctor or pharmacist before you use any other medicines. This includes over-the-counter medicines. Make sure your doctor knows all of the medicines, vitamins, herbs, and supplements you take. Taking some medicines at the same time can cause problems.  Where can you learn more?  Go to https://www.[employer].net/patiented  Enter V615 in the search box to learn more about \"Learning About Rh Immunoglobulin Shots.\"  Current as of: 2023               Content Version: 13.8    2330-6273 [employer].   Care instructions adapted under license by your healthcare professional. If you have questions about a medical condition or this instruction, always ask your healthcare professional. [employer] disclaims any warranty or liability for your use " of this information.      Rubella (Tongan Measles): Care Instructions  Overview  Rubella, also called Tongan measles or 3-day measles, is a disease caused by a virus. It spreads by coughs, sneezes, and close contact. Rubella usually is mild and does not cause long-term problems. But if you are pregnant and get it, you can give the disease to your unborn baby. This can cause serious birth defects.  While you have rubella, you may get a rash and a mild fever, and the lymph glands in your neck may swell. Older children often have a fever, eye pain, a sore throat, and body aches. You can relieve most symptoms with care at home. Avoid being around others, especially pregnant people, until your rash has been gone for at least 4 days. People who have not had this disease before or have not had the vaccine have the greatest chance of getting the virus.  Follow-up care is a key part of your treatment and safety. Be sure to make and go to all appointments, and call your doctor if you are having problems. It's also a good idea to know your test results and keep a list of the medicines you take.  How can you care for yourself at home?  Drink plenty of fluids. If you have kidney, heart, or liver disease and have to limit fluids, talk with your doctor before you increase the amount of fluids you drink.  Get plenty of rest to help your body heal.  Take an over-the-counter pain medicine, such as acetaminophen (Tylenol), ibuprofen (Advil, Motrin), or naproxen (Aleve), to reduce fever and discomfort. Read and follow all instructions on the label. Do not give aspirin to anyone younger than 20. It has been linked to Reye syndrome, a serious illness.  Do not take two or more pain medicines at the same time unless the doctor told you to. Many pain medicines have acetaminophen, which is Tylenol. Too much acetaminophen (Tylenol) can be harmful.  Try not to scratch the rash. Put cold, wet cloths on the rash to reduce itching.  Do not smoke.  "Smoking can make your symptoms worse. If you need help quitting, talk to your doctor about stop-smoking programs and medicines. These can increase your chances of quitting for good.  Avoid contact with people who have never had rubella and who have not been immunized.  When should you call for help?   Call your doctor now or seek immediate medical care if:    You have a fever with a stiff neck or a severe headache.     You are sensitive to light or feel very sleepy or confused.   Watch closely for changes in your health, and be sure to contact your doctor if:    You do not get better as expected.   Where can you learn more?  Go to https://www.[employer].net/patiented  Enter B812 in the search box to learn more about \"Rubella (Nepali Measles): Care Instructions.\"  Current as of: 2023               Content Version: 13.8    8705-3336 "[employer]".   Care instructions adapted under license by your healthcare professional. If you have questions about a medical condition or this instruction, always ask your healthcare professional. "[employer]" disclaims any warranty or liability for your use of this information.      Gonorrhea and Chlamydia: About These Tests  What is it?  These tests use a sample of urine or other body fluid to look for the bacteria that cause these sexually transmitted infections (STIs). The fluid sample can come from the cervix, vagina, rectum, throat, or eyes.  Why is this test done?  These tests may be done to:  Find out if symptoms are caused by gonorrhea or chlamydia.  Check people who are at high risk of being infected with gonorrhea or chlamydia.  Retest people several months after they have been treated for gonorrhea or chlamydia.  Check for infection in your  if you had a gonorrhea or chlamydia infection at the time of delivery.  How can you prepare for the test?  If you are going to have a urine test, do not urinate for at least 1 hour before the " "test.  If you think you may have chlamydia or gonorrhea, don't have sexual intercourse until you get your test results. And you may want to have tests for other STIs, such as HIV.  How is the test done?  For a direct sample, a swab is used to collect body fluid from the cervix, vagina, rectum, throat, or eyes. Your doctor may collect the sample. Or you may be given instructions on how to collect your own sample.  For a urine sample, you will collect the urine that comes out when you first start to urinate. Don't wipe the genital area clean before you urinate.  How long does the test take?  The test will take a few minutes.  What happens after the test?  You will be able to go home right away.  You can go back to your usual activities right away.  If you do have an infection, don't have sexual intercourse for 7 days after you start treatment. And your sex partner(s) should also be treated.  Follow-up care is a key part of your treatment and safety. Be sure to make and go to all appointments, and call your doctor if you are having problems. It's also a good idea to keep a list of the medicines you take. Ask your doctor when you can expect to have your test results.  Where can you learn more?  Go to https://www.[employer].net/patiented  Enter K976 in the search box to learn more about \"Gonorrhea and Chlamydia: About These Tests.\"  Current as of: April 19, 2023               Content Version: 13.8    9489-5108 [email or web address].   Care instructions adapted under license by your healthcare professional. If you have questions about a medical condition or this instruction, always ask your healthcare professional. [employer] disclaims any warranty or liability for your use of this information.      Trichomoniasis: About This Test  What is it?     This test uses a sample of urine or other body fluid to look for the tiny parasite that causes trichomoniasis (also called trich). The fluid sample can come " from the vagina, cervix, or urethra. Your doctor may choose to use one or more of many available tests.  Why is it done?  A trich test may be done to:  Find out if symptoms are caused by trich.  Check people who are at high risk for being infected with trich.  Check after treatment to make sure that the infection is gone.  How do you prepare for the test?  If you are going to have a urine test, do not urinate for at least 1 hour before the test.  How is the test done?  For a direct sample, a swab is used to collect body fluid from the cervix, vagina, or urethra. Your doctor may collect the sample. Or you may be given instructions on how to collect your own sample.  For a urine sample, you will collect the urine that comes out when you first start to urinate. Don't wipe the area clean before you urinate.  How long does the test take?  It will take a few minutes to collect a sample.  What happens after the test?  You can go home right away.  You can go back to your usual activities right away.  You may get the test results the same day or several days later. It depends on the test used.  If you do have an infection, don't have sexual intercourse for 7 days after you start treatment. Your sex partner(s) should also be treated.  Follow-up care is a key part of your treatment and safety. Be sure to make and go to all appointments, and call your doctor if you are having problems. Ask your doctor when you can expect to have your test results.  Current as of: April 19, 2023               Content Version: 13.8    2360-3326 [employer].   Care instructions adapted under license by your healthcare professional. If you have questions about a medical condition or this instruction, always ask your healthcare professional. [employer] disclaims any warranty or liability for your use of this information.      HIV Testing: Care Instructions  Overview  You can get tested for the human immunodeficiency virus  "(HIV). Most doctors use a blood test to check for HIV antibodies and antigens in your blood. It may also check for the genetic material (RNA) of HIV. Some tests use saliva to check for HIV antibodies. But these aren't as accurate. For example, they may give a false result if you've just been infected.  What do the results mean?    Normal (negative)    No HIV antibodies, antigens, or RNA were found.  You may need more testing. It can make sure your test results are correct.    Uncertain (indeterminate)    Test results didn't clearly show if you have an HIV infection.  HIV antibodies or antigens may not have formed yet.  Some other type of antibody or antigen may have affected the results.  You will need another test to be sure.    Abnormal (positive)    HIV antibodies, antigens, or RNA were found.  If you haven't had an RNA test yet, one will be done. If it's positive, you have HIV.  If your test result is positive, your doctor will talk to you. You will discuss starting treatment.  Follow-up care is a key part of your treatment and safety. Be sure to make and go to all appointments, and call your doctor if you are having problems. It's also a good idea to know your test results and keep a list of the medicines you take.  Where can you learn more?  Go to https://www.[employer].net/patiented  Enter T792 in the search box to learn more about \"HIV Testing: Care Instructions.\"  Current as of: June 13, 2023               Content Version: 13.8    4285-0658 [employer].   Care instructions adapted under license by your healthcare professional. If you have questions about a medical condition or this instruction, always ask your healthcare professional. [employer] disclaims any warranty or liability for your use of this information.      Hepatitis C Virus Tests: About These Tests  What are they?     Hepatitis C virus tests are blood tests that check for substances in the blood that show whether you " "have hepatitis C now or had it in the past. The tests can also tell you what type of hepatitis C you have and how severe the disease is. This can help your doctor with treatment.  If the tests show that you have long-term hepatitis C, you need to take steps to prevent spreading the disease.  Why are these tests done?  You may need these tests if:  You have symptoms of hepatitis.  You may have been exposed to the virus. You are more likely to have been exposed to the virus if you inject drugs or are exposed to body fluids (such as if you are a health care worker).  You've had other tests that show you have liver problems.  You are 18 to 79 years old.  You have an HIV infection.  The tests also are done to help your doctor decide about your treatment and see how well it works.  How do you prepare for the test?  In general, there's nothing you have to do before this test, unless your doctor tells you to.  How is the test done?  A health professional uses a needle to take a blood sample, usually from the arm.  What happens after these tests?  You will probably be able to go home right away.  You can go back to your usual activities right away.  Follow-up care is a key part of your treatment and safety. Be sure to make and go to all appointments, and call your doctor if you are having problems. It's also a good idea to keep a list of the medicines you take. Ask your doctor when you can expect to have your test results.  Where can you learn more?  Go to https://www.[employer].com.net/patiented  Enter W551 in the search box to learn more about \"Hepatitis C Virus Tests: About These Tests.\"  Current as of: June 13, 2023               Content Version: 13.8    1584-5804 [employer].   Care instructions adapted under license by your healthcare professional. If you have questions about a medical condition or this instruction, always ask your healthcare professional. [employer] disclaims any warranty or " liability for your use of this information.      Learning About Fetal Ultrasound Results  What is a fetal ultrasound?     Fetal ultrasound is a test that lets your doctor see an image of your baby. Your doctor learns information about your baby from this picture. You may find out, for example, if you are having a boy or a girl. But the main reason you have this test is to get information about your baby's health.  (You may hear your baby called a fetus. This is a common medical term for a baby that's growing in the mother's uterus.)  What kind of information can you learn from this test?  The findings of an ultrasound fall into two categories, normal and abnormal.  Normal  The fetus is the right size for its age.  The placenta is the expected size and does not cover the cervix.  There is enough amniotic fluid in the uterus.  No birth defects can be seen.  Abnormal  The fetus is small or large for its age.  The placenta covers the cervix.  There is too much or too little amniotic fluid in the uterus.  The fetus may have a birth defect.  What does an abnormal result mean?  Abnormal seems to imply that something is wrong with your baby. But what it means is that the test has shown something the doctor wants to take a closer look at.  And that's what happens next. Your doctor will talk to you about what further test or tests you may need.  What do the results mean?  Some of the things your doctor may see on an abnormal ultrasound include:  Echogenic bowel.  The bowel looks very bright on the screen. This could mean that there's blood in the bowel. Or it could mean that something is blocking the small bowel.  Increased nuchal translucency.  The ultrasound measures the thickness at the back of the baby's neck. An increase in thickness is sometimes an early sign of Down syndrome.  Increased or decreased amniotic fluid.  The doctor will look for a reason for the level of amniotic fluid and will watch the pregnancy closely  "as it progresses.  Large ventricles.  Ventricles in the brain look larger than they should. Your doctor may take a closer look at the brain.  Renal pyelectasis/hydronephrosis.  The ultrasound measures the fluid around the kidney. If there is more fluid than expected, there is a chance of urinary tract or kidney problems.  Short long bones.  The ultrasound measures certain arm and leg bones. A long bone (humerus or femur) that is shorter than average could be a sign of Down syndrome.  Subchorionic hemorrhage.  An ultrasound can show bleeding under one of the membranes that surrounds the fetus. Some women don't have symptoms of bleeding. The ultrasound can find this problem when women are not bleeding from their vagina. Women who have this condition have a slightly higher chance of miscarriage.  What do you do now?  Take a deep breath, and let it out. Keep in mind that an abnormal finding on an ultrasound, after it's coupled with more information, may:  Turn out to be nothing.  Turn out to be something mild that won't affect the baby.  Turn out to be something more serious. But if this happens, early diagnosis helps you and your doctor plan treatment options sooner rather than later.  Your medical team is there for you. So are your family and friends. Ask questions, and get the help and support you need.  Follow-up care is a key part of your treatment and safety. Be sure to make and go to all appointments, and call your doctor if you are having problems. It's also a good idea to know your test results and keep a list of the medicines you take.  Where can you learn more?  Go to https://www.[employer].net/patiented  Enter K451 in the search box to learn more about \"Learning About Fetal Ultrasound Results.\"  Current as of: July 11, 2023               Content Version: 13.8    3785-8376 [employer].   Care instructions adapted under license by your healthcare professional. If you have questions about a medical " condition or this instruction, always ask your healthcare professional. [employer], [facility] disclaims any warranty or liability for your use of this information.      Learning About Prenatal Visits  Overview     Regular prenatal visits are very important during any pregnancy. These quick office visits may seem simple and routine. But they can help you have a safe and healthy pregnancy. Your doctor is watching for problems that can only be found through regular checkups. The visits also give you and your doctor time to build a good relationship.  It's common to see your doctor every 4 weeks until week 28 of pregnancy. Then the visits will happen more often. From weeks 28 to 36, it's common to have visits every 2 to 3 weeks. In the final month of pregnancy, you likely will see your doctor every week. Your doctor may want to see you more or less often, depending on your health, your age, and if you've had a normal, full-term pregnancy before.  At different times in your pregnancy, you will have exams and tests. Some are routine. Others are done only when there is a chance of a problem. Everything healthy you do for your body helps you have a healthy pregnancy. Rest when you need it. Eat well, drink plenty of water, and exercise regularly.  What happens during a prenatal visit?  You will have blood pressure checks, along with urine tests. You also may have blood tests. If you need to go to the bathroom while waiting for the doctor, tell the nurse. You will be given a sample cup so your urine can be tested.  You will be weighed and have your belly measured.  Your doctor may listen to the fetal heartbeat with a special device.  At about 24 weeks, and possibly earlier in your pregnancy, your doctor will check your blood sugar (glucose tolerance test) for diabetes that can occur during pregnancy. This is gestational diabetes, which can be harmful.  You will have tests to check for infections that could harm your  ". These include group B streptococcus and hepatitis B.  Your doctor may do ultrasounds to check for problems. This also checks the position of the fetus. An ultrasound uses sound waves to produce a picture of the fetus.  You may get your vaccines updated.  Your doctor may ask you questions to check for signs of anxiety or depression. Tell your doctor if you feel sad, anxious, or hopeless for more than a few days.  You may have other tests at any time during your pregnancy.  Use your visits to discuss with your doctor any concerns you have.  How can you care for yourself at home?  Get plenty of rest.  Try to exercise every day, if your doctor says it is okay. If you have not exercised in the past, start out slowly. For example, you can take short walks each day.  Choose healthy foods, such as fruits, vegetables, whole grains, lean proteins, low-fat dairy, and healthy fats.  Drink plenty of fluids. Cut down on drinks with caffeine, such as coffee, tea, and cola. If you have kidney, heart, or liver disease and have to limit fluids, talk with your doctor before you increase the amount of fluids you drink.  Try to avoid chemical fumes, paint fumes, and poisons.  If you smoke, vape, or use alcohol, marijuana, or other drugs, quit or cut back as much as you can. Talk to your doctor if you need help quitting.  Review all of your medicines, including over-the-counter medicines and supplements, with your doctor. Some of your routine medicines may need to be changed. Do not stop or start taking any medicines without talking to your doctor first.  Follow-up care is a key part of your treatment and safety. Be sure to make and go to all appointments, and call your doctor if you are having problems. It's also a good idea to know your test results and keep a list of the medicines you take.  Where can you learn more?  Go to https://www.healthwise.net/patiented  Enter J502 in the search box to learn more about \"Learning About " "Prenatal Visits.\"  Current as of: July 11, 2023               Content Version: 13.8    8941-1504 [employer].   Care instructions adapted under license by your healthcare professional. If you have questions about a medical condition or this instruction, always ask your healthcare professional. [employer] disclaims any warranty or liability for your use of this information.      Intimate Partner Violence: Care Instructions  Overview     If you want to save this information but don't think it is safe to take it home, see if a trusted friend can keep it for you. Plan ahead. Know who you can call for help, and memorize the phone number.   Be careful online too. Your online activity may be seen by others. Do not use your personal computer or device to read about this topic. Use a safe computer, such as one at work, a friend's home, or a library.    Intimate partner violence--a type of domestic abuse--is different from an argument now and then. It is a pattern of abuse that one person may use to control another person's behavior. It may start with threats and name-calling. Then, it may lead to more serious acts, like pushing and slapping. The abuse also may occur in other areas. For example, the abuser may withhold money or spend a partner's money without their knowledge.  Abuse can cause serious harm. You are more likely to have a long-term health problem from the injuries and stress of living in a violent relationship. People who are sexually abused by their partners have more sexually transmitted infections and unplanned pregnancies. Anyone who is abused also faces emotional pain. Anyone can be abused in relationships. In some relationships, both people use abusive behavior.  If you are pregnant, abuse can cause problems such as poor weight gain, infections, and bleeding. Abuse during this time may increase your baby's risk of low birth weight, premature birth, and death.  Follow-up care is a " "key part of your treatment and safety. Be sure to make and go to all appointments, and call your doctor if you are having problems. It's also a good idea to know your test results and keep a list of the medicines you take.  How can you care for yourself at home?  If you do not have a safe place to stay, discuss this with your doctor before you leave.  Have a plan for where to go, how to leave your home, and where to stay in case of an emergency. Do not tell your partner about your plan. Contact:  The National Domestic Violence Hotline toll-free at 1-444.256.8807. They can help you find resources in your area.  Your local police department, hospital, or clinic for information about shelters and safe homes near you.  Talk to a trusted friend or neighbor, a counselor, or a jose leader. Do not feel that you have to hide what happened.  Teach your children how to call for help in an emergency.  Be alert to warning signs, such as threats, heavy alcohol use, or drug use. This can help you avoid danger.  If you can, make sure that there are no guns or other weapons in your home.  When should you call for help?   Call 911 anytime you think you may need emergency care. For example, call if:    You or someone else has just been abused.     You think you or someone else is in danger of being abused.   Watch closely for changes in your health, and be sure to contact your doctor if you have any problems.  Where can you learn more?  Go to https://www.[employer].net/patiented  Enter G282 in the search box to learn more about \"Intimate Partner Violence: Care Instructions.\"  Current as of: June 25, 2023               Content Version: 13.8    6439-2410 [employer].   Care instructions adapted under license by your healthcare professional. If you have questions about a medical condition or this instruction, always ask your healthcare professional. [employer] disclaims any warranty or liability for your use " of this information.      Intimate Partner Violence Safety Instructions: Care Instructions  Overview     If you want to save this information but don't think it is safe to take it home, see if a trusted friend can keep it for you. Plan ahead. Know who you can call for help, and memorize the phone number.   Be careful online too. Your online activity may be seen by others. Do not use your personal computer or device to read about this topic. Use a safe computer, such as one at work, a friend's home, or a library.    When you are abused by a spouse or partner, you can take actions to protect yourself and your children.  You can increase your safety whether you decide to stay with your spouse or partner or you decide to leave. You may want to make a safety plan and pack a bag ahead of time. This will help you leave quickly when you decide to. Remember, you cannot change your partner's actions, but you can find help for you and your children. No one deserves to be abused.  Follow-up care is a key part of your treatment and safety. Be sure to make and go to all appointments, and call your doctor if you are having problems. It's also a good idea to know your test results and keep a list of the medicines you take.  How can you care for yourself at home?  Make a plan for your safety   If you decide to stay with your abusive spouse or partner, you can do the following to increase your safety:  Decide what works best to keep you safe in an emergency.  Know who you can call to help you in an emergency.  Decide if you will call the police if you get hurt again. If you can, agree on a signal with your children or neighbor to call the police for you if you need help. You can flash lights or hang something out of a window.  Choose a safe place to go for a short time if you need to leave home. Memorize the address and phone number.  Learn escape routes out of your home in case you need to leave in a hurry. Teach your children  different ways to get out of your home quickly if they need to.  If you can, hide or lock up things that can be used as weapons (guns, knives, hammers).  Learn the number of a domestic violence shelter. Talk to the people there about how they can help.  Find out about other community resources that can help you.  Take pictures of bruises or other injuries if you can. You can also take pictures of things your abuser has broken.  Teach your children that violence is never okay. Tell them that they do not deserve to be hurt.  Pack a bag   Prepare a bag with things you will need if you leave suddenly. Leave it with a friend, a relative, or someone else you trust. You could include the following items in the bag:  A set of keys to your home and car.  Emergency phone numbers and addresses.  Money such as cash or checks. You can also ask a friend, a relative, or someone else you trust to hold money for you.  Copies of legal documents such as house and car titles or rent receipts, birth certificates, Social Security card, voter registration, marriage and 's licenses, and your children's health records.  Personal items you would need for a few days, such as clothes, a toothbrush, toothpaste, and any medicines you or your children need.  A favorite toy or book for your child or children.  Diapers and bottles, if you have very young children.  Pictures that show signs of abuse and violence. You may also add pictures of your abuser.  If you leave   If you decide to leave, you can take the following steps:  Go to the emergency room at a hospital if you have been hurt.  Think about asking the police to be with you as you leave. They can protect you as you leave your home.  If you decide to leave secretly, remember that activities can be tracked. Your abuser may still have access to your cell phone, email, and credit cards. It may be possible for these to be traced. Always be aware of your surroundings.  If this is an  "emergency, do not worry about gathering up anything. Just leave--your safety is most important.  If your abuser moves out, change the locks on the doors. If you have a security system, change the access code.  When should you call for help?   Call 911 anytime you think you may need emergency care. For example, call if:    You or someone else has just been abused.     You think you or someone else is in danger of being abused.   Watch closely for changes in your health, and be sure to contact your doctor if you have any problems.  Where can you learn more?  Go to https://www.[employer].net/patiented  Enter A752 in the search box to learn more about \"Intimate Partner Violence Safety Instructions: Care Instructions.\"  Current as of: June 25, 2023               Content Version: 13.8    9672-5478 [employer].   Care instructions adapted under license by your healthcare professional. If you have questions about a medical condition or this instruction, always ask your healthcare professional. [employer] disclaims any warranty or liability for your use of this information.      Learning About Intimate Partner Violence  What is intimate partner violence?  Intimate partner violence is a type of domestic abuse. It's threatening, emotionally harmful, or violent behavior in a personal relationship. It can happen between past or current partners or spouses. In some relationships both people abuse each other. One partner may be more abusive. Or the abuse may be equal.  Abuse can affect people of any ethnic group, race, or [place]. It can affect teens, adults, or the elderly. And it can happen to people of any sexual orientation, gender, or social status.  Abusers use fear, bullying, and threats to control their partners. They may control what their partners do. They may control where their partners go or who they see. They may act jealous, controlling, or possessive. These early signs of abuse may " happen soon after the start of the relationship. Sometimes it can be hard to notice abuse at first. But after the relationship becomes more serious, the abuse may get worse.  If you are being abused in your relationship, it's important to get help. The abuse is not your fault. You don't have to face it alone.  Be careful  It may not be safe to take home domestic abuse information like this handout. Some people ask a trusted friend to keep it for them. It's also important to plan ahead and to memorize the phone number of places you can go for help. If you are concerned about your safety, do not use your computer, smartphone, or tablet to read about domestic abuse.   What are the types of intimate partner violence?  Abuse can happen in different ways. Each type can happen on its own or in combination with others.  Emotional abuse  Emotional abuse is a pattern of threats, insults, or controlling behavior. It includes verbal abuse. It goes beyond healthy disagreements in a relationship. It's a sign of an unhealthy relationship.  Do you feel threatened, intimidated, or controlled?  Does your partner:  Threaten your children, other family members, or pets?  Use jokes meant to embarrass or shame you?  Call you names?  Tell you that you are a bad parent?  Threaten to take away your children?  Threaten to have you or your family members deported?  Control your access to money or other basic needs?  Control what you do, who you see or talk to, or where you go?  Another form of emotional abuse is denying that it is happening. Or the abuser may act like the abuse is no big deal or is your fault.  Sexual abuse  With sexual abuse, abusers may try to convince or force you to have sex. They may force you into sex acts you're not comfortable with. Or they may sexually assault you. Sexual abuse can happen even if you are in a committed relationship.  Physical abuse  Physical abuse means that a partner hits, kicks, or does something  else to physically hurt you. Physical abuse that starts with a slap might lead to kicking, shoving, and choking over time. The abuser may also threaten to hurt or kill you.  Stalking  Stalking means that an abuser gives you attention that you do not want and that causes you fear. Examples of stalking include:  Following you.  Showing up at places where the abuser isn't invited, such as at your work or school.  Constantly calling or texting you.  What problems can  to?  Intimate partner violence can be very dangerous. It can cause serious, repeated injury. It can even lead to death.  All forms of abuse can cause long-term health problems from the stress of a violent relationship. Verbal abuse can lead to sexual and physical abuse.  Abuse causes:  Emotional pain.  Depression.  Anxiety.  Post-traumatic stress.  Sexual abuse can lead to sexually transmitted infections (such as HIV/AIDS) and unplanned pregnancy.  Pregnancy can be a very dangerous time for people in abusive relationships. Abuse can cause or increase the risk of problems during pregnancy. These include low weight gain, anemia, infections, and bleeding. Abuse may also increase your baby's risk of low birth weight, premature birth, and death.  It can be hard for some victims of abuse to ask for help or to leave their relationship. You may feel scared, stuck, or not sure what steps to take. But it's important not to ignore abuse. Talking to someone you trust could be the first step to ending the abuse and taking care of your own health and happiness again. There are resources available that can help keep you safe.  Where can you get help?  Talk to a trusted friend. Find a local advocacy group, or talk to your doctor about the abuse.  Contact the National Domestic Violence Hotline at 4-602-461-PAJM (1-130.571.3328) for more safety tips. They can guide you to groups in your area that can help. Or go to the National Coalition Against Domestic Violence  "website at www.[employer]" to learn more.  Domestic violence groups or a counselor in your area can help you make a safety plan for yourself and your children.  When to call for help  Call 911 anytime you think you may need emergency care. For example, call if:  You think that you or someone you know is in danger of being abused.  You have been hurt and can't have someone safely take you to emergency care.  You have just been abused.  A family member has just been abused.  Where can you learn more?  Go to https://www.[employer].net/patiented  Enter S665 in the search box to learn more about \"Learning About Intimate Partner Violence.\"  Current as of: June 25, 2023               Content Version: 13.8    2358-2661 [employer].   Care instructions adapted under license by your healthcare professional. If you have questions about a medical condition or this instruction, always ask your healthcare professional. [employer] disclaims any warranty or liability for your use of this information.      Vaginal Bleeding During Pregnancy: Care Instructions  Overview     It's common to have some vaginal spotting when you are pregnant. In some cases, the bleeding isn't serious. And there aren't any more problems with the pregnancy.  But sometimes bleeding is a sign of a more serious problem. This is more common if the bleeding is heavy or painful. Examples of more serious problems include miscarriage, an ectopic pregnancy, and a problem with the placenta.  You may have to see your doctor again to be sure everything is okay. You may also need more tests to find the cause of the bleeding.  Home treatment may be all you need. But it depends on what is causing the bleeding. Be sure to tell your doctor if you have any new symptoms or if your symptoms get worse.  The doctor has checked you carefully, but problems can develop later. If you notice any problems or new symptoms, get medical treatment right " away.  Follow-up care is a key part of your treatment and safety. Be sure to make and go to all appointments, and call your doctor if you are having problems. It's also a good idea to know your test results and keep a list of the medicines you take.  How can you care for yourself at home?  If your doctor prescribed medicines, take them exactly as directed. Call your doctor if you think you are having a problem with your medicine.  Do not have vaginal sex until your doctor says it's okay.  Do not put anything in your vagina until your doctor says it's okay.  Ask your doctor about other activities you can or can't do.  Get a lot of rest. Being pregnant can make you tired.  Do not use nonsteroidal anti-inflammatory drugs (NSAIDs), such as ibuprofen (Advil, Motrin), naproxen (Aleve), or aspirin, unless your doctor says it is okay.  When should you call for help?   Call 911 anytime you think you may need emergency care. For example, call if:    You passed out (lost consciousness).     You have severe vaginal bleeding. This means you are soaking through a pad each hour for 2 or more hours.     You have sudden, severe pain in your belly or pelvis.   Call your doctor now or seek immediate medical care if:    You have new or worse vaginal bleeding.     You are dizzy or lightheaded, or you feel like you may faint.     You have pain in your belly, pelvis, or lower back.     You think that you are in labor.     You have a sudden release of fluid from your vagina.     You've been having regular contractions for an hour. This means that you've had at least 8 contractions within 1 hour or at least 4 contractions within 20 minutes, even after you change your position and drink fluids.     You notice that your baby has stopped moving or is moving much less than normal.   Watch closely for changes in your health, and be sure to contact your doctor if you have any problems.  Where can you learn more?  Go to  "https://www.[employer].net/patiented  Enter N829 in the search box to learn more about \"Vaginal Bleeding During Pregnancy: Care Instructions.\"  Current as of: July 11, 2023               Content Version: 13.8    [id number] [employer].   Care instructions adapted under license by your healthcare professional. If you have questions about a medical condition or this instruction, always ask your healthcare professional. [employer] disclaims any warranty or liability for your use of this information.      Circumcision in Infants: What to Expect at Home  Your Child's Recovery  After circumcision, your baby's penis may look red and swollen. It may have petroleum jelly and gauze on it. The gauze will likely come off when your baby urinates. Follow your doctor's directions about whether to put clean gauze back on your baby's penis or to leave the gauze off. If you need to remove gauze from the penis, use warm water to soak the gauze and gently loosen it.  The doctor may have used a Plastibell device to do the circumcision. If so, your baby will have a plastic ring around the head of the penis. The ring should fall off by itself in 10 to 12 days.  A thin, yellow film may form over the area the day after the procedure. This is part of the normal healing process. It should go away in a few days.  Your baby may seem fussy while the area heals. It may hurt for your baby to urinate. This pain often gets better in 3 or 4 days. But it may last for up to 2 weeks.  Even though your baby's penis will likely start to feel better after 3 or 4 days, it may look worse. The penis often starts to look like it's getting better after about 7 to 10 days.  This care sheet gives you a general idea about how long it will take for your child to recover. But each child recovers at a different pace. Follow the steps below to help your child get better as quickly as possible.  How can you care for your child at " home?  Activity    Let your baby rest as much as possible. Sleeping will help with recovery.     You can give your baby a sponge bath the day after surgery. Ask your doctor when it is okay to give your baby a bath.   Medicines    Your doctor will tell you if and when your child can restart any medicines. The doctor will also give you instructions about your child taking any new medicines.     Your doctor may recommend giving your baby acetaminophen (Tylenol) to help with pain after the procedure. Be safe with medicines. Give your child medicines exactly as prescribed. Call your doctor if you think your child is having a problem with a medicine.     Do not give your child two or more pain medicines at the same time unless the doctor told you to. Many pain medicines have acetaminophen, which is Tylenol. Too much acetaminophen (Tylenol) can be harmful.   Circumcision care    Always wash your hands before and after touching the circumcision area.     Gently wash your baby's penis with plain, warm water after each diaper change, and pat it dry. Do not use soap. Don't use hydrogen peroxide or alcohol. They can slow healing.     Do not try to remove the film that forms on the penis. The film will go away on its own.     Put plenty of petroleum jelly (such as Vaseline) on the circumcision area during each diaper change. This will prevent your baby's penis from sticking to the diaper while it heals.     Fasten your baby's diapers loosely so that there is less pressure on the penis while it heals.   Follow-up care is a key part of your child's treatment and safety. Be sure to make and go to all appointments, and call your doctor if your child is having problems. It's also a good idea to know your child's test results and keep a list of the medicines your child takes.  When should you call for help?   Call your doctor now or seek immediate medical care if:    Your baby has a fever over 100.4 F.     Your baby is extremely fussy  "or irritable, has a high-pitched cry, or refuses to eat.     Your baby does not have a wet diaper within 12 hours after the circumcision.     You find a spot of bleeding larger than a 2-inch [place] from the incision.     Your baby has signs of infection. Signs may include severe swelling; redness; a red streak on the shaft of the penis; or a thick, yellow discharge.   Watch closely for changes in your child's health, and be sure to contact your doctor if:    A Plastibell device was used for the circumcision and the ring has not fallen off after 10 to 12 days.   Where can you learn more?  Go to https://www.[employer].net/patiented  Enter S255 in the search box to learn more about \"Circumcision in Infants: What to Expect at Home.\"  Current as of: February 28, 2023               Content Version: 13.8    5227-2711 [employer].   Care instructions adapted under license by your healthcare professional. If you have questions about a medical condition or this instruction, always ask your healthcare professional. [employer] disclaims any warranty or liability for your use of this information.      "

## 2023-11-24 ENCOUNTER — APPOINTMENT (OUTPATIENT)
Dept: ULTRASOUND IMAGING | Facility: CLINIC | Age: 28
End: 2023-11-24
Attending: FAMILY MEDICINE
Payer: COMMERCIAL

## 2023-11-24 ENCOUNTER — HOSPITAL ENCOUNTER (EMERGENCY)
Facility: CLINIC | Age: 28
Discharge: HOME | End: 2023-11-25
Attending: FAMILY MEDICINE | Admitting: FAMILY MEDICINE
Payer: COMMERCIAL

## 2023-11-24 VITALS
OXYGEN SATURATION: 96 % | RESPIRATION RATE: 18 BRPM | HEART RATE: 55 BPM | DIASTOLIC BLOOD PRESSURE: 93 MMHG | WEIGHT: 275 LBS | BODY MASS INDEX: 38.35 KG/M2 | TEMPERATURE: 98 F | SYSTOLIC BLOOD PRESSURE: 136 MMHG

## 2023-11-24 DIAGNOSIS — Z34.90 INTRAUTERINE PREGNANCY: ICD-10-CM

## 2023-11-24 DIAGNOSIS — O20.9 VAGINAL BLEEDING AFFECTING EARLY PREGNANCY: ICD-10-CM

## 2023-11-24 LAB
ALBUMIN UR-MCNC: NEGATIVE MG/DL
APPEARANCE UR: ABNORMAL
BASOPHILS # BLD AUTO: 0.1 10E3/UL (ref 0–0.2)
BASOPHILS NFR BLD AUTO: 0 %
BILIRUB UR QL STRIP: NEGATIVE
COLOR UR AUTO: YELLOW
EOSINOPHIL # BLD AUTO: 0.5 10E3/UL (ref 0–0.7)
EOSINOPHIL NFR BLD AUTO: 4 %
ERYTHROCYTE [DISTWIDTH] IN BLOOD BY AUTOMATED COUNT: 13.6 % (ref 10–15)
GLUCOSE UR STRIP-MCNC: NEGATIVE MG/DL
HCG UR QL: POSITIVE
HCT VFR BLD AUTO: 40.4 % (ref 35–47)
HGB BLD-MCNC: 13.5 G/DL (ref 11.7–15.7)
HGB UR QL STRIP: ABNORMAL
IMM GRANULOCYTES # BLD: 0.1 10E3/UL
IMM GRANULOCYTES NFR BLD: 1 %
KETONES UR STRIP-MCNC: NEGATIVE MG/DL
LEUKOCYTE ESTERASE UR QL STRIP: ABNORMAL
LYMPHOCYTES # BLD AUTO: 2.9 10E3/UL (ref 0.8–5.3)
LYMPHOCYTES NFR BLD AUTO: 21 %
MCH RBC QN AUTO: 27.4 PG (ref 26.5–33)
MCHC RBC AUTO-ENTMCNC: 33.4 G/DL (ref 31.5–36.5)
MCV RBC AUTO: 82 FL (ref 78–100)
MONOCYTES # BLD AUTO: 1.1 10E3/UL (ref 0–1.3)
MONOCYTES NFR BLD AUTO: 8 %
MUCOUS THREADS #/AREA URNS LPF: PRESENT /LPF
NEUTROPHILS # BLD AUTO: 9.1 10E3/UL (ref 1.6–8.3)
NEUTROPHILS NFR BLD AUTO: 66 %
NITRATE UR QL: NEGATIVE
NRBC # BLD AUTO: 0 10E3/UL
NRBC BLD AUTO-RTO: 0 /100
PH UR STRIP: 6 [PH] (ref 5–7)
PLATELET # BLD AUTO: 314 10E3/UL (ref 150–450)
RBC # BLD AUTO: 4.93 10E6/UL (ref 3.8–5.2)
RBC URINE: 1 /HPF
SP GR UR STRIP: 1.02 (ref 1–1.03)
SQUAMOUS EPITHELIAL: 2 /HPF
UROBILINOGEN UR STRIP-MCNC: NORMAL MG/DL
WBC # BLD AUTO: 13.7 10E3/UL (ref 4–11)
WBC URINE: 4 /HPF

## 2023-11-24 PROCEDURE — 84702 CHORIONIC GONADOTROPIN TEST: CPT | Performed by: FAMILY MEDICINE

## 2023-11-24 PROCEDURE — 99284 EMERGENCY DEPT VISIT MOD MDM: CPT | Mod: 25 | Performed by: FAMILY MEDICINE

## 2023-11-24 PROCEDURE — 85025 COMPLETE CBC W/AUTO DIFF WBC: CPT | Performed by: FAMILY MEDICINE

## 2023-11-24 PROCEDURE — 99284 EMERGENCY DEPT VISIT MOD MDM: CPT | Performed by: FAMILY MEDICINE

## 2023-11-24 PROCEDURE — 36415 COLL VENOUS BLD VENIPUNCTURE: CPT | Performed by: FAMILY MEDICINE

## 2023-11-24 PROCEDURE — 81025 URINE PREGNANCY TEST: CPT | Performed by: FAMILY MEDICINE

## 2023-11-24 PROCEDURE — 76801 OB US < 14 WKS SINGLE FETUS: CPT

## 2023-11-24 PROCEDURE — 81001 URINALYSIS AUTO W/SCOPE: CPT | Performed by: FAMILY MEDICINE

## 2023-11-24 ASSESSMENT — ACTIVITIES OF DAILY LIVING (ADL): ADLS_ACUITY_SCORE: 35

## 2023-11-25 ENCOUNTER — MYC MEDICAL ADVICE (OUTPATIENT)
Dept: FAMILY MEDICINE | Facility: CLINIC | Age: 28
End: 2023-11-25
Payer: COMMERCIAL

## 2023-11-25 LAB — HCG INTACT+B SERPL-ACNC: ABNORMAL MIU/ML

## 2023-11-25 NOTE — ED TRIAGE NOTES
Pt reports she is probably around 5-6 weeks pregnant.      Triage Assessment (Adult)       Row Name 11/24/23 1645          Triage Assessment    Airway WDL WDL        Respiratory WDL    Respiratory WDL WDL        Cardiac WDL    Cardiac WDL WDL

## 2023-11-25 NOTE — ED TRIAGE NOTES
Vaginal bleeding last couple days but today worsening bleeding and 'heavy nausea'.      Triage Assessment (Adult)       Row Name 11/24/23 6337          Triage Assessment    Airway WDL WDL        Respiratory WDL    Respiratory WDL WDL        Cardiac WDL    Cardiac WDL WDL

## 2023-11-25 NOTE — DISCHARGE INSTRUCTIONS
We are not sure why you are having vaginal bleeding.  This could be due to implantation of the placenta or possible threatened miscarriage.  Your expected due date is July 13, 2024.  Avoid intercourse and any heavy lifting or activity.  Drink plenty of fluids to stay hydrated and try to rest for the next 1 to 2 days.  Follow-up with Dr. Price as planned.  Return to the emergency department at any time if your symptoms worsen.

## 2023-11-25 NOTE — ED PROVIDER NOTES
[facility] ED Provider Note   Patient: Lidia Gallegos  MRN #:  [id number]  Date of Visit: November 24, 2023    CC:     Chief Complaint   Patient presents with    Vaginal Bleeding     HPI:  Lidia Gallegos is a 28 year old female O negative blood type, G2, P1 at approximately 5-6 weeks gestation who presented to the emergency department with pelvic pain and vaginal bleeding.  She started to have some spotting on Tuesday, and bleeding has increased.  Patient's last menstrual period was early October.  She had a previous miscarriage in April.  She may have had another early miscarriage at another time.  Patient has some nausea but no vomiting.  She reports lower abdominal pain rated 4 out of 10.  She had a home pregnancy test on Tuesday, 10 days ago.  Bleeding is slightly less than a regular period.    Problem List:  Patient Active Problem List    Diagnosis Date Noted    Major depressive disorder, recurrent episode, moderate (H) 08/02/2023     Priority: Medium    Autism spectrum disorder 08/02/2023     Priority: Medium    Prediabetes 03/22/2023     Priority: Medium    Anxiety with depression 11/15/2017     Priority: Medium    Chronic bilateral low back pain without sciatica 03/28/2016     Priority: Medium       Past Medical History:   Diagnosis Date    Anxiety     Autism     Depression     History of anemia        MEDS: amoxicillin-clavulanate (AUGMENTIN) 875-125 MG tablet  cyclobenzaprine (FLEXERIL) 10 MG tablet  oxyCODONE (ROXICODONE) 5 MG tablet  Prenatal Vit-Fe Fumarate-FA (PRENATAL MULTIVITAMIN W/IRON) 27-0.8 MG tablet  venlafaxine (EFFEXOR XR) 75 MG 24 hr capsule        ALLERGIES:  No Known Allergies    Past Surgical History:   Procedure Laterality Date    INNER EAR SURGERY Right     age 2    WISDOM TOOTH EXTRACTION         Social History     Tobacco Use    Smoking status: Former     Types: Cigarettes     Quit date: 12/30/2022     Years since  quittin.9    Smokeless tobacco: Never   Vaping Use    Vaping Use: Never used   Substance Use Topics    Alcohol use: Yes     Comment: occ    Drug use: Never         Review of Systems   Except as noted in HPI, all other systems were reviewed and are negative    Physical Exam   Vitals were reviewed  Patient Vitals for the past 12 hrs:   BP Temp Temp src Pulse Resp SpO2 Weight   23 2240 (!) 136/93 98  F (36.7  C) Temporal 55 18 96 % 124.7 kg (275 lb)     GENERAL APPEARANCE: Alert and oriented x 3, no acute distress  FACE: normal facies  EYES: Pupils are equal  HENT: normal external exam  NECK: no adenopathy or asymmetry  RESP: normal respiratory effort; clear breath sounds bilaterally  CV: regular rate and rhythm; no significant murmurs, gallops or rubs  ABD: soft, obese, mild lower abdominal tenderness; no localization to the left or right lower quadrant; no rebound or guarding; bowel sounds are normal  SKIN: no worrisome rash  NEURO: no facial droop; no focal deficits, speech is normal        Available Lab/Imaging Results     Results for orders placed or performed during the hospital encounter of 23 (from the past 24 hour(s))   CBC with platelets differential    Narrative    The following orders were created for panel order CBC with platelets differential.  Procedure                               Abnormality         Status                     ---------                               -----------         ------                     CBC with platelets and d...[[id number]]  Abnormal            Final result                 Please view results for these tests on the individual orders.   HCG quantitative pregnancy (blood)   Result Value Ref Range    hCG Quantitative 36,026 (H) <5 mIU/mL   CBC with platelets and differential   Result Value Ref Range    WBC Count 13.7 (H) 4.0 - 11.0 10e3/uL    RBC Count 4.93 3.80 - 5.20 10e6/uL    Hemoglobin 13.5 11.7 - 15.7 g/dL    Hematocrit 40.4 35.0 - 47.0 %    MCV 82 78 - 100  fL    MCH 27.4 26.5 - 33.0 pg    MCHC 33.4 31.5 - 36.5 g/dL    RDW 13.6 10.0 - 15.0 %    Platelet Count 314 150 - 450 10e3/uL    % Neutrophils 66 %    % Lymphocytes 21 %    % Monocytes 8 %    % Eosinophils 4 %    % Basophils 0 %    % Immature Granulocytes 1 %    NRBCs per 100 WBC 0 <1 /100    Absolute Neutrophils 9.1 (H) 1.6 - 8.3 10e3/uL    Absolute Lymphocytes 2.9 0.8 - 5.3 10e3/uL    Absolute Monocytes 1.1 0.0 - 1.3 10e3/uL    Absolute Eosinophils 0.5 0.0 - 0.7 10e3/uL    Absolute Basophils 0.1 0.0 - 0.2 10e3/uL    Absolute Immature Granulocytes 0.1 <=0.4 10e3/uL    Absolute NRBCs 0.0 10e3/uL   UA with Microscopic reflex to Culture    Specimen: Urine, Midstream   Result Value Ref Range    Color Urine Yellow Colorless, Straw, Light Yellow, Yellow    Appearance Urine Slightly Cloudy (A) Clear    Glucose Urine Negative Negative mg/dL    Bilirubin Urine Negative Negative    Ketones Urine Negative Negative mg/dL    Specific Gravity Urine 1.016 1.003 - 1.035    Blood Urine Moderate (A) Negative    pH Urine 6.0 5.0 - 7.0    Protein Albumin Urine Negative Negative mg/dL    Urobilinogen Urine Normal Normal, 2.0 mg/dL    Nitrite Urine Negative Negative    Leukocyte Esterase Urine Trace (A) Negative    Mucus Urine Present (A) None Seen /LPF    RBC Urine 1 <=2 /HPF    WBC Urine 4 <=5 /HPF    Squamous Epithelials Urine 2 (H) <=1 /HPF    Narrative    Urine Culture not indicated   HCG qualitative urine (UPT)   Result Value Ref Range    hCG Urine Qualitative Positive (A) Negative   US OB < 14 Weeks w Transvaginal    Narrative    EXAM: US OB <14 WEEKS WITH TRANSVAGINAL SINGLE  LOCATION: [facility]  DATE: 11/24/2023    INDICATION: Vaginal bleeding and cramping.  COMPARISON: None available.  TECHNIQUE: Transabdominal scans were performed. Endovaginal ultrasound was performed to better visualize the embryo.    FINDINGS:  UTERUS: Single normal appearing intrauterine gestation sac.  CRL: Measures 0.8  cm, equals 6 weeks and 6 days.  FETAL HEART RATE: 128 bpm.  AMNIOTIC FLUID: Normal.  PLACENTA: Not yet formed. No evidence for sub-chorionic hemorrhage.    RIGHT OVARY: Measures 2.7 x 1.7 x 2.5 cm.  LEFT OVARY: Measures 4.3 x 2.5 x 3.3 cm. There is approximately 2.6 x 2.2 x 2.5 cm anechoic focus in the left ovary, likely represents physiological/corpus luteal cyst.      Impression    IMPRESSION:   1.  Single living intrauterine gestation at 6 weeks and 6 days, EDC 07/13/2024.                Impression     Final diagnoses:   Vaginal bleeding affecting early pregnancy   Intrauterine pregnancy at 6 weeks 6 days.         ED Course & Medical Decision Making   Lidia Gallegos is a 28 year old female G2, P1 at approximately 4-6 weeks gestation who presented to the emergency department with 3-day history of spotting, and now heavier bleeding amounting to almost a regular period associated with pelvic pain and cramping of 4/10 pain level.  Patient had a previous miscarriage in April of this year at approximately 4-5 weeks gestation.  Patient is known to have O Negative blood type.  Patient is accompanied by her .  Last menstrual period was in early October.  No prior history of ectopic pregnancies.    Vital signs reveal a temp of 98 degrees, blood pressure 136/93, heart rate of 55, respiratory rate of 18 with 96% oxygen saturation.  Exam reveals some diffuse lower abdominal tenderness.  No rebound or guarding over the adnexa.    Laboratory workup reveals a white blood count of 13.7, hemoglobin of 13.5, platelet count of 314,000.  Urine pregnancy test is positive.  Urinalysis reveals 1 red blood cell, 4 white blood cells and 2 squamous epithelial cells.  OB ultrasound reveals a single living intrauterine gestation at 6 weeks and 6 days with EDC of July 13, 2024.  Fetal heart rate of 128.  Placenta is not yet formed.  No evidence for subchorionic hemorrhage.  Left ovary reveals a likely physiological/corpus luteal cyst.   Quantitative pregnancy levels pending.    Results were discussed with the patient.  She and her  are planning to see Dr. Price in about a week.  Continue to monitor for further symptoms.  The cause of her bleeding may be due to implantation bleed or still possible threatened miscarriage.  Avoid intercourse, or any heavy lifting or activity.  See discharge instructions below.        Written after-visit summary and instructions were given at the time of discharge.    Follow up Plan:   Deino Price MD  [address] [place] [address]  [phone]    In 1 week      [facility] Emergency Dept  [address] [place] [id number] [phone]    If symptoms worsen      Discharge Instructions:   We are not sure why you are having vaginal bleeding.  This could be due to implantation of the placenta or possible threatened miscarriage.  Your expected due date is July 13, 2024.  Avoid intercourse and any heavy lifting or activity.  Drink plenty of fluids to stay hydrated and try to rest for the next 1 to 2 days.  Follow-up with Dr. Price as planned.  Return to the emergency department at any time if your symptoms worsen.       Disclaimer: This note consists of words and symbols derived from keyboarding and dictation using voice recognition software.  As a result, there may be errors that have gone undetected.  Please consider this when interpreting information found in this note.       Louisa Wells MD  11/25/23 0135

## 2023-11-27 ASSESSMENT — ENCOUNTER SYMPTOMS
PARESTHESIAS: 0
DIZZINESS: 0
CHILLS: 0
NERVOUS/ANXIOUS: 0
EYE PAIN: 0
HEADACHES: 0
ARTHRALGIAS: 0
MYALGIAS: 0
HEMATURIA: 0
WEAKNESS: 0
DIARRHEA: 0
NAUSEA: 1
HEMATOCHEZIA: 0
SHORTNESS OF BREATH: 0
FREQUENCY: 0
BREAST MASS: 0
HEARTBURN: 1
COUGH: 0
DYSURIA: 0
CONSTIPATION: 0
FEVER: 0
ABDOMINAL PAIN: 0
JOINT SWELLING: 0
SORE THROAT: 0
PALPITATIONS: 0

## 2023-11-27 NOTE — TELEPHONE ENCOUNTER
Patient is on my schedule for this Friday for 1st OB.  I no longer do OB and cannot see her for this issue.  Please reschedule her with another provider that does OB.      Was in ER and needs follow-up this week, so does not have to be a 1st OB given she is still only 8w0d, if this helps with scheduling.    Deion Price MD

## 2023-12-01 ENCOUNTER — OFFICE VISIT (OUTPATIENT)
Dept: FAMILY MEDICINE | Facility: CLINIC | Age: 28
End: 2023-12-01
Attending: FAMILY MEDICINE
Payer: COMMERCIAL

## 2023-12-01 VITALS
BODY MASS INDEX: 40.28 KG/M2 | HEIGHT: 70 IN | RESPIRATION RATE: 18 BRPM | WEIGHT: 281.38 LBS | HEART RATE: 81 BPM | SYSTOLIC BLOOD PRESSURE: 112 MMHG | OXYGEN SATURATION: 97 % | DIASTOLIC BLOOD PRESSURE: 78 MMHG | TEMPERATURE: 98.1 F

## 2023-12-01 DIAGNOSIS — O21.9 NAUSEA AND VOMITING IN PREGNANCY: ICD-10-CM

## 2023-12-01 DIAGNOSIS — O20.9 VAGINAL BLEEDING AFFECTING EARLY PREGNANCY: Primary | ICD-10-CM

## 2023-12-01 DIAGNOSIS — K21.00 GASTROESOPHAGEAL REFLUX DISEASE WITH ESOPHAGITIS WITHOUT HEMORRHAGE: ICD-10-CM

## 2023-12-01 PROCEDURE — 99213 OFFICE O/P EST LOW 20 MIN: CPT | Performed by: FAMILY MEDICINE

## 2023-12-01 RX ORDER — OMEPRAZOLE 40 MG/1
40 CAPSULE, DELAYED RELEASE ORAL DAILY
Qty: 30 CAPSULE | Refills: 1 | Status: SHIPPED | OUTPATIENT
Start: 2023-12-01 | End: 2024-02-24

## 2023-12-01 RX ORDER — ONDANSETRON 4 MG/1
4 TABLET, ORALLY DISINTEGRATING ORAL EVERY 8 HOURS PRN
Qty: 10 TABLET | Refills: 3 | Status: SHIPPED | OUTPATIENT
Start: 2023-12-01 | End: 2024-02-01

## 2023-12-01 ASSESSMENT — ENCOUNTER SYMPTOMS
HEMATURIA: 0
DIARRHEA: 0
HEMATOCHEZIA: 0
FREQUENCY: 0
COUGH: 0
PALPITATIONS: 0
NERVOUS/ANXIOUS: 0
EYE PAIN: 0
SORE THROAT: 0
WEAKNESS: 0
CONSTIPATION: 0
BREAST MASS: 0
HEADACHES: 0
FEVER: 0
CHILLS: 0
DIZZINESS: 0
HEARTBURN: 1
MYALGIAS: 0
ABDOMINAL PAIN: 0
PARESTHESIAS: 0
JOINT SWELLING: 0
NAUSEA: 1
SHORTNESS OF BREATH: 0
DYSURIA: 0
ARTHRALGIAS: 0

## 2023-12-01 ASSESSMENT — PAIN SCALES - GENERAL: PAINLEVEL: NO PAIN (0)

## 2023-12-01 NOTE — PROGRESS NOTES
ASSESSMENT/ORDERS:    ICD-10-CM    1. Vaginal bleeding affecting early pregnancy  O20.9       2. Nausea and vomiting in pregnancy  O21.9 ondansetron (ZOFRAN ODT) 4 MG ODT tab      3. Gastroesophageal reflux disease with esophagitis without hemorrhage  K21.00 omeprazole (PRILOSEC) 40 MG DR capsule        PLAN:  1. Zofran prescribed for patients nausea in the first trimester.  2. Prescription strength omeprazole prescribed for patients worsening existing GERD in the first trimester.   3. Reviewed patients labs and ultrasound from ED visit, coupled with patients improved symptoms, are reassuring she does not need acute care interventions. Patient advised to continue with planned OB visit on 12/13/23 with Dr. Sahara Roberto.    Case was discussed with Dr. Sahara Roberto in clinic and agreed to current medications and plan to continue with first OB visit on 12/13/23.    Patient was seen and examined by myself and Deion Price MD. The note was then scribed by me.   Geno Corera, MS3    This patient was seen and examined by myself as well as the medical student.  The medical student scribed the note and I have reviewed it, edited it appropriately, and agree with the final documentation.     Deion Price MD     Libia Murillo is a 28 year old, presenting for the following health issues:  Hospital F/U (ED follow up)        12/1/2023     2:47 PM   Additional Questions   Roomed by Luz Marina       Healthy Habits:     Getting at least 3 servings of Calcium per day:  Yes    Bi-annual eye exam:  Yes    Dental care twice a year:  Yes    Sleep apnea or symptoms of sleep apnea:  None    Diet:  Regular (no restrictions)    Frequency of exercise:  2-3 days/week    Duration of exercise:  30-45 minutes    Taking medications regularly:  Yes    Medication side effects:  Not applicable    Additional concerns today:  No         ED/UC Followup:    Facility:  [facility] Emergency Dept  Date of visit:  11/24/23   Reason for visit: Vaginal bleeding   Current Status: Pt reports feeling better.     Patient is following up after an ED visit on11/24/23 for vaginal bleeding early in her pregnancy. Patient notes that spotting started 11/21/23, was very faint and then on 11/23/23 started to have heavier bleeding which she described as being period like. Since her ED visit it has tapered and stopped 11/27/23. She has had no bleeding since Monday. No cramping. She has been experiencing bloating, nausea, and heartburn. She has taken Zofran and omeprazole in the past and found them helpful but would need refills. No headache or changes in vision, no swelling.     In the ED she had an ultrasound confirming a intrauterine pregnancy without evidence of subchorionic hemorrhage and HCG levels of 36,026.    Patient reports higher stress load as it is final week. She is in college at  [employer] for her masters in molecular cellular and biology. She has a first OB visit scheduled with Dr. Sahara Roberto for 12/13/23.     Review of Systems   Constitutional:  Negative for chills and fever.   HENT:  Negative for congestion, ear pain, hearing loss and sore throat.    Eyes:  Negative for pain and visual disturbance.   Respiratory:  Negative for cough and shortness of breath.    Cardiovascular:  Negative for chest pain, palpitations and peripheral edema.   Gastrointestinal:  Positive for heartburn and nausea. Negative for abdominal pain, constipation, diarrhea and hematochezia.   Breasts:  Negative for tenderness, breast mass and discharge.   Genitourinary:  Negative for dysuria, frequency, genital sores, hematuria, pelvic pain, urgency, vaginal bleeding and vaginal discharge.   Musculoskeletal:  Negative for arthralgias, joint swelling and myalgias.   Skin:  Negative for rash.   Neurological:  Negative for dizziness, weakness, headaches and paresthesias.   Psychiatric/Behavioral:  Negative for mood changes. The patient is  "not nervous/anxious.           Objective    /78   Pulse 81   Temp 98.1  F (36.7  C) (Tympanic)   Resp 18   Ht 1.785 m (5' 10.28\")   Wt 127.6 kg (281 lb 6 oz)   LMP 10/02/2023   SpO2 97%   BMI 40.06 kg/m    Body mass index is 40.06 kg/m .  Physical Exam   GENERAL: healthy, alert and no distress  RESP: lungs clear to auscultation - no rales, rhonchi or wheezes  CV: regular rate and rhythm, normal S1 S2, no S3 or S4, no murmur, click or rub, no peripheral edema and peripheral pulses strong    Admission on 11/24/2023, Discharged on 11/25/2023   Component Date Value Ref Range Status    Color Urine 11/24/2023 Yellow  Colorless, Straw, Light Yellow, Yellow Final    Appearance Urine 11/24/2023 Slightly Cloudy (A)  Clear Final    Glucose Urine 11/24/2023 Negative  Negative mg/dL Final    Bilirubin Urine 11/24/2023 Negative  Negative Final    Ketones Urine 11/24/2023 Negative  Negative mg/dL Final    Specific Gravity Urine 11/24/2023 1.016  1.003 - 1.035 Final    Blood Urine 11/24/2023 Moderate (A)  Negative Final    pH Urine 11/24/2023 6.0  5.0 - 7.0 Final    Protein Albumin Urine 11/24/2023 Negative  Negative mg/dL Final    Urobilinogen Urine 11/24/2023 Normal  Normal, 2.0 mg/dL Final    Nitrite Urine 11/24/2023 Negative  Negative Final    Leukocyte Esterase Urine 11/24/2023 Trace (A)  Negative Final    Mucus Urine 11/24/2023 Present (A)  None Seen /LPF Final    RBC Urine 11/24/2023 1  <=2 /HPF Final    WBC Urine 11/24/2023 4  <=5 /HPF Final    Squamous Epithelials Urine 11/24/2023 2 (H)  <=1 /HPF Final    hCG Urine Qualitative 11/24/2023 Positive (A)  Negative Final    This test is for screening purposes.  Results should be interpreted along with the clinical picture.  Confirmation testing is available if warranted by ordering [id number], HCG Quantitative Pregnancy.    hCG Quantitative 11/24/2023 36,026 (H)  <5 mIU/mL Final    Adult: 0-5 mIU/mL for healthy non-pregnant person  Neonates: Should be within normal " ranges by 2 days after birth      WBC Count 11/24/2023 13.7 (H)  4.0 - 11.0 10e3/uL Final    RBC Count 11/24/2023 4.93  3.80 - 5.20 10e6/uL Final    Hemoglobin 11/24/2023 13.5  11.7 - 15.7 g/dL Final    Hematocrit 11/24/2023 40.4  35.0 - 47.0 % Final    MCV 11/24/2023 82  78 - 100 fL Final    MCH 11/24/2023 27.4  26.5 - 33.0 pg Final    MCHC 11/24/2023 33.4  31.5 - 36.5 g/dL Final    RDW 11/24/2023 13.6  10.0 - 15.0 % Final    Platelet Count 11/24/2023 314  150 - 450 10e3/uL Final    % Neutrophils 11/24/2023 66  % Final    % Lymphocytes 11/24/2023 21  % Final    % Monocytes 11/24/2023 8  % Final    % Eosinophils 11/24/2023 4  % Final    % Basophils 11/24/2023 0  % Final    % Immature Granulocytes 11/24/2023 1  % Final    NRBCs per 100 WBC 11/24/2023 0  <1 /100 Final    Absolute Neutrophils 11/24/2023 9.1 (H)  1.6 - 8.3 10e3/uL Final    Absolute Lymphocytes 11/24/2023 2.9  0.8 - 5.3 10e3/uL Final    Absolute Monocytes 11/24/2023 1.1  0.0 - 1.3 10e3/uL Final    Absolute Eosinophils 11/24/2023 0.5  0.0 - 0.7 10e3/uL Final    Absolute Basophils 11/24/2023 0.1  0.0 - 0.2 10e3/uL Final    Absolute Immature Granulocytes 11/24/2023 0.1  <=0.4 10e3/uL Final    Absolute NRBCs 11/24/2023 0.0  10e3/uL Final

## 2023-12-08 ENCOUNTER — HOSPITAL ENCOUNTER (EMERGENCY)
Facility: CLINIC | Age: 28
Discharge: HOME | End: 2023-12-08
Attending: STUDENT IN AN ORGANIZED HEALTH CARE EDUCATION/TRAINING PROGRAM | Admitting: STUDENT IN AN ORGANIZED HEALTH CARE EDUCATION/TRAINING PROGRAM
Payer: COMMERCIAL

## 2023-12-08 ENCOUNTER — APPOINTMENT (OUTPATIENT)
Dept: ULTRASOUND IMAGING | Facility: CLINIC | Age: 28
End: 2023-12-08
Attending: STUDENT IN AN ORGANIZED HEALTH CARE EDUCATION/TRAINING PROGRAM
Payer: COMMERCIAL

## 2023-12-08 VITALS
TEMPERATURE: 98.1 F | SYSTOLIC BLOOD PRESSURE: 143 MMHG | HEART RATE: 82 BPM | OXYGEN SATURATION: 98 % | RESPIRATION RATE: 20 BRPM | HEIGHT: 71 IN | DIASTOLIC BLOOD PRESSURE: 89 MMHG | BODY MASS INDEX: 39.1 KG/M2 | WEIGHT: 279.3 LBS

## 2023-12-08 DIAGNOSIS — O20.9 VAGINAL BLEEDING AFFECTING EARLY PREGNANCY: ICD-10-CM

## 2023-12-08 LAB
ABO/RH(D): NORMAL
ANION GAP SERPL CALCULATED.3IONS-SCNC: 13 MMOL/L (ref 7–15)
ANTIBODY SCREEN: NEGATIVE
BASOPHILS # BLD AUTO: 0 10E3/UL (ref 0–0.2)
BASOPHILS NFR BLD AUTO: 0 %
BUN SERPL-MCNC: 8.7 MG/DL (ref 6–20)
CALCIUM SERPL-MCNC: 8.7 MG/DL (ref 8.6–10)
CHLORIDE SERPL-SCNC: 103 MMOL/L (ref 98–107)
CREAT SERPL-MCNC: 0.76 MG/DL (ref 0.51–0.95)
DEPRECATED HCO3 PLAS-SCNC: 20 MMOL/L (ref 22–29)
EGFRCR SERPLBLD CKD-EPI 2021: >90 ML/MIN/1.73M2
EOSINOPHIL # BLD AUTO: 0.4 10E3/UL (ref 0–0.7)
EOSINOPHIL NFR BLD AUTO: 4 %
ERYTHROCYTE [DISTWIDTH] IN BLOOD BY AUTOMATED COUNT: 13.8 % (ref 10–15)
GLUCOSE SERPL-MCNC: 119 MG/DL (ref 70–99)
HCT VFR BLD AUTO: 40.2 % (ref 35–47)
HGB BLD-MCNC: 13.7 G/DL (ref 11.7–15.7)
IMM GRANULOCYTES # BLD: 0.1 10E3/UL
IMM GRANULOCYTES NFR BLD: 1 %
LYMPHOCYTES # BLD AUTO: 2.2 10E3/UL (ref 0.8–5.3)
LYMPHOCYTES NFR BLD AUTO: 20 %
MCH RBC QN AUTO: 27.8 PG (ref 26.5–33)
MCHC RBC AUTO-ENTMCNC: 34.1 G/DL (ref 31.5–36.5)
MCV RBC AUTO: 82 FL (ref 78–100)
MONOCYTES # BLD AUTO: 0.8 10E3/UL (ref 0–1.3)
MONOCYTES NFR BLD AUTO: 7 %
NEUTROPHILS # BLD AUTO: 7.5 10E3/UL (ref 1.6–8.3)
NEUTROPHILS NFR BLD AUTO: 68 %
NRBC # BLD AUTO: 0 10E3/UL
NRBC BLD AUTO-RTO: 0 /100
PLATELET # BLD AUTO: 351 10E3/UL (ref 150–450)
POTASSIUM SERPL-SCNC: 3.8 MMOL/L (ref 3.4–5.3)
RBC # BLD AUTO: 4.93 10E6/UL (ref 3.8–5.2)
SODIUM SERPL-SCNC: 136 MMOL/L (ref 135–145)
SPECIMEN EXPIRATION DATE: NORMAL
WBC # BLD AUTO: 10.9 10E3/UL (ref 4–11)

## 2023-12-08 PROCEDURE — 80048 BASIC METABOLIC PNL TOTAL CA: CPT | Performed by: STUDENT IN AN ORGANIZED HEALTH CARE EDUCATION/TRAINING PROGRAM

## 2023-12-08 PROCEDURE — 250N000011 HC RX IP 250 OP 636: Mod: JZ | Performed by: STUDENT IN AN ORGANIZED HEALTH CARE EDUCATION/TRAINING PROGRAM

## 2023-12-08 PROCEDURE — 76801 OB US < 14 WKS SINGLE FETUS: CPT

## 2023-12-08 PROCEDURE — 86901 BLOOD TYPING SEROLOGIC RH(D): CPT | Performed by: STUDENT IN AN ORGANIZED HEALTH CARE EDUCATION/TRAINING PROGRAM

## 2023-12-08 PROCEDURE — 99285 EMERGENCY DEPT VISIT HI MDM: CPT | Mod: 25 | Performed by: STUDENT IN AN ORGANIZED HEALTH CARE EDUCATION/TRAINING PROGRAM

## 2023-12-08 PROCEDURE — 85025 COMPLETE CBC W/AUTO DIFF WBC: CPT | Performed by: STUDENT IN AN ORGANIZED HEALTH CARE EDUCATION/TRAINING PROGRAM

## 2023-12-08 PROCEDURE — 86850 RBC ANTIBODY SCREEN: CPT | Performed by: STUDENT IN AN ORGANIZED HEALTH CARE EDUCATION/TRAINING PROGRAM

## 2023-12-08 PROCEDURE — 99284 EMERGENCY DEPT VISIT MOD MDM: CPT | Performed by: STUDENT IN AN ORGANIZED HEALTH CARE EDUCATION/TRAINING PROGRAM

## 2023-12-08 PROCEDURE — 36415 COLL VENOUS BLD VENIPUNCTURE: CPT | Performed by: STUDENT IN AN ORGANIZED HEALTH CARE EDUCATION/TRAINING PROGRAM

## 2023-12-08 PROCEDURE — 96372 THER/PROPH/DIAG INJ SC/IM: CPT

## 2023-12-08 RX ADMIN — HUMAN RHO(D) IMMUNE GLOBULIN 300 MCG: 1500 SOLUTION INTRAMUSCULAR; INTRAVENOUS at 12:51

## 2023-12-08 ASSESSMENT — ACTIVITIES OF DAILY LIVING (ADL)
ADLS_ACUITY_SCORE: 35
ADLS_ACUITY_SCORE: 35

## 2023-12-08 NOTE — Clinical Note
Lidia Gallegos was seen and treated in our emergency department on 12/8/2023.  She may return to work on 12/10/2023.       If you have any questions or concerns, please don't hesitate to call.      Candelario Bob MD

## 2023-12-08 NOTE — DISCHARGE INSTRUCTIONS
You were found to have a subchorionic hemorrhage today which is likely the cause of your vaginal bleeding.  Otherwise your ultrasound and lab work was very reassuring.  I do not see signs of significant blood loss today.  You received RhoGAM in the emergency department due to your Rh- blood type.  Please follow-up with your obstetrician as scheduled next week.  You should avoid sexual intercourse and continue pelvic rest until told otherwise by your doctor.  Return to the emergency department immediately in the meantime for any new or worsening symptoms, particularly any continued bleeding, development of pain.

## 2023-12-08 NOTE — ED PROVIDER NOTES
History     Chief Complaint   Patient presents with    Vaginal Bleeding - Pregnant     HPI  Lidia Gallegos is a 28 year old female with history of autism, anxiety/depression, GERD presents for evaluation of vaginal bleeding in pregnancy.  Patient is approximately 10 weeks gestation based on first trimester ultrasound.  She did have 1 episode of spotting earlier in the pregnancy 2 to 3 weeks ago and was evaluated in the emergency department at that time.  Ultrasound confirmed an intrauterine pregnancy.  She has not had any additional spotting until this morning, when she awoke with significant vaginal bleeding.  Patient states this is the same amount as the first day of her period.  She otherwise feels well, denies any abdominal or pelvic pain, nausea or vomiting, lightheadedness or shortness of breath, changes in bowel or urinary habits, or other complaints today.    Allergies:  No Known Allergies    Problem List:    Patient Active Problem List    Diagnosis Date Noted    Gastroesophageal reflux disease with esophagitis without hemorrhage 12/01/2023     Priority: Medium    Major depressive disorder, recurrent episode, moderate (H) 08/02/2023     Priority: Medium    Autism spectrum disorder 08/02/2023     Priority: Medium    Prediabetes 03/22/2023     Priority: Medium    Anxiety with depression 11/15/2017     Priority: Medium    Chronic bilateral low back pain without sciatica 03/28/2016     Priority: Medium        Past Medical History:    Past Medical History:   Diagnosis Date    Anxiety     Autism     Depression     History of anemia        Past Surgical History:    Past Surgical History:   Procedure Laterality Date    INNER EAR SURGERY Right     age 2    WISDOM TOOTH EXTRACTION         Family History:    Family History   Problem Relation Age of Onset    No Known Problems Mother     Hypertension Father     Mental Illness Sister         bipolar    Attention Deficit Disorder Sister     Birth defects Brother          "bladder - reportedly related to nuchal cord    Cancer Maternal Grandfather     Diabetes Paternal Grandmother     Diabetes Paternal Grandfather     Kidney failure Paternal Grandfather        Social History:  Marital Status:   [2]  Social History     Tobacco Use    Smoking status: Former     Types: Cigarettes     Quit date: 2022     Years since quittin.9    Smokeless tobacco: Never   Vaping Use    Vaping Use: Never used   Substance Use Topics    Alcohol use: Yes     Comment: occ    Drug use: Never        Medications:    omeprazole (PRILOSEC) 40 MG DR capsule  ondansetron (ZOFRAN ODT) 4 MG ODT tab  Prenatal Vit-Fe Fumarate-FA (PRENATAL MULTIVITAMIN W/IRON) 27-0.8 MG tablet      Review of Systems   All other systems reviewed and are negative.  See HPI.    Physical Exam   BP: (!) 142/81  Pulse: 87  Temp: 98.1  F (36.7  C)  Resp: 20  Height: 180.3 cm (5' 11\")  Weight: 126.7 kg (279 lb 4.8 oz)  SpO2: 98 %      Physical Exam  Vitals and nursing note reviewed.   Constitutional:       General: She is not in acute distress.     Appearance: Normal appearance. She is obese. She is not ill-appearing, toxic-appearing or diaphoretic.      Comments: Sitting comfortably on bed, no acute distress.   HENT:      Head: Atraumatic.      Mouth/Throat:      Mouth: Mucous membranes are moist.   Eyes:      General: No scleral icterus.     Extraocular Movements: Extraocular movements intact.      Conjunctiva/sclera: Conjunctivae normal.      Pupils: Pupils are equal, round, and reactive to light.   Cardiovascular:      Rate and Rhythm: Normal rate and regular rhythm.      Pulses: Normal pulses.      Heart sounds: Normal heart sounds. No murmur heard.  Pulmonary:      Effort: No respiratory distress.      Breath sounds: Normal breath sounds.   Abdominal:      General: Abdomen is flat. There is no distension.      Tenderness: There is no abdominal tenderness. There is no guarding or rebound.   Genitourinary:     Comments: Exam " deferred as she does not have active bleeding or other related symptoms.  Musculoskeletal:      Cervical back: Neck supple.   Skin:     General: Skin is warm.      Capillary Refill: Capillary refill takes less than 2 seconds.      Findings: No rash.   Neurological:      General: No focal deficit present.      Mental Status: She is alert and oriented to person, place, and time.   Psychiatric:         Mood and Affect: Mood normal.         ED Course             Procedures              Results for orders placed or performed during the hospital encounter of 12/08/23 (from the past 24 hour(s))   CBC with platelets differential    Narrative    The following orders were created for panel order CBC with platelets differential.  Procedure                               Abnormality         Status                     ---------                               -----------         ------                     CBC with platelets and d...[[id number]]                      Final result                 Please view results for these tests on the individual orders.   ABO/Rh type and screen    Narrative    The following orders were created for panel order ABO/Rh type and screen.  Procedure                               Abnormality         Status                     ---------                               -----------         ------                     Adult Type and Screen[[id number]]                            Final result                 Please view results for these tests on the individual orders.   Basic metabolic panel   Result Value Ref Range    Sodium 136 135 - 145 mmol/L    Potassium 3.8 3.4 - 5.3 mmol/L    Chloride 103 98 - 107 mmol/L    Carbon Dioxide (CO2) 20 (L) 22 - 29 mmol/L    Anion Gap 13 7 - 15 mmol/L    Urea Nitrogen 8.7 6.0 - 20.0 mg/dL    Creatinine 0.76 0.51 - 0.95 mg/dL    GFR Estimate >90 >60 mL/min/1.73m2    Calcium 8.7 8.6 - 10.0 mg/dL    Glucose 119 (H) 70 - 99 mg/dL   CBC with platelets and differential   Result Value  Ref Range    WBC Count 10.9 4.0 - 11.0 10e3/uL    RBC Count 4.93 3.80 - 5.20 10e6/uL    Hemoglobin 13.7 11.7 - 15.7 g/dL    Hematocrit 40.2 35.0 - 47.0 %    MCV 82 78 - 100 fL    MCH 27.8 26.5 - 33.0 pg    MCHC 34.1 31.5 - 36.5 g/dL    RDW 13.8 10.0 - 15.0 %    Platelet Count 351 150 - 450 10e3/uL    % Neutrophils 68 %    % Lymphocytes 20 %    % Monocytes 7 %    % Eosinophils 4 %    % Basophils 0 %    % Immature Granulocytes 1 %    NRBCs per 100 WBC 0 <1 /100    Absolute Neutrophils 7.5 1.6 - 8.3 10e3/uL    Absolute Lymphocytes 2.2 0.8 - 5.3 10e3/uL    Absolute Monocytes 0.8 0.0 - 1.3 10e3/uL    Absolute Eosinophils 0.4 0.0 - 0.7 10e3/uL    Absolute Basophils 0.0 0.0 - 0.2 10e3/uL    Absolute Immature Granulocytes 0.1 <=0.4 10e3/uL    Absolute NRBCs 0.0 10e3/uL   Adult Type and Screen   Result Value Ref Range    ABO/RH(D) O NEG     Antibody Screen Negative Negative    SPECIMEN EXPIRATION DATE [id number]    US OB <14 Weeks W Transvaginal    Addendum: 12/8/2023    The EDC is 7/13/2024.    ISABEL GAN MD         SYSTEM ID:  [id number]      Narrative    US OB <14 WEEKS WITH TRANSVAGINAL SINGLE 12/8/2023 10:58 AM    HISTORY: First trimester vaginal bleeding    COMPARISON: 11/24/2023.    FINDINGS:   There is a single live intrauterine pregnancy with crown-rump length  of 2.1 cm. The sonographic gestational age based on this ultrasound is  8 weeks and 6 days. Fetal heart rate is 172 beats per minute.   Amniotic fluid is unable to be assessed due to early gestational age.  Placenta is not yet adequately visible due to early gestational age. A  small subchorionic hemorrhage is identified measuring 2.2 x 0.4 x 1.1  cm.    Maternal adnexa: The right ovary appears unremarkable measuring 0.1  0.8 x 1.5 x 2.9 cm. The left ovary measures 2.7 x 2.4 x 4.1 cm and  contains a corpus luteal cyst measuring 1.9 x 1.7 x 2.0 cm. No free  fluid in the pelvis.      Impression    IMPRESSION:   1. Single intrauterine gestation at 8  weeks and 6 days with EDC of  7/8/2024.  2. Small subchorionic hemorrhage.  3. Small left corpus luteal cyst.    ISABEL GAN MD         SYSTEM ID:  [id number]       Medications   rho(D) immune globulin (RHOPHYLAC) injection 300 mcg (300 mcg IV/IM $Given 12/8/23 1251)       Assessments & Plan (with Medical Decision Making)     I have reviewed the nursing notes.    I have reviewed the findings, diagnosis, plan and need for follow up with the patient.    Medical Decision Making  Lidia Gallegos is a 28 year old female with history of autism, anxiety/depression, GERD presents for evaluation of vaginal bleeding in pregnancy.  Mild hypertension on arrival, vitals otherwise normal.  Exam is very reassuring, she has no abdominal tenderness or rebound/guarding.  She has already had a verified IUP during evaluation a few weeks ago.  She will need RhoGAM based on reported blood type and Rh status.  Discussed various evaluation options, including blood work and ultrasound.  After reviewing options, patient elected to have imaging performed today.    Patient labs were very reassuring overall.  She has no anemia or leukocytosis.  Blood type is O-.  Transvaginal ultrasound showed a single IUP at 8 weeks and 6 days gestation, fetal heart tones in the 170s, along with a small subchorionic hemorrhage.  No other acute abnormalities were detected.  Findings were reviewed with the patient.  She did agree to receiving RhoGAM in the emergency department and this was administered without complication.  She has an existing appointment with her obstetrician next week.  Advised to continue pelvic rest until then and we also discussed very strict return precautions for any abdominal pain, significant bleeding, other new/worsening symptoms.    Discharge Medication List as of 12/8/2023  1:02 PM          Final diagnoses:   Vaginal bleeding affecting early pregnancy   Subchorionic hemorrhage of placenta in first trimester, single or  unspecified fetus       12/8/2023   [facility] EMERGENCY DEPT       Candelario Bob MD  12/08/23 4735

## 2023-12-08 NOTE — ED TRIAGE NOTES
Pt reports she is 10 weeks pregnant and this morning she woke to vaginal bleeding. Pt denies any cramping or the passing of any clots.      Triage Assessment (Adult)       Row Name 12/08/23 0934          Triage Assessment    Airway WDL WDL        Respiratory WDL    Respiratory WDL WDL        Skin Circulation/Temperature WDL    Skin Circulation/Temperature WDL WDL

## 2023-12-12 LAB
ABO/RH(D): ABNORMAL
ANTIBODY SCREEN: POSITIVE
SPECIMEN EXPIRATION DATE: ABNORMAL

## 2023-12-13 ENCOUNTER — PRENATAL OFFICE VISIT (OUTPATIENT)
Dept: FAMILY MEDICINE | Facility: CLINIC | Age: 28
End: 2023-12-13
Payer: COMMERCIAL

## 2023-12-13 VITALS
RESPIRATION RATE: 18 BRPM | HEART RATE: 85 BPM | OXYGEN SATURATION: 98 % | SYSTOLIC BLOOD PRESSURE: 126 MMHG | DIASTOLIC BLOOD PRESSURE: 70 MMHG | BODY MASS INDEX: 39.98 KG/M2 | WEIGHT: 279.25 LBS | HEIGHT: 70 IN | TEMPERATURE: 97.6 F

## 2023-12-13 DIAGNOSIS — Z33.1 PREGNANT STATE, INCIDENTAL: ICD-10-CM

## 2023-12-13 DIAGNOSIS — O26.891 RH NEGATIVE STATE IN ANTEPARTUM PERIOD, FIRST TRIMESTER: ICD-10-CM

## 2023-12-13 DIAGNOSIS — F33.1 MAJOR DEPRESSIVE DISORDER, RECURRENT EPISODE, MODERATE (H): ICD-10-CM

## 2023-12-13 DIAGNOSIS — F84.0 AUTISM SPECTRUM DISORDER: ICD-10-CM

## 2023-12-13 DIAGNOSIS — F41.8 ANXIETY WITH DEPRESSION: ICD-10-CM

## 2023-12-13 DIAGNOSIS — O09.90 SUPERVISION OF HIGH RISK PREGNANCY, ANTEPARTUM: Primary | ICD-10-CM

## 2023-12-13 DIAGNOSIS — Z67.91 RH NEGATIVE STATE IN ANTEPARTUM PERIOD, FIRST TRIMESTER: ICD-10-CM

## 2023-12-13 DIAGNOSIS — Z87.42 HISTORY OF ABNORMAL CERVICAL PAP SMEAR: ICD-10-CM

## 2023-12-13 DIAGNOSIS — E66.812 OBESITY, CLASS II, BMI 35-39.9: ICD-10-CM

## 2023-12-13 DIAGNOSIS — K21.00 GASTROESOPHAGEAL REFLUX DISEASE WITH ESOPHAGITIS WITHOUT HEMORRHAGE: ICD-10-CM

## 2023-12-13 DIAGNOSIS — R73.03 PREDIABETES: ICD-10-CM

## 2023-12-13 DIAGNOSIS — O09.291 HISTORY OF PREGNANCY LOSS IN PRIOR PREGNANCY, CURRENTLY PREGNANT IN FIRST TRIMESTER: ICD-10-CM

## 2023-12-13 LAB
ANTIBODY ID: NORMAL
CLUE CELLS: PRESENT
ERYTHROCYTE [DISTWIDTH] IN BLOOD BY AUTOMATED COUNT: 13.8 % (ref 10–15)
HBA1C MFR BLD: 5.8 %
HBV SURFACE AG SERPL QL IA: NONREACTIVE
HCT VFR BLD AUTO: 43.3 % (ref 35–47)
HCV AB SERPL QL IA: NONREACTIVE
HGB BLD-MCNC: 14.5 G/DL (ref 11.7–15.7)
HIV 1+2 AB+HIV1 P24 AG SERPL QL IA: NONREACTIVE
MCH RBC QN AUTO: 27.6 PG (ref 26.5–33)
MCHC RBC AUTO-ENTMCNC: 33.5 G/DL (ref 31.5–36.5)
MCV RBC AUTO: 83 FL (ref 78–100)
PLATELET # BLD AUTO: 389 10E3/UL (ref 150–450)
RBC # BLD AUTO: 5.25 10E6/UL (ref 3.8–5.2)
RUBV IGG SERPL QL IA: 3.9 INDEX
RUBV IGG SERPL QL IA: POSITIVE
SPECIMEN EXPIRATION DATE: NORMAL
T PALLIDUM AB SER QL: NONREACTIVE
TRICHOMONAS, WET PREP: ABNORMAL
WBC # BLD AUTO: 12.9 10E3/UL (ref 4–11)
WBC'S/HIGH POWER FIELD, WET PREP: ABNORMAL
YEAST, WET PREP: ABNORMAL

## 2023-12-13 PROCEDURE — 87389 HIV-1 AG W/HIV-1&-2 AB AG IA: CPT | Performed by: STUDENT IN AN ORGANIZED HEALTH CARE EDUCATION/TRAINING PROGRAM

## 2023-12-13 PROCEDURE — 85027 COMPLETE CBC AUTOMATED: CPT | Performed by: STUDENT IN AN ORGANIZED HEALTH CARE EDUCATION/TRAINING PROGRAM

## 2023-12-13 PROCEDURE — 87624 HPV HI-RISK TYP POOLED RSLT: CPT | Performed by: STUDENT IN AN ORGANIZED HEALTH CARE EDUCATION/TRAINING PROGRAM

## 2023-12-13 PROCEDURE — 86850 RBC ANTIBODY SCREEN: CPT | Performed by: STUDENT IN AN ORGANIZED HEALTH CARE EDUCATION/TRAINING PROGRAM

## 2023-12-13 PROCEDURE — 86900 BLOOD TYPING SEROLOGIC ABO: CPT | Performed by: STUDENT IN AN ORGANIZED HEALTH CARE EDUCATION/TRAINING PROGRAM

## 2023-12-13 PROCEDURE — 99213 OFFICE O/P EST LOW 20 MIN: CPT | Performed by: STUDENT IN AN ORGANIZED HEALTH CARE EDUCATION/TRAINING PROGRAM

## 2023-12-13 PROCEDURE — 88175 CYTOPATH C/V AUTO FLUID REDO: CPT | Performed by: STUDENT IN AN ORGANIZED HEALTH CARE EDUCATION/TRAINING PROGRAM

## 2023-12-13 PROCEDURE — 36415 COLL VENOUS BLD VENIPUNCTURE: CPT | Performed by: STUDENT IN AN ORGANIZED HEALTH CARE EDUCATION/TRAINING PROGRAM

## 2023-12-13 PROCEDURE — 86803 HEPATITIS C AB TEST: CPT | Performed by: STUDENT IN AN ORGANIZED HEALTH CARE EDUCATION/TRAINING PROGRAM

## 2023-12-13 PROCEDURE — 87086 URINE CULTURE/COLONY COUNT: CPT | Performed by: STUDENT IN AN ORGANIZED HEALTH CARE EDUCATION/TRAINING PROGRAM

## 2023-12-13 PROCEDURE — 83036 HEMOGLOBIN GLYCOSYLATED A1C: CPT | Performed by: STUDENT IN AN ORGANIZED HEALTH CARE EDUCATION/TRAINING PROGRAM

## 2023-12-13 PROCEDURE — 86870 RBC ANTIBODY IDENTIFICATION: CPT | Performed by: STUDENT IN AN ORGANIZED HEALTH CARE EDUCATION/TRAINING PROGRAM

## 2023-12-13 PROCEDURE — 87210 SMEAR WET MOUNT SALINE/INK: CPT | Performed by: STUDENT IN AN ORGANIZED HEALTH CARE EDUCATION/TRAINING PROGRAM

## 2023-12-13 PROCEDURE — 86901 BLOOD TYPING SEROLOGIC RH(D): CPT | Performed by: STUDENT IN AN ORGANIZED HEALTH CARE EDUCATION/TRAINING PROGRAM

## 2023-12-13 PROCEDURE — 87340 HEPATITIS B SURFACE AG IA: CPT | Performed by: STUDENT IN AN ORGANIZED HEALTH CARE EDUCATION/TRAINING PROGRAM

## 2023-12-13 PROCEDURE — 86780 TREPONEMA PALLIDUM: CPT | Performed by: STUDENT IN AN ORGANIZED HEALTH CARE EDUCATION/TRAINING PROGRAM

## 2023-12-13 PROCEDURE — 86762 RUBELLA ANTIBODY: CPT | Performed by: STUDENT IN AN ORGANIZED HEALTH CARE EDUCATION/TRAINING PROGRAM

## 2023-12-13 ASSESSMENT — PAIN SCALES - GENERAL: PAINLEVEL: NO PAIN (0)

## 2023-12-13 NOTE — PROGRESS NOTES
Concerns: seen in ED on  for vaginal bleeding. Evaluation was benign though imaging showed small subchorionic hemorrhage. Bleeding has since resolved. Denies all other symptoms.   Doing well.  No concerns today.  She is Rh NEGATIVE.  Will require RhoGam.  Will schedule anatomy ultrasound.  RTC 4 weeks.    INITIAL OB ASSESSMENT                           ---------------------     Date: 2023  Age: 28 year old   Plan Number: [id number]  Name: Lidia Gallegos    : 1995  Phone: [phone] (home)  Marital Status:co-habitating,    Race/Ethnicity:    Partner's Name: Robles    Partner's Occupation:  Stay at home dad.       OB History    Para Term  AB Living   2 0 0 0 1 0   SAB IAB Ectopic Multiple Live Births   0 0 0 0 0      # Outcome Date GA Lbr Henrique/2nd Weight Sex Delivery Anes PTL Lv   2 Current            1 AB 2023 4w3d             Obstetric Comments   EDC 2024 based on LMP and estimated.   to Robles.       Thinks may have had a prior pregnancy loss, but was never confirmed.   See Specialty History for details.    LMP: 10/2/23, Cycle length: 28 days  LMP Certain?:Yes  LMP Normal?: Yes       No Known Allergies     Current Outpatient Medications   Medication Sig Dispense Refill    omeprazole (PRILOSEC) 40 MG DR capsule Take 1 capsule (40 mg) by mouth daily 30 capsule 1    ondansetron (ZOFRAN ODT) 4 MG ODT tab Take 1 tablet (4 mg) by mouth every 8 hours as needed for nausea 10 tablet 3    Prenatal Vit-Fe Fumarate-FA (PRENATAL MULTIVITAMIN W/IRON) 27-0.8 MG tablet Take 1 tablet by mouth daily          Social History: Benign     Past Medical History of Father of Baby:   Hypertension and Diabetes fhx, no personal issues.     Past Medical History of Patient:  Past Medical History:   Diagnosis Date    Anxiety     Autism     Depression     History of anemia         Surgical History:       Past Surgical History:   Procedure Laterality Date    INNER EAR SURGERY Right     age 2     WISDOM TOOTH EXTRACTION          Family History:   Family History   Problem Relation Age of Onset    No Known Problems Mother     Hypertension Father     Mental Illness Sister         bipolar    Attention Deficit Disorder Sister     Birth defects Brother         bladder - reportedly related to nuchal cord    Cancer Maternal Grandfather     Diabetes Paternal Grandmother     Diabetes Paternal Grandfather     Kidney failure Paternal Grandfather         Genetic History: No known genectic disease in 1st or 2nd degree relatives     ROS negative unless otherwise specified above.     History Since Last Menstrual Period: Bleeding as above, resolved.        Paps abnormal last check in 2021 with: Low-grade squamous intraepithelial lesion (LSIL), encompassing HPV/mild dysplasia/LUCERO 1.   Recommend one today.         Assessment:      Plan:  Follow up in 4 weeks.  Restrict exercise/pelvic rest: discussion in reference to noted small subchorionic hemorrhage and uncertain implications for health of pregnany. Patient preferring to be cautious and pursue pelvic rest and avoidance of strenuous activity.   Prenatal vitamins.  Anticipated weight gain.    Pregnancy Issues:  (O09.90) Supervision of high risk pregnancy, antepartum  (primary encounter diagnosis)  (Z33.1) Pregnant state, incidental  Plan: Prenatal labs previously ordered and ppending.        Pap screen with HPV - recommended age 30 - 65         years, Wet prep - Clinic Collect, Chlamydia &         Gonorrhea by PCR, GICH/Range - Clinic Collect      (O26.891,  Z67.91) Rh negative state in antepartum period, first trimester  Comment: received rhogam in ED 12/8 for vaginal bleeding. Will need again in 3rd trimester.    (O09.291) History of pregnancy loss in prior pregnancy, currently pregnant in first trimester  Comment: Prior loss at approx 4.3. Possible additional loss, but that time did not have confirmed pregnancy.    (Z87.42) History of abnormal cervical Pap smear  Comment:  per chart review pap and colpo in 2021 with LSIL/CIN1 results. Will collect pap and wet prep today given history.  Plan: Pap screen with HPV - recommended age 30 - 65         years    (E66.9) Obesity, Class II, BMI 35-39.9  Comment: Recommend monitor weight gain to try and keep within limits of recommendations based on BMI.    (K21.00) Gastroesophageal reflux disease with esophagitis without hemorrhage  Comment: previously controlled with omeprazole. Can continue.    (R73.03) Prediabetes  Comment: recommend standard pregnancy screening protocol and management as indicated if develops GDM. Emphasize healthy diet and exercise.    (F41.8) Anxiety with depression  (F33.1) Major depressive disorder, recurrent episode, moderate (H)  Comment: No current medications. Doing well. Advised to report to clinic changes or concerns to permit support when needed.     (F84.0) Autism spectrum disorder       Prenatal care plan              - prior deliveries: none              - Labor pain management:  uncertain if desires epidural.              - Circumcision if boy: No              - Covid 19 vaccine: NO, will revisit in 3rd trimester              - influenza vaccine: NO, will revisit in 3rd trimester              - Tdap - will discuss in 3rd trimester        Sahara Maravilla DO

## 2023-12-14 LAB — BACTERIA UR CULT: NORMAL

## 2023-12-18 LAB
BKR LAB AP GYN ADEQUACY: NORMAL
BKR LAB AP GYN INTERPRETATION: NORMAL
BKR LAB AP HPV REFLEX: NORMAL
BKR LAB AP PREVIOUS ABNORMAL: NORMAL
PATH REPORT.COMMENTS IMP SPEC: NORMAL
PATH REPORT.COMMENTS IMP SPEC: NORMAL
PATH REPORT.RELEVANT HX SPEC: NORMAL

## 2023-12-19 LAB
HUMAN PAPILLOMA VIRUS 16 DNA: NEGATIVE
HUMAN PAPILLOMA VIRUS 18 DNA: NEGATIVE
HUMAN PAPILLOMA VIRUS FINAL DIAGNOSIS: NORMAL
HUMAN PAPILLOMA VIRUS OTHER HR: NEGATIVE

## 2023-12-20 PROBLEM — R87.612 PAPANICOLAOU SMEAR OF CERVIX WITH LOW GRADE SQUAMOUS INTRAEPITHELIAL LESION (LGSIL): Status: ACTIVE | Noted: 2021-06-22

## 2023-12-27 NOTE — TELEPHONE ENCOUNTER
Action 12.26.23 marimar   Action Taken Complete. Records in [facility].

## 2024-01-19 ENCOUNTER — PRENATAL OFFICE VISIT (OUTPATIENT)
Dept: FAMILY MEDICINE | Facility: CLINIC | Age: 29
End: 2024-01-19
Payer: COMMERCIAL

## 2024-01-19 VITALS
HEIGHT: 71 IN | DIASTOLIC BLOOD PRESSURE: 88 MMHG | WEIGHT: 279 LBS | TEMPERATURE: 97.5 F | SYSTOLIC BLOOD PRESSURE: 132 MMHG | OXYGEN SATURATION: 99 % | RESPIRATION RATE: 20 BRPM | HEART RATE: 88 BPM | BODY MASS INDEX: 39.06 KG/M2

## 2024-01-19 DIAGNOSIS — O09.90 SUPERVISION OF HIGH RISK PREGNANCY, ANTEPARTUM: Primary | ICD-10-CM

## 2024-01-19 DIAGNOSIS — F33.1 MAJOR DEPRESSIVE DISORDER, RECURRENT EPISODE, MODERATE (H): ICD-10-CM

## 2024-01-19 DIAGNOSIS — R09.81 NASAL CONGESTION: ICD-10-CM

## 2024-01-19 PROBLEM — E66.01 MORBID OBESITY (H): Status: ACTIVE | Noted: 2024-01-19

## 2024-01-19 PROCEDURE — 36415 COLL VENOUS BLD VENIPUNCTURE: CPT | Performed by: STUDENT IN AN ORGANIZED HEALTH CARE EDUCATION/TRAINING PROGRAM

## 2024-01-19 PROCEDURE — 99207 PR PRENATAL VISIT: CPT | Performed by: STUDENT IN AN ORGANIZED HEALTH CARE EDUCATION/TRAINING PROGRAM

## 2024-01-19 RX ORDER — FLUTICASONE PROPIONATE 50 MCG
1 SPRAY, SUSPENSION (ML) NASAL DAILY
Qty: 16 G | Refills: 1 | Status: SHIPPED | OUTPATIENT
Start: 2024-01-19

## 2024-01-19 ASSESSMENT — PAIN SCALES - GENERAL: PAINLEVEL: NO PAIN (0)

## 2024-01-21 ENCOUNTER — HOSPITAL ENCOUNTER (EMERGENCY)
Facility: CLINIC | Age: 29
Discharge: HOME | End: 2024-01-21
Attending: FAMILY MEDICINE | Admitting: FAMILY MEDICINE
Payer: COMMERCIAL

## 2024-01-21 VITALS
HEIGHT: 71 IN | OXYGEN SATURATION: 98 % | RESPIRATION RATE: 19 BRPM | HEART RATE: 106 BPM | DIASTOLIC BLOOD PRESSURE: 83 MMHG | WEIGHT: 276 LBS | BODY MASS INDEX: 38.64 KG/M2 | TEMPERATURE: 98.1 F | SYSTOLIC BLOOD PRESSURE: 110 MMHG

## 2024-01-21 DIAGNOSIS — O21.9 NAUSEA AND VOMITING IN PREGNANCY: ICD-10-CM

## 2024-01-21 LAB
ANION GAP SERPL CALCULATED.3IONS-SCNC: 12 MMOL/L (ref 7–15)
BASOPHILS # BLD AUTO: 0 10E3/UL (ref 0–0.2)
BASOPHILS NFR BLD AUTO: 0 %
BUN SERPL-MCNC: 9.4 MG/DL (ref 6–20)
CALCIUM SERPL-MCNC: 8.7 MG/DL (ref 8.6–10)
CHLORIDE SERPL-SCNC: 103 MMOL/L (ref 98–107)
CREAT SERPL-MCNC: 0.68 MG/DL (ref 0.51–0.95)
DEPRECATED HCO3 PLAS-SCNC: 20 MMOL/L (ref 22–29)
EGFRCR SERPLBLD CKD-EPI 2021: >90 ML/MIN/1.73M2
EOSINOPHIL # BLD AUTO: 0 10E3/UL (ref 0–0.7)
EOSINOPHIL NFR BLD AUTO: 0 %
ERYTHROCYTE [DISTWIDTH] IN BLOOD BY AUTOMATED COUNT: 14.3 % (ref 10–15)
GLUCOSE SERPL-MCNC: 130 MG/DL (ref 70–99)
HCT VFR BLD AUTO: 40.8 % (ref 35–47)
HGB BLD-MCNC: 14 G/DL (ref 11.7–15.7)
IMM GRANULOCYTES # BLD: 0.1 10E3/UL
IMM GRANULOCYTES NFR BLD: 1 %
LYMPHOCYTES # BLD AUTO: 0.7 10E3/UL (ref 0.8–5.3)
LYMPHOCYTES NFR BLD AUTO: 6 %
MCH RBC QN AUTO: 27.9 PG (ref 26.5–33)
MCHC RBC AUTO-ENTMCNC: 34.3 G/DL (ref 31.5–36.5)
MCV RBC AUTO: 81 FL (ref 78–100)
MONOCYTES # BLD AUTO: 0.6 10E3/UL (ref 0–1.3)
MONOCYTES NFR BLD AUTO: 5 %
NEUTROPHILS # BLD AUTO: 10.4 10E3/UL (ref 1.6–8.3)
NEUTROPHILS NFR BLD AUTO: 88 %
NRBC # BLD AUTO: 0 10E3/UL
NRBC BLD AUTO-RTO: 0 /100
PLATELET # BLD AUTO: 292 10E3/UL (ref 150–450)
POTASSIUM SERPL-SCNC: 4.5 MMOL/L (ref 3.4–5.3)
RBC # BLD AUTO: 5.01 10E6/UL (ref 3.8–5.2)
SODIUM SERPL-SCNC: 135 MMOL/L (ref 135–145)
WBC # BLD AUTO: 11.8 10E3/UL (ref 4–11)

## 2024-01-21 PROCEDURE — 85025 COMPLETE CBC W/AUTO DIFF WBC: CPT | Performed by: FAMILY MEDICINE

## 2024-01-21 PROCEDURE — 36415 COLL VENOUS BLD VENIPUNCTURE: CPT | Performed by: FAMILY MEDICINE

## 2024-01-21 PROCEDURE — 250N000011 HC RX IP 250 OP 636: Performed by: FAMILY MEDICINE

## 2024-01-21 PROCEDURE — 99284 EMERGENCY DEPT VISIT MOD MDM: CPT | Mod: 25 | Performed by: FAMILY MEDICINE

## 2024-01-21 PROCEDURE — 96374 THER/PROPH/DIAG INJ IV PUSH: CPT | Performed by: FAMILY MEDICINE

## 2024-01-21 PROCEDURE — 258N000003 HC RX IP 258 OP 636: Performed by: FAMILY MEDICINE

## 2024-01-21 PROCEDURE — 99284 EMERGENCY DEPT VISIT MOD MDM: CPT | Performed by: FAMILY MEDICINE

## 2024-01-21 PROCEDURE — 96361 HYDRATE IV INFUSION ADD-ON: CPT | Performed by: FAMILY MEDICINE

## 2024-01-21 PROCEDURE — 80048 BASIC METABOLIC PNL TOTAL CA: CPT | Performed by: FAMILY MEDICINE

## 2024-01-21 RX ORDER — ONDANSETRON 4 MG/1
4 TABLET, ORALLY DISINTEGRATING ORAL EVERY 8 HOURS PRN
Status: DISCONTINUED | OUTPATIENT
Start: 2024-01-21 | End: 2024-01-21 | Stop reason: HOSPADM

## 2024-01-21 RX ORDER — ONDANSETRON 4 MG/1
4 TABLET, ORALLY DISINTEGRATING ORAL EVERY 8 HOURS PRN
Qty: 10 TABLET | Refills: 0 | Status: SHIPPED | OUTPATIENT
Start: 2024-01-21 | End: 2024-02-29

## 2024-01-21 RX ORDER — ONDANSETRON 4 MG/1
4 TABLET, ORALLY DISINTEGRATING ORAL EVERY 8 HOURS PRN
Qty: 10 TABLET | Refills: 0 | Status: SHIPPED | OUTPATIENT
Start: 2024-01-21 | End: 2024-01-21

## 2024-01-21 RX ORDER — ONDANSETRON 4 MG/1
4 TABLET, ORALLY DISINTEGRATING ORAL EVERY 8 HOURS PRN
Qty: 2 TABLET | Refills: 0 | Status: SHIPPED | OUTPATIENT
Start: 2024-01-21 | End: 2024-02-29

## 2024-01-21 RX ORDER — ONDANSETRON 4 MG/1
4 TABLET, ORALLY DISINTEGRATING ORAL EVERY 8 HOURS PRN
Qty: 10 TABLET | Refills: 0 | Status: SHIPPED | OUTPATIENT
Start: 2024-01-21 | End: 2024-01-21 | Stop reason: ALTCHOICE

## 2024-01-21 RX ORDER — ONDANSETRON 2 MG/ML
4 INJECTION INTRAMUSCULAR; INTRAVENOUS EVERY 30 MIN PRN
Status: DISCONTINUED | OUTPATIENT
Start: 2024-01-21 | End: 2024-01-21 | Stop reason: HOSPADM

## 2024-01-21 RX ADMIN — SODIUM CHLORIDE 1000 ML: 9 INJECTION, SOLUTION INTRAVENOUS at 15:41

## 2024-01-21 RX ADMIN — ONDANSETRON 4 MG: 4 TABLET, ORALLY DISINTEGRATING ORAL at 18:13

## 2024-01-21 RX ADMIN — ONDANSETRON 4 MG: 2 INJECTION INTRAMUSCULAR; INTRAVENOUS at 15:44

## 2024-01-21 ASSESSMENT — ACTIVITIES OF DAILY LIVING (ADL)
ADLS_ACUITY_SCORE: 33
ADLS_ACUITY_SCORE: 35

## 2024-01-21 NOTE — ED TRIAGE NOTES
Reports N/V/D for 6 hours, states she is 16 weeks pregnant.      Triage Assessment (Adult)       Row Name 01/21/24 1459          Triage Assessment    Airway WDL WDL        Respiratory WDL    Respiratory WDL WDL        Skin Circulation/Temperature WDL    Skin Circulation/Temperature WDL WDL        Cardiac WDL    Cardiac WDL X;rhythm     Pulse Rate & Regularity tachycardic        Peripheral/Neurovascular WDL    Peripheral Neurovascular WDL WDL        Cognitive/Neuro/Behavioral WDL    Cognitive/Neuro/Behavioral WDL WDL

## 2024-01-21 NOTE — ED PROVIDER NOTES
History     Chief Complaint   Patient presents with    Emesis During Pregnancy     HPI  Lidia Gallegos is a 28 year old female who presents with nausea and vomiting for the last 6 to 8 hours.  Patient is currently 15 to 16 weeks pregnant.  She has had problems with your morning sickness throughout this pregnancy.  She used to have Zofran but did not have any currently.  She states that she has been unable to keep anything down.  She started to feel little lightheaded now.  Denies any recent fevers or chills.  Denies any current belly pain.  Denies any recent dysuria or hematuria.  Denies a recent cough or URI-like symptoms    Allergies:  No Known Allergies    Problem List:    Patient Active Problem List    Diagnosis Date Noted    Morbid obesity (H) 01/19/2024     Priority: Medium    Gastroesophageal reflux disease with esophagitis without hemorrhage 12/01/2023     Priority: Medium    Major depressive disorder, recurrent episode, moderate (H) 08/02/2023     Priority: Medium    Autism spectrum disorder 08/02/2023     Priority: Medium    Prediabetes 03/22/2023     Priority: Medium    Papanicolaou smear of cervix with low grade squamous intraepithelial lesion (LGSIL) 06/22/2021     Priority: Medium     9/21/18 NIL pap  6/22/21 LSIL pap  7/29/21 [place] bx: LUCERO 1  - [facility]  12/13/23 NIL Pap, Neg HR HPV @ 27 yo. Plan: cotest in 3 years.         Anxiety with depression 11/15/2017     Priority: Medium    Chronic bilateral low back pain without sciatica 03/28/2016     Priority: Medium        Past Medical History:    Past Medical History:   Diagnosis Date    Anxiety     Autism     Depression     History of anemia        Past Surgical History:    Past Surgical History:   Procedure Laterality Date    INNER EAR SURGERY Right     age 2    WISDOM TOOTH EXTRACTION         Family History:    Family History   Problem Relation Age of Onset    No Known Problems Mother     Hypertension Father     Mental Illness Sister          "bipolar    Attention Deficit Disorder Sister     Birth defects Brother         bladder - reportedly related to nuchal cord    Cancer Maternal Grandfather     Diabetes Paternal Grandmother     Diabetes Paternal Grandfather     Kidney failure Paternal Grandfather        Social History:  Marital Status:   [2]  Social History     Tobacco Use    Smoking status: Former     Types: Cigarettes     Quit date: 2022     Years since quittin.0    Smokeless tobacco: Never   Vaping Use    Vaping Use: Never used   Substance Use Topics    Alcohol use: Yes     Comment: occ    Drug use: Never        Medications:    ondansetron (ZOFRAN ODT) 4 MG ODT tab  fluticasone (FLONASE) 50 MCG/ACT nasal spray  omeprazole (PRILOSEC) 40 MG DR capsule  ondansetron (ZOFRAN ODT) 4 MG ODT tab  Prenatal Vit-Fe Fumarate-FA (PRENATAL MULTIVITAMIN W/IRON) 27-0.8 MG tablet          Review of Systems   All other systems reviewed and are negative.      Physical Exam   BP: 128/81  Pulse: (!) 129  Temp: 98.1  F (36.7  C)  Resp: 20  Height: 180.3 cm (5' 11\")  Weight: 125.2 kg (276 lb)  SpO2: 100 %      Physical Exam  Vitals and nursing note reviewed.   Constitutional:       General: She is not in acute distress.     Appearance: She is well-developed. She is not diaphoretic.   HENT:      Head: Normocephalic and atraumatic.      Nose: Nose normal.      Mouth/Throat:      Pharynx: No oropharyngeal exudate.   Eyes:      Conjunctiva/sclera: Conjunctivae normal.   Cardiovascular:      Rate and Rhythm: Normal rate and regular rhythm.      Heart sounds: Normal heart sounds. No murmur heard.     No friction rub.   Pulmonary:      Effort: Pulmonary effort is normal. No respiratory distress.      Breath sounds: Normal breath sounds. No stridor. No wheezing or rales.   Abdominal:      General: Bowel sounds are normal. There is no distension.      Palpations: Abdomen is soft. There is no mass.      Tenderness: There is no abdominal tenderness. There is no " guarding.   Musculoskeletal:         General: No tenderness. Normal range of motion.      Cervical back: Normal range of motion and neck supple.   Skin:     General: Skin is warm and dry.      Capillary Refill: Capillary refill takes less than 2 seconds.      Findings: No erythema.   Neurological:      Mental Status: She is alert and oriented to person, place, and time.   Psychiatric:         Judgment: Judgment normal.         ED Course                 Procedures           Results for orders placed or performed during the hospital encounter of 01/21/24 (from the past 24 hour(s))   CBC with platelets differential    Narrative    The following orders were created for panel order CBC with platelets differential.  Procedure                               Abnormality         Status                     ---------                               -----------         ------                     CBC with platelets and d...[[id number]]  Abnormal            Final result                 Please view results for these tests on the individual orders.   Basic metabolic panel   Result Value Ref Range    Sodium 135 135 - 145 mmol/L    Potassium 4.5 3.4 - 5.3 mmol/L    Chloride 103 98 - 107 mmol/L    Carbon Dioxide (CO2) 20 (L) 22 - 29 mmol/L    Anion Gap 12 7 - 15 mmol/L    Urea Nitrogen 9.4 6.0 - 20.0 mg/dL    Creatinine 0.68 0.51 - 0.95 mg/dL    GFR Estimate >90 >60 mL/min/1.73m2    Calcium 8.7 8.6 - 10.0 mg/dL    Glucose 130 (H) 70 - 99 mg/dL   CBC with platelets and differential   Result Value Ref Range    WBC Count 11.8 (H) 4.0 - 11.0 10e3/uL    RBC Count 5.01 3.80 - 5.20 10e6/uL    Hemoglobin 14.0 11.7 - 15.7 g/dL    Hematocrit 40.8 35.0 - 47.0 %    MCV 81 78 - 100 fL    MCH 27.9 26.5 - 33.0 pg    MCHC 34.3 31.5 - 36.5 g/dL    RDW 14.3 10.0 - 15.0 %    Platelet Count 292 150 - 450 10e3/uL    % Neutrophils 88 %    % Lymphocytes 6 %    % Monocytes 5 %    % Eosinophils 0 %    % Basophils 0 %    % Immature Granulocytes 1 %    NRBCs per  100 WBC 0 <1 /100    Absolute Neutrophils 10.4 (H) 1.6 - 8.3 10e3/uL    Absolute Lymphocytes 0.7 (L) 0.8 - 5.3 10e3/uL    Absolute Monocytes 0.6 0.0 - 1.3 10e3/uL    Absolute Eosinophils 0.0 0.0 - 0.7 10e3/uL    Absolute Basophils 0.0 0.0 - 0.2 10e3/uL    Absolute Immature Granulocytes 0.1 <=0.4 10e3/uL    Absolute NRBCs 0.0 10e3/uL       Medications   ondansetron (ZOFRAN) injection 4 mg (4 mg Intravenous $Given 1/21/24 1544)   sodium chloride 0.9% BOLUS 1,000 mL (0 mLs Intravenous Stopped 1/21/24 1632)     Labs of come back and are mostly unremarkable.  White count which is slightly elevated but I think this is likely more related to the pregnancy, there is no obvious source of infection.  Patient's vomiting has cleared up with the Zofran here.  We have done an oral challenge and she has been able to keep fluids down.  She states mainly she started vomiting because she is out of her Zofran.  This was refilled and I recommend that she follow-up with her OB doctor in the next couple of days if symptoms or not improving.  Patient will be discharged at this time.    Assessments & Plan (with Medical Decision Making)  Nausea and vomiting in pregnancy     I have reviewed the nursing notes.    I have reviewed the findings, diagnosis, plan and need for follow up with the patient.      New Prescriptions    ONDANSETRON (ZOFRAN ODT) 4 MG ODT TAB    Take 1 tablet (4 mg) by mouth every 8 hours as needed for nausea       Final diagnoses:   Nausea and vomiting in pregnancy       1/21/2024   [facility] EMERGENCY DEPT       Petey Robledo MD  01/21/24 4004

## 2024-01-21 NOTE — ED NOTES
Unable to find fetal heart tones, Dr. Robledo notified and reports he will check with ultrasound.

## 2024-01-26 LAB — SCANNED LAB RESULT: ABNORMAL

## 2024-02-01 ENCOUNTER — MYC REFILL (OUTPATIENT)
Dept: FAMILY MEDICINE | Facility: CLINIC | Age: 29
End: 2024-02-01
Payer: COMMERCIAL

## 2024-02-01 DIAGNOSIS — O21.9 NAUSEA AND VOMITING IN PREGNANCY: ICD-10-CM

## 2024-02-01 RX ORDER — ONDANSETRON 4 MG/1
4 TABLET, ORALLY DISINTEGRATING ORAL EVERY 8 HOURS PRN
Qty: 10 TABLET | Refills: 3 | Status: ON HOLD | OUTPATIENT
Start: 2024-02-01 | End: 2024-06-25

## 2024-02-24 ENCOUNTER — MYC REFILL (OUTPATIENT)
Dept: FAMILY MEDICINE | Facility: CLINIC | Age: 29
End: 2024-02-24
Payer: COMMERCIAL

## 2024-02-24 DIAGNOSIS — K21.00 GASTROESOPHAGEAL REFLUX DISEASE WITH ESOPHAGITIS WITHOUT HEMORRHAGE: ICD-10-CM

## 2024-02-26 ASSESSMENT — PATIENT HEALTH QUESTIONNAIRE - PHQ9
SUM OF ALL RESPONSES TO PHQ QUESTIONS 1-9: 0
10. IF YOU CHECKED OFF ANY PROBLEMS, HOW DIFFICULT HAVE THESE PROBLEMS MADE IT FOR YOU TO DO YOUR WORK, TAKE CARE OF THINGS AT HOME, OR GET ALONG WITH OTHER PEOPLE: NOT DIFFICULT AT ALL

## 2024-02-27 ENCOUNTER — PRENATAL OFFICE VISIT (OUTPATIENT)
Dept: FAMILY MEDICINE | Facility: CLINIC | Age: 29
End: 2024-02-27
Payer: COMMERCIAL

## 2024-02-27 VITALS
SYSTOLIC BLOOD PRESSURE: 118 MMHG | RESPIRATION RATE: 20 BRPM | HEIGHT: 71 IN | WEIGHT: 286 LBS | HEART RATE: 91 BPM | OXYGEN SATURATION: 98 % | DIASTOLIC BLOOD PRESSURE: 84 MMHG | TEMPERATURE: 97.5 F | BODY MASS INDEX: 40.04 KG/M2

## 2024-02-27 DIAGNOSIS — F33.1 MAJOR DEPRESSIVE DISORDER, RECURRENT EPISODE, MODERATE (H): ICD-10-CM

## 2024-02-27 DIAGNOSIS — E66.01 MORBID OBESITY (H): ICD-10-CM

## 2024-02-27 DIAGNOSIS — Z67.91 RH NEGATIVE STATUS DURING PREGNANCY IN SECOND TRIMESTER: ICD-10-CM

## 2024-02-27 DIAGNOSIS — O28.5 ABNORMAL GENETIC TEST DURING PREGNANCY: ICD-10-CM

## 2024-02-27 DIAGNOSIS — F84.0 AUTISM SPECTRUM DISORDER: ICD-10-CM

## 2024-02-27 DIAGNOSIS — K21.00 GASTROESOPHAGEAL REFLUX DISEASE WITH ESOPHAGITIS WITHOUT HEMORRHAGE: ICD-10-CM

## 2024-02-27 DIAGNOSIS — R73.03 PREDIABETES: ICD-10-CM

## 2024-02-27 DIAGNOSIS — O09.92 HIGH-RISK PREGNANCY IN SECOND TRIMESTER: Primary | ICD-10-CM

## 2024-02-27 DIAGNOSIS — O26.892 RH NEGATIVE STATUS DURING PREGNANCY IN SECOND TRIMESTER: ICD-10-CM

## 2024-02-27 PROBLEM — O09.90 HIGH-RISK PREGNANCY: Status: ACTIVE | Noted: 2024-02-27

## 2024-02-27 PROCEDURE — 99207 PR PRENATAL VISIT: CPT | Performed by: FAMILY MEDICINE

## 2024-02-27 PROCEDURE — 90471 IMMUNIZATION ADMIN: CPT | Performed by: FAMILY MEDICINE

## 2024-02-27 PROCEDURE — 90686 IIV4 VACC NO PRSV 0.5 ML IM: CPT | Performed by: FAMILY MEDICINE

## 2024-02-27 RX ORDER — LANCETS
EACH MISCELLANEOUS
Qty: 100 EACH | Refills: 6 | Status: ON HOLD | OUTPATIENT
Start: 2024-02-27 | End: 2024-07-07

## 2024-02-27 RX ORDER — ASPIRIN 81 MG/1
81 TABLET ORAL DAILY
Qty: 100 TABLET | Refills: 2 | Status: ON HOLD | OUTPATIENT
Start: 2024-02-27 | End: 2024-06-25

## 2024-02-27 RX ORDER — OMEPRAZOLE 40 MG/1
40 CAPSULE, DELAYED RELEASE ORAL DAILY
Qty: 30 CAPSULE | Refills: 1 | Status: SHIPPED | OUTPATIENT
Start: 2024-02-27 | End: 2024-03-29

## 2024-02-27 NOTE — Clinical Note
Please have patient to stop by to get the AFP test to screen for spina bifida as soon as possible.  Thank you

## 2024-02-27 NOTE — PROGRESS NOTES
Pregnancy risks:  Mental health  -Had anxiety and depression, doing well without medications  -Will monitor closely, can be worse during the pregnancy  -Monitor and treat for postpartum depression/psychosis aggressively  -High functioning autism  Obesity  -Pre-pregnancy BMI 40 with nulliparity.  - Increased risk for preeclampsia,   - Started aspirin 81 mg on 24  - Growth US at 32-34 week  - BPP and NST weekly, starting at 34 week  -Continue to deliver 39-40  Prediabetes  -Last A1c on 2023 was 5.8   -Higher risk for developing gestational diabetes   -Healthy diet and exercising discussed and encouraged  -Monitor morning blood sugars and the goal is less than 95  -Recheck glucose intolerance test at 26-28 weeks.  Abnormal NIPS  -Positive for increased risk for XYY   -Discussed about potential false positive   -Recommend MFM/ consultation -but declined  -Declined a level 2 ultrasound with MFM    Rh neg (O-)   Rhogram with bleeding before 28 weeks, at 28 weeks and after delivery if indicated    OB History  -  -Previous miscarriage at 5-6 weeks    Prenatal care plan   - RICA: 2024 (determined by US)   - BMI (pre-pregnancy):  40 - goal for weight gain is less than 20 pounds  - OB labs:  O neg, IR, all others are normal   - First trimester screening:  NIPS - positive for XYY   - Second trimester screening: AFP ordered today   - Pain management:  to be determined   - Ped:  to be determined   - Circumcision if boy: to be determined   - BC: to be determined    - Immunizations: Flu given 24, declined COVID      Pregnancy wise, doing well. No concerns today.   Nausea managed with Zofran - tolerating oral intake and has been drinking a lot of water.   Heartburn managed with omeprazole - encouraged to take it as needed  No UTI symptoms.  Good appetite, no vomiting.  No vaginal bleeding, loss of fluids, or abnormal discharge.  You are feeling baby moving, no cramping  No concerns of mental  "health or safety  No drug, alcohol or tobacco smoking    /84   Pulse 91   Temp 97.5  F (36.4  C) (Temporal)   Resp 20   Ht 1.805 m (5' 11.06\")   Wt 129.7 kg (286 lb)   LMP 10/02/2023   SpO2 98%   BMI 39.82 kg/m       Continue prenatal vitamin, Zofran, and omeprazole.   Start on Aspirin 81 mg daily due to increased risk of preeclampsia.  Start monitoring BS before breakfast.   -Goal is less than 95,   -Exercise, healthy diet and weight management discussed  -Monitor closely for developing gestational diabetes.  Reviewed NIPS - XYY   -Recommended MFM and  consultation but declined   -Recommended level 2 ultrasound with MFM - also declined  Encouraged to drink lots of water.   Reportable signs and symptoms discussed.  Educated what to expect in the next 4 weeks.  Sent for anatomy US  - has not had 1 done yet  Received flu vaccine on 2/27/24; side effect discussed.   -Declined COVID vaccination  Rh- noted; she was instructed let me know if develops vaginal bleeding  RCT in 4 weeks.       Holly Thompson, MS3    I was present with the medical student who participated in the service and in the documentation of the note. I have verified the history and personally performed the physical exam and medical decision making. I reviewed the note in detail and edited it appropriately. I agree with the assessment and plan of care as documented in the note.       Mackenzie Neal Mai, MD    "

## 2024-02-29 PROBLEM — O28.5 ABNORMAL GENETIC TEST DURING PREGNANCY: Status: ACTIVE | Noted: 2024-02-29

## 2024-02-29 PROBLEM — O26.899 RH NEGATIVE STATUS DURING PREGNANCY: Status: ACTIVE | Noted: 2024-02-29

## 2024-02-29 PROBLEM — Z67.91 RH NEGATIVE STATUS DURING PREGNANCY: Status: ACTIVE | Noted: 2024-02-29

## 2024-02-29 PROBLEM — F41.8 ANXIETY WITH DEPRESSION: Status: RESOLVED | Noted: 2017-11-15 | Resolved: 2024-02-29

## 2024-03-08 ENCOUNTER — HOSPITAL ENCOUNTER (OUTPATIENT)
Dept: ULTRASOUND IMAGING | Facility: CLINIC | Age: 29
Discharge: HOME | End: 2024-03-08
Attending: FAMILY MEDICINE | Admitting: FAMILY MEDICINE
Payer: COMMERCIAL

## 2024-03-08 DIAGNOSIS — O09.92 HIGH-RISK PREGNANCY IN SECOND TRIMESTER: ICD-10-CM

## 2024-03-08 PROCEDURE — 76805 OB US >/= 14 WKS SNGL FETUS: CPT

## 2024-03-20 ENCOUNTER — PRE VISIT (OUTPATIENT)
Dept: ALLERGY | Facility: CLINIC | Age: 29
End: 2024-03-20

## 2024-03-29 ENCOUNTER — PRENATAL OFFICE VISIT (OUTPATIENT)
Dept: FAMILY MEDICINE | Facility: CLINIC | Age: 29
End: 2024-03-29
Payer: COMMERCIAL

## 2024-03-29 VITALS
WEIGHT: 291 LBS | OXYGEN SATURATION: 98 % | HEART RATE: 93 BPM | SYSTOLIC BLOOD PRESSURE: 128 MMHG | BODY MASS INDEX: 40.74 KG/M2 | RESPIRATION RATE: 18 BRPM | HEIGHT: 71 IN | DIASTOLIC BLOOD PRESSURE: 80 MMHG | TEMPERATURE: 97.4 F

## 2024-03-29 DIAGNOSIS — O09.92 HIGH-RISK PREGNANCY IN SECOND TRIMESTER: Primary | ICD-10-CM

## 2024-03-29 DIAGNOSIS — Z67.91 RH NEGATIVE STATUS DURING PREGNANCY IN SECOND TRIMESTER: ICD-10-CM

## 2024-03-29 DIAGNOSIS — O28.5 ABNORMAL GENETIC TEST DURING PREGNANCY: ICD-10-CM

## 2024-03-29 DIAGNOSIS — E66.01 MORBID OBESITY (H): ICD-10-CM

## 2024-03-29 DIAGNOSIS — K21.00 GASTROESOPHAGEAL REFLUX DISEASE WITH ESOPHAGITIS WITHOUT HEMORRHAGE: ICD-10-CM

## 2024-03-29 DIAGNOSIS — R73.03 PREDIABETES: ICD-10-CM

## 2024-03-29 DIAGNOSIS — O26.892 RH NEGATIVE STATUS DURING PREGNANCY IN SECOND TRIMESTER: ICD-10-CM

## 2024-03-29 DIAGNOSIS — F33.1 MAJOR DEPRESSIVE DISORDER, RECURRENT EPISODE, MODERATE (H): ICD-10-CM

## 2024-03-29 PROCEDURE — 99213 OFFICE O/P EST LOW 20 MIN: CPT | Mod: 25 | Performed by: FAMILY MEDICINE

## 2024-03-29 PROCEDURE — 99207 PR PRENATAL VISIT: CPT | Performed by: FAMILY MEDICINE

## 2024-03-29 RX ORDER — OMEPRAZOLE 40 MG/1
40 CAPSULE, DELAYED RELEASE ORAL DAILY
Qty: 90 CAPSULE | Refills: 1 | Status: SHIPPED | OUTPATIENT
Start: 2024-03-29

## 2024-03-29 ASSESSMENT — PATIENT HEALTH QUESTIONNAIRE - PHQ9
SUM OF ALL RESPONSES TO PHQ QUESTIONS 1-9: 0
SUM OF ALL RESPONSES TO PHQ QUESTIONS 1-9: 0
10. IF YOU CHECKED OFF ANY PROBLEMS, HOW DIFFICULT HAVE THESE PROBLEMS MADE IT FOR YOU TO DO YOUR WORK, TAKE CARE OF THINGS AT HOME, OR GET ALONG WITH OTHER PEOPLE: NOT DIFFICULT AT ALL

## 2024-03-29 ASSESSMENT — PAIN SCALES - GENERAL: PAINLEVEL: NO PAIN (0)

## 2024-03-29 NOTE — PROGRESS NOTES
Pregnancy risks:  Mental health  -Had anxiety and depression, doing well without medications  -Will monitor closely, can be worse during the pregnancy  -Monitor and treat for postpartum depression/psychosis aggressively  -High functioning autism - in college for master program  Obesity  -Pre-pregnancy BMI 40 with nulliparity.  - Increased risk for preeclampsia,   - Aspirin 81 mg  - Growth US at 32-34 week  - BPP and NST weekly, starting at 34 week  -Consider to deliver 39-40  Prediabetes  -Last A1c on 2023 was 5.8              -Higher risk for developing gestational diabetes              -Healthy diet and exercising discussed and encouraged  -Monitor morning blood sugars and the goal is less than 95  -Recheck glucose intolerance test at 26-28 weeks.  Abnormal NIPS  -Positive for increased risk for XYY              -Discussed about potential false positive              -Recommend MFM/ consultation - declined  -Declined a level 2 ultrasound with MFM     Rh neg (O-)              Rhogram with bleeding before 28 weeks, at 28 weeks and after delivery if indicated     OB History  -  -Previous miscarriage at 5-6 weeks     Prenatal care plan              - RICA: 2024 (determined by US)              - BMI (pre-pregnancy):  40 - (11-19 pounds)  - OB labs:  O neg, IR, all others are normal              - First trimester screening:  NIPS - positive for XYY              - Second trimester screening: AFP - normal              - Pain management: Epidural as needed              - Ped:  to be determined              - Circumcision if boy: Yes              - BC: Likely birth control pill -did well with in the past              - Immunizations: Flu given 24, declined COVID   - Tdap at 28 weeks      Stated that her fasting blood sugar has been running in the range of 130-140.  Not checking postprandial blood sugar.   Known to have prediabetic, last hemoglobin A1c couple months ago was 5.8.  Last year's  hemoglobin A1c was 6.1.  Discussed with her about the goal for fasting blood sugar which is less than 95.  I also informed her that her fasting blood sugar has been high and she is to have gestational diabetes.  I recommended its treatment with diabetic educator consultation and/or initiating insulins.  She declined.  She and her significant other felt these blood sugar readings are her baseline which is normal for her.  They do not believe that she has gestational diabetes   -I had a long conversation with then about the potential complication associated with uncontrolled gestational diabetes to the pregnancy, fetal health, delivery and  care.  I strongly recommended to continue work on exercise and healthy diet.  I again recommend seeing diabetic educator for further education and monitoring as well as treatment options but they declined.  She is planning to continue working on exercising and healthy diet.  She also plans to continue monitoring her blood sugar.  She preferred to get the glucose intolerance test test at 28 weeks and we will go from there.  She also needs a RhoGAM injection at that time.    She had no concern about her mental health or safety.  Acid reflux is controlled with omeprazole.  Status been taking the prenatal vitamin and low-dose aspirin daily.  Omeprazole refilled today.  PHQ-9 score of 0 today.     Pregnancy wise, doing well and has no concern.  Normal fetal movement with no contraction.  No UTI symptoms or bleeding. Good appetite and tolerate PO intake well, been drinking a lot of water.  No nausea/vomiting.  Heartburn has been controlled with omeprazole.  No vaginal bleeding, no contractions, cramping, leaking, abnormal vaginal discharge or vaginal bleeding. No UTI symptoms.  No headache, acute vision changes, leg swelling, abdominal pain, CP/SOB.     Reviewed the prenatal flow sheet with her    /80 (Cuff Size: Adult Regular)   Pulse 93   Temp 97.4  F (36.3  C)  "(Temporal)   Resp 18   Ht 1.803 m (5' 11\")   Wt 132 kg (291 lb)   LMP 10/02/2023   SpO2 98%   BMI 40.59 kg/m       Discussed kick counts and fetal movement.  Reportable signs and symptoms discussed.  Mood has been good, no concern of her mental health or safety.  Continue with prenatal vitamin and low-dose aspirin daily   Encouraged to drinks a lot of water.    Discussed about what to expect in the next 4 weeks  Symptoms to call in or be seen discussed as well.    Follow up in 3 week, earlier as needed.    Ordered 1 hour glucose test, Hgb and RPR, Hgb A1c, blood type and screen to be done at at the next visit.    All of her questions answered.    Mackenzie Neal Mai, MD     Answers submitted by the patient for this visit:  Patient Health Questionnaire (Submitted on 3/29/2024)  If you checked off any problems, how difficult have these problems made it for you to do your work, take care of things at home, or get along with other people?: Not difficult at all  PHQ9 TOTAL SCORE: 0    "

## 2024-04-22 LAB
ABO/RH(D): NORMAL
ANTIBODY SCREEN: NEGATIVE
SPECIMEN EXPIRATION DATE: NORMAL

## 2024-04-23 ENCOUNTER — PRENATAL OFFICE VISIT (OUTPATIENT)
Dept: FAMILY MEDICINE | Facility: CLINIC | Age: 29
End: 2024-04-23
Payer: COMMERCIAL

## 2024-04-23 VITALS
TEMPERATURE: 97.1 F | WEIGHT: 293 LBS | RESPIRATION RATE: 16 BRPM | SYSTOLIC BLOOD PRESSURE: 116 MMHG | OXYGEN SATURATION: 97 % | DIASTOLIC BLOOD PRESSURE: 78 MMHG | BODY MASS INDEX: 41.02 KG/M2 | HEIGHT: 71 IN | HEART RATE: 88 BPM

## 2024-04-23 DIAGNOSIS — O26.893 RH NEGATIVE STATUS DURING PREGNANCY IN THIRD TRIMESTER: ICD-10-CM

## 2024-04-23 DIAGNOSIS — F33.1 MAJOR DEPRESSIVE DISORDER, RECURRENT EPISODE, MODERATE (H): ICD-10-CM

## 2024-04-23 DIAGNOSIS — O09.93 HIGH-RISK PREGNANCY IN THIRD TRIMESTER: Primary | ICD-10-CM

## 2024-04-23 DIAGNOSIS — E11.9 TYPE 2 DIABETES MELLITUS WITHOUT COMPLICATION, WITHOUT LONG-TERM CURRENT USE OF INSULIN (H): ICD-10-CM

## 2024-04-23 DIAGNOSIS — Z67.91 RH NEGATIVE STATUS DURING PREGNANCY IN THIRD TRIMESTER: ICD-10-CM

## 2024-04-23 DIAGNOSIS — E66.01 MORBID OBESITY (H): ICD-10-CM

## 2024-04-23 LAB
ERYTHROCYTE [DISTWIDTH] IN BLOOD BY AUTOMATED COUNT: 13.2 % (ref 10–15)
GLUCOSE 1H P 50 G GLC PO SERPL-MCNC: 309 MG/DL (ref 70–129)
HBA1C MFR BLD: 7.5 %
HCT VFR BLD AUTO: 34.9 % (ref 35–47)
HGB BLD-MCNC: 12.1 G/DL (ref 11.7–15.7)
MCH RBC QN AUTO: 27.9 PG (ref 26.5–33)
MCHC RBC AUTO-ENTMCNC: 34.7 G/DL (ref 31.5–36.5)
MCV RBC AUTO: 80 FL (ref 78–100)
PLATELET # BLD AUTO: 319 10E3/UL (ref 150–450)
RBC # BLD AUTO: 4.34 10E6/UL (ref 3.8–5.2)
WBC # BLD AUTO: 12 10E3/UL (ref 4–11)

## 2024-04-23 PROCEDURE — 85027 COMPLETE CBC AUTOMATED: CPT | Performed by: FAMILY MEDICINE

## 2024-04-23 PROCEDURE — 86900 BLOOD TYPING SEROLOGIC ABO: CPT | Performed by: FAMILY MEDICINE

## 2024-04-23 PROCEDURE — 86850 RBC ANTIBODY SCREEN: CPT | Performed by: FAMILY MEDICINE

## 2024-04-23 PROCEDURE — 83036 HEMOGLOBIN GLYCOSYLATED A1C: CPT | Performed by: FAMILY MEDICINE

## 2024-04-23 PROCEDURE — 36415 COLL VENOUS BLD VENIPUNCTURE: CPT | Performed by: FAMILY MEDICINE

## 2024-04-23 PROCEDURE — 82950 GLUCOSE TEST: CPT | Performed by: FAMILY MEDICINE

## 2024-04-23 PROCEDURE — 86901 BLOOD TYPING SEROLOGIC RH(D): CPT | Performed by: FAMILY MEDICINE

## 2024-04-23 PROCEDURE — 86780 TREPONEMA PALLIDUM: CPT | Performed by: FAMILY MEDICINE

## 2024-04-23 PROCEDURE — 99213 OFFICE O/P EST LOW 20 MIN: CPT | Mod: 25 | Performed by: FAMILY MEDICINE

## 2024-04-23 PROCEDURE — 99207 PR PRENATAL VISIT: CPT | Performed by: FAMILY MEDICINE

## 2024-04-23 PROCEDURE — 96372 THER/PROPH/DIAG INJ SC/IM: CPT | Performed by: FAMILY MEDICINE

## 2024-04-23 ASSESSMENT — PATIENT HEALTH QUESTIONNAIRE - PHQ9
10. IF YOU CHECKED OFF ANY PROBLEMS, HOW DIFFICULT HAVE THESE PROBLEMS MADE IT FOR YOU TO DO YOUR WORK, TAKE CARE OF THINGS AT HOME, OR GET ALONG WITH OTHER PEOPLE: NOT DIFFICULT AT ALL
SUM OF ALL RESPONSES TO PHQ QUESTIONS 1-9: 0
SUM OF ALL RESPONSES TO PHQ QUESTIONS 1-9: 0

## 2024-04-23 ASSESSMENT — PAIN SCALES - GENERAL: PAINLEVEL: NO PAIN (0)

## 2024-04-23 NOTE — PROGRESS NOTES
Pregnancy risks:  Mental health  -Had anxiety and depression, doing well without medications  -Will monitor closely, can be worse during the pregnancy  -Monitor and treat for postpartum depression/psychosis aggressively  -High functioning autism - in college just graduate from a master program  Obesity  -Pre-pregnancy BMI 40 with nulliparity.  - Increased risk for preeclampsia,   - Aspirin 81 mg  - Growth US at 32-34 week  - BPP and NST weekly, starting at 34 week  -Consider to deliver 39-40  DM with hgb A1c of 7.5  -Last A1c on 2023 was 5.8   -Hgb A1c on 3/29/24: 7.5    -She does not believe that she is DM     - Declined DM ed and intervention     - Discussed potential complications to pregnancy, delivery and  care              -Healthy diet and exercising discussed and encouraged   Goal for Hgb A1c <6.5 during the pregnancy  -Encouraged to monitor blood sugars   - Fasting BS: <95   - 1 hr postprandial: <140   - 2 hr postprandial: <120    Abnormal NIPS  -Positive for increased risk for XYY              -Discussed about potential false positive              -Recommend MFM/ consultation - declined  -Declined a level 2 ultrasound with MFM     Rh neg (O-)              Received Rhogram on 3/29/24 at 28 weeks     OB History  -  -Previous miscarriage at 5-6 weeks     Prenatal care plan              - RICA: 2024 (determined by US)              - BMI (pre-pregnancy):  40 - (11-19 pounds)  - OB labs:  O neg, IR, all others are normal              - First trimester screening:  NIPS - positive for XYY              - Second trimester screening: AFP - normal              - Pain management: Epidural as needed              - Ped:  to be determined              - Circumcision if boy: Yes              - BC: Likely birth control pill -did well with in the past              - Immunizations: Flu given 24, declined COVID              - Tdap: 28 -32 weeks      Results for orders placed or performed  in visit on 04/23/24   CBC with platelets     Status: Abnormal   Result Value Ref Range    WBC Count 12.0 (H) 4.0 - 11.0 10e3/uL    RBC Count 4.34 3.80 - 5.20 10e6/uL    Hemoglobin 12.1 11.7 - 15.7 g/dL    Hematocrit 34.9 (L) 35.0 - 47.0 %    MCV 80 78 - 100 fL    MCH 27.9 26.5 - 33.0 pg    MCHC 34.7 31.5 - 36.5 g/dL    RDW 13.2 10.0 - 15.0 %    Platelet Count 319 150 - 450 10e3/uL   Glucose tolerance gest screen 1 hour     Status: Abnormal   Result Value Ref Range    Glu Gest Screen 1hr 50g 309 (H) 70 - 129 mg/dL    Narrative    This is a screening test for Gestational Diabetes Mellitus.   If results are 130 mg/dL or greater, a Standard 100 gram Gestational  3 hour Glucose Tolerance should be performed.   Treponema Abs w Reflex to RPR and Titer     Status: Normal   Result Value Ref Range    Treponema Antibody Total Nonreactive Nonreactive   Hemoglobin A1c     Status: Abnormal   Result Value Ref Range    Hemoglobin A1C 7.5 (H) <5.7 %   Adult Type and Screen     Status: None   Result Value Ref Range    ABO/RH(D) O NEG     Antibody Screen Negative Negative    SPECIMEN EXPIRATION DATE [id number]    ABO/Rh type and screen     Status: None    Narrative    The following orders were created for panel order ABO/Rh type and screen.  Procedure                               Abnormality         Status                     ---------                               -----------         ------                     Adult Type and Screen[[id number]]                            Final result                 Please view results for these tests on the individual orders.        As she was prediabetic at the beginning of pregnancy with a hemoglobin A1c of 5.8.  Per her report, her blood sugars been running in 130s-140 fasting.  Her 1 hour glucose intolerance test today - the glucose was 309 with a hemoglobin A1c of 7.5.   -Discussed about the results with patient over the phone   -Informed her that she is now diabetes   -Discussed with her  "about the importance of control blood sugar during pregnancy    -The goal for blood sugar before breakfast, 1 hour and 2-hour glucose discussed.    -The goal for her hemoglobin A1c during the pregnancy discussed.   -Potential complication to the pregnancy, delivery and  care associated with uncontrolled diabetes discussed   -Treatment options discussed     -Declined diabetic educator consultation    -Declined endocrinology consultation/referral    -Declined medication - she does not believe that she has diabetes     -Stated that her father-in-law see endocrinologist for diabetes   -He ok to have fasting BS to be in the 140s fasting by endocrinologist      -Did not feel the need to get her blood sugar down to the recommended level    -She does not believe this is diabetes.    -Informed her that blood sugar need to be tighter control in pregnant as compared to non-pregnant patients   -Plan to continue to communicate and work with her   -She is quite resistant to the treatment recommendation, I am very hesitate to push harder.    She received the program today.    No concern of her mental health.    BMI of 40.  Gained about 15 pounds during the pregnancy.  Will continue to monitor closely.  The goal for weight gain is to be less than 20 pounds.  Encouraged to continue working on exercising and healthy diet.    Otherwise, doing well and has no concern.   Normal fetal movement with no contraction or cramping.    No nausea/vomiting - tolerates oral intake well and has been drinking a lot or water   No acid reflux symptoms with omeprazole  No vaginal bleeding, abnormal discharge, or leakage  No UTI symptoms  No headache, acute visual changes, leg swelling, abdominal pain, CP/SOB.   No safety or mental health concerns  No drug, alcohol or tobacco use    Reviewed the prenatal flow sheet with her    /78 (Cuff Size: Adult Large)   Pulse 88   Temp 97.1  F (36.2  C) (Temporal)   Resp 16   Ht 1.803 m (5' 11\")   " Wt 133.8 kg (295 lb)   LMP 10/02/2023   SpO2 97%   BMI 41.14 kg/m       Discussed kick counts and fetal movement.  Discussed about preeclamptic symptoms  PTL precaution and symptoms that need to be seen discussed.  Continue with prenatal vitamin daily and aspirin 81 mg daily   Encouraged to drinks a lot of water.    Educated about what to expect in the next 2 weeks.    Follow up in 2 week as scheduled, earlier as needed.    Labs result reviewed.    All of her questions answered.    Mackenzie Neal Mai, MD          Answers submitted by the patient for this visit:  Patient Health Questionnaire (Submitted on 4/23/2024)  If you checked off any problems, how difficult have these problems made it for you to do your work, take care of things at home, or get along with other people?: Not difficult at all  PHQ9 TOTAL SCORE: 0

## 2024-04-23 NOTE — NURSING NOTE
Clinic Administered Medication Documentation        Patient was given Rhophylac. Prior to medication administration, verified patient's identity using patient s name and date of birth. Please see MAR and medication order for additional information. Patient instructed to remain in clinic for 15 minutes and report any adverse reaction to staff immediately.    Vial/Syringe: Single dose vial. Was entire vial of medication used? Yes      Luz Marina Bass MA

## 2024-04-24 LAB — T PALLIDUM AB SER QL: NONREACTIVE

## 2024-05-05 PROBLEM — E11.9 TYPE 2 DIABETES MELLITUS WITHOUT COMPLICATION, WITHOUT LONG-TERM CURRENT USE OF INSULIN (H): Status: ACTIVE | Noted: 2023-03-22

## 2024-05-07 ENCOUNTER — PRENATAL OFFICE VISIT (OUTPATIENT)
Dept: FAMILY MEDICINE | Facility: CLINIC | Age: 29
End: 2024-05-07
Payer: COMMERCIAL

## 2024-05-07 VITALS
OXYGEN SATURATION: 98 % | WEIGHT: 293 LBS | TEMPERATURE: 97.9 F | SYSTOLIC BLOOD PRESSURE: 124 MMHG | HEIGHT: 71 IN | RESPIRATION RATE: 18 BRPM | HEART RATE: 106 BPM | BODY MASS INDEX: 41.02 KG/M2 | DIASTOLIC BLOOD PRESSURE: 78 MMHG

## 2024-05-07 DIAGNOSIS — E11.65 TYPE 2 DIABETES MELLITUS WITH HYPERGLYCEMIA, WITHOUT LONG-TERM CURRENT USE OF INSULIN (H): ICD-10-CM

## 2024-05-07 DIAGNOSIS — O09.93 HIGH-RISK PREGNANCY IN THIRD TRIMESTER: Primary | ICD-10-CM

## 2024-05-07 DIAGNOSIS — F33.1 MAJOR DEPRESSIVE DISORDER, RECURRENT EPISODE, MODERATE (H): ICD-10-CM

## 2024-05-07 DIAGNOSIS — F84.0 AUTISM SPECTRUM DISORDER: ICD-10-CM

## 2024-05-07 DIAGNOSIS — E66.01 MORBID OBESITY (H): ICD-10-CM

## 2024-05-07 PROCEDURE — 99207 PR PRENATAL VISIT: CPT | Performed by: FAMILY MEDICINE

## 2024-05-07 PROCEDURE — 99213 OFFICE O/P EST LOW 20 MIN: CPT | Mod: 25 | Performed by: FAMILY MEDICINE

## 2024-05-07 ASSESSMENT — PAIN SCALES - GENERAL: PAINLEVEL: NO PAIN (0)

## 2024-05-07 NOTE — PROGRESS NOTES
Pregnancy risks:  Mental health  -Had anxiety and depression, doing well without medications   - PHQ-9 score of 0 today (24)  -Will monitor closely, can be worse during the pregnancy  -Monitor and treat for postpartum depression/psychosis aggressively  -High functioning autism - in college just graduate from a master program  Obesity  -Pre-pregnancy BMI 40 with nulliparity.  - Increased risk for preeclampsia,   - Aspirin 81 mg  - Growth US at 32-34 week  - BPP and NST weekly, starting at 34 week  -Consider to deliver 39-40  DM with hgb A1c of 7.5  -Last A1c on 2023 was 5.8              -Hgb A1c on 3/29/24: 7.5                          -She does not believe that she is DM                                      - Declined DM ed and intervention                                      - Discussed potential complications to pregnancy, delivery and               -Healthy diet and exercising discussed and encouraged              -Goal for Hgb A1c <6.5 during the pregnancy  -Encouraged to monitor blood sugars              - Fasting BS: <95              - 1 hr postprandial: <140              - 2 hr postprandial: <120     Abnormal NIPS  -Positive for increased risk for XYY              -Discussed about potential false positive              -Recommend MFM/ consultation - declined  -Declined a level 2 ultrasound with MFM     Rh neg (O-)              Received Rhogram on 3/29/24 at 28 weeks     OB History  -  -Previous miscarriage at 5-6 weeks     Prenatal care plan              - RICA: 2024 (determined by US)              - BMI (pre-pregnancy):  40 - (11-19 pounds)  - OB labs:  O neg, IR, all others are normal              - First trimester screening:  NIPS - positive for XYY              - Second trimester screening: AFP - normal              - Pain management: Epidural as needed              - Ped:  to be determined              - Circumcision if boy: Yes              - BC: Likely birth control  pill -did well with in the past              - Immunizations: Flu given 24, declined COVID              - Tdap: 28 -32 weeks      Again, I had a long conversation with her and her significant other about the lab results specifically with her recent hemoglobin A1c of 7.5 and 1 hour glucose test with a blood sugar of over 300.  I informed her that she is diabetic and it needs to be treated aggressively especially while she is pregnancy.  I had a long conversation with her about the potential complication associated with pregnancy, intrapartum care, postpartum care as well as  care.  Informed her that uncontrolled diabetes will put higher risk for macrosomia, fetal intolerance and .  Poor or slow wound healing with higher risks of infection if surgical delivery is needed.  Also discussed about the potential hypoglycemic complications in  which may need NICU care.  I reiterated the goal for her hemoglobin A1c as well and has blood sugar during the pregnancy (hemoglobin A1c less than 6.5, fasting glucose less than 95, 1 hour postprandial glucose less than 140 and 2 hours postprandial less than 120.)    Patient and her significant other again did not believe that she is diabetic.  Stated that she has been doing a lot of research as well as consulting her professor at [employer], all have pointed out that she is not diabetic unless her hemoglobin A1c more than 7.5 and it is okay for her to have the blood sugar up to 150 fasting.  Her blood sugar has been in the 130s at home.  Stated her father and her grandmother had diabetes for many years; they were seeing endocrinologist and they were told that it is ok as long as the fasting blood sugar less than 150.  Stated that she trusted her father/grandmother, the research that she been reading and her professor;  she does not feel this is an urgent matter and therefore declined all intervention or referral at this time.  She is willing  "to take the risk.    I informed her that treatment for diabetes during the pregnancy is very different from treatment for nonpregnant diabetic patients.    I offered referral to diabetic educator, endocrinology and/or M referral for secondary opinion but she declined.     In the meantime, I strongly encouraged her to work on exercising and healthy diet.  I also encouraged her to let me know if she changes her mind in regard to treatment and referrals.    Will start monitor with growth ultrasound every 4 weeks, biophysical profile and NST weekly starting at 34 weeks.    She is also morbidly obese with pre-pregnant's BMI of 40.  She has gained about 20 pounds during this pregnancy.  Will continue to monitor.  Again emphasized on healthy diet and staying active.    Her depression is stable.  PHQ-9 score of 0 today.  Will continue to monitor.  I believe that her decision about diabetic treatment is strongly correlated to her autism.    Otherwise, doing well and has no concern.    Normal fetal movement with no contraction or cramping.    No headache, dizziness, acute visual change or leg swelling.    No abnormal discharge, vaginal bleeding or leakage.    No UTI symptoms.   Acid reflux is stable and controlled with omeprazole        Reviewed the prenatal flow sheet with her in details    /78 (Cuff Size: Adult Regular)   Pulse 106   Temp 97.9  F (36.6  C) (Temporal)   Resp 18   Ht 1.805 m (5' 11.06\")   Wt 134.7 kg (297 lb)   LMP 10/02/2023   SpO2 98%   BMI 41.35 kg/m      Continue with prenatal vitamin and aspirin.   Encouraged drinks a lot of water.    PTL precautions PROM and reportable symptoms discussed.   Preeclamptic symptoms discussed.  Discussed kick counts and fetal movement.  Recommended Tdap today -declined; will consider it at the next visit  Educated about what to expect in the next couple weeks  RTC 2 weeks      Mackenzie Neal Mai, MD      Answers submitted by the patient for this visit:  Patient " Health Questionnaire (Submitted on 5/6/2024)  If you checked off any problems, how difficult have these problems made it for you to do your work, take care of things at home, or get along with other people?: Not difficult at all  PHQ9 TOTAL SCORE: 0

## 2024-05-07 NOTE — Clinical Note
Please schedule growth ultrasound with ERIN on 5/21 with her next appointment.  She also need an order 1 at 38 weeks (ordered) Prescheduled biophysical profile on week 35, 36, 37, and 39 -with the prenatal care appointment.  Not sure why she is scheduled to follow-up with me on June 17, 18 and 25th.  She should be seen weekly after her next appointment on 5/21.  Please make the correction.

## 2024-05-07 NOTE — PATIENT INSTRUCTIONS
"Weeks 30 to 32 of Your Pregnancy: Care Instructions  Your baby is growing more every day. Its eyes can open and close, and it may have hair on its head. Your baby may sleep 20 to 45 minutes at a time and is more active at certain times.    You should feel your baby move several times every day. Your baby now turns less and kicks more.    This is a good time to tour your hospital or birthing center. You may also want to find childcare if needed.    To ease heartburn    Avoid foods that make your symptoms worse, such as chocolate, spicy foods, and caffeine.  Avoid bending over or lying down after meals.  Do not eat for 2 hours before bedtime.  Take antacids like Tums, but don't take ones that have sodium bicarbonate, magnesium trisilicate, or aspirin.    To care for large, swollen veins (varicose veins)    Try to avoid standing for long periods of time.  Sit with your feet propped up.  Wear support hose.  Get some exercise every day, like walking or swimming.  Counting your baby's kicks  Your doctor may ask you to count your baby's movements, such as kicks, flutters, or rolls.    Find a quiet place, and get comfortable. Write down your start time. Count your baby's movements (except hiccups). When your baby has moved 10 times, you can stop counting. Write down how many minutes it took.    If an hour goes by and you don't feel 10 movements, have something to eat or drink. Count for another hour. If you don't feel at least 10 movements in the 2-hour period, call your doctor.  Follow-up care is a key part of your treatment and safety. Be sure to make and go to all appointments, and call your doctor if you are having problems. It's also a good idea to know your test results and keep a list of the medicines you take.  Where can you learn more?  Go to https://www.[employer].net/patiented  Enter X471 in the search box to learn more about \"Weeks 30 to 32 of Your Pregnancy: Care Instructions.\"  Current as of: July 10, " 2023               Content Version: 14.0    0425-3569 [employer].   Care instructions adapted under license by your healthcare professional. If you have questions about a medical condition or this instruction, always ask your healthcare professional. [employer] disclaims any warranty or liability for your use of this information.

## 2024-05-09 PROBLEM — E11.65 TYPE 2 DIABETES MELLITUS WITH HYPERGLYCEMIA, WITHOUT LONG-TERM CURRENT USE OF INSULIN (H): Status: ACTIVE | Noted: 2023-03-22

## 2024-05-19 ENCOUNTER — HOSPITAL ENCOUNTER (OUTPATIENT)
Facility: CLINIC | Age: 29
Discharge: HOME | End: 2024-05-19
Attending: FAMILY MEDICINE | Admitting: FAMILY MEDICINE
Payer: COMMERCIAL

## 2024-05-19 ENCOUNTER — NURSE TRIAGE (OUTPATIENT)
Dept: NURSING | Facility: CLINIC | Age: 29
End: 2024-05-19
Payer: COMMERCIAL

## 2024-05-19 VITALS
HEART RATE: 80 BPM | SYSTOLIC BLOOD PRESSURE: 142 MMHG | DIASTOLIC BLOOD PRESSURE: 83 MMHG | TEMPERATURE: 98 F | RESPIRATION RATE: 18 BRPM

## 2024-05-19 DIAGNOSIS — M54.9 BACK PAIN AFFECTING PREGNANCY IN THIRD TRIMESTER: Primary | ICD-10-CM

## 2024-05-19 DIAGNOSIS — O99.891 BACK PAIN AFFECTING PREGNANCY IN THIRD TRIMESTER: Primary | ICD-10-CM

## 2024-05-19 LAB
ALBUMIN MFR UR ELPH: 34.1 MG/DL
ALBUMIN SERPL BCG-MCNC: 2.8 G/DL (ref 3.5–5.2)
ALBUMIN UR-MCNC: 30 MG/DL
ALP SERPL-CCNC: 119 U/L (ref 40–150)
ALT SERPL W P-5'-P-CCNC: 19 U/L (ref 0–50)
ANION GAP SERPL CALCULATED.3IONS-SCNC: 9 MMOL/L (ref 7–15)
APPEARANCE UR: ABNORMAL
AST SERPL W P-5'-P-CCNC: 13 U/L (ref 0–45)
BILIRUB SERPL-MCNC: 0.4 MG/DL
BILIRUB UR QL STRIP: NEGATIVE
BUN SERPL-MCNC: 11.7 MG/DL (ref 6–20)
CALCIUM SERPL-MCNC: 8.5 MG/DL (ref 8.6–10)
CHLORIDE SERPL-SCNC: 104 MMOL/L (ref 98–107)
COLOR UR AUTO: YELLOW
CREAT SERPL-MCNC: 0.65 MG/DL (ref 0.51–0.95)
CREAT UR-MCNC: 268.7 MG/DL
DEPRECATED HCO3 PLAS-SCNC: 20 MMOL/L (ref 22–29)
EGFRCR SERPLBLD CKD-EPI 2021: >90 ML/MIN/1.73M2
ERYTHROCYTE [DISTWIDTH] IN BLOOD BY AUTOMATED COUNT: 13.2 % (ref 10–15)
GLUCOSE SERPL-MCNC: 239 MG/DL (ref 70–99)
GLUCOSE UR STRIP-MCNC: >499 MG/DL
HCT VFR BLD AUTO: 32.2 % (ref 35–47)
HGB BLD-MCNC: 10.8 G/DL (ref 11.7–15.7)
HGB UR QL STRIP: NEGATIVE
KETONES UR STRIP-MCNC: NEGATIVE MG/DL
LEUKOCYTE ESTERASE UR QL STRIP: ABNORMAL
MCH RBC QN AUTO: 27.1 PG (ref 26.5–33)
MCHC RBC AUTO-ENTMCNC: 33.5 G/DL (ref 31.5–36.5)
MCV RBC AUTO: 81 FL (ref 78–100)
MUCOUS THREADS #/AREA URNS LPF: PRESENT /LPF
NITRATE UR QL: NEGATIVE
PH UR STRIP: 5 [PH] (ref 5–7)
PLATELET # BLD AUTO: 268 10E3/UL (ref 150–450)
POTASSIUM SERPL-SCNC: 4.1 MMOL/L (ref 3.4–5.3)
PROT SERPL-MCNC: 5.4 G/DL (ref 6.4–8.3)
PROT/CREAT 24H UR: 0.13 MG/MG CR (ref 0–0.2)
RBC # BLD AUTO: 3.98 10E6/UL (ref 3.8–5.2)
RBC URINE: 0 /HPF
SODIUM SERPL-SCNC: 133 MMOL/L (ref 135–145)
SP GR UR STRIP: >1.03 (ref 1–1.03)
SQUAMOUS EPITHELIAL: <1 /HPF
UROBILINOGEN UR STRIP-MCNC: 2 MG/DL
WBC # BLD AUTO: 9.9 10E3/UL (ref 4–11)
WBC URINE: <1 /HPF

## 2024-05-19 PROCEDURE — 84156 ASSAY OF PROTEIN URINE: CPT | Performed by: FAMILY MEDICINE

## 2024-05-19 PROCEDURE — 81001 URINALYSIS AUTO W/SCOPE: CPT | Performed by: FAMILY MEDICINE

## 2024-05-19 PROCEDURE — G0463 HOSPITAL OUTPT CLINIC VISIT: HCPCS

## 2024-05-19 PROCEDURE — 85027 COMPLETE CBC AUTOMATED: CPT | Performed by: FAMILY MEDICINE

## 2024-05-19 PROCEDURE — 82247 BILIRUBIN TOTAL: CPT | Performed by: FAMILY MEDICINE

## 2024-05-19 PROCEDURE — 36415 COLL VENOUS BLD VENIPUNCTURE: CPT | Performed by: FAMILY MEDICINE

## 2024-05-19 PROCEDURE — 250N000013 HC RX MED GY IP 250 OP 250 PS 637: Performed by: FAMILY MEDICINE

## 2024-05-19 RX ORDER — METOCLOPRAMIDE 5 MG/1
10 TABLET ORAL EVERY 6 HOURS PRN
Status: DISCONTINUED | OUTPATIENT
Start: 2024-05-19 | End: 2024-05-19 | Stop reason: HOSPADM

## 2024-05-19 RX ORDER — METOCLOPRAMIDE HYDROCHLORIDE 5 MG/ML
10 INJECTION INTRAMUSCULAR; INTRAVENOUS EVERY 6 HOURS PRN
Status: DISCONTINUED | OUTPATIENT
Start: 2024-05-19 | End: 2024-05-19 | Stop reason: HOSPADM

## 2024-05-19 RX ORDER — ONDANSETRON 2 MG/ML
4 INJECTION INTRAMUSCULAR; INTRAVENOUS EVERY 6 HOURS PRN
Status: DISCONTINUED | OUTPATIENT
Start: 2024-05-19 | End: 2024-05-19 | Stop reason: HOSPADM

## 2024-05-19 RX ORDER — PROCHLORPERAZINE 25 MG
25 SUPPOSITORY, RECTAL RECTAL EVERY 12 HOURS PRN
Status: DISCONTINUED | OUTPATIENT
Start: 2024-05-19 | End: 2024-05-19 | Stop reason: HOSPADM

## 2024-05-19 RX ORDER — OXYCODONE HYDROCHLORIDE 5 MG/1
10 TABLET ORAL ONCE
Status: COMPLETED | OUTPATIENT
Start: 2024-05-19 | End: 2024-05-19

## 2024-05-19 RX ORDER — PROCHLORPERAZINE MALEATE 5 MG
10 TABLET ORAL EVERY 6 HOURS PRN
Status: DISCONTINUED | OUTPATIENT
Start: 2024-05-19 | End: 2024-05-19 | Stop reason: HOSPADM

## 2024-05-19 RX ORDER — ONDANSETRON 4 MG/1
4 TABLET, ORALLY DISINTEGRATING ORAL EVERY 6 HOURS PRN
Status: DISCONTINUED | OUTPATIENT
Start: 2024-05-19 | End: 2024-05-19 | Stop reason: HOSPADM

## 2024-05-19 RX ADMIN — OXYCODONE HYDROCHLORIDE 10 MG: 5 TABLET ORAL at 19:42

## 2024-05-19 ASSESSMENT — ACTIVITIES OF DAILY LIVING (ADL)
ADLS_ACUITY_SCORE: 18

## 2024-05-19 NOTE — PROGRESS NOTES
"1755 Dr HOPKINS called and updated regarding patiet's back pain, elevated BP and UA results. Will get labs. Pt offerred ice pack -pt declined \"never works.\". No spasms at this time. Rare ctns. Category 1 tracing.R: Will update Dr HOPKINS with labs.  "

## 2024-05-19 NOTE — PROGRESS NOTES
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 FHT's with moderate variability, accels present, no decels noted on strip. Contractions   R: Will notify MD to obtain further orders.

## 2024-05-19 NOTE — TELEPHONE ENCOUNTER
32w6d    Back pain.. Severe. Very difficult to even get out of bed.  Has cyclobenzaprine. Wondering if she can take this.  Extremities are shaking.  Severe abdominal pain.  Per the protocol, I instructed Lidia to call 911.  Caller stated understanding and agreement.        Reason for Disposition   SEVERE abdominal pain    Additional Information   Negative: Shock suspected (e.g., cold/pale/clammy skin, too weak to stand, low BP, rapid pulse)    Protocols used: Pregnancy - Back Pain-A-  Pia DRIVER RN [facility]

## 2024-05-20 ASSESSMENT — PATIENT HEALTH QUESTIONNAIRE - PHQ9: SUM OF ALL RESPONSES TO PHQ QUESTIONS 1-9: 0

## 2024-05-20 NOTE — PROGRESS NOTES
S:  Discharge from triage.   A:  FHT's 130, Moderate variability, Accels present, no decels noted. Contractions none.  Cervical exam not done  R:  Written and verbal D/C instruction provided. Pt. Verbalized understanding of D/C instructions and will follow up with Dr Dee tomorrow or tuesday as previously scheduled.   Verbalizes understanding that she will call or return to the [facility] with any further questions or concerns.   Pt. D/C'd via ambulation with     Nursing Discharge Checklist  Discharge order entered: Yes  Patient care order entered: Yes  Charges entered: Yes  IV start and stop times have been documented in Epic?  na  NST start and stop times have been documented in Epic Doc F/S?  na

## 2024-05-20 NOTE — DISCHARGE INSTRUCTIONS
Diet:  -Continue on regular diet as tolerated  /diabetic diet as previously instructed   -Drink 8-10 glasses of water and/or juice every day    Activity as tolerated/level ______Bedrest.     Reason for being seen in L&D                      [facility] at [phone] if you have    5 or more contractions in one hour  Leaking of fluid from your vaginal area  Decreased fetal movement (follow kick count instructions)  Bleeding from your vaginal area  Swelling of your face, or increased swelling in your hands or legs  Headaches or vision problems such as blurring or seeing spots or stars  Nausea or vomiting lasting for more than 24 hours  An increase in weight (5lbs./week)  Epigastric pain (sometimes confused with heartburn that does not go away or a very bad stomach ache)    If you have any questions, please follow up with your doctor.  Oxycodone 5 mg every 4-6 hours as needed for pain. Tylenol 650 mg every 4 hours. Rest, Ice pack as needed.  Contact Dr Dee tomorrow regarding pain, elevated blood glucose level. Follow with Dr Dee on Tuesday as previously scheduled.

## 2024-05-21 ENCOUNTER — HOSPITAL ENCOUNTER (OUTPATIENT)
Dept: ULTRASOUND IMAGING | Facility: CLINIC | Age: 29
Discharge: HOME | End: 2024-05-21
Attending: FAMILY MEDICINE | Admitting: FAMILY MEDICINE
Payer: COMMERCIAL

## 2024-05-21 ENCOUNTER — PRENATAL OFFICE VISIT (OUTPATIENT)
Dept: FAMILY MEDICINE | Facility: CLINIC | Age: 29
End: 2024-05-21
Payer: COMMERCIAL

## 2024-05-21 VITALS
WEIGHT: 293 LBS | HEIGHT: 71 IN | TEMPERATURE: 97.4 F | OXYGEN SATURATION: 100 % | RESPIRATION RATE: 18 BRPM | SYSTOLIC BLOOD PRESSURE: 136 MMHG | DIASTOLIC BLOOD PRESSURE: 86 MMHG | HEART RATE: 129 BPM | BODY MASS INDEX: 41.02 KG/M2

## 2024-05-21 DIAGNOSIS — E11.65 TYPE 2 DIABETES MELLITUS WITH HYPERGLYCEMIA, WITHOUT LONG-TERM CURRENT USE OF INSULIN (H): ICD-10-CM

## 2024-05-21 DIAGNOSIS — F33.1 MAJOR DEPRESSIVE DISORDER, RECURRENT EPISODE, MODERATE (H): ICD-10-CM

## 2024-05-21 DIAGNOSIS — O09.93 HIGH-RISK PREGNANCY IN THIRD TRIMESTER: Primary | ICD-10-CM

## 2024-05-21 DIAGNOSIS — E66.01 MORBID OBESITY (H): ICD-10-CM

## 2024-05-21 DIAGNOSIS — O26.893 RH NEGATIVE STATUS DURING PREGNANCY IN THIRD TRIMESTER: ICD-10-CM

## 2024-05-21 DIAGNOSIS — Z67.91 RH NEGATIVE STATUS DURING PREGNANCY IN THIRD TRIMESTER: ICD-10-CM

## 2024-05-21 DIAGNOSIS — O09.93 HIGH-RISK PREGNANCY IN THIRD TRIMESTER: ICD-10-CM

## 2024-05-21 PROCEDURE — 76819 FETAL BIOPHYS PROFIL W/O NST: CPT

## 2024-05-21 PROCEDURE — 99207 PR PRENATAL VISIT: CPT | Performed by: FAMILY MEDICINE

## 2024-05-21 PROCEDURE — 99213 OFFICE O/P EST LOW 20 MIN: CPT | Mod: 25 | Performed by: FAMILY MEDICINE

## 2024-05-21 ASSESSMENT — PATIENT HEALTH QUESTIONNAIRE - PHQ9
SUM OF ALL RESPONSES TO PHQ QUESTIONS 1-9: 0
10. IF YOU CHECKED OFF ANY PROBLEMS, HOW DIFFICULT HAVE THESE PROBLEMS MADE IT FOR YOU TO DO YOUR WORK, TAKE CARE OF THINGS AT HOME, OR GET ALONG WITH OTHER PEOPLE: NOT DIFFICULT AT ALL
SUM OF ALL RESPONSES TO PHQ QUESTIONS 1-9: 0

## 2024-05-21 ASSESSMENT — PAIN SCALES - GENERAL: PAINLEVEL: NO PAIN (0)

## 2024-05-21 NOTE — PROGRESS NOTES
Pregnancy risks:  Mental health  -Had anxiety and depression, doing well without medications              - PHQ-9 score of 0 today (24)  -Will monitor closely, can be worse during the pregnancy  -Monitor and treat for postpartum depression/psychosis aggressively  -High functioning autism - in college graduate with a master degree  Obesity  -Pre-pregnancy BMI 40 with nulliparity.  - Increased risk for preeclampsia,   - Aspirin 81 mg  - Growth US at 32-34 week   - 24:  EFW 55%, BPP /  - BPP and NST weekly, starting at 34 week  -Consider to deliver 39-40  DM with hgb A1c of 7.5  -A1c on 2023 was 5.8              -Hgb A1c on 3/29/24: 7.5                          -She does not believe that she is DM                                      - Declined DM ed and intervention - refused meds                                      - Discussed potential complications to pregnancy, delivery and       - willing to take the risk              -Healthy diet and exercising discussed and encouraged              -Goal for Hgb A1c <6.5 during the pregnancy   -Likely will need insulin drips during intrapartum care  -Encouraged to monitor blood sugars              - Fasting BS: <95              - 1 hr postprandial: <140              - 2 hr postprandial: <120     Abnormal NIPS  -Positive for increased risk for XYY              -Discussed about potential false positive              -Recommend MFM/ consultation - declined  -Declined a level 2 ultrasound with MFM     Rh neg (O-)              - Received Rhogram on 3/29/24 at 28 weeks     OB History  -  -Previous miscarriage at 5-6 weeks      Prenatal care plan              - RICA: 2024 ([place]              - BMI (pre-pregnancy):  40 - (11-19 pounds)  - OB labs:  O neg, IR, all others are normal              - First trimester screening:  NIPS - positive for XYY              - Second trimester screening: AFP - normal              - Pain management: Epidural  as needed              - Ped:  to be determined              - Circumcision if boy: Yes              - BC: Likely birth control pill -did well with in the past              - Immunizations: Flu given 24, declined COVID              - Tdap: 28 -32 weeks    Was in L&D on 24 for lower back pain.  Reviewed the medical record.  Blood pressure was elevated.  NST was reactive.  UA with a glucose of more than 499 but no signs of bladder infection.  Protein creatinine ratio was normal.  CMP was normal except the glucose was 239.  CBC show hemoglobin of 10.8.   - Back pain resolved.  Feeling better.  No concern about it.  Not checking her blood pressure at home.  No headache or acute visual change.  No upper abdominal pain.    - Encouraged to continue with her normal activities as tolerated.  Stretching exercise also recommended.  Tylenol as needed for pain.  Follow-up as needed.      Stated her fasting blood sugar at home has been running around 150s although she is not checking it regularly as he used to.  Again, she still does not believe that she has diabetes - her elevated blood sugar is due to the hormonal change associated with the pregnancy.  I informed her that her blood sugar while L&D was high.  Stated it was right after eating.  I again had a long conversation with her and her  about the potential complications associated with uncontrolled diabetes to the pregnancy, fetal health and  care.  I informed him again that she has diabetes.  I reiterated that the goal for her blood sugar before breakfast as well as 1 in/or 2 hours postprandially.  She does not want any intervention nor seeing the specialist include diabetic educator and/or endocrinology at this time.  She is willing to take the risks.   Encouraged to continue working on exercise and healthy diet.  Will recheck the hgb A1c at around 37-38 weeks.  If it remains to be elevated, likely she will need insulin drip during the intrapartum  "care.    No concern of her safety or mental health.  She feels her depression is well-controlled.  Her  PHQ-9 score of 0 today.    Pregnancy wise, doing well and has no concern.    Normal fetal movement with no contraction or cramping.    No headache, dizziness, acute visual change or leg swelling.    No abnormal discharge, vaginal bleeding or leakage.    No UTI symptoms.   No concern of mental health -feels safe  Acid reflux is tolerable with omeprazole.   Been drinking a lot of water.    Reviewed the prenatal flow sheet with her in details    /86 (Cuff Size: Adult Large)   Pulse (!) 129   Temp 97.4  F (36.3  C) (Temporal)   Resp 18   Ht 1.803 m (5' 11\")   Wt 137 kg (302 lb)   LMP 10/02/2023   SpO2 100%   BMI 42.12 kg/m      Continue with prenatal vitamin, omeprazole and low-dose aspirin.    Encouraged drinks a lot of water.    Continue with her normal activity and encouraged exercising as tolerated.  PTL precautions, PROM and reportable symptoms discussed.   Preeclamptic symptoms discussed.  Cephalic position confirmed by Leopold maneuvers.  Discussed kick counts and fetal movement.  Declined Tdap today  Educated about what to expect in the next couple weeks  RTC 2 weeks - all of her questions were answered.        Mackenzie Neal Mai, MD      Answers submitted by the patient for this visit:  Patient Health Questionnaire (Submitted on 5/21/2024)  If you checked off any problems, how difficult have these problems made it for you to do your work, take care of things at home, or get along with other people?: Not difficult at all  PHQ9 TOTAL SCORE: 0    "

## 2024-05-21 NOTE — Clinical Note
Need prenatal apt with MD and BPP/NST on 6/5-6/7 (any day works) Follow-up with me on 6/14/24 with BPP/NST Weekly prenatal care with me and BPP/NST weekly after that until 39 week

## 2024-05-21 NOTE — PATIENT INSTRUCTIONS
"Weeks 32 to 34 of Your Pregnancy: Care Instructions    Decide whether you want to bank or donate your baby's umbilical cord blood. If you want to save this blood, you have to arrange for it ahead of time.    Decide about circumcision. Personal, Methodist, or cultural beliefs may play a role in your decision. You get to decide what you want for your baby.    Learn how to ease hemorrhoids.    Get more liquids, fruits, vegetables, and fiber in your diet.  Avoid sitting for too long.  Clean yourself with moist toilet paper. Or try witch hazel pads.  Try ice packs or warm sitz baths for discomfort.  Use hydrocortisone cream for pain or itching.  Ask your doctor about stool softeners.    Consider the benefits of breastfeeding.    It reduces your baby's risk of sudden infant death syndrome (SIDS).   babies are less likely to get certain infections. And they're less likely to be obese or get diabetes later in life.  It can lower your risk of breast and ovarian cancers and osteoporosis.  It saves you money.  Follow-up care is a key part of your treatment and safety. Be sure to make and go to all appointments, and call your doctor if you are having problems. It's also a good idea to know your test results and keep a list of the medicines you take.  Where can you learn more?  Go to https://www.[employer].net/patiented  Enter X711 in the search box to learn more about \"Weeks 32 to 34 of Your Pregnancy: Care Instructions.\"  Current as of: July 10, 2023               Content Version: 14.0    2513-7970 [employer].   Care instructions adapted under license by your healthcare professional. If you have questions about a medical condition or this instruction, always ask your healthcare professional. [employer] disclaims any warranty or liability for your use of this information.      You have been provided the Any Day Now: Early Labor at Home document.    Additional copies can be found here:  " [email or web address]  You have been provided the What I'd Wish I'd Known About Giving Birth document.    Additional copies can be found here:  [email or web address]

## 2024-05-24 PROBLEM — O99.891 BACK PAIN AFFECTING PREGNANCY: Status: RESOLVED | Noted: 2024-05-19 | Resolved: 2024-05-24

## 2024-05-24 PROBLEM — M54.9 BACK PAIN AFFECTING PREGNANCY: Status: RESOLVED | Noted: 2024-05-19 | Resolved: 2024-05-24

## 2024-05-30 ENCOUNTER — HOSPITAL ENCOUNTER (OUTPATIENT)
Facility: CLINIC | Age: 29
End: 2024-05-30
Admitting: FAMILY MEDICINE
Payer: COMMERCIAL

## 2024-05-30 ENCOUNTER — HOSPITAL ENCOUNTER (OUTPATIENT)
Facility: CLINIC | Age: 29
Discharge: HOME | End: 2024-05-30
Attending: FAMILY MEDICINE | Admitting: FAMILY MEDICINE
Payer: COMMERCIAL

## 2024-05-30 VITALS
TEMPERATURE: 97.8 F | DIASTOLIC BLOOD PRESSURE: 83 MMHG | RESPIRATION RATE: 24 BRPM | HEART RATE: 110 BPM | SYSTOLIC BLOOD PRESSURE: 139 MMHG

## 2024-05-30 DIAGNOSIS — B37.31 YEAST INFECTION OF THE VAGINA: Primary | ICD-10-CM

## 2024-05-30 DIAGNOSIS — O23.599 BACTERIAL VAGINOSIS IN PREGNANCY: ICD-10-CM

## 2024-05-30 DIAGNOSIS — B96.89 BACTERIAL VAGINOSIS IN PREGNANCY: ICD-10-CM

## 2024-05-30 PROBLEM — Z36.89 ENCOUNTER FOR TRIAGE IN PREGNANT PATIENT: Status: ACTIVE | Noted: 2024-05-30

## 2024-05-30 LAB
CLUE CELLS: PRESENT
TRICHOMONAS, WET PREP: ABNORMAL
WBC'S/HIGH POWER FIELD, WET PREP: ABNORMAL
YEAST, WET PREP: PRESENT

## 2024-05-30 PROCEDURE — 87210 SMEAR WET MOUNT SALINE/INK: CPT | Performed by: FAMILY MEDICINE

## 2024-05-30 PROCEDURE — G0463 HOSPITAL OUTPT CLINIC VISIT: HCPCS

## 2024-05-30 RX ORDER — FLUCONAZOLE 150 MG/1
150 TABLET ORAL ONCE
Qty: 1 TABLET | Refills: 0 | Status: SHIPPED | OUTPATIENT
Start: 2024-05-30 | End: 2024-05-30

## 2024-05-30 RX ORDER — METRONIDAZOLE 500 MG/1
500 TABLET ORAL 2 TIMES DAILY
Qty: 14 TABLET | Refills: 0 | Status: SHIPPED | OUTPATIENT
Start: 2024-05-30 | End: 2024-06-06

## 2024-05-30 RX ORDER — LIDOCAINE 40 MG/G
CREAM TOPICAL
Status: DISCONTINUED | OUTPATIENT
Start: 2024-05-30 | End: 2024-05-30 | Stop reason: HOSPADM

## 2024-05-30 RX ORDER — ACETAMINOPHEN 500 MG
1000 TABLET ORAL EVERY 6 HOURS PRN
Status: ON HOLD | COMMUNITY
End: 2024-06-25

## 2024-05-30 ASSESSMENT — ACTIVITIES OF DAILY LIVING (ADL): ADLS_ACUITY_SCORE: 22

## 2024-05-30 NOTE — DISCHARGE INSTRUCTIONS
OB Triage Discharge Instructions    Diet:  Diabetic as previously instructed    Activity:  As tolerated    Other Special Instructions: Call primary provider if concerns.     Reason for being seen in L&D: Vaginal pain vaginal discharge, hemorrhoid pain    Treatment/procedures performed in L&D: Wet prep.     Call the [facility] at [phone] if you have:  5 or more contractions in one hour  Leaking of fluid from your vaginal area  Decreased fetal movement (follow kick count instructions)  Bleeding from your vaginal area  Swelling in your face, or increased swelling in your hands or legs  Headaches or vision problems such as blurring or seeing spots or starts  Nausea or vomiting lasting for more than 24 hours  An increase in weight (5lbs/week)  Epigastric pain (sometimes confused with heartburn that does not go away or a very bad stomach ache)    If you have any questions, please follow up with your doctor.        Patient Signature: ______________________________________________________________  By signing the above I acknowledge that a nurse or my care provider has explained the instruction to me and I have had any questions regarding my care explained to me.        Discharge Nurse Signature: _______________________________ 5/30/2024  6:16 AM    Method of discharge: Ambulatory    Accompanied by: Self  Time of discharge: 0617

## 2024-05-30 NOTE — PROGRESS NOTES
S: Triage Arrival  B: Lidia is a 29   y.o.  @ 34w 3d who presents with complaint of external vaginal pain and external hemorrhoids and yellow mucous discharge.  A: EFM applied. FHT's 135 with moderate variability, accels present, no decels noted on strip. Contractions   R: Will notify MD to obtain further orders.

## 2024-05-30 NOTE — PROGRESS NOTES
Reviewed wet prep results with Dr Penaloza. Received telephone order from Dr. Penaloza for Diflucan 150mg orally one time and Flagyl 500 mg by mouth twice a day for 7 days.

## 2024-05-30 NOTE — PROGRESS NOTES
S:  Discharge from triage.   A:  FHT's 135, Moderate variability, Accels present, no decels noted. No contractions felt or noted on toco.  R:  Written and verbal D/C instruction provided. Pt. Verbalized understanding of D/C instructions and will follow up with Dr. Armando as previously scheduled.   Verbalizes understanding that she will call or return to the [facility] with any further questions or concerns.   Pt. D/C'd via ambulatory with self    Nursing Discharge Checklist  Discharge order entered: Yes  Patient care order entered: Yes  Charges entered: Yes  IV start and stop times have been documented in Epic?  N/A  NST start and stop times have been documented in Epic Doc F/S?  N/A

## 2024-05-30 NOTE — PROGRESS NOTES
S: MD update  A:  Dr. Penaloza informed of patient C/O vainal pain, hemmorhoids, and vaginal discharge.  FHT's135 with moderate variability, accels present, no decels noted. No contracitons felt or noted on toco. UA results reviewed with MD.  R: Orders received obtain wet prep

## 2024-06-05 ENCOUNTER — PRENATAL OFFICE VISIT (OUTPATIENT)
Dept: FAMILY MEDICINE | Facility: CLINIC | Age: 29
End: 2024-06-05
Payer: COMMERCIAL

## 2024-06-05 ENCOUNTER — HOSPITAL ENCOUNTER (OUTPATIENT)
Dept: ULTRASOUND IMAGING | Facility: CLINIC | Age: 29
Discharge: HOME | End: 2024-06-05
Attending: FAMILY MEDICINE | Admitting: FAMILY MEDICINE
Payer: COMMERCIAL

## 2024-06-05 VITALS
WEIGHT: 293 LBS | HEIGHT: 71 IN | OXYGEN SATURATION: 97 % | TEMPERATURE: 98 F | DIASTOLIC BLOOD PRESSURE: 82 MMHG | BODY MASS INDEX: 41.02 KG/M2 | HEART RATE: 96 BPM | SYSTOLIC BLOOD PRESSURE: 130 MMHG

## 2024-06-05 DIAGNOSIS — E11.65 TYPE 2 DIABETES MELLITUS WITH HYPERGLYCEMIA, WITHOUT LONG-TERM CURRENT USE OF INSULIN (H): ICD-10-CM

## 2024-06-05 DIAGNOSIS — F33.1 MAJOR DEPRESSIVE DISORDER, RECURRENT EPISODE, MODERATE (H): ICD-10-CM

## 2024-06-05 DIAGNOSIS — O09.93 HIGH-RISK PREGNANCY IN THIRD TRIMESTER: ICD-10-CM

## 2024-06-05 DIAGNOSIS — Z67.91 RH NEGATIVE STATUS DURING PREGNANCY IN THIRD TRIMESTER: ICD-10-CM

## 2024-06-05 DIAGNOSIS — O09.93 HIGH-RISK PREGNANCY IN THIRD TRIMESTER: Primary | ICD-10-CM

## 2024-06-05 DIAGNOSIS — E66.01 MORBID OBESITY (H): ICD-10-CM

## 2024-06-05 DIAGNOSIS — O26.893 RH NEGATIVE STATUS DURING PREGNANCY IN THIRD TRIMESTER: ICD-10-CM

## 2024-06-05 DIAGNOSIS — O28.5 ABNORMAL GENETIC TEST DURING PREGNANCY: ICD-10-CM

## 2024-06-05 PROBLEM — Z36.89 ENCOUNTER FOR TRIAGE IN PREGNANT PATIENT: Status: RESOLVED | Noted: 2024-05-30 | Resolved: 2024-06-05

## 2024-06-05 PROCEDURE — 90471 IMMUNIZATION ADMIN: CPT | Performed by: FAMILY MEDICINE

## 2024-06-05 PROCEDURE — 76819 FETAL BIOPHYS PROFIL W/O NST: CPT

## 2024-06-05 PROCEDURE — 99207 PR COMPLICATED OB VISIT: CPT | Performed by: FAMILY MEDICINE

## 2024-06-05 PROCEDURE — 90715 TDAP VACCINE 7 YRS/> IM: CPT | Performed by: FAMILY MEDICINE

## 2024-06-05 PROCEDURE — 59025 FETAL NON-STRESS TEST: CPT | Performed by: FAMILY MEDICINE

## 2024-06-05 ASSESSMENT — PAIN SCALES - GENERAL: PAINLEVEL: NO PAIN (0)

## 2024-06-06 NOTE — PROGRESS NOTES
"Doing well overall.  No bleeding, no regular ctx.   Here with FOB.   Needs NST due to gestational diabetes.   NST today was reactive and shows baseline 140s with moderate variabilty but not adequate accels. Irregular ctx present.   No decels.   She did get BPP today as well, scored 8/8.   She has had Rhogam.   I asked her about her diabetes, whether she is checking her glucose. I was quickly scolded by FOB and he states \"we've been over the diabetes thing and we're not addressing it again!\" It appears she currently has an A1C over 7, is not on any medication, does not check her glucose and has declined diabetes education.   I was unable to discuss this because FOB became angry. He states that baby is tracking \"just fine\" on ultrasound and they did not want to talk about it again.   She was prediabetic prior to pregnancy, this could be still gestational but based on A1C she is at high risk of  hypoglycemia. It appears she has weekly BPPs set up and has another growth ultrasound in 2 weeks. Last growth ultrasound on  showed EFW 55%.   Tdap given today.   Will follow up in 1 week with Dr. Dee or sooner prn.   Kim Armando MD   "

## 2024-06-14 ENCOUNTER — HOSPITAL ENCOUNTER (OUTPATIENT)
Dept: ULTRASOUND IMAGING | Facility: CLINIC | Age: 29
Discharge: HOME | End: 2024-06-14
Attending: FAMILY MEDICINE | Admitting: FAMILY MEDICINE
Payer: COMMERCIAL

## 2024-06-14 ENCOUNTER — TELEPHONE (OUTPATIENT)
Dept: FAMILY MEDICINE | Facility: CLINIC | Age: 29
End: 2024-06-14

## 2024-06-14 DIAGNOSIS — O09.93 HIGH-RISK PREGNANCY IN THIRD TRIMESTER: ICD-10-CM

## 2024-06-14 DIAGNOSIS — E11.65 TYPE 2 DIABETES MELLITUS WITH HYPERGLYCEMIA, WITHOUT LONG-TERM CURRENT USE OF INSULIN (H): ICD-10-CM

## 2024-06-14 PROCEDURE — 76819 FETAL BIOPHYS PROFIL W/O NST: CPT

## 2024-06-14 NOTE — TELEPHONE ENCOUNTER
Patient missed her OB appointment today.  Dr. Dee would like her scheduled to see him next week.  Monday 6/17/24 at 7:20 am, 8:20 am or 10:20 am would work.  Left message for patient to call back.    Florence Franco RN on 6/14/2024 at 2:47 PM

## 2024-06-17 NOTE — TELEPHONE ENCOUNTER
Phoned patient and informed her of documentation per encounter as stated below.  Patient stated she was busy and forgot about the appointment last week with Dr. Leila MD.  She stated she has plans for today, but has a scheduled office visit with Dr. Leila MD tomorrow, 6/18/2024 and does not feel she would need to be seen today.    Advised patient to call back with any further questions or concerns prior to her visit tomorrow.  Patient stated understanding.    Elizabeth Frye RN

## 2024-06-18 ENCOUNTER — HOSPITAL ENCOUNTER (OUTPATIENT)
Dept: ULTRASOUND IMAGING | Facility: CLINIC | Age: 29
Discharge: HOME | End: 2024-06-18
Attending: FAMILY MEDICINE | Admitting: FAMILY MEDICINE
Payer: COMMERCIAL

## 2024-06-18 ENCOUNTER — MYC MEDICAL ADVICE (OUTPATIENT)
Dept: FAMILY MEDICINE | Facility: CLINIC | Age: 29
End: 2024-06-18

## 2024-06-18 ENCOUNTER — RESULT FOLLOW UP (OUTPATIENT)
Dept: FAMILY MEDICINE | Facility: CLINIC | Age: 29
End: 2024-06-18

## 2024-06-18 DIAGNOSIS — E11.65 TYPE 2 DIABETES MELLITUS WITH HYPERGLYCEMIA, WITHOUT LONG-TERM CURRENT USE OF INSULIN (H): ICD-10-CM

## 2024-06-18 DIAGNOSIS — E66.01 MORBID OBESITY (H): ICD-10-CM

## 2024-06-18 DIAGNOSIS — E11.65 TYPE 2 DIABETES MELLITUS WITH HYPERGLYCEMIA, WITHOUT LONG-TERM CURRENT USE OF INSULIN (H): Primary | ICD-10-CM

## 2024-06-18 DIAGNOSIS — O09.93 HIGH-RISK PREGNANCY IN THIRD TRIMESTER: ICD-10-CM

## 2024-06-18 DIAGNOSIS — O28.3 ABNORMAL OBSTETRIC ULTRASOUND SCAN: ICD-10-CM

## 2024-06-18 LAB — RADIOLOGIST FLAGS: ABNORMAL

## 2024-06-18 PROCEDURE — 76819 FETAL BIOPHYS PROFIL W/O NST: CPT

## 2024-06-18 NOTE — PROGRESS NOTES
Called and left a message to her and her  again to have them call me back at my self phone number to discuss about OB ultrasound result.    Mackenzie Dee MD.

## 2024-06-19 ENCOUNTER — TELEPHONE (OUTPATIENT)
Dept: FAMILY MEDICINE | Facility: CLINIC | Age: 29
End: 2024-06-19

## 2024-06-19 NOTE — TELEPHONE ENCOUNTER
I would like her to be seen today.  I sent a [employer] message.  Tell her that there may be a wait as I am squeezing her in.  She need to go to the lab first. Also start NST right away if I fall behind.   Labs ordered.

## 2024-06-19 NOTE — TELEPHONE ENCOUNTER
RN Triage    Patient Contact    Attempt # 1    Was call answered?  No.  Left message on voicemail with information to call me back.    Florence Franco RN on 6/19/2024 at 3:28 PM

## 2024-06-19 NOTE — TELEPHONE ENCOUNTER
Patient calling wondering if she can schedule an induction for this Friday.     Patient is [place] hours north of the [facility] helping her parents and [place].     Pt states she was supposed to get labs done today, but unable due to location.     Pt states is not in signs of distress and baby is ok at this time.

## 2024-06-19 NOTE — TELEPHONE ENCOUNTER
Please let her know that I would like her to come in for at least labs,and if possible NST.  I am concern of her baby.  thanks

## 2024-06-20 NOTE — TELEPHONE ENCOUNTER
See my chart encounter 6/18/24 - patient was already informed of plan.     Closing encounter.     Deanne Lemus BSN, RN

## 2024-06-21 ENCOUNTER — ANESTHESIA (OUTPATIENT)
Dept: OBGYN | Facility: CLINIC | Age: 29
End: 2024-06-21
Payer: COMMERCIAL

## 2024-06-21 ENCOUNTER — ANESTHESIA EVENT (OUTPATIENT)
Dept: OBGYN | Facility: CLINIC | Age: 29
End: 2024-06-21
Payer: COMMERCIAL

## 2024-06-21 ENCOUNTER — LAB (OUTPATIENT)
Dept: LAB | Facility: CLINIC | Age: 29
End: 2024-06-21
Payer: COMMERCIAL

## 2024-06-21 ENCOUNTER — PRENATAL OFFICE VISIT (OUTPATIENT)
Dept: FAMILY MEDICINE | Facility: CLINIC | Age: 29
End: 2024-06-21
Payer: COMMERCIAL

## 2024-06-21 ENCOUNTER — HOSPITAL ENCOUNTER (INPATIENT)
Facility: CLINIC | Age: 29
LOS: 4 days | Discharge: HOME | End: 2024-06-25
Attending: FAMILY MEDICINE | Admitting: FAMILY MEDICINE
Payer: COMMERCIAL

## 2024-06-21 VITALS
SYSTOLIC BLOOD PRESSURE: 116 MMHG | HEART RATE: 76 BPM | TEMPERATURE: 97.4 F | HEIGHT: 71 IN | BODY MASS INDEX: 41.02 KG/M2 | RESPIRATION RATE: 20 BRPM | WEIGHT: 293 LBS | DIASTOLIC BLOOD PRESSURE: 98 MMHG | OXYGEN SATURATION: 98 %

## 2024-06-21 DIAGNOSIS — E11.65 TYPE 2 DIABETES MELLITUS WITH HYPERGLYCEMIA, WITHOUT LONG-TERM CURRENT USE OF INSULIN (H): ICD-10-CM

## 2024-06-21 DIAGNOSIS — E11.65 TYPE 2 DIABETES MELLITUS WITH HYPERGLYCEMIA, WITHOUT LONG-TERM CURRENT USE OF INSULIN (H): Primary | ICD-10-CM

## 2024-06-21 DIAGNOSIS — O26.893 RH NEGATIVE STATUS DURING PREGNANCY IN THIRD TRIMESTER: ICD-10-CM

## 2024-06-21 DIAGNOSIS — O28.5 ABNORMAL GENETIC TEST DURING PREGNANCY: ICD-10-CM

## 2024-06-21 DIAGNOSIS — O14.13 SEVERE PRE-ECLAMPSIA IN THIRD TRIMESTER: ICD-10-CM

## 2024-06-21 DIAGNOSIS — F84.0 AUTISM SPECTRUM DISORDER: ICD-10-CM

## 2024-06-21 DIAGNOSIS — O09.93 HIGH-RISK PREGNANCY IN THIRD TRIMESTER: ICD-10-CM

## 2024-06-21 DIAGNOSIS — O14.93 PRE-ECLAMPSIA IN THIRD TRIMESTER: ICD-10-CM

## 2024-06-21 DIAGNOSIS — O28.3 ABNORMAL OBSTETRIC ULTRASOUND SCAN: ICD-10-CM

## 2024-06-21 DIAGNOSIS — Z98.891 S/P CESAREAN SECTION: ICD-10-CM

## 2024-06-21 DIAGNOSIS — O13.9 PREGNANCY INDUCED HYPERTENSION, ANTEPARTUM: ICD-10-CM

## 2024-06-21 DIAGNOSIS — Z67.91 RH NEGATIVE STATUS DURING PREGNANCY IN THIRD TRIMESTER: ICD-10-CM

## 2024-06-21 DIAGNOSIS — D62 ANEMIA DUE TO BLOOD LOSS, ACUTE: ICD-10-CM

## 2024-06-21 DIAGNOSIS — E66.01 MORBID OBESITY (H): ICD-10-CM

## 2024-06-21 DIAGNOSIS — Z34.90 TERM PREGNANCY: ICD-10-CM

## 2024-06-21 PROBLEM — F33.1 MAJOR DEPRESSIVE DISORDER, RECURRENT EPISODE, MODERATE (H): Status: ACTIVE | Noted: 2023-08-02

## 2024-06-21 PROBLEM — E11.9 DM TYPE 2 (DIABETES MELLITUS, TYPE 2) (H): Status: RESOLVED | Noted: 2024-06-21 | Resolved: 2024-06-21

## 2024-06-21 PROBLEM — E11.9 DM TYPE 2 (DIABETES MELLITUS, TYPE 2) (H): Status: ACTIVE | Noted: 2024-06-21

## 2024-06-21 LAB
ABO/RH(D): NORMAL
ALBUMIN MFR UR ELPH: 196.4 MG/DL
ALBUMIN SERPL BCG-MCNC: 2.8 G/DL (ref 3.5–5.2)
ALBUMIN SERPL BCG-MCNC: 3 G/DL (ref 3.5–5.2)
ALBUMIN SERPL BCG-MCNC: 3.1 G/DL (ref 3.5–5.2)
ALBUMIN UR-MCNC: 100 MG/DL
ALP SERPL-CCNC: 144 U/L (ref 40–150)
ALP SERPL-CCNC: 148 U/L (ref 40–150)
ALP SERPL-CCNC: 155 U/L (ref 40–150)
ALT SERPL W P-5'-P-CCNC: 15 U/L (ref 0–50)
ALT SERPL W P-5'-P-CCNC: 16 U/L (ref 0–50)
ALT SERPL W P-5'-P-CCNC: 17 U/L (ref 0–50)
ALT SERPL W P-5'-P-CCNC: 18 U/L (ref 0–50)
ANION GAP SERPL CALCULATED.3IONS-SCNC: 10 MMOL/L (ref 7–15)
ANION GAP SERPL CALCULATED.3IONS-SCNC: 11 MMOL/L (ref 7–15)
ANION GAP SERPL CALCULATED.3IONS-SCNC: 13 MMOL/L (ref 7–15)
ANTIBODY SCREEN: NEGATIVE
APPEARANCE UR: ABNORMAL
AST SERPL W P-5'-P-CCNC: 11 U/L (ref 0–45)
AST SERPL W P-5'-P-CCNC: 14 U/L (ref 0–45)
AST SERPL W P-5'-P-CCNC: 16 U/L (ref 0–45)
AST SERPL W P-5'-P-CCNC: 17 U/L (ref 0–45)
B-OH-BUTYR SERPL-SCNC: <0.18 MMOL/L
BACTERIA #/AREA URNS HPF: ABNORMAL /HPF
BILIRUB SERPL-MCNC: 0.4 MG/DL
BILIRUB UR QL STRIP: NEGATIVE
BUN SERPL-MCNC: 10.3 MG/DL (ref 6–20)
BUN SERPL-MCNC: 10.5 MG/DL (ref 6–20)
BUN SERPL-MCNC: 8.7 MG/DL (ref 6–20)
CALCIUM SERPL-MCNC: 8.2 MG/DL (ref 8.6–10)
CALCIUM SERPL-MCNC: 8.7 MG/DL (ref 8.6–10)
CALCIUM SERPL-MCNC: 8.9 MG/DL (ref 8.6–10)
CHLORIDE SERPL-SCNC: 102 MMOL/L (ref 98–107)
CHLORIDE SERPL-SCNC: 107 MMOL/L (ref 98–107)
CHLORIDE SERPL-SCNC: 108 MMOL/L (ref 98–107)
COLOR UR AUTO: YELLOW
CREAT SERPL-MCNC: 0.76 MG/DL (ref 0.51–0.95)
CREAT SERPL-MCNC: 0.79 MG/DL (ref 0.51–0.95)
CREAT SERPL-MCNC: 0.81 MG/DL (ref 0.51–0.95)
CREAT SERPL-MCNC: 0.86 MG/DL (ref 0.51–0.95)
CREAT UR-MCNC: 277 MG/DL
DEPRECATED HCO3 PLAS-SCNC: 17 MMOL/L (ref 22–29)
DEPRECATED HCO3 PLAS-SCNC: 20 MMOL/L (ref 22–29)
DEPRECATED HCO3 PLAS-SCNC: 22 MMOL/L (ref 22–29)
EGFRCR SERPLBLD CKD-EPI 2021: >90 ML/MIN/1.73M2
ERYTHROCYTE [DISTWIDTH] IN BLOOD BY AUTOMATED COUNT: 13.9 % (ref 10–15)
ERYTHROCYTE [DISTWIDTH] IN BLOOD BY AUTOMATED COUNT: 13.9 % (ref 10–15)
GLUCOSE BLDC GLUCOMTR-MCNC: 105 MG/DL (ref 70–99)
GLUCOSE BLDC GLUCOMTR-MCNC: 114 MG/DL (ref 70–99)
GLUCOSE BLDC GLUCOMTR-MCNC: 119 MG/DL (ref 70–99)
GLUCOSE BLDC GLUCOMTR-MCNC: 123 MG/DL (ref 70–99)
GLUCOSE BLDC GLUCOMTR-MCNC: 123 MG/DL (ref 70–99)
GLUCOSE BLDC GLUCOMTR-MCNC: 124 MG/DL (ref 70–99)
GLUCOSE BLDC GLUCOMTR-MCNC: 126 MG/DL (ref 70–99)
GLUCOSE BLDC GLUCOMTR-MCNC: 129 MG/DL (ref 70–99)
GLUCOSE BLDC GLUCOMTR-MCNC: 136 MG/DL (ref 70–99)
GLUCOSE BLDC GLUCOMTR-MCNC: 145 MG/DL (ref 70–99)
GLUCOSE BLDC GLUCOMTR-MCNC: 153 MG/DL (ref 70–99)
GLUCOSE BLDC GLUCOMTR-MCNC: 155 MG/DL (ref 70–99)
GLUCOSE SERPL-MCNC: 116 MG/DL (ref 70–99)
GLUCOSE SERPL-MCNC: 133 MG/DL (ref 70–99)
GLUCOSE SERPL-MCNC: 149 MG/DL (ref 70–99)
GLUCOSE UR STRIP-MCNC: NEGATIVE MG/DL
HBA1C MFR BLD: 8.3 %
HCT VFR BLD AUTO: 30.4 % (ref 35–47)
HCT VFR BLD AUTO: 32.5 % (ref 35–47)
HGB BLD-MCNC: 10.4 G/DL (ref 11.7–15.7)
HGB BLD-MCNC: 11 G/DL (ref 11.7–15.7)
HGB BLD-MCNC: 11 G/DL (ref 11.7–15.7)
HGB BLD-MCNC: 12 G/DL (ref 11.7–15.7)
HGB UR QL STRIP: NEGATIVE
HOLD SPECIMEN: NORMAL
HYALINE CASTS: 11 /LPF
KETONES UR STRIP-MCNC: NEGATIVE MG/DL
LDH SERPL L TO P-CCNC: 182 U/L (ref 0–250)
LEUKOCYTE ESTERASE UR QL STRIP: NEGATIVE
MAGNESIUM SERPL-MCNC: 4.2 MG/DL (ref 1.7–2.3)
MAGNESIUM SERPL-MCNC: 4.9 MG/DL (ref 1.7–2.3)
MCH RBC QN AUTO: 26.3 PG (ref 26.5–33)
MCH RBC QN AUTO: 26.5 PG (ref 26.5–33)
MCHC RBC AUTO-ENTMCNC: 33.8 G/DL (ref 31.5–36.5)
MCHC RBC AUTO-ENTMCNC: 34.2 G/DL (ref 31.5–36.5)
MCV RBC AUTO: 78 FL (ref 78–100)
MCV RBC AUTO: 78 FL (ref 78–100)
MUCOUS THREADS #/AREA URNS LPF: PRESENT /LPF
NITRATE UR QL: NEGATIVE
PH UR STRIP: 5 [PH] (ref 5–7)
PLATELET # BLD AUTO: 238 10E3/UL (ref 150–450)
PLATELET # BLD AUTO: 270 10E3/UL (ref 150–450)
PLATELET # BLD AUTO: 276 10E3/UL (ref 150–450)
PLATELET # BLD AUTO: 278 10E3/UL (ref 150–450)
POTASSIUM SERPL-SCNC: 3.9 MMOL/L (ref 3.4–5.3)
POTASSIUM SERPL-SCNC: 4.1 MMOL/L (ref 3.4–5.3)
POTASSIUM SERPL-SCNC: 4.3 MMOL/L (ref 3.4–5.3)
PROT SERPL-MCNC: 5.5 G/DL (ref 6.4–8.3)
PROT SERPL-MCNC: 5.8 G/DL (ref 6.4–8.3)
PROT SERPL-MCNC: 6 G/DL (ref 6.4–8.3)
PROT/CREAT 24H UR: 0.71 MG/MG CR (ref 0–0.2)
RBC # BLD AUTO: 3.92 10E6/UL (ref 3.8–5.2)
RBC # BLD AUTO: 4.19 10E6/UL (ref 3.8–5.2)
RBC URINE: 2 /HPF
SODIUM SERPL-SCNC: 132 MMOL/L (ref 135–145)
SODIUM SERPL-SCNC: 139 MMOL/L (ref 135–145)
SODIUM SERPL-SCNC: 139 MMOL/L (ref 135–145)
SP GR UR STRIP: 1.03 (ref 1–1.03)
SPECIMEN EXPIRATION DATE: NORMAL
SQUAMOUS EPITHELIAL: 8 /HPF
T PALLIDUM AB SER QL: NONREACTIVE
UROBILINOGEN UR STRIP-MCNC: NORMAL MG/DL
WBC # BLD AUTO: 10.3 10E3/UL (ref 4–11)
WBC # BLD AUTO: 12 10E3/UL (ref 4–11)
WBC URINE: 6 /HPF

## 2024-06-21 PROCEDURE — 36415 COLL VENOUS BLD VENIPUNCTURE: CPT

## 2024-06-21 PROCEDURE — 250N000013 HC RX MED GY IP 250 OP 250 PS 637: Performed by: FAMILY MEDICINE

## 2024-06-21 PROCEDURE — 99207 PR PRENATAL VISIT: CPT | Performed by: FAMILY MEDICINE

## 2024-06-21 PROCEDURE — 250N000009 HC RX 250: Performed by: FAMILY MEDICINE

## 2024-06-21 PROCEDURE — 80053 COMPREHEN METABOLIC PANEL: CPT

## 2024-06-21 PROCEDURE — 86900 BLOOD TYPING SEROLOGIC ABO: CPT | Performed by: FAMILY MEDICINE

## 2024-06-21 PROCEDURE — 83615 LACTATE (LD) (LDH) ENZYME: CPT | Performed by: FAMILY MEDICINE

## 2024-06-21 PROCEDURE — 258N000003 HC RX IP 258 OP 636: Mod: JZ | Performed by: FAMILY MEDICINE

## 2024-06-21 PROCEDURE — 84460 ALANINE AMINO (ALT) (SGPT): CPT | Performed by: FAMILY MEDICINE

## 2024-06-21 PROCEDURE — 99222 1ST HOSP IP/OBS MODERATE 55: CPT | Performed by: PEDIATRICS

## 2024-06-21 PROCEDURE — 85049 AUTOMATED PLATELET COUNT: CPT | Performed by: FAMILY MEDICINE

## 2024-06-21 PROCEDURE — 84155 ASSAY OF PROTEIN SERUM: CPT | Performed by: FAMILY MEDICINE

## 2024-06-21 PROCEDURE — 3E033VJ INTRODUCTION OF OTHER HORMONE INTO PERIPHERAL VEIN, PERCUTANEOUS APPROACH: ICD-10-PCS | Performed by: FAMILY MEDICINE

## 2024-06-21 PROCEDURE — 83036 HEMOGLOBIN GLYCOSYLATED A1C: CPT

## 2024-06-21 PROCEDURE — 250N000011 HC RX IP 250 OP 636: Performed by: FAMILY MEDICINE

## 2024-06-21 PROCEDURE — 81003 URINALYSIS AUTO W/O SCOPE: CPT | Performed by: FAMILY MEDICINE

## 2024-06-21 PROCEDURE — 85018 HEMOGLOBIN: CPT | Performed by: FAMILY MEDICINE

## 2024-06-21 PROCEDURE — 84450 TRANSFERASE (AST) (SGOT): CPT | Performed by: FAMILY MEDICINE

## 2024-06-21 PROCEDURE — 82565 ASSAY OF CREATININE: CPT | Performed by: FAMILY MEDICINE

## 2024-06-21 PROCEDURE — 120N000013 HC R&B IMCU

## 2024-06-21 PROCEDURE — 87653 STREP B DNA AMP PROBE: CPT | Performed by: FAMILY MEDICINE

## 2024-06-21 PROCEDURE — 86780 TREPONEMA PALLIDUM: CPT | Performed by: FAMILY MEDICINE

## 2024-06-21 PROCEDURE — 258N000001 HC RX 258: Performed by: FAMILY MEDICINE

## 2024-06-21 PROCEDURE — 36415 COLL VENOUS BLD VENIPUNCTURE: CPT | Performed by: FAMILY MEDICINE

## 2024-06-21 PROCEDURE — 85027 COMPLETE CBC AUTOMATED: CPT

## 2024-06-21 PROCEDURE — 83735 ASSAY OF MAGNESIUM: CPT | Performed by: FAMILY MEDICINE

## 2024-06-21 PROCEDURE — 99222 1ST HOSP IP/OBS MODERATE 55: CPT | Performed by: FAMILY MEDICINE

## 2024-06-21 PROCEDURE — 82010 KETONE BODYS QUAN: CPT | Performed by: FAMILY MEDICINE

## 2024-06-21 PROCEDURE — 84156 ASSAY OF PROTEIN URINE: CPT | Performed by: FAMILY MEDICINE

## 2024-06-21 RX ORDER — OXYTOCIN/0.9 % SODIUM CHLORIDE 30/500 ML
1-24 PLASTIC BAG, INJECTION (ML) INTRAVENOUS CONTINUOUS
Status: DISCONTINUED | OUTPATIENT
Start: 2024-06-21 | End: 2024-06-22 | Stop reason: HOSPADM

## 2024-06-21 RX ORDER — NALOXONE HYDROCHLORIDE 0.4 MG/ML
0.2 INJECTION, SOLUTION INTRAMUSCULAR; INTRAVENOUS; SUBCUTANEOUS
Status: DISCONTINUED | OUTPATIENT
Start: 2024-06-21 | End: 2024-06-22 | Stop reason: HOSPADM

## 2024-06-21 RX ORDER — TRANEXAMIC ACID 10 MG/ML
1 INJECTION, SOLUTION INTRAVENOUS EVERY 30 MIN PRN
Status: DISCONTINUED | OUTPATIENT
Start: 2024-06-21 | End: 2024-06-22 | Stop reason: HOSPADM

## 2024-06-21 RX ORDER — ONDANSETRON 4 MG/1
4 TABLET, ORALLY DISINTEGRATING ORAL EVERY 6 HOURS PRN
Status: DISCONTINUED | OUTPATIENT
Start: 2024-06-21 | End: 2024-06-22 | Stop reason: HOSPADM

## 2024-06-21 RX ORDER — MAGNESIUM SULFATE IN WATER 40 MG/ML
1.75 INJECTION, SOLUTION INTRAVENOUS CONTINUOUS
Status: DISCONTINUED | OUTPATIENT
Start: 2024-06-21 | End: 2024-06-23

## 2024-06-21 RX ORDER — LIDOCAINE 40 MG/G
CREAM TOPICAL
Status: DISCONTINUED | OUTPATIENT
Start: 2024-06-21 | End: 2024-06-21

## 2024-06-21 RX ORDER — CITRIC ACID/SODIUM CITRATE 334-500MG
30 SOLUTION, ORAL ORAL
Status: DISCONTINUED | OUTPATIENT
Start: 2024-06-21 | End: 2024-06-22 | Stop reason: HOSPADM

## 2024-06-21 RX ORDER — IBUPROFEN 800 MG/1
800 TABLET, FILM COATED ORAL
Status: DISCONTINUED | OUTPATIENT
Start: 2024-06-21 | End: 2024-06-23

## 2024-06-21 RX ORDER — HYDRALAZINE HYDROCHLORIDE 20 MG/ML
10 INJECTION INTRAMUSCULAR; INTRAVENOUS
Status: DISCONTINUED | OUTPATIENT
Start: 2024-06-21 | End: 2024-06-25 | Stop reason: HOSPADM

## 2024-06-21 RX ORDER — DEXTROSE MONOHYDRATE 100 MG/ML
INJECTION, SOLUTION INTRAVENOUS CONTINUOUS PRN
Status: DISCONTINUED | OUTPATIENT
Start: 2024-06-21 | End: 2024-06-25 | Stop reason: HOSPADM

## 2024-06-21 RX ORDER — LABETALOL HYDROCHLORIDE 5 MG/ML
20 INJECTION, SOLUTION INTRAVENOUS
Status: DISCONTINUED | OUTPATIENT
Start: 2024-06-21 | End: 2024-06-25 | Stop reason: HOSPADM

## 2024-06-21 RX ORDER — PENICILLIN G 3000000 [IU]/50ML
3 INJECTION, SOLUTION INTRAVENOUS EVERY 4 HOURS
Status: DISCONTINUED | OUTPATIENT
Start: 2024-06-21 | End: 2024-06-22 | Stop reason: HOSPADM

## 2024-06-21 RX ORDER — MISOPROSTOL 100 UG/1
25 TABLET ORAL EVERY 4 HOURS PRN
Status: DISCONTINUED | OUTPATIENT
Start: 2024-06-21 | End: 2024-06-22 | Stop reason: HOSPADM

## 2024-06-21 RX ORDER — OXYTOCIN/0.9 % SODIUM CHLORIDE 30/500 ML
100-340 PLASTIC BAG, INJECTION (ML) INTRAVENOUS CONTINUOUS PRN
Status: DISCONTINUED | OUTPATIENT
Start: 2024-06-21 | End: 2024-06-25

## 2024-06-21 RX ORDER — NIFEDIPINE 10 MG/1
10-20 CAPSULE ORAL
Status: DISCONTINUED | OUTPATIENT
Start: 2024-06-21 | End: 2024-06-25 | Stop reason: HOSPADM

## 2024-06-21 RX ORDER — FENTANYL CITRATE-0.9 % NACL/PF 10 MCG/ML
100 PLASTIC BAG, INJECTION (ML) INTRAVENOUS EVERY 5 MIN PRN
Status: DISCONTINUED | OUTPATIENT
Start: 2024-06-21 | End: 2024-06-22 | Stop reason: HOSPADM

## 2024-06-21 RX ORDER — LABETALOL HYDROCHLORIDE 5 MG/ML
20-40 INJECTION, SOLUTION INTRAVENOUS EVERY 10 MIN PRN
Status: DISCONTINUED | OUTPATIENT
Start: 2024-06-21 | End: 2024-06-25 | Stop reason: HOSPADM

## 2024-06-21 RX ORDER — OXYTOCIN/0.9 % SODIUM CHLORIDE 30/500 ML
340 PLASTIC BAG, INJECTION (ML) INTRAVENOUS CONTINUOUS PRN
Status: DISCONTINUED | OUTPATIENT
Start: 2024-06-21 | End: 2024-06-22 | Stop reason: HOSPADM

## 2024-06-21 RX ORDER — CALCIUM GLUCONATE 94 MG/ML
1 INJECTION, SOLUTION INTRAVENOUS
Status: DISCONTINUED | OUTPATIENT
Start: 2024-06-21 | End: 2024-06-25 | Stop reason: HOSPADM

## 2024-06-21 RX ORDER — MISOPROSTOL 200 UG/1
400 TABLET ORAL
Status: DISCONTINUED | OUTPATIENT
Start: 2024-06-21 | End: 2024-06-22 | Stop reason: HOSPADM

## 2024-06-21 RX ORDER — LOPERAMIDE HCL 2 MG
2 CAPSULE ORAL
Status: DISCONTINUED | OUTPATIENT
Start: 2024-06-21 | End: 2024-06-22 | Stop reason: HOSPADM

## 2024-06-21 RX ORDER — PROCHLORPERAZINE 25 MG
25 SUPPOSITORY, RECTAL RECTAL EVERY 12 HOURS PRN
Status: DISCONTINUED | OUTPATIENT
Start: 2024-06-21 | End: 2024-06-22 | Stop reason: HOSPADM

## 2024-06-21 RX ORDER — NALOXONE HYDROCHLORIDE 0.4 MG/ML
0.4 INJECTION, SOLUTION INTRAMUSCULAR; INTRAVENOUS; SUBCUTANEOUS
Status: DISCONTINUED | OUTPATIENT
Start: 2024-06-21 | End: 2024-06-22 | Stop reason: HOSPADM

## 2024-06-21 RX ORDER — DEXTROSE MONOHYDRATE 25 G/50ML
25-50 INJECTION, SOLUTION INTRAVENOUS
Status: DISCONTINUED | OUTPATIENT
Start: 2024-06-21 | End: 2024-06-25

## 2024-06-21 RX ORDER — MAGNESIUM SULFATE HEPTAHYDRATE 40 MG/ML
4 INJECTION, SOLUTION INTRAVENOUS
Status: DISCONTINUED | OUTPATIENT
Start: 2024-06-21 | End: 2024-06-25 | Stop reason: HOSPADM

## 2024-06-21 RX ORDER — NALBUPHINE HYDROCHLORIDE 10 MG/ML
2.5-5 INJECTION, SOLUTION INTRAMUSCULAR; INTRAVENOUS; SUBCUTANEOUS EVERY 6 HOURS PRN
Status: DISCONTINUED | OUTPATIENT
Start: 2024-06-21 | End: 2024-06-25 | Stop reason: HOSPADM

## 2024-06-21 RX ORDER — NICOTINE POLACRILEX 4 MG
15-30 LOZENGE BUCCAL
Status: DISCONTINUED | OUTPATIENT
Start: 2024-06-21 | End: 2024-06-25

## 2024-06-21 RX ORDER — SODIUM CHLORIDE, SODIUM LACTATE, POTASSIUM CHLORIDE, CALCIUM CHLORIDE 600; 310; 30; 20 MG/100ML; MG/100ML; MG/100ML; MG/100ML
10-125 INJECTION, SOLUTION INTRAVENOUS CONTINUOUS
Status: DISCONTINUED | OUTPATIENT
Start: 2024-06-21 | End: 2024-06-25 | Stop reason: HOSPADM

## 2024-06-21 RX ORDER — ACETAMINOPHEN 325 MG/1
975 TABLET ORAL EVERY 4 HOURS PRN
Status: DISCONTINUED | OUTPATIENT
Start: 2024-06-21 | End: 2024-06-25 | Stop reason: HOSPADM

## 2024-06-21 RX ORDER — OXYTOCIN 10 [USP'U]/ML
10 INJECTION, SOLUTION INTRAMUSCULAR; INTRAVENOUS
Status: DISCONTINUED | OUTPATIENT
Start: 2024-06-21 | End: 2024-06-25

## 2024-06-21 RX ORDER — ONDANSETRON 2 MG/ML
4 INJECTION INTRAMUSCULAR; INTRAVENOUS EVERY 6 HOURS PRN
Status: DISCONTINUED | OUTPATIENT
Start: 2024-06-21 | End: 2024-06-22 | Stop reason: HOSPADM

## 2024-06-21 RX ORDER — METOCLOPRAMIDE HYDROCHLORIDE 5 MG/ML
10 INJECTION INTRAMUSCULAR; INTRAVENOUS EVERY 6 HOURS PRN
Status: DISCONTINUED | OUTPATIENT
Start: 2024-06-21 | End: 2024-06-22 | Stop reason: HOSPADM

## 2024-06-21 RX ORDER — NICOTINE POLACRILEX 4 MG
15-30 LOZENGE BUCCAL
Status: DISCONTINUED | OUTPATIENT
Start: 2024-06-21 | End: 2024-06-21

## 2024-06-21 RX ORDER — SODIUM CHLORIDE, SODIUM LACTATE, POTASSIUM CHLORIDE, CALCIUM CHLORIDE 600; 310; 30; 20 MG/100ML; MG/100ML; MG/100ML; MG/100ML
10-125 INJECTION, SOLUTION INTRAVENOUS CONTINUOUS
Status: DISCONTINUED | OUTPATIENT
Start: 2024-06-21 | End: 2024-06-21

## 2024-06-21 RX ORDER — PRENATAL VIT/IRON FUM/FOLIC AC 27MG-0.8MG
1 TABLET ORAL DAILY
Status: DISCONTINUED | OUTPATIENT
Start: 2024-06-21 | End: 2024-06-25 | Stop reason: HOSPADM

## 2024-06-21 RX ORDER — MAGNESIUM SULFATE HEPTAHYDRATE 40 MG/ML
2 INJECTION, SOLUTION INTRAVENOUS
Status: DISCONTINUED | OUTPATIENT
Start: 2024-06-21 | End: 2024-06-25 | Stop reason: HOSPADM

## 2024-06-21 RX ORDER — MAGNESIUM SULFATE HEPTAHYDRATE 40 MG/ML
4 INJECTION, SOLUTION INTRAVENOUS ONCE
Status: COMPLETED | OUTPATIENT
Start: 2024-06-21 | End: 2024-06-21

## 2024-06-21 RX ORDER — SODIUM CHLORIDE 9 MG/ML
INJECTION, SOLUTION INTRAVENOUS CONTINUOUS PRN
Status: DISCONTINUED | OUTPATIENT
Start: 2024-06-21 | End: 2024-06-25 | Stop reason: HOSPADM

## 2024-06-21 RX ORDER — PENICILLIN G POTASSIUM 5000000 [IU]/1
5 INJECTION, POWDER, FOR SOLUTION INTRAMUSCULAR; INTRAVENOUS ONCE
Status: COMPLETED | OUTPATIENT
Start: 2024-06-21 | End: 2024-06-21

## 2024-06-21 RX ORDER — SODIUM CHLORIDE, SODIUM LACTATE, POTASSIUM CHLORIDE, CALCIUM CHLORIDE 600; 310; 30; 20 MG/100ML; MG/100ML; MG/100ML; MG/100ML
INJECTION, SOLUTION INTRAVENOUS CONTINUOUS PRN
Status: DISCONTINUED | OUTPATIENT
Start: 2024-06-21 | End: 2024-06-22 | Stop reason: HOSPADM

## 2024-06-21 RX ORDER — OXYTOCIN 10 [USP'U]/ML
10 INJECTION, SOLUTION INTRAMUSCULAR; INTRAVENOUS
Status: DISCONTINUED | OUTPATIENT
Start: 2024-06-21 | End: 2024-06-22 | Stop reason: HOSPADM

## 2024-06-21 RX ORDER — KETOROLAC TROMETHAMINE 30 MG/ML
30 INJECTION, SOLUTION INTRAMUSCULAR; INTRAVENOUS
Status: DISCONTINUED | OUTPATIENT
Start: 2024-06-21 | End: 2024-06-21

## 2024-06-21 RX ORDER — LIDOCAINE 40 MG/G
CREAM TOPICAL
Status: DISCONTINUED | OUTPATIENT
Start: 2024-06-21 | End: 2024-06-22 | Stop reason: HOSPADM

## 2024-06-21 RX ORDER — METHYLERGONOVINE MALEATE 0.2 MG/ML
200 INJECTION INTRAVENOUS
Status: DISCONTINUED | OUTPATIENT
Start: 2024-06-21 | End: 2024-06-22 | Stop reason: HOSPADM

## 2024-06-21 RX ORDER — ACETAMINOPHEN 650 MG/1
650 SUPPOSITORY RECTAL EVERY 4 HOURS PRN
Status: DISCONTINUED | OUTPATIENT
Start: 2024-06-21 | End: 2024-06-21

## 2024-06-21 RX ORDER — LOPERAMIDE HCL 2 MG
4 CAPSULE ORAL
Status: DISCONTINUED | OUTPATIENT
Start: 2024-06-21 | End: 2024-06-22 | Stop reason: HOSPADM

## 2024-06-21 RX ORDER — METOCLOPRAMIDE 5 MG/1
10 TABLET ORAL EVERY 6 HOURS PRN
Status: DISCONTINUED | OUTPATIENT
Start: 2024-06-21 | End: 2024-06-22 | Stop reason: HOSPADM

## 2024-06-21 RX ORDER — NIFEDIPINE 30 MG/1
30 TABLET, EXTENDED RELEASE ORAL ONCE
Status: COMPLETED | OUTPATIENT
Start: 2024-06-21 | End: 2024-06-21

## 2024-06-21 RX ORDER — CARBOPROST TROMETHAMINE 250 UG/ML
250 INJECTION, SOLUTION INTRAMUSCULAR
Status: DISCONTINUED | OUTPATIENT
Start: 2024-06-21 | End: 2024-06-22 | Stop reason: HOSPADM

## 2024-06-21 RX ORDER — PROCHLORPERAZINE MALEATE 5 MG
10 TABLET ORAL EVERY 6 HOURS PRN
Status: DISCONTINUED | OUTPATIENT
Start: 2024-06-21 | End: 2024-06-22 | Stop reason: HOSPADM

## 2024-06-21 RX ORDER — DEXTROSE MONOHYDRATE 25 G/50ML
25-50 INJECTION, SOLUTION INTRAVENOUS
Status: DISCONTINUED | OUTPATIENT
Start: 2024-06-21 | End: 2024-06-21

## 2024-06-21 RX ADMIN — PENICILLIN G POTASSIUM 5 MILLION UNITS: 5000000 POWDER, FOR SOLUTION INTRAMUSCULAR; INTRAPLEURAL; INTRATHECAL; INTRAVENOUS at 19:48

## 2024-06-21 RX ADMIN — INSULIN HUMAN: 1 INJECTION, SOLUTION INTRAVENOUS at 20:01

## 2024-06-21 RX ADMIN — LABETALOL HYDROCHLORIDE 40 MG: 5 INJECTION INTRAVENOUS at 18:50

## 2024-06-21 RX ADMIN — INSULIN HUMAN 1 UNITS/HR: 1 INJECTION, SOLUTION INTRAVENOUS at 18:40

## 2024-06-21 RX ADMIN — INSULIN HUMAN: 1 INJECTION, SOLUTION INTRAVENOUS at 11:27

## 2024-06-21 RX ADMIN — LABETALOL HYDROCHLORIDE 20 MG: 5 INJECTION INTRAVENOUS at 20:24

## 2024-06-21 RX ADMIN — INSULIN HUMAN: 1 INJECTION, SOLUTION INTRAVENOUS at 14:36

## 2024-06-21 RX ADMIN — DEXTROSE MONOHYDRATE: 100 INJECTION, SOLUTION INTRAVENOUS at 14:40

## 2024-06-21 RX ADMIN — LABETALOL HYDROCHLORIDE 40 MG: 5 INJECTION INTRAVENOUS at 20:41

## 2024-06-21 RX ADMIN — PRENATAL VIT W/ FE FUMARATE-FA TAB 27-0.8 MG 1 TABLET: 27-0.8 TAB at 23:01

## 2024-06-21 RX ADMIN — INSULIN HUMAN: 1 INJECTION, SOLUTION INTRAVENOUS at 21:29

## 2024-06-21 RX ADMIN — MAGNESIUM SULFATE HEPTAHYDRATE 2 G/HR: 40 INJECTION, SOLUTION INTRAVENOUS at 10:40

## 2024-06-21 RX ADMIN — NIFEDIPINE 30 MG: 30 TABLET, EXTENDED RELEASE ORAL at 23:01

## 2024-06-21 RX ADMIN — NIFEDIPINE 20 MG: 10 CAPSULE ORAL at 09:41

## 2024-06-21 RX ADMIN — LABETALOL HYDROCHLORIDE 80 MG: 5 INJECTION INTRAVENOUS at 21:52

## 2024-06-21 RX ADMIN — SODIUM CHLORIDE, POTASSIUM CHLORIDE, SODIUM LACTATE AND CALCIUM CHLORIDE 25 ML/HR: 600; 310; 30; 20 INJECTION, SOLUTION INTRAVENOUS at 10:10

## 2024-06-21 RX ADMIN — MISOPROSTOL 25 MCG: 100 TABLET ORAL at 18:23

## 2024-06-21 RX ADMIN — Medication 2 MILLI-UNITS/MIN: at 22:19

## 2024-06-21 RX ADMIN — HYDRALAZINE HYDROCHLORIDE 10 MG: 20 INJECTION INTRAMUSCULAR; INTRAVENOUS at 23:53

## 2024-06-21 RX ADMIN — LABETALOL HYDROCHLORIDE 20 MG: 5 INJECTION INTRAVENOUS at 10:26

## 2024-06-21 RX ADMIN — MAGNESIUM SULFATE HEPTAHYDRATE 4 G: 40 INJECTION, SOLUTION INTRAVENOUS at 10:10

## 2024-06-21 RX ADMIN — SODIUM CHLORIDE, POTASSIUM CHLORIDE, SODIUM LACTATE AND CALCIUM CHLORIDE: 600; 310; 30; 20 INJECTION, SOLUTION INTRAVENOUS at 22:19

## 2024-06-21 RX ADMIN — INSULIN HUMAN: 1 INJECTION, SOLUTION INTRAVENOUS at 17:49

## 2024-06-21 RX ADMIN — MAGNESIUM SULFATE HEPTAHYDRATE 2 G/HR: 40 INJECTION, SOLUTION INTRAVENOUS at 20:14

## 2024-06-21 RX ADMIN — LABETALOL HYDROCHLORIDE 20 MG: 5 INJECTION INTRAVENOUS at 16:17

## 2024-06-21 RX ADMIN — ACETAMINOPHEN 975 MG: 325 TABLET, FILM COATED ORAL at 16:42

## 2024-06-21 RX ADMIN — MISOPROSTOL 25 MCG: 100 TABLET ORAL at 13:17

## 2024-06-21 RX ADMIN — NIFEDIPINE 10 MG: 10 CAPSULE ORAL at 09:16

## 2024-06-21 RX ADMIN — SODIUM CHLORIDE: 9 INJECTION, SOLUTION INTRAVENOUS at 10:10

## 2024-06-21 ASSESSMENT — ACTIVITIES OF DAILY LIVING (ADL)
ADLS_ACUITY_SCORE: 19
ADLS_ACUITY_SCORE: 18
ADLS_ACUITY_SCORE: 19
ADLS_ACUITY_SCORE: 18
ADLS_ACUITY_SCORE: 19
ADLS_ACUITY_SCORE: 18
ADLS_ACUITY_SCORE: 19
ADLS_ACUITY_SCORE: 19
ADLS_ACUITY_SCORE: 35

## 2024-06-21 NOTE — PROGRESS NOTES
Data: [facility] presented to [facility]: 2024  7:59 AM.  Patient admitted for induction for gestational diabetes. Patient is a .  Prenatal record reviewed. Pregnancy  has been complicated by obesity.  Gestational Age 37w4d. VSS. Fetal movement present. Support person is present.   Action: Verbal consent for EFM.  Admission assessment completed. Bill of rights reviewed.  Response: Patient verbalized agreement with plan. Will contact Dr Mackenzie Neal Mai with update and further orders.  Dafne Lujan RN on 2024 at 9:05 AM

## 2024-06-21 NOTE — PROGRESS NOTES
Tolerating the medical infusion well.  Blood pressure has been fluctuating.  No preeclamptic symptoms.  Will continue with magnesium infusion.  She has good I/O.    BS has been stable with insulin drip.     Received the first Cytotec dose.  Not feeling the contractions.    NST - reactive with category I.  Rare contractions noted on the Lilia.    Will continue with the current care.     Will monitor kidney function and mag levels closely.    A/P discussed and all of her questions were answered.      Mackenzie Dee MD.

## 2024-06-21 NOTE — PROGRESS NOTES
Dr. Dee called and informed pt was 2.5 cm/70%/-1 station/vertex. Plan of care is to start patient on pitocin, and to treat with penicillin for GBS unknown status. Dafne Lujan RN on 6/21/2024 at 7:01 PM

## 2024-06-21 NOTE — H&P
"[facility] Labor and Delivery History and Physical    Lidia Gallegos MRN# [id number]   Age: 29 year old YOB: 1995     Date of Admission:  2024    Primary care provider: Deion Price           Chief Complaint:   Lidia Gallegos is a 29 year old female who is 37w4d pregnant and being admitted for induction of labor, indication pregnancy-induced hypertension and Uncontrolled T2DM and Pre-eclampsia.          Pregnancy history:     OBSTETRIC HISTORY:    OB History    Para Term  AB Living   2 0 0 0 1 0   SAB IAB Ectopic Multiple Live Births   0 0 0 0 0      # Outcome Date GA Lbr Henrique/2nd Weight Sex Type Anes PTL Lv   2 Current            1 AB 2023 4w3d             Obstetric Comments   EDC 2024 based on LMP and estimated.   to [place].         EDC: Estimated Date of Delivery: 24    Prenatal Labs:   Lab Results   Component Value Date    AS Negative 2024    HEPBANG Nonreactive 2023    HGB 10.4 (L) 2024       GBS Status:   No results found for: \"GBS\"    Active Problem List  Patient Active Problem List   Diagnosis    Chronic bilateral low back pain without sciatica    Type 2 diabetes mellitus with hyperglycemia, without long-term current use of insulin (H)    Major depressive disorder, recurrent episode, moderate (H)    Autism spectrum disorder    Gastroesophageal reflux disease with esophagitis without hemorrhage    Papanicolaou smear of cervix with low grade squamous intraepithelial lesion (LGSIL)    Morbid obesity (H)    High-risk pregnancy    Abnormal genetic test during pregnancy - NIPS showed XYY    Rh negative status during pregnancy    DM type 2 (diabetes mellitus, type 2) (H)    Term pregnancy    Pregnancy induced hypertension, antepartum       Medication Prior to Admission  Facility-Administered Medications Prior to Admission   Medication Dose Route Frequency Provider Last Rate Last Admin    rho(D) immune globulin " (RHOGAM) injection 300 mcg  300 mcg Intramuscular Once          Medications Prior to Admission   Medication Sig Dispense Refill Last Dose    acetaminophen (TYLENOL) 500 MG tablet Take 1,000 mg by mouth every 6 hours as needed for mild pain Takes once a day as needed for pain   6/21/2024 at 0650    aspirin 81 MG EC tablet Take 1 tablet (81 mg) by mouth daily 100 tablet 2  at 0650    blood glucose (NO BRAND SPECIFIED) test strip Use to test blood sugar 2 times daily or as directed. To accompany: Blood Glucose Monitor Brands: per insurance. 100 strip 6     blood glucose (NO BRAND SPECIFIED) test strip Use to test blood sugar 1-2 times daily or as directed. To accompany: Blood Glucose Monitor Brands: per insurance. 100 strip 6     blood glucose monitoring (NO BRAND SPECIFIED) meter device kit Use to test blood sugar 2 times daily or as directed. Preferred blood glucose meter OR supplies to accompany: Blood Glucose Monitor Brands: per insurance. 1 kit 0     fluticasone (FLONASE) 50 MCG/ACT nasal spray Spray 1 spray into both nostrils daily 16 g 1     omeprazole (PRILOSEC) 40 MG DR capsule Take 1 capsule (40 mg) by mouth daily 90 capsule 1  at 0650    ondansetron (ZOFRAN ODT) 4 MG ODT tab Take 1 tablet (4 mg) by mouth every 8 hours as needed for nausea 10 tablet 3     Prenatal Vit-Fe Fumarate-FA (PRENATAL MULTIVITAMIN W/IRON) 27-0.8 MG tablet Take 1 tablet by mouth daily       thin (NO BRAND SPECIFIED) lancets Use with lanceting device. To accompany: Blood Glucose Monitor Brands: per insurance. 100 each 6    .        Maternal Past Medical History:     Past Medical History:   Diagnosis Date    Anxiety     Autism     Depression     History of anemia                        Family History:     Family History   Problem Relation Age of Onset    No Known Problems Mother     Hypertension Father     Mental Illness Sister         bipolar    Attention Deficit Disorder Sister     Birth defects Brother         bladder - reportedly  "related to nuchal cord    Cancer Maternal Grandfather     Diabetes Paternal Grandmother     Diabetes Paternal Grandfather     Kidney failure Paternal Grandfather      Family history reviewed and updated in [facility]            Social History:     Social History     Tobacco Use    Smoking status: Former     Current packs/day: 0.00     Types: Cigarettes     Quit date: 2022     Years since quittin.4    Smokeless tobacco: Never   Substance Use Topics    Alcohol use: Not Currently     Comment: occ            Review of Systems:   A comprehensive review of systems was performed and found to be negative except as described in this note          Physical Exam:   Vitals were reviewed  Patient Vitals for the past 12 hrs:   BP Temp Temp src Pulse Resp SpO2 Height Weight   24 0850 (!) 178/113 -- -- -- -- -- -- --   24 0832 (!) 176/117 98.1  F (36.7  C) Oral 78 16 97 % 1.803 m (5' 11\") 142 kg (313 lb)   24 0830 (!) 176/117 -- -- -- -- 97 % -- --     Constitutional:   awake, alert, cooperative, no apparent distress, and appears stated age   Eyes:   sclera clear   ENT:   normocephalic, without obvious abnormality   Neck:   skin normal   Lungs:   No increased work of breathing, good air exchange, clear to auscultation bilaterally, no crackles or wheezing   Cardiovascular:   normal S1 and S2   Abdomen:   Gravid, non tender.    Musculoskeletal:   Normal reflexes.    Neurologic:   Wake and alert, responds to questions appropriately often tangential but easily redirectable.    Neuropsychiatric:   Anxious       Cervix:deferred  Presentation:Cephalic  Fetal Heart Rate Tracing: reactive and reassuring  Tocometer: external monitor                       Assessment:   Lidia Gallegos is a 37w4d pregnant female admitted with induction of labor; indication pregnancy-induced hypertension diagnosed at presentation to labor and delivery with sustained severe range BP and poorly controlled T2 DM with hx of Autism spectrum d/o " and depression w anxiety.          Plan:   Admit - see IP orders  -PIH: likely pre-E but awaiting labs. Severe range BP x2 at LDU at admit for IOL for T2DM. Treated with nifedipine now x2 will proceed with labetalol prn if no response to nifedipine. Mag gtt ordered for neuro protection. Strict I&O marc okay prn.  No Severe symptoms at this time but given sustained elevated BP will treat per ACOG guidelines with magnesium gtt.     --Bps now into the 150s sbp after nifedipine x2. Will continue to monitor per protocol. PIH labs show normal platelets and normal H&H with creatinine to 0.79 and normal LFTs. Will continue to monitor per protocol.     T2DM: initial BG necessitated sliding scale insulin but given likely NPO/clears will defer insulin drip for now until BP stabilized    Fetal Wellbeing: Reactive FHT in clinic; moderate variability with accels. ?prolonged deceleration x1 at 0945 but suspect maternal HR given positioning and broken tracing.     **Concern for possible skin edema of fetus on last BPP on 6/18/24. No sign of pleural edema on that exam.     -Labor induction with likely cytotec +/- FB with progression to pitocin prn   MD consultant ordered for possible insulin drip prn    Karen Bangura MD

## 2024-06-21 NOTE — PROGRESS NOTES
"Pt ambulated to rest room with A2 and gait belt, when at toilet pt states \"my vision just went black\". RN called for another assist and pt was ambulated via shreya steady and A3 back to bed. Primary RN updated and called Dr. Dee to inform. BROOKLYNN/WILLIAN for marc catheter placement at this time. Dafne Lujan RN on 6/21/2024 at 6:26 PM    "

## 2024-06-21 NOTE — CONSULTS
[facility]  Consult Note - Hospitalist Service  Date of Admission:  6/21/2024  Consult Requested by: Dr. Dee  Reason for Consult: diamante-partum management of diabetes in pregnancy    Assessment & Plan   Lidia Gallegos is a 29 year old female admitted on 6/21/2024 at 37-5/7 weeks gestation for induction of labor due to concerns for diabetes with hyperglycemia, pregnancy-induced hypertension, and possible preeclampsia.  Hospitalist team was consulted regarding peripartum management of diabetes in pregnancy.    Gestational diabetes mellitus  History of prediabetes  Did not carry diagnosis of diabetes mellitus prior to this pregnancy although previous test results were suspicious for prediabetes predating this pregnancy.  Has had persistent hyperglycemia during pregnancy suspicious for gestational diabetes mellitus for which the patient declined specific pharmacotherapy.  Now admitted for induction of labor.  -Agree with intrapartum use of insulin infusion using the gestational diabetes mellitus system protocol and order set  -Standard guidelines recommend maintaining intrapartum blood sugars  (ACOG guidelines recommend target blood sugar )  -In addition to insulin therapy, agree with starting dextrose infusion with insulin infusion per order set protocol to reduce risk for hypoglycemia  -After delivery, anticipate discontinuation of insulin infusion  -Recommend retesting postpartum blood sugars at approximately 24 to 72 hours after delivery with postpartum blood sugar targets at that time of fasting blood sugar less than 140 and preprandial blood sugar less than 180  -If during hospitalization postpartum blood sugars exceed these targets, would consider recommending pharmacotherapy with metformin and/or insulin  -It is generally recommended that patient have reevaluation for persistent diabetes mellitus about 4 to 6 weeks after delivery  -Gestational diabetes mellitus discussed  with the patient today and addressed her questions and concerns at this time  -Briefly discussed case with Dr. Dee of the family medicine obstetric service    Obesity  Weight prior to pregnancy was 123 kg in August 2023 with BMI 38 at that time concerning for obesity.  Current pregnant weight 142 kg.  Obesity also increases her risk for type 2 diabetes mellitus.  -Discussed lifestyle measures for general obesity management (not specific to pregnancy) including dietary intervention    Pregnancy  Pregnancy-induced hypertension  Patient admitted to induce labor partly due to concerns for pregnancy-induced hypertension and possible preeclampsia.  -Routine intrapartum management deferred to her obstetrics team  -Evaluation and treatment of pregnancy-induced hypertension and/or preeclampsia also deferred to her obstetrics team    Anemia  Patient developed mild anemia during pregnancy.  -Evaluation and management of anemia in pregnancy deferred to her infectious team       Clinically Significant Risk Factors Present on Admission              # Hypoalbuminemia: Lowest albumin = 2.8 g/dL at 6/21/2024  7:13 AM, will monitor as appropriate   # Drug Induced Platelet Defect: home medication list includes an antiplatelet medication   # Hypertension: Noted on problem list    # Anemia: based on hgb <11          # DMII: A1C = 8.3 % (Ref range: <5.7 %) within past 6 months               Oscar Ross MD  Hospitalist Service  Securely message with [employer] (more info)  Text page via [facility] Paging/Directory   ______________________________________________________________________    Chief Complaint   Diabetes in pregnancy    History is obtained from the patient and electronic health record    History of Present Illness   Lidia Gallegos is a 29 year old female who carried previous diagnosis of prediabetes before this pregnancy.  She has been monitoring her blood sugars during pregnancy due to concerns for progression to diabetes during  pregnancy.  She says her fasting blood sugars have been 120 or less and that her preprandial blood sugars during the day and evening have been 170 or less.  It is reported that medication treatment of diabetes during pregnancy was recommended, but the patient indicates that she chose not to start medication treatment for diabetes during pregnancy due to her desire to focus on lifestyle changes.  She has been modifying her diet.  She was seen in routine follow-up today for her pregnancy, and hospital admission was advised due to elevated blood pressure today with new onset of swelling in her legs.  Additionally, most recent OB ultrasound performed June 18 had demonstrated fetal size parameters higher than expected for gestational age by dates (estimated gestational age based on that ultrasound was 39-1/7 weeks gestation with gestational age based on dates of 37-1/7 weeks gestation).  That ultrasound had also demonstrated suspicion for possible fetal edema and skin thickening.  Upon admission today for induction of labor, obstetrics team recommended treatment of diabetes with insulin infusion which has been started.  Hospitalist team was consulted by obstetrics team to assist with peripartum management of diabetes in pregnancy.  Patient is now seen in her hospital room for evaluation.      Past Medical History    Past Medical History:   Diagnosis Date    Anxiety     Autism     Depression     History of anemia      Patient's reports that she had been diagnosed with prediabetes in the past but that she had never previously been diagnosed with type 2 diabetes.    Past Surgical History   Past Surgical History:   Procedure Laterality Date    INNER EAR SURGERY Right     age 2    WISDOM TOOTH EXTRACTION         Medications   I have reviewed this patient's current medications  Current Facility-Administered Medications   Medication Dose Route Frequency Provider Last Rate Last Admin    calcium gluconate 10 % injection 1 g  1 g  Intravenous Once PRN Karen Bangura MD        carboprost (HEMABATE) injection 250 mcg  250 mcg Intramuscular Q15 Min PRN Mackenzie Dee MD        And    loperamide (IMODIUM) capsule 4 mg  4 mg Oral Once PRN Mackenzie Dee MD        dextrose 10% BOLUS 150 mL  150 mL Intravenous Q15 Min PRN Karen Bangura MD        Or    glucose gel 15-30 g  15-30 g Oral Q15 Min PRN Karen Bangura MD        Or    dextrose 50 % injection 25-50 mL  25-50 mL Intravenous Q15 Min PRN Karen Bangura MD        Or    glucagon injection 1 mg  1 mg Subcutaneous Q15 Min PRN Karen Bangura MD        dextrose 10% infusion   Intravenous Continuous PRN Karen Bangura MD        hydrALAZINE (APRESOLINE) injection 10 mg  10 mg Intravenous Q20 Min PRN Karen Bangura MD        ketorolac (TORADOL) injection 30 mg  30 mg Intravenous Once PRN Mackenzie Dee MD        Or    ketorolac (TORADOL) injection 30 mg  30 mg Intramuscular Once PRN Mackenzie Dee MD        Or    ibuprofen (ADVIL/MOTRIN) tablet 800 mg  800 mg Oral Once PRN Mackenzie Dee MD        insulin regular (MYXREDLIN) 1 unit/mL infusion   Intravenous Continuous PRN Karen Bangura MD 1.5 mL/hr at 06/21/24 1127 New Bag at 06/21/24 1127    labetalol (NORMODYNE/TRANDATE) injection 20 mg  20 mg Intravenous Q2H PRN Karen Bangura MD   20 mg at 06/21/24 1026    labetalol (NORMODYNE/TRANDATE) injection 20-40 mg  20-40 mg Intravenous Q10 Min PRN Karen Bangura MD        lactated ringers BOLUS 1,000 mL  1,000 mL Intravenous Once PRN Mackenzie Dee MD        Or    lactated ringers BOLUS 500 mL  500 mL Intravenous Once PRN Mackenzie Dee MD        lactated ringers infusion   Intravenous Continuous PRN Mackenzie Dee MD        lactated ringers infusion   mL/hr Intravenous Continuous Karen Bangura MD 75 mL/hr at 06/21/24 1042 75 mL/hr at 06/21/24 1042    lidocaine (LMX4) kit   Topical Q1H PRN Mackenzie Dee MD        lidocaine 1 % 0.1-1 mL  0.1-1 mL Other Q1H PRN Willem  MD Karen        lidocaine 1 % 0.1-20 mL  0.1-20 mL Subcutaneous Once PRN Mackenzie Dee MD        loperamide (IMODIUM) capsule 2 mg  2 mg Oral Q2H PRN Mackenzie Dee MD        magnesium sulfate 2 g in 50 mL sterile water intermittent infusion  2 g Intravenous Once PRN Karen Bangura MD        magnesium sulfate 4 g in 100 mL sterile water intermittent infusion  4 g Intravenous Once PRN Karen Bangura MD        magnesium sulfate infusion  2 g/hr Intravenous Continuous Karen Bangura MD 50 mL/hr at 06/21/24 1040 2 g/hr at 06/21/24 1040    Medication Instructions - cervical ripening and induction medications   Does not apply Continuous PRN Mackenzie Dee MD        Medication Instructions - cervical ripening and induction medications   Does not apply Continuous PRN Mackenzie Dee MD        methylergonovine (METHERGINE) injection 200 mcg  200 mcg Intramuscular Q2H PRN Mackenzie Dee MD        metoclopramide (REGLAN) injection 10 mg  10 mg Intravenous Q6H PRN Mackenzie Dee MD        Or    metoclopramide (REGLAN) tablet 10 mg  10 mg Oral Q6H PRN Mackenzie Dee MD        midazolam (VERSED) injection 2 mg  2 mg Intravenous Q5 Min PRN Karen Bangura MD        misoprostol (cervical ripening) (CYTOTEC) quarter-tab 25 mcg  25 mcg Vaginal Q4H PRN Mackenzie Dee MD        misoprostol (CYTOTEC) tablet 400 mcg  400 mcg Oral ONCE PRN REPEAT PER INSTRUCTIONS Mackenzie Dee MD        Or    misoprostol (CYTOTEC) suppository 800 mcg  800 mcg Rectal ONCE PRN REPEAT PER INSTRUCTIONS Mackenzie Dee MD        naloxone (NARCAN) injection 0.2 mg  0.2 mg Intravenous Q2 Min PRN Mackenzie Dee MD        Or    naloxone (NARCAN) injection 0.4 mg  0.4 mg Intravenous Q2 Min PRN Mackenzie Dee MD        Or    naloxone (NARCAN) injection 0.2 mg  0.2 mg Intramuscular Q2 Min PRN Mackenzie Dee MD        Or    naloxone (NARCAN) injection 0.4 mg  0.4 mg Intramuscular Q2 Min PRN Leila, Vui Van, MD        NIFEdipine (PROCARDIA) capsule 10-20 mg  10-20 mg  Oral Q20 Min PRN Karen Bangura MD   20 mg at 06/21/24 0941    ondansetron (ZOFRAN ODT) ODT tab 4 mg  4 mg Oral Q6H PRN Mackenzie Dee MD        Or    ondansetron (ZOFRAN) injection 4 mg  4 mg Intravenous Q6H PRN Mackenzie Dee MD        oxytocin (PITOCIN) 30 units in 500 mL 0.9% NaCl infusion  100-340 mL/hr Intravenous Continuous PRN Mackenzie Dee MD        oxytocin (PITOCIN) 30 units in 500 mL 0.9% NaCl infusion  340 mL/hr Intravenous Continuous PRN Mackenzie Dee MD        oxytocin (PITOCIN) 30 units in 500 mL 0.9% NaCl infusion  1-24 stephen-units/min Intravenous Continuous Mackenzie Dee MD        oxytocin (PITOCIN) injection 10 Units  10 Units Intramuscular Once PRN Mackenzie Dee MD        oxytocin (PITOCIN) injection 10 Units  10 Units Intramuscular Once PRN Mackenzie Dee MD        prenatal multivitamin w/iron per tablet 1 tablet  1 tablet Oral Daily Mackenzie Dee MD        prochlorperazine (COMPAZINE) injection 10 mg  10 mg Intravenous Q6H PRN Mackenzie Dee MD        Or    prochlorperazine (COMPAZINE) tablet 10 mg  10 mg Oral Q6H PRN Mackenzie Dee MD        Or    prochlorperazine (COMPAZINE) suppository 25 mg  25 mg Rectal Q12H PRN Mackenzie Dee MD        sodium chloride (PF) 0.9% PF flush 3 mL  3 mL Intracatheter Q8H Mackenzie Dee MD        sodium chloride (PF) 0.9% PF flush 3 mL  3 mL Intracatheter q1 min prn Mackenzie Dee MD        sodium chloride (PF) 0.9% PF flush 3 mL  3 mL Intracatheter Q8H Karen Bangura MD        sodium chloride (PF) 0.9% PF flush 3 mL  3 mL Intracatheter q1 min prn Karen Bangura MD        sodium chloride (PF) 0.9% PF flush 3 mL  3 mL Intracatheter Q8H Karen Bangura MD        sodium chloride (PF) 0.9% PF flush 3 mL  3 mL Intracatheter q1 min prn Karen Bangura MD        sodium chloride (PF) 0.9% PF flush 3 mL  3 mL Intracatheter Q8H Karen Bangura MD        sodium chloride (PF) 0.9% PF flush 3 mL  3 mL Intracatheter q1 min prn Karen Bangura MD        sodium  chloride 0.9 % infusion   Intravenous Continuous PRN Karen Bangura MD        sodium citrate-citric acid (BICITRA) solution 30 mL  30 mL Oral Once PRN Mackenzie Dee MD        tranexamic acid 1 g in 100 mL NS IV bag (premix)  1 g Intravenous Q30 Min PRN Mackenzie Dee MD              Review of Systems    The 10 point Review of Systems is negative other than noted in the HPI or here.     Social History   I have reviewed this patient's social history and updated it with pertinent information if needed.  Social History     Tobacco Use    Smoking status: Former     Current packs/day: 0.00     Types: Cigarettes     Quit date: 2022     Years since quittin.4    Smokeless tobacco: Never   Vaping Use    Vaping status: Never Used   Substance Use Topics    Alcohol use: Not Currently     Comment: occ    Drug use: Never         Family History   I have reviewed this patient's family history and updated it with pertinent information if needed.  Family History   Problem Relation Age of Onset    No Known Problems Mother     Hypertension Father     Mental Illness Sister         bipolar    Attention Deficit Disorder Sister     Birth defects Brother         bladder - reportedly related to nuchal cord    Cancer Maternal Grandfather     Diabetes Paternal Grandmother     Diabetes Paternal Grandfather     Kidney failure Paternal Grandfather          Allergies   No Known Allergies     Physical Exam   Vital Signs: Temp: 98.1  F (36.7  C) Temp src: Oral BP: 136/74 Pulse: 78   Resp: 16 SpO2: 98 % O2 Device: None (Room air)    Patient Vitals for the past 24 hrs:   BP Temp Temp src Pulse Resp SpO2 Height Weight   24 1215 136/74 -- -- -- -- 98 % -- --   24 1200 (!) 147/84 -- -- -- -- 98 % -- --   24 1145 (!) 135/90 -- -- -- -- 96 % -- --   24 1130 -- -- -- -- -- 98 % -- --   24 1125 (!) 149/78 -- -- -- -- 99 % -- --   24 1115 -- -- -- -- -- 98 % -- --   24 1110 (!) 149/80 -- -- -- -- 99 % -- --  "  06/21/24 1105 (!) 143/79 -- -- -- -- 98 % -- --   06/21/24 1100 (!) 145/80 -- -- -- -- 98 % -- --   06/21/24 1055 (!) 143/84 -- -- -- -- 97 % -- --   06/21/24 1050 (!) 145/76 -- -- -- -- 96 % -- --   06/21/24 1045 (!) 145/75 -- -- -- -- 97 % -- --   06/21/24 1040 (!) 149/72 -- -- -- -- 97 % -- --   06/21/24 1035 137/69 -- -- -- -- 97 % -- --   06/21/24 1030 (!) 143/78 -- -- -- -- 97 % -- --   06/21/24 1025 (!) 165/94 -- -- -- -- 99 % -- --   06/21/24 1020 (!) 163/101 -- -- -- -- 98 % -- --   06/21/24 1015 -- -- -- -- -- 99 % -- --   06/21/24 1010 -- -- -- -- -- 100 % -- --   06/21/24 1005 -- -- -- -- -- 100 % -- --   06/21/24 1000 (!) 157/93 -- -- -- -- 99 % -- --   06/21/24 0950 -- -- -- -- -- 99 % -- --   06/21/24 0945 -- -- -- -- -- 93 % -- --   06/21/24 0935 (!) 170/106 -- -- -- -- -- -- --   06/21/24 0905 (!) 173/106 -- -- -- -- -- -- --   06/21/24 0850 (!) 178/113 -- -- -- -- -- -- --   06/21/24 0832 (!) 176/117 98.1  F (36.7  C) Oral 78 16 97 % 1.803 m (5' 11\") 142 kg (313 lb)   06/21/24 0830 (!) 176/117 -- -- -- -- 97 % -- --     Weight: 313 lbs 0 oz  Wt Readings from Last 4 Encounters:   06/21/24 142 kg (313 lb)   06/21/24 142 kg (313 lb)   06/05/24 137.8 kg (303 lb 12.8 oz)   05/21/24 137 kg (302 lb)     Pregravid weight from August 2, 2023 was 123 kg or 272 pounds    Constitutional: Morbidly obese appearing but gravid woman without signs of acute distress resting in bed  Eyes: Anicteric sclerae, clear conjunctivae  ENT: No nasal drainage, grossly normal lips  Neck: Trachea midline, no stridor  Hematologic / Lymphatic: no cervical lymphadenopathy and no supraclavicular lymphadenopathy  Respiratory: Normal respiratory effort, diminished breath sounds throughout with symmetric excursion, clear lungs  Cardiovascular: Regular rate and rhythm, good radial pulse, normal capillary refill, mild to moderate pitting edema in the lower legs bilaterally  GI: Gravid but morbidly obese appearing abdomen, soft, " nontender  Skin: Pale color, no rash appreciated, no areas of acanthosis nigracans noted  Musculoskeletal: Edematous lower legs bilaterally without palpable cords or significant tenderness  Neurologic: Alert and maintains wakefulness and attention, no confusion evident  Neuropsychiatric: Normal mood and affect for the circumstances    Medical Decision Making       84 MINUTES SPENT BY ME on the date of service doing chart review, history, exam, documentation & further activities per the note.      Data     I have personally reviewed the following data over the past 24 hrs:    10.3  \   10.4 (L)   / 238     139 108 (H) 10.5 /  155 (H)   4.3 20 (L) 0.81 \     ALT: 17 AST: 17 AP: 148 TBILI: 0.4   ALB: 3.1 (L) TOT PROTEIN: 5.8 (L) LIPASE: N/A     TSH: N/A T4: N/A A1C: 8.3 (H)       Blood sugars today have ranged 119-155 so far    Previous test results were reviewed for comparison:  April 19, 2021 glucose 82  April 13, 2022 glucose 100    During pregnancy in February, March, and April 2023 blood sugar ranged 109-157 and hemoglobin A1c was 6.1    In June 2023 when she was no longer pregnant glucose was 107  On August 2, 2023 glucose was 114 and hCG testing was negative    On November 24, 2023 hCG testing was positive  On December 8, 2023 blood sugar was 119  On December 13, 2023 hemoglobin A1c was 5.8  On January 21, 2024 blood sugar was 130  On April 23 hemoglobin A1c was 7.5 and 1 hour blood sugar testing after 50 g glucose load was 309  On May 19 blood sugar was 239    BPP imaging report from June 21 reviewed: Based on size measurements, gestational age was estimated at 39 1/7 weeks (gestational age by dates was 37-1/7 weeks), estimated fetal weight was 3676 g, BPP score was 8 out of 8, note was made about possible fetal edema and skin thickening on that study    Recent Labs   Lab 06/21/24  1230 06/21/24  1107 06/21/24  0930 06/21/24  0858 06/21/24  0713   WBC  --   --   --   --  10.3   HGB  --   --   --   --  10.4*    MCV  --   --   --   --  78   PLT  --   --   --   --  238   NA  --   --  139  --  139   POTASSIUM  --   --  4.3  --  4.1   CHLORIDE  --   --  108*  --  107   CO2  --   --  20*  --  22   BUN  --   --  10.5  --  8.7   CR  --   --  0.81  --  0.79   ANIONGAP  --   --  11  --  10   TERRI  --   --  8.9  --  8.7   * 153* 116*   < > 149*   ALBUMIN  --   --  3.1*  --  2.8*   PROTTOTAL  --   --  5.8*  --  5.5*   BILITOTAL  --   --  0.4  --  0.4   ALKPHOS  --   --  148  --  144   ALT  --   --  17  --  15   AST  --   --  17  --  11    < > = values in this interval not displayed.

## 2024-06-21 NOTE — PROGRESS NOTES
Client put on call light and complained of room spinning. Denied blurred vision, thirst. SOB; headache has decreased from 5 to 4 after tylenol.  at bedside to draw magnesium level at 1750. Awaiting results and placement of next cytotec before call Dr. Dee.

## 2024-06-21 NOTE — PROGRESS NOTES
"Dr. Dee informed of clientr report of room spinning and also reported clients sense of wanting to \"black out\" when voiding. Order received to place Dougherty catheter. Magnesium level reported to be 4.2 mg/dl. Protein creatine ratio 0.71. Latest  and some severe range BP, but not repeat a second severe range in next 15 min.   "

## 2024-06-21 NOTE — PROGRESS NOTES
BG of 155, will remain on algorithm 1, and 1.5 units/hr with no dextrose fluids running at this time. Dafne Lujan RN on 6/21/2024 at 12:32 PM

## 2024-06-21 NOTE — PROGRESS NOTES
Pt has had 2 severe range blood pressures. Dr. Bangura notified, and informed no IV in place yet, requesting oral medication. Dafne Lujan RN on 6/21/2024 at 9:05 AM

## 2024-06-21 NOTE — PROGRESS NOTES
Lidia was admitted this morning for medical induction secondary to term pregnancy with uncontrolled diabetes.  I have been working closely with Dr. Bangura and have been monitoring Lidia closely.  She is  at 37w4d.  Pregnancy was complicated with morbid obesity and diabetes.  She is also high function autistic.  She was prediabetes before she was pregnant with the Hgb A1c of 5.8 and 6.1 about 6 and 12 months ago respectively.  Failed the 1 hr glucose test with the BS of 309 and Hgb A1 couple months ago was 7.5.   She and her  adamantly did not believe that she is diabetic and refused all intervention or monitoring.  She canceled her last couple prenatal visits.  Ultrasound couple days ago showed EFW at around 84th percentile for (3676 grams) with the skin thickening and edema, concerns of diabetes complication.    She was seen in the clinic this morning, labs showed a hemoglobin A1c of 8.3, went up from 7.5 couple months ago.  Her blood pressure in the office was slightly elevated.  She was then admitted to the hospital for induction secondary to term pregnancy with uncontrolled diabetes.    I have been monitoring her closely with Dr. Pollock.  NST has been reactive no contractions.    Elevated blood pressure at admission - BP was in the severe range.  She was treated for preeclampsia with severe range with magnesium and has been tolerating it well.      BS has been monitoring and it has been jim.  Plan to start insulin drip.  Hospitalist was also consulted consulted to manage her insulin.    I had a long conversation with her and her  about about the risks associate with the delivery with pre-eclampsia and uncontrolled DM.  New born is at the higher risk for NICU care and therefore there is a high chance that the baby will be transferred out for higher level of care.  I offered and recommended to transfer to the [facility] or to hospital with NICU and specialist for induction and labor  management as well as  care, but both she and her  declined.  They will not consider getting getting her care through [facility].  They are willing to take the risk.    NST showed no contraction.  Overall category where 1 overall with moderate variable, normal acceleration, no deceleration.      Blood pressure has been stable and controlled.  She received 2 doses of Procardia orally and 1 dose of labetalol.  Preeclamptic lab was normal.  She is tolerating the magnesium infusion well.    Cervical check: close, soft, -2 and mid position. EFW around 8 #    Start the induction with Cytotec - mechanical and/or pitocin if indicated  Continue with insulin treatment per protocol.  Continue with the magnesium sulfate infusion per protocol.  Continue with the blood pressure management for preeclampsia per protocol.    Consider augmentation with AROM and pitocin when appropriate  Pain medication discussed:  epidural likely     Intrapartum care discussed   -Discussed about indication for augmentation   -Discussed about delayed cord clamping   -Discussed about routine  care: Okay with routine  care   -Discussed about  care if concern arises, including rapid response and tele-NICU consultation    -Discussed with the potential complication associated with induction - especially with unfavorable cervix  Discussed about indications for episiotomy, vacuuming and/or   Discussed about postpartum care and postpartum hemorrhage   -Okay to be transfused if indicated  Up-to-date for Tdap  Group B strep: unknown - checked today. Antibiotic when in active labor unless negative result  Discussed about post partum hemorrhage and indication for blood transfusion.       She is okay to be transfused if necessary.      - Blood type and screen    Aggressive with postpartum hemorrhage prevention.    - No contraindication for Hemabate.      - Avoid Methergine due to elevated blood pressure  Discussed about  potential shoulder dystocia.   - EFW today is about 8 pounds.     - Adequate pelvic outlet appreciated  Anticipate   A/P discussed and all questions were answered.    Mackenzie Dee MD.

## 2024-06-21 NOTE — PROGRESS NOTES
Dr. Dee and Dr. Bangura have been in and out of room with patient this morning explaining the reason for magnesium and insulin need. Have offered patient a transfer do to [facility], she declines at this time. Magnesium bolus administered with no side effects. Dafne Lujan RN on 6/21/2024 at 10:45 AM

## 2024-06-22 LAB
ALBUMIN SERPL BCG-MCNC: 3 G/DL (ref 3.5–5.2)
ALP SERPL-CCNC: 142 U/L (ref 40–150)
ALT SERPL W P-5'-P-CCNC: 14 U/L (ref 0–50)
ANION GAP SERPL CALCULATED.3IONS-SCNC: 14 MMOL/L (ref 7–15)
AST SERPL W P-5'-P-CCNC: 16 U/L (ref 0–45)
BILIRUB SERPL-MCNC: 0.5 MG/DL
BUN SERPL-MCNC: 10.6 MG/DL (ref 6–20)
CALCIUM SERPL-MCNC: 7.6 MG/DL (ref 8.6–10)
CHLORIDE SERPL-SCNC: 104 MMOL/L (ref 98–107)
CREAT SERPL-MCNC: 0.85 MG/DL (ref 0.51–0.95)
DEPRECATED HCO3 PLAS-SCNC: 17 MMOL/L (ref 22–29)
EGFRCR SERPLBLD CKD-EPI 2021: >90 ML/MIN/1.73M2
ERYTHROCYTE [DISTWIDTH] IN BLOOD BY AUTOMATED COUNT: 14 % (ref 10–15)
GLUCOSE BLDC GLUCOMTR-MCNC: 110 MG/DL (ref 70–99)
GLUCOSE BLDC GLUCOMTR-MCNC: 122 MG/DL (ref 70–99)
GLUCOSE BLDC GLUCOMTR-MCNC: 126 MG/DL (ref 70–99)
GLUCOSE BLDC GLUCOMTR-MCNC: 126 MG/DL (ref 70–99)
GLUCOSE BLDC GLUCOMTR-MCNC: 128 MG/DL (ref 70–99)
GLUCOSE BLDC GLUCOMTR-MCNC: 130 MG/DL (ref 70–99)
GLUCOSE BLDC GLUCOMTR-MCNC: 171 MG/DL (ref 70–99)
GLUCOSE SERPL-MCNC: 137 MG/DL (ref 70–99)
GP B STREP DNA SPEC QL NAA+PROBE: NEGATIVE
HCT VFR BLD AUTO: 32.6 % (ref 35–47)
HGB BLD-MCNC: 10.8 G/DL (ref 11.7–15.7)
MAGNESIUM SERPL-MCNC: 5.5 MG/DL (ref 1.7–2.3)
MAGNESIUM SERPL-MCNC: 5.8 MG/DL (ref 1.7–2.3)
MAGNESIUM SERPL-MCNC: 6.1 MG/DL (ref 1.7–2.3)
MCH RBC QN AUTO: 26.2 PG (ref 26.5–33)
MCHC RBC AUTO-ENTMCNC: 33.1 G/DL (ref 31.5–36.5)
MCV RBC AUTO: 79 FL (ref 78–100)
PLATELET # BLD AUTO: 264 10E3/UL (ref 150–450)
PLATELET # BLD AUTO: 272 10E3/UL (ref 150–450)
PLATELET # BLD AUTO: 300 10E3/UL (ref 150–450)
PLATELET # BLD AUTO: 300 10E3/UL (ref 150–450)
POTASSIUM SERPL-SCNC: 4.9 MMOL/L (ref 3.4–5.3)
PROT SERPL-MCNC: 5.6 G/DL (ref 6.4–8.3)
RBC # BLD AUTO: 4.13 10E6/UL (ref 3.8–5.2)
SODIUM SERPL-SCNC: 135 MMOL/L (ref 135–145)
WBC # BLD AUTO: 19.8 10E3/UL (ref 4–11)

## 2024-06-22 PROCEDURE — 250N000009 HC RX 250: Performed by: FAMILY MEDICINE

## 2024-06-22 PROCEDURE — 250N000011 HC RX IP 250 OP 636: Mod: JZ | Performed by: FAMILY MEDICINE

## 2024-06-22 PROCEDURE — 59510 CESAREAN DELIVERY: CPT | Performed by: FAMILY MEDICINE

## 2024-06-22 PROCEDURE — 360N000076 HC SURGERY LEVEL 3, PER MIN: Performed by: FAMILY MEDICINE

## 2024-06-22 PROCEDURE — 80053 COMPREHEN METABOLIC PANEL: CPT | Performed by: FAMILY MEDICINE

## 2024-06-22 PROCEDURE — 250N000013 HC RX MED GY IP 250 OP 250 PS 637: Performed by: FAMILY MEDICINE

## 2024-06-22 PROCEDURE — 258N000003 HC RX IP 258 OP 636: Mod: JZ | Performed by: NURSE ANESTHETIST, CERTIFIED REGISTERED

## 2024-06-22 PROCEDURE — 271N000001 HC OR GENERAL SUPPLY NON-STERILE: Performed by: FAMILY MEDICINE

## 2024-06-22 PROCEDURE — 250N000011 HC RX IP 250 OP 636: Performed by: NURSE ANESTHETIST, CERTIFIED REGISTERED

## 2024-06-22 PROCEDURE — 85014 HEMATOCRIT: CPT | Performed by: FAMILY MEDICINE

## 2024-06-22 PROCEDURE — 258N000003 HC RX IP 258 OP 636: Mod: JZ | Performed by: FAMILY MEDICINE

## 2024-06-22 PROCEDURE — 272N000001 HC OR GENERAL SUPPLY STERILE: Performed by: FAMILY MEDICINE

## 2024-06-22 PROCEDURE — 999N000157 HC STATISTIC RCP TIME EA 10 MIN

## 2024-06-22 PROCEDURE — 83735 ASSAY OF MAGNESIUM: CPT | Performed by: FAMILY MEDICINE

## 2024-06-22 PROCEDURE — 710N000010 HC RECOVERY PHASE 1, LEVEL 2, PER MIN: Performed by: FAMILY MEDICINE

## 2024-06-22 PROCEDURE — 85049 AUTOMATED PLATELET COUNT: CPT | Performed by: FAMILY MEDICINE

## 2024-06-22 PROCEDURE — 250N000011 HC RX IP 250 OP 636: Performed by: FAMILY MEDICINE

## 2024-06-22 PROCEDURE — 250N000009 HC RX 250: Performed by: NURSE ANESTHETIST, CERTIFIED REGISTERED

## 2024-06-22 PROCEDURE — 36415 COLL VENOUS BLD VENIPUNCTURE: CPT | Performed by: FAMILY MEDICINE

## 2024-06-22 PROCEDURE — 250N000011 HC RX IP 250 OP 636: Mod: JZ | Performed by: NURSE ANESTHETIST, CERTIFIED REGISTERED

## 2024-06-22 PROCEDURE — 59514 CESAREAN DELIVERY ONLY: CPT | Mod: 80 | Performed by: FAMILY MEDICINE

## 2024-06-22 PROCEDURE — 120N000013 HC R&B IMCU

## 2024-06-22 PROCEDURE — 370N000017 HC ANESTHESIA TECHNICAL FEE, PER MIN: Performed by: FAMILY MEDICINE

## 2024-06-22 RX ORDER — AMOXICILLIN 250 MG
1 CAPSULE ORAL 2 TIMES DAILY
Status: DISCONTINUED | OUTPATIENT
Start: 2024-06-22 | End: 2024-06-25 | Stop reason: HOSPADM

## 2024-06-22 RX ORDER — OXYTOCIN 10 [USP'U]/ML
10 INJECTION, SOLUTION INTRAMUSCULAR; INTRAVENOUS
Status: DISCONTINUED | OUTPATIENT
Start: 2024-06-22 | End: 2024-06-22 | Stop reason: HOSPADM

## 2024-06-22 RX ORDER — NIFEDIPINE 30 MG/1
30 TABLET, EXTENDED RELEASE ORAL DAILY
Status: DISCONTINUED | OUTPATIENT
Start: 2024-06-22 | End: 2024-06-23

## 2024-06-22 RX ORDER — OXYTOCIN/0.9 % SODIUM CHLORIDE 30/500 ML
340 PLASTIC BAG, INJECTION (ML) INTRAVENOUS CONTINUOUS PRN
Status: DISCONTINUED | OUTPATIENT
Start: 2024-06-22 | End: 2024-06-22 | Stop reason: HOSPADM

## 2024-06-22 RX ORDER — ENOXAPARIN SODIUM 100 MG/ML
40 INJECTION SUBCUTANEOUS EVERY 12 HOURS
Status: DISCONTINUED | OUTPATIENT
Start: 2024-06-22 | End: 2024-06-25 | Stop reason: HOSPADM

## 2024-06-22 RX ORDER — TRANEXAMIC ACID 10 MG/ML
1 INJECTION, SOLUTION INTRAVENOUS EVERY 30 MIN PRN
Status: DISCONTINUED | OUTPATIENT
Start: 2024-06-22 | End: 2024-06-25 | Stop reason: HOSPADM

## 2024-06-22 RX ORDER — OXYTOCIN/0.9 % SODIUM CHLORIDE 30/500 ML
100-340 PLASTIC BAG, INJECTION (ML) INTRAVENOUS CONTINUOUS PRN
Status: DISCONTINUED | OUTPATIENT
Start: 2024-06-22 | End: 2024-06-25 | Stop reason: HOSPADM

## 2024-06-22 RX ORDER — ONDANSETRON 4 MG/1
4 TABLET, ORALLY DISINTEGRATING ORAL EVERY 30 MIN PRN
Status: DISCONTINUED | OUTPATIENT
Start: 2024-06-22 | End: 2024-06-22

## 2024-06-22 RX ORDER — DEXAMETHASONE SODIUM PHOSPHATE 10 MG/ML
4 INJECTION, SOLUTION INTRAMUSCULAR; INTRAVENOUS
Status: DISCONTINUED | OUTPATIENT
Start: 2024-06-22 | End: 2024-06-22

## 2024-06-22 RX ORDER — ONDANSETRON 4 MG/1
4 TABLET, ORALLY DISINTEGRATING ORAL EVERY 6 HOURS PRN
Status: DISCONTINUED | OUTPATIENT
Start: 2024-06-22 | End: 2024-06-25 | Stop reason: HOSPADM

## 2024-06-22 RX ORDER — SIMETHICONE 80 MG
80 TABLET,CHEWABLE ORAL 4 TIMES DAILY PRN
Status: DISCONTINUED | OUTPATIENT
Start: 2024-06-22 | End: 2024-06-25 | Stop reason: HOSPADM

## 2024-06-22 RX ORDER — METOCLOPRAMIDE HYDROCHLORIDE 5 MG/ML
10 INJECTION INTRAMUSCULAR; INTRAVENOUS EVERY 6 HOURS PRN
Status: DISCONTINUED | OUTPATIENT
Start: 2024-06-22 | End: 2024-06-25 | Stop reason: HOSPADM

## 2024-06-22 RX ORDER — MISOPROSTOL 200 UG/1
400 TABLET ORAL
Status: DISCONTINUED | OUTPATIENT
Start: 2024-06-22 | End: 2024-06-25 | Stop reason: HOSPADM

## 2024-06-22 RX ORDER — BISACODYL 10 MG
10 SUPPOSITORY, RECTAL RECTAL DAILY PRN
Status: DISCONTINUED | OUTPATIENT
Start: 2024-06-24 | End: 2024-06-25 | Stop reason: HOSPADM

## 2024-06-22 RX ORDER — BUPIVACAINE HYDROCHLORIDE 2.5 MG/ML
INJECTION, SOLUTION EPIDURAL; INFILTRATION; INTRACAUDAL PRN
Status: DISCONTINUED | OUTPATIENT
Start: 2024-06-22 | End: 2024-06-22

## 2024-06-22 RX ORDER — HYDROCORTISONE 25 MG/G
CREAM TOPICAL 3 TIMES DAILY PRN
Status: DISCONTINUED | OUTPATIENT
Start: 2024-06-22 | End: 2024-06-25 | Stop reason: HOSPADM

## 2024-06-22 RX ORDER — MEPERIDINE HYDROCHLORIDE 25 MG/ML
12.5 INJECTION INTRAMUSCULAR; INTRAVENOUS; SUBCUTANEOUS EVERY 5 MIN PRN
Status: DISCONTINUED | OUTPATIENT
Start: 2024-06-22 | End: 2024-06-22

## 2024-06-22 RX ORDER — OXYTOCIN/0.9 % SODIUM CHLORIDE 30/500 ML
340 PLASTIC BAG, INJECTION (ML) INTRAVENOUS CONTINUOUS PRN
Status: DISCONTINUED | OUTPATIENT
Start: 2024-06-22 | End: 2024-06-25 | Stop reason: HOSPADM

## 2024-06-22 RX ORDER — DEXTROSE, SODIUM CHLORIDE, SODIUM LACTATE, POTASSIUM CHLORIDE, AND CALCIUM CHLORIDE 5; .6; .31; .03; .02 G/100ML; G/100ML; G/100ML; G/100ML; G/100ML
INJECTION, SOLUTION INTRAVENOUS CONTINUOUS
Status: DISCONTINUED | OUTPATIENT
Start: 2024-06-22 | End: 2024-06-25 | Stop reason: HOSPADM

## 2024-06-22 RX ORDER — MODIFIED LANOLIN
OINTMENT (GRAM) TOPICAL
Status: DISCONTINUED | OUTPATIENT
Start: 2024-06-22 | End: 2024-06-25 | Stop reason: HOSPADM

## 2024-06-22 RX ORDER — LOPERAMIDE HCL 2 MG
2 CAPSULE ORAL
Status: DISCONTINUED | OUTPATIENT
Start: 2024-06-22 | End: 2024-06-22 | Stop reason: HOSPADM

## 2024-06-22 RX ORDER — DEXTROSE MONOHYDRATE 25 G/50ML
25-50 INJECTION, SOLUTION INTRAVENOUS
Status: DISCONTINUED | OUTPATIENT
Start: 2024-06-22 | End: 2024-06-25 | Stop reason: HOSPADM

## 2024-06-22 RX ORDER — KETOROLAC TROMETHAMINE 30 MG/ML
30 INJECTION, SOLUTION INTRAMUSCULAR; INTRAVENOUS EVERY 6 HOURS
Status: COMPLETED | OUTPATIENT
Start: 2024-06-22 | End: 2024-06-23

## 2024-06-22 RX ORDER — METOCLOPRAMIDE 5 MG/1
10 TABLET ORAL EVERY 6 HOURS PRN
Status: DISCONTINUED | OUTPATIENT
Start: 2024-06-22 | End: 2024-06-25 | Stop reason: HOSPADM

## 2024-06-22 RX ORDER — ACETAMINOPHEN 325 MG/1
975 TABLET ORAL ONCE
Status: COMPLETED | OUTPATIENT
Start: 2024-06-22 | End: 2024-06-22

## 2024-06-22 RX ORDER — NICOTINE POLACRILEX 4 MG
15-30 LOZENGE BUCCAL
Status: DISCONTINUED | OUTPATIENT
Start: 2024-06-22 | End: 2024-06-25 | Stop reason: HOSPADM

## 2024-06-22 RX ORDER — PROCHLORPERAZINE MALEATE 5 MG
10 TABLET ORAL EVERY 6 HOURS PRN
Status: DISCONTINUED | OUTPATIENT
Start: 2024-06-22 | End: 2024-06-25 | Stop reason: HOSPADM

## 2024-06-22 RX ORDER — OXYCODONE HYDROCHLORIDE 5 MG/1
5 TABLET ORAL EVERY 4 HOURS PRN
Status: DISCONTINUED | OUTPATIENT
Start: 2024-06-22 | End: 2024-06-25 | Stop reason: HOSPADM

## 2024-06-22 RX ORDER — LOPERAMIDE HCL 2 MG
4 CAPSULE ORAL
Status: DISCONTINUED | OUTPATIENT
Start: 2024-06-22 | End: 2024-06-25 | Stop reason: HOSPADM

## 2024-06-22 RX ORDER — MISOPROSTOL 200 UG/1
400 TABLET ORAL
Status: DISCONTINUED | OUTPATIENT
Start: 2024-06-22 | End: 2024-06-22 | Stop reason: HOSPADM

## 2024-06-22 RX ORDER — IBUPROFEN 800 MG/1
800 TABLET, FILM COATED ORAL EVERY 6 HOURS
Status: DISCONTINUED | OUTPATIENT
Start: 2024-06-23 | End: 2024-06-25 | Stop reason: HOSPADM

## 2024-06-22 RX ORDER — SODIUM CHLORIDE, SODIUM LACTATE, POTASSIUM CHLORIDE, CALCIUM CHLORIDE 600; 310; 30; 20 MG/100ML; MG/100ML; MG/100ML; MG/100ML
INJECTION, SOLUTION INTRAVENOUS CONTINUOUS
Status: DISCONTINUED | OUTPATIENT
Start: 2024-06-22 | End: 2024-06-22 | Stop reason: HOSPADM

## 2024-06-22 RX ORDER — AMOXICILLIN 250 MG
2 CAPSULE ORAL 2 TIMES DAILY
Status: DISCONTINUED | OUTPATIENT
Start: 2024-06-22 | End: 2024-06-25 | Stop reason: HOSPADM

## 2024-06-22 RX ORDER — OXYTOCIN 10 [USP'U]/ML
10 INJECTION, SOLUTION INTRAMUSCULAR; INTRAVENOUS
Status: DISCONTINUED | OUTPATIENT
Start: 2024-06-22 | End: 2024-06-25

## 2024-06-22 RX ORDER — NALOXONE HYDROCHLORIDE 0.4 MG/ML
0.1 INJECTION, SOLUTION INTRAMUSCULAR; INTRAVENOUS; SUBCUTANEOUS
Status: DISCONTINUED | OUTPATIENT
Start: 2024-06-22 | End: 2024-06-22

## 2024-06-22 RX ORDER — TRANEXAMIC ACID 10 MG/ML
1 INJECTION, SOLUTION INTRAVENOUS EVERY 30 MIN PRN
Status: DISCONTINUED | OUTPATIENT
Start: 2024-06-22 | End: 2024-06-22 | Stop reason: HOSPADM

## 2024-06-22 RX ORDER — CEFAZOLIN SODIUM/WATER 3 G/30 ML
3 SYRINGE (ML) INTRAVENOUS
Status: COMPLETED | OUTPATIENT
Start: 2024-06-22 | End: 2024-06-22

## 2024-06-22 RX ORDER — IBUPROFEN 800 MG/1
800 TABLET, FILM COATED ORAL EVERY 6 HOURS
Status: DISCONTINUED | OUTPATIENT
Start: 2024-06-23 | End: 2024-06-22

## 2024-06-22 RX ORDER — KETOROLAC TROMETHAMINE 30 MG/ML
30 INJECTION, SOLUTION INTRAMUSCULAR; INTRAVENOUS EVERY 6 HOURS
Status: DISCONTINUED | OUTPATIENT
Start: 2024-06-22 | End: 2024-06-22

## 2024-06-22 RX ORDER — ACETAMINOPHEN 325 MG/1
975 TABLET ORAL EVERY 6 HOURS
Status: DISCONTINUED | OUTPATIENT
Start: 2024-06-22 | End: 2024-06-25 | Stop reason: HOSPADM

## 2024-06-22 RX ORDER — LOPERAMIDE HCL 2 MG
4 CAPSULE ORAL
Status: DISCONTINUED | OUTPATIENT
Start: 2024-06-22 | End: 2024-06-22 | Stop reason: HOSPADM

## 2024-06-22 RX ORDER — OXYTOCIN 10 [USP'U]/ML
INJECTION, SOLUTION INTRAMUSCULAR; INTRAVENOUS PRN
Status: DISCONTINUED | OUTPATIENT
Start: 2024-06-22 | End: 2024-06-25 | Stop reason: HOSPADM

## 2024-06-22 RX ORDER — ONDANSETRON 2 MG/ML
4 INJECTION INTRAMUSCULAR; INTRAVENOUS EVERY 30 MIN PRN
Status: DISCONTINUED | OUTPATIENT
Start: 2024-06-22 | End: 2024-06-22

## 2024-06-22 RX ORDER — METHYLERGONOVINE MALEATE 0.2 MG/ML
200 INJECTION INTRAVENOUS
Status: DISCONTINUED | OUTPATIENT
Start: 2024-06-22 | End: 2024-06-25 | Stop reason: HOSPADM

## 2024-06-22 RX ORDER — METHYLERGONOVINE MALEATE 0.2 MG/ML
200 INJECTION INTRAVENOUS
Status: DISCONTINUED | OUTPATIENT
Start: 2024-06-22 | End: 2024-06-22 | Stop reason: HOSPADM

## 2024-06-22 RX ORDER — CARBOPROST TROMETHAMINE 250 UG/ML
250 INJECTION, SOLUTION INTRAMUSCULAR
Status: DISCONTINUED | OUTPATIENT
Start: 2024-06-22 | End: 2024-06-22 | Stop reason: HOSPADM

## 2024-06-22 RX ORDER — LIDOCAINE 40 MG/G
CREAM TOPICAL
Status: DISCONTINUED | OUTPATIENT
Start: 2024-06-22 | End: 2024-06-22 | Stop reason: HOSPADM

## 2024-06-22 RX ORDER — LIDOCAINE HYDROCHLORIDE AND EPINEPHRINE 15; 5 MG/ML; UG/ML
INJECTION, SOLUTION EPIDURAL PRN
Status: DISCONTINUED | OUTPATIENT
Start: 2024-06-22 | End: 2024-06-22

## 2024-06-22 RX ORDER — CARBOPROST TROMETHAMINE 250 UG/ML
250 INJECTION, SOLUTION INTRAMUSCULAR
Status: DISCONTINUED | OUTPATIENT
Start: 2024-06-22 | End: 2024-06-25 | Stop reason: HOSPADM

## 2024-06-22 RX ORDER — OXYTOCIN/0.9 % SODIUM CHLORIDE 30/500 ML
PLASTIC BAG, INJECTION (ML) INTRAVENOUS CONTINUOUS PRN
Status: DISCONTINUED | OUTPATIENT
Start: 2024-06-22 | End: 2024-06-22

## 2024-06-22 RX ORDER — CITRIC ACID/SODIUM CITRATE 334-500MG
30 SOLUTION, ORAL ORAL
Status: DISCONTINUED | OUTPATIENT
Start: 2024-06-22 | End: 2024-06-22 | Stop reason: HOSPADM

## 2024-06-22 RX ORDER — PROCHLORPERAZINE 25 MG
25 SUPPOSITORY, RECTAL RECTAL EVERY 12 HOURS PRN
Status: DISCONTINUED | OUTPATIENT
Start: 2024-06-22 | End: 2024-06-25 | Stop reason: HOSPADM

## 2024-06-22 RX ORDER — OXYTOCIN 10 [USP'U]/ML
10 INJECTION, SOLUTION INTRAMUSCULAR; INTRAVENOUS
Status: DISCONTINUED | OUTPATIENT
Start: 2024-06-22 | End: 2024-06-25 | Stop reason: HOSPADM

## 2024-06-22 RX ORDER — CEFAZOLIN SODIUM/WATER 3 G/30 ML
3 SYRINGE (ML) INTRAVENOUS SEE ADMIN INSTRUCTIONS
Status: DISCONTINUED | OUTPATIENT
Start: 2024-06-22 | End: 2024-06-22 | Stop reason: HOSPADM

## 2024-06-22 RX ORDER — LIDOCAINE 40 MG/G
CREAM TOPICAL
Status: DISCONTINUED | OUTPATIENT
Start: 2024-06-22 | End: 2024-06-25 | Stop reason: HOSPADM

## 2024-06-22 RX ORDER — ONDANSETRON 2 MG/ML
4 INJECTION INTRAMUSCULAR; INTRAVENOUS EVERY 6 HOURS PRN
Status: DISCONTINUED | OUTPATIENT
Start: 2024-06-22 | End: 2024-06-25 | Stop reason: HOSPADM

## 2024-06-22 RX ORDER — LOPERAMIDE HCL 2 MG
2 CAPSULE ORAL
Status: DISCONTINUED | OUTPATIENT
Start: 2024-06-22 | End: 2024-06-25 | Stop reason: HOSPADM

## 2024-06-22 RX ORDER — AZITHROMYCIN 500 MG/1
500 INJECTION, POWDER, LYOPHILIZED, FOR SOLUTION INTRAVENOUS
Status: COMPLETED | OUTPATIENT
Start: 2024-06-22 | End: 2024-06-22

## 2024-06-22 RX ORDER — ALBUTEROL SULFATE 0.83 MG/ML
2.5 SOLUTION RESPIRATORY (INHALATION) EVERY 4 HOURS PRN
Status: DISCONTINUED | OUTPATIENT
Start: 2024-06-22 | End: 2024-06-22

## 2024-06-22 RX ADMIN — PHENYLEPHRINE HYDROCHLORIDE 100 MCG: 10 INJECTION INTRAVENOUS at 05:12

## 2024-06-22 RX ADMIN — SODIUM CHLORIDE, POTASSIUM CHLORIDE, SODIUM LACTATE AND CALCIUM CHLORIDE 500 ML: 600; 310; 30; 20 INJECTION, SOLUTION INTRAVENOUS at 01:28

## 2024-06-22 RX ADMIN — Medication 340 ML/HR: at 05:02

## 2024-06-22 RX ADMIN — LIDOCAINE HYDROCHLORIDE,EPINEPHRINE BITARTRATE 4 ML: 15; .005 INJECTION, SOLUTION EPIDURAL; INFILTRATION; INTRACAUDAL; PERINEURAL at 02:30

## 2024-06-22 RX ADMIN — SODIUM CHLORIDE, POTASSIUM CHLORIDE, SODIUM LACTATE AND CALCIUM CHLORIDE 10 ML/HR: 600; 310; 30; 20 INJECTION, SOLUTION INTRAVENOUS at 16:44

## 2024-06-22 RX ADMIN — PHENYLEPHRINE HYDROCHLORIDE 100 MCG: 10 INJECTION INTRAVENOUS at 05:25

## 2024-06-22 RX ADMIN — SENNOSIDES AND DOCUSATE SODIUM 1 TABLET: 8.6; 5 TABLET ORAL at 12:03

## 2024-06-22 RX ADMIN — KETOROLAC TROMETHAMINE 30 MG: 30 INJECTION, SOLUTION INTRAMUSCULAR at 20:35

## 2024-06-22 RX ADMIN — NIFEDIPINE 30 MG: 30 TABLET, EXTENDED RELEASE ORAL at 10:52

## 2024-06-22 RX ADMIN — TRANEXAMIC ACID 1 G: 10 INJECTION, SOLUTION INTRAVENOUS at 05:03

## 2024-06-22 RX ADMIN — KETOROLAC TROMETHAMINE 30 MG: 30 INJECTION, SOLUTION INTRAMUSCULAR at 10:07

## 2024-06-22 RX ADMIN — ACETAMINOPHEN 975 MG: 325 TABLET, FILM COATED ORAL at 04:25

## 2024-06-22 RX ADMIN — ENOXAPARIN SODIUM 40 MG: 40 INJECTION SUBCUTANEOUS at 20:36

## 2024-06-22 RX ADMIN — SENNOSIDES AND DOCUSATE SODIUM 1 TABLET: 8.6; 5 TABLET ORAL at 20:36

## 2024-06-22 RX ADMIN — AZITHROMYCIN MONOHYDRATE 500 MG: 500 INJECTION, POWDER, LYOPHILIZED, FOR SOLUTION INTRAVENOUS at 04:35

## 2024-06-22 RX ADMIN — PENICILLIN G 3 MILLION UNITS: 3000000 INJECTION, SOLUTION INTRAVENOUS at 03:35

## 2024-06-22 RX ADMIN — PRENATAL VIT W/ FE FUMARATE-FA TAB 27-0.8 MG 1 TABLET: 27-0.8 TAB at 20:36

## 2024-06-22 RX ADMIN — HYDRALAZINE HYDROCHLORIDE 10 MG: 20 INJECTION INTRAMUSCULAR; INTRAVENOUS at 00:38

## 2024-06-22 RX ADMIN — Medication: at 02:36

## 2024-06-22 RX ADMIN — LIDOCAINE HYDROCHLORIDE 5 ML: 20 INJECTION, SOLUTION EPIDURAL; INFILTRATION; INTRACAUDAL; PERINEURAL at 04:47

## 2024-06-22 RX ADMIN — BUPIVACAINE HYDROCHLORIDE 20 ML: 2.5 INJECTION, SOLUTION EPIDURAL; INFILTRATION; INTRACAUDAL at 05:39

## 2024-06-22 RX ADMIN — BUPIVACAINE HYDROCHLORIDE 10 ML: 2.5 INJECTION, SOLUTION EPIDURAL; INFILTRATION; INTRACAUDAL; PERINEURAL at 02:34

## 2024-06-22 RX ADMIN — PENICILLIN G 3 MILLION UNITS: 3000000 INJECTION, SOLUTION INTRAVENOUS at 00:02

## 2024-06-22 RX ADMIN — SODIUM CHLORIDE, POTASSIUM CHLORIDE, SODIUM LACTATE AND CALCIUM CHLORIDE: 600; 310; 30; 20 INJECTION, SOLUTION INTRAVENOUS at 04:39

## 2024-06-22 RX ADMIN — SODIUM CHLORIDE, POTASSIUM CHLORIDE, SODIUM LACTATE AND CALCIUM CHLORIDE 500 ML: 600; 310; 30; 20 INJECTION, SOLUTION INTRAVENOUS at 03:16

## 2024-06-22 RX ADMIN — LIDOCAINE HYDROCHLORIDE 5 ML: 20 INJECTION, SOLUTION EPIDURAL; INFILTRATION; INTRACAUDAL; PERINEURAL at 05:11

## 2024-06-22 RX ADMIN — ACETAMINOPHEN 975 MG: 325 TABLET, FILM COATED ORAL at 22:53

## 2024-06-22 RX ADMIN — PHENYLEPHRINE HYDROCHLORIDE 100 MCG: 10 INJECTION INTRAVENOUS at 05:16

## 2024-06-22 RX ADMIN — BUPIVACAINE HYDROCHLORIDE 20 ML: 2.5 INJECTION, SOLUTION EPIDURAL; INFILTRATION; INTRACAUDAL at 05:43

## 2024-06-22 RX ADMIN — Medication 3 G: at 04:35

## 2024-06-22 RX ADMIN — MAGNESIUM SULFATE HEPTAHYDRATE 2 G/HR: 40 INJECTION, SOLUTION INTRAVENOUS at 05:26

## 2024-06-22 RX ADMIN — ONDANSETRON 4 MG: 2 INJECTION INTRAMUSCULAR; INTRAVENOUS at 04:47

## 2024-06-22 RX ADMIN — ACETAMINOPHEN 975 MG: 325 TABLET, FILM COATED ORAL at 12:03

## 2024-06-22 ASSESSMENT — ACTIVITIES OF DAILY LIVING (ADL)
ADLS_ACUITY_SCORE: 19

## 2024-06-22 NOTE — PROGRESS NOTES
Transfer from  PACU to Room 361  Transferred via bed    S: 28 y/o female  S/P  section       Anesthesia Type:  spinal       Surgeon:  Dr. Dee       Allergies:  See Medication Reconciliation Record       DNR: no  (Yes,No)    B:  Pertinent Medical History:   Past Medical History:   Diagnosis Date    Anxiety     Autism     Depression     History of anemia         (CHF; Heart Disease; Lung Disease; Chronic Pain; Diabetes; Other (Comment)          Surgical History:    Past Surgical History:   Procedure Laterality Date    INNER EAR SURGERY Right     age 2    WISDOM TOOTH EXTRACTION           A:  EBL: 532        UOP:  marc in place        NPO:  ___Yes _x__No         Vomiting:  ___Yes _x__No         Drainage: none        Skin Integrity: intact except incison (Normal; Pressure Ulcer (Location)        RFO: _x__Yes___No (identify item if present) marc        SSI Patient?  _x__Yes___No (if yes, see checklist for actions)        Brace/sling/equipment:  ___Yes__x_No (identify item if present)         See PACU record for ongoing assessment, vital signs and pain assessment.    R: Post-Op vitals and assessments as ordered/indicated per patient's condition.       Follow Post-Op orders and notify Physician prn.       Continue to involve patient/family in plan of care and discharge planning.       Reinforce Pre-Operative education.       Implement skin safety interventions as appropriate.    Name:   Gudelia Santos RN          Yes

## 2024-06-22 NOTE — L&D DELIVERY NOTE
Delivery Summary    Lidia Gallegos MRN# [id number]   Age: 29 year old YOB: 1995       Procedure note:    Procedure:  primary low transverse     Indication:  Fetal intolerance (persistent late decelerations); Uncontrolled DM. Pre-eclampsia with severe feature, morbid obesity    Surgeon:  Mackenzie Dee MD.   Assistant Surgeon:  Kim Armando MD; Karen Bangura MD    Antibiotic prior to surgery: Pencillin, Ancef and Zithromax    Findings:    Healthy looking male with vertex - OP presentation    -Apgar score:  9 and 9 at 1 and 5 minutes respectively    -Birth weight:  8#8.9oz   Normal uterus, fallopian tubes, fimbriae and ovaries   Nuchal cord x1 - loose and reducible   Bicornate Uterus    EBL:  550 ml      The whole procedure was then discussed with the patient and her  in details.  We also discussed potential associated complications which included but were not limited to bleeding, infection, unintended injury to the bowel or surrounding organs, unintended injury to the fetus/, and/or anesthesia complications.  In rare case, heavy bleeding may require blood transfusion or hysterectomy.   The patient was okay to proceed with the procedure and the consent was signed.    I asked Dr. Armando, FP/OB to assist in the  Section because of  the baby need to be delivered urgently (stat CS) due to fetal intolerance. Her  special skill set possessing in Myotomy repair, fascial closure, and special skin closure techniques.  She is morbidly obese - will need extra hand to maneuver the procedure safely.  Dr. Begum was a vital assistant in all of the above listed procedures. Her skills allowed us to shorten the length of the procedure considerably, which has been shown to decrease risk of infection, decrease post operative morbidity, and improve outcomes.      DESCRIPTION OF THE PROCEDURE:    The patient was given a dose of Ancelf before the procedure.  She was then taken to  the operating room.  Dougherty catheter was placed after she had a spinal.  She was given spinal anesthesia with good effect by CRNA.  She was placed in the supine position.  The abdomen was prepped and draped in the usual sterile manner.      A low transverse abdominal incision was made, about 12 cm in length.  The incision was taken down sharply to the fascia.  The fascia was incised and extended bilaterally.  The rectus abdominus was taken down from the fascia with fingers side to side.  The rectus abdominus was divided in the midline and extended vertically bilaterally.  The peritoneum was then carefully entered and extended vertically by fingers. The uterus was identified.  Sunny-O retractor was placed in in the usual manner.  Bladder flap was created and taken down sharply with fingers.  A low transverse uterine incision was made at the midline and extended bilaterally with fingers.  There was about 250 ml of clear amniotic fluid noted.      The baby was in the vertex position; OP presentation.  The head was delivered first followed  by the delivery of the body in a usual manner with no difficulty.  Mouth was gently cleaned with gauss and bulb suctioning.  The cord was then clamped and cut after 1 minute of cord delay.  The infant was passed to the warmer with nursing attending.  Apgar score of 9 and 9 at 1 and 5 minutes respectively.  BW was 8#8.9Oz    The placenta was delivered spontaneously.  The placenta was examined, and it looked normal with a 3-vessel cord noted.  There were no abnormalities or excessive calcifications on the placenta.  The uterus was then massaged aggressively.  1 ml of Pitocin was injected directly into the fundus.  Thirty units of Pitocin was infused at the bolus rate.  The uterus was then grasped and cleaned with wet laps.  Good hemostasis noted.  The uterine incision was then grasped with Trevino and then closed with locking suture using O Vycryl.  The second imbricated suture applied  along the urine incision in the usual manner, using 0 chromic.  She was again noted of good hemostasis.    The gutter was then cleaned of blood and clots.  Examination of the fallopian tubes, fimbria and ovaries was normal.      The uterine incision was then irrigated and examined carefully again, which showed good hemostasis. The peritoneum fascia was closed with 3.0 Vicryl. The fascia was then closed with running suture of O-Vicryl in the usual manner.  The subcutaneous tissue was then irrigated and proximate with run-on suturing, using thing 2.0 chromic.  The skin was then closed with Quilt suture.    Liida tolerated the procedure well.  Sponge, needle, and instrument counts were correct.  Estimated blood loss was about 550 ml.  She was taken to the recovery room in good condition after the nerve block.  Routine post partum care was initiated.  She and her  were updated about the procedure.  All of their questions were answered.      Mackenzie Dee MD.             lE rAgelia-Lidia [[id number]]      Labor Event Times      Decision date/time (emergent ): 2024 0358          Labor Length      3rd Stage (hrs): 0 (min): 2          Labor Events     labor?: No   steroids: Full Course  Labor Type: Induction/Cervical ripening, AROM  Predominate monitoring during 1st stage: continuous electronic fetal monitoring     Antibiotics received during labor?: Yes  Reason for Antibiotics: GBS  Antibiotics received for GBS: Penicillin  Antibiotics Given (GBS): Greater than 4 hours prior to delivery     Rupture identifier: Sac 1  Rupture date/time: 24 0014   Rupture type: Artificial Rupture of Membranes  Fluid color: Clear  Fluid odor: Normal     Induction: Misoprostol, Oxytocin, AROM  Induction date/time:      Cervical ripening date/time:      Indications for induction: Severe Preeclampsia, Diabetes (Comment to specify), Obesity     Augmentation: Oxytocin, AROM  Indications for augmentation:  "Ineffective Contraction Pattern, Preeclampsia       Delivery/Placenta Date and Time      Delivery Date: 24 Delivery Time:  4:58 AM   Placenta Date/Time: 2024  5:01 AM  Oxytocin given at the time of delivery: after delivery of placenta  Delivering clinician: Mackenzie Dee MD   Other personnel present at delivery:  Provider Role   Karen Bangura MD Assistant Surgeon   Kim Armando MD Assistant Surgeon             Vaginal Counts              Needles Suture Needles Sponges (RETIRED) Instruments   Initial counts       Added to count       Relief counts       Final counts               Placed during labor Accounted for at the end of labor   FSE Yes Yes   IUPC Yes Yes   Cervidil No                              Apgars    Living status: Living   1 Minute 5 Minute 10 Minute 15 Minute 20 Minute   Skin color: 1  1       Heart rate: 2  2       Reflex irritability: 2  2       Muscle tone: 2  2       Respiratory effort: 2  2       Total: 9  9       Apgars assigned by: DOMINIK TEJADA       Cord      Vessels: 3 Vessels    Cord Complications: Nuchal   Nuchal Intervention: reduced         Nuchal cord description: loose nuchal cord         Cord Blood Disposition: Lab    Gases Sent?: No    Delayed cord clamping?: Yes    Cord Clamping Delay (seconds): 31-60 seconds    Stem cell collection?: No            Resuscitation    Methods: None  Output in Delivery Room: Voided        Measurements      Weight: 8 lb 8.9 oz Length: 1' 9\"     Head circumference: 14 cm Chest circumference: 33.7 cm   Output in delivery room: Voided       Skin to Skin and Feeding Plan      Skin to skin initiation date/time: 1841    Skin to skin with: Mother  Skin to skin end date/time:            Labor Events and Shoulder Dystocia    Fetal Tracing Prior to Delivery: Category 2  Shoulder dystocia present?: Neg       Delivery (Maternal) (Provider to Complete) ([id number])    Episiotomy: None  Perineal lacerations: None    Repair " suture: None  Genital tract inspection done: Neg  Comment if genital tract inspection not done: Delivered by CS       Blood Loss  Mother: Lidia Gallegos #[id number]     Start of Mother's Information      Delivery Blood Loss  24 0454 - 24 0624      Total Surgical QBL Blood Loss (mL) Hospital Encounter 532 mL    Total  532 mL               End of Mother's Information  Mother: Lidia Gallegos #[id number]                Delivery - Provider to Complete ([id number])    Delivering clinician: Mackenzie Dee MD  Delivery Type (Choose the 1 that will go to the Birth History): , Low Transverse                          Priority: STAT      Specifics: Primary nulliparous     Indications for : Fetal intolerance     For  or operative vaginal delivery, labor was most recently managed by (if not delivering provider): Mackenzie Dee MD     Other personnel:  Provider Role   Karen Bangura MD Assistant Surgeon   Kim Armando MD Assistant Surgeon                    Placenta    Date/Time: 2024  5:01 AM  Removal: Spontaneous  Comments: healthy looking placenta with 3 vessel cord  Disposition: Hospital disposal             Anesthesia    Method: Epidural                    Presentation and Position    Presentation: Vertex     Occiput Posterior                     Mackenzie Neal Mai, MD

## 2024-06-22 NOTE — PROGRESS NOTES
BP remained to be high with labetalol at 80 mg. Added procardia Xl 30 mg and switch to hydralazine per protocol. .  Received 2 doses of Hydralazine 20 mg.  BP now is 149/87.    Will continue to monitor BP every 10 minutes for 1 hrs then if stable then every 30 minutes for 1 hrs follow by every hour for 4 hrs if keep stable in the goal's range.      NST is reactive, category I overall.    Mackenzie Dee MD.

## 2024-06-22 NOTE — PROGRESS NOTES
Recovering well.  Blood pressure has been gone up and has been stable around 150s/90s in the last several hours.  No headache or acute visual change.    Continue with mag infusion. Mag level is 5.5.  Will decrease the magnesium infusion to 1.75 mg/h.    Start Procardia XL 30 mg daily    Will monitor the blood pressure closely.  Will increase the Procardia dose to maintain blood pressure in the 140s/90s or less.    Blood sugar this morning was 130 fasting.  Will continue to monitor closely per protocol.  She is now off the insulin pump.    A/P discussed and all of her questions were answered.    Mackenzie Dee MD.

## 2024-06-22 NOTE — PROGRESS NOTES
Nurse asked me review NST.  Been having late decel for about an hour.  Failed conservative management - positional adjustment, stop pitocin, IV fluid with now effect    BP has been on the lower side.      Confirmed late decels with minimal variable.    Cervix:  4/80/-2:  unchanged.    Stat CS was call    Due to complex condition - Dr. Bangura and leslie were called to assist

## 2024-06-22 NOTE — PROGRESS NOTES
Patient's BP's continue to remain high, patient okay to proceed with 80 mg's of IV labetalol at this time.

## 2024-06-22 NOTE — PROGRESS NOTES
Provider notification:   Provider: Dr. Leila YU was notified at 0345 regarding: a persistent Category II fetal heart rate tracing for 30 minutes.    Category II Algorithm     Fetal heart rate and uterine activity reviewed with provider.    EFM interpretation suggests: concern for persistent Category II tracing due to: minimal variability.  EFM suggests concern for interruption of the oxygen pathway due to:  late decelerations.     Interventions to improve fetal oxygenation for a Category II tracing include: blood pressure check, Category II algorithm reviewed, IV fluid bolus , maternal positioning, oxytocin discontinued, and sterile vaginal exam    After discussion with provider:Provider coming to bedside    Plan per provider / orders received for to continue to observe patient at this time and reevaluate in 30 minutes

## 2024-06-22 NOTE — ANESTHESIA PREPROCEDURE EVALUATION
Anesthesia Pre-Procedure Evaluation    Patient: Lidia Gallegos   MRN: [id number] : 1995        Procedure : * No procedures listed *          Past Medical History:   Diagnosis Date    Anxiety     Autism     Depression     History of anemia       Past Surgical History:   Procedure Laterality Date    INNER EAR SURGERY Right     age 2    WISDOM TOOTH EXTRACTION        No Known Allergies   Social History     Tobacco Use    Smoking status: Former     Current packs/day: 0.00     Types: Cigarettes     Quit date: 2022     Years since quittin.4    Smokeless tobacco: Never   Substance Use Topics    Alcohol use: Not Currently     Comment: occ      Wt Readings from Last 1 Encounters:   24 142 kg (313 lb)        Anesthesia Evaluation   Pt has had prior anesthetic. Type: General and MAC.        ROS/MED HX  ENT/Pulmonary: Comment: Past smoker      Neurologic:       Cardiovascular:     (+)  - - PIH and Mg ++ gtt and BP Meds  -  - -                                      METS/Exercise Tolerance:     Hematologic:  - neg hematologic  ROS     Musculoskeletal:   (+)        low back pain,       GI/Hepatic:     (+) GERD,                   Renal/Genitourinary:       Endo:     (+)  type II DM,   Using insulin, - using insulin pump.         Obesity,       Psychiatric/Substance Use:     (+) psychiatric history anxiety and depression       Infectious Disease:       Malignancy:       Other:            Physical Exam    Airway        Mallampati: II   TM distance: > 3 FB   Neck ROM: full   Mouth opening: > 3 cm    Respiratory Devices and Support         Dental  no notable dental history         Cardiovascular   cardiovascular exam normal          Pulmonary   pulmonary exam normal                OUTSIDE LABS:  CBC:   Lab Results   Component Value Date    WBC 10.3 2024    WBC 9.9 2024    HGB 12.0 2024    HGB 10.4 (L) 2024    HCT 30.4 (L) 2024    HCT 32.2 (L) 2024     2024    PLT  "238 06/21/2024     BMP:   Lab Results   Component Value Date     06/21/2024     06/21/2024    POTASSIUM 4.3 06/21/2024    POTASSIUM 4.1 06/21/2024    CHLORIDE 108 (H) 06/21/2024    CHLORIDE 107 06/21/2024    CO2 20 (L) 06/21/2024    CO2 22 06/21/2024    BUN 10.5 06/21/2024    BUN 8.7 06/21/2024    CR 0.86 06/21/2024    CR 0.81 06/21/2024     (H) 06/21/2024     (H) 06/21/2024     COAGS: No results found for: \"PTT\", \"INR\", \"FIBR\"  POC:   Lab Results   Component Value Date    HCG Positive (A) 11/24/2023     HEPATIC:   Lab Results   Component Value Date    ALBUMIN 3.1 (L) 06/21/2024    PROTTOTAL 5.8 (L) 06/21/2024    ALT 18 06/21/2024    AST 16 06/21/2024    ALKPHOS 148 06/21/2024    BILITOTAL 0.4 06/21/2024     OTHER:   Lab Results   Component Value Date    LACT 1.6 02/11/2023    A1C 8.3 (H) 06/21/2024    TERRI 8.9 06/21/2024    MAG 4.2 (H) 06/21/2024    LIPASE 77 04/13/2022    TSH 2.20 08/02/2023    CRP 3.6 04/19/2021       Anesthesia Plan    ASA Status:  3       Anesthesia Type: Epidural.              Consents    Anesthesia Plan(s) and associated risks, benefits, and realistic alternatives discussed. Questions answered and patient/representative(s) expressed understanding.     - Discussed:     - Discussed with:  Patient            Postoperative Care            Comments:    Other Comments: The risks and benefits of anesthesia, and the alternatives where applicable, have been discussed with the patient, and they wish to proceed.          neg OB ROS.      Caretr Martel, APRN CRNA    I have reviewed the pertinent notes and labs in the chart from the past 30 days and (re)examined the patient.  Any updates or changes from those notes are reflected in this note.          # Hypoalbuminemia: Lowest albumin = 2.8 g/dL at 6/21/2024  7:13 AM, will monitor as appropriate    # Drug Induced Platelet Defect: home medication list includes an antiplatelet medication  # DMII: A1C = 8.3 % (Ref range: <5.7 %) " within past 6 months

## 2024-06-22 NOTE — PROGRESS NOTES
S: Transfer to postpartum  B:  @ 3255  A: Mother and baby transferred to postpartum unit at 0700 via bed after completion of immediate recovery period. Patient oriented to room.   R: Anticipate routine postpartum care.

## 2024-06-22 NOTE — ANESTHESIA CARE TRANSFER NOTE
Patient: Lidia Gallegos    Procedure: Procedure(s):   SECTION       Diagnosis: Type 2 diabetes mellitus with hyperglycemia, without long-term current use of insulin (H) [E11.65]  High-risk pregnancy in third trimester [O09.93]  Morbid obesity (H) [E66.01]  Pre-eclampsia in third trimester [O14.93]  Diagnosis Additional Information: No value filed.    Anesthesia Type:   Epidural     Note:    Oropharynx: oropharynx clear of all foreign objects and spontaneously breathing  Level of Consciousness: drowsy  Oxygen Supplementation: room air    Independent Airway: airway patency satisfactory and stable  Dentition: dentition unchanged  Vital Signs Stable: post-procedure vital signs reviewed and stable  Report to RN Given: handoff report given  Patient transferred to: PACU    Handoff Report: Identifed the Patient, Identified the Reponsible Provider, Reviewed the pertinent medical history, Discussed the surgical course, Reviewed Intra-OP anesthesia mangement and issues during anesthesia, Set expectations for post-procedure period and Allowed opportunity for questions and acknowledgement of understanding      Vitals:  Vitals Value Taken Time   BP     Temp     Pulse     Resp 27 24 0554   SpO2 97 % 24 0554   Vitals shown include unfiled device data.    Electronically Signed By: SOFYA Iraheta CRNA  2024  5:55 AM

## 2024-06-22 NOTE — ANESTHESIA PROCEDURE NOTES
Epidural catheter Procedure Note    Pre-Procedure   Staff -        Performed By: CRNA       Location: OB       Pre-Anesthestic Checklist: patient identified, IV checked, risks and benefits discussed, informed consent, monitors and equipment checked, pre-op evaluation and at physician/surgeon's request  Timeout:       Correct Patient: Yes        Correct Procedure: Yes        Correct Site: Yes        Correct Position: Yes   Procedure Documentation  Procedure: epidural catheter       Patient Position: sitting       Patient Prep/Sterile Barriers: sterile gloves, mask, patient draped       Skin prep: Betadine       Local skin infiltrated with 3 mL of 1% lidocaine.        Insertion Site: L3-4. (midline approach).       Technique: LORT air        JACQUELINE at 9 cm.       Needle Type: ToSensipassy needle and Espinoza       Needle Gauge: 17.        Needle Length (Inches): 3.5        Catheter: 19 G.          Catheter threaded easily.         4 cm epidural space.           # of attempts: 2 and  # of redirects:  3    Assessment/Narrative         Paresthesias: No.       Test dose of 4 mL lidocaine 1.5% w/ 1:200,000 epinephrine at.         Test dose negative, 3 minutes after injection, for signs of intravascular, subdural, or intrathecal injection.       Insertion/Infusion Method: LORT air       Aspiration negative for Heme or CSF via Epidural Catheter.       Sensory Level Left: T6.       Sensory Level Right: T6.     Comments:  Pt tolerated the procedure.  Had a right paresthesia on first attempt.  The paresthesia resolved when the needle was withdrawn.  Went to a level above and was able to achieve the epidural space.  Pt had a negative response to the test dose for intravascular, intrathecal or subdural injection.  She had an appropriate physiological response to the loading dose. I will be available if further intervention is required.        FOR [facility] ([facility]) ONLY:   Pain Team Contact information: please page the Pain Team Via [employer].  "Search \"Pain\". During daytime hours, please page the attending first. At night please page the resident first.      "

## 2024-06-22 NOTE — PROGRESS NOTES
Dr. Dee called and updated that patient has had multiple BP's in severe range in which she had been administered 20 mg of labetalol IV and 40 mg's of labetalol IV with little decrease in BP's. Plan to continue following labetalol algorithm and switch over to hydralazine or nifedipine if patient surpasses 300mg's in 24 hours.

## 2024-06-22 NOTE — PROGRESS NOTES
Provider updated that patient's blood pressures have still stayed in severe range despite IV labetalol and IV hydralazine protocol's. Provider would like to see what the next blood pressure looks like and assess from there.

## 2024-06-22 NOTE — PROGRESS NOTES
Pregnancy risks:  Mental health  -Had anxiety and depression, doing well without medications              - PHQ-9 score of 0 today (24)  -Will monitor closely, can be worse during the pregnancy  -Monitor and treat for postpartum depression/psychosis aggressively  -High functioning autism - in college graduate with a master degree  Obesity  -Pre-pregnancy BMI 40 with nulliparity.  - Increased risk for preeclampsia,   - Aspirin 81 mg  - Growth US at 32-34 week              - 24:  EFW 55%, BPP 8/8   - 2024:  EFW 84th; BPP 8/8; fetal skin edema  - BPP and NST weekly, starting at 34 week   - Today:  not adequate - sent to L&D for induction  -Medical induction today: term pregnancy with uncontrolled DM  DM with hgb A1c of 7.5  -A1c on 2023 was 5.8              -Hgb A1c on 3/29/24: 7.5                          -She does not believe that she is DM                                      - Declined DM ed and intervention - refused meds                                      - Discussed potential complications to pregnancy, delivery and                                                   - willing to take the risk              -Healthy diet and exercising discussed and encouraged              -Goal for Hgb A1c <6.5 during the pregnancy              -Likely will need insulin drips during intrapartum care  -Encouraged to monitor blood sugars              - Fasting BS: <95              - 1 hr postprandial: <140              - 2 hr postprandial: <120   - Hgb A1c (24):  8.3.  send for induction     Abnormal NIPS  -Positive for increased risk for XYY              -Discussed about potential false positive              -Recommend MFM/ consultation - declined  -Declined a level 2 ultrasound with MFM     Rh neg (O-)              - Received Rhogram on 3/29/24 at 28 weeks     OB History  -  -Previous miscarriage at 5-6 weeks      Prenatal care plan              - RICA: 2024 ([place]               - BMI (pre-pregnancy):  40 - (11-19 pounds)  - OB labs:  O neg, IR, all others are normal              - First trimester screening:  NIPS - positive for XYY              - Second trimester screening: AFP - normal              - Pain management: Epidural as needed              - Ped:  Dr. Price              - Circumcision if boy: Yes              - BC: Likely birth control pill -did well with in the past              - Immunizations: Flu given 24, declined COVID              - Tdap: 28 -32 weeks    Uncontrolled diabetes, hemoglobin A1c today was 8.3; went up from 7.5 about 2 months ago.  Patient refused monitoring or treating as she does not believes that has diabetes despite of the hemoglobin A1c level.  Has not been not checking her blood sugar at home.    NST this morning: FHT 120s with moderate variable,1 acceleration but no deceleration.  No contraction.  NST was stopped prematurely because she was sent to L&D for admission for medical induction.    Ultrasound 2 days ago showed EFW at 84%, went up from 54% about a month ago.  There was fetal skin edema, concern of uncontrolled diabetes.    Blood pressure is slightly elevated in office today.  No history of high blood pressure.  She has been taking aspirin 81 mg daily.    Discussed with patient about US findings and lab results.  Recommended medical induction due to term pregnancy with uncontrolled diabetes.  Comorbidity include morbid obesity.    I informed her that she is consider high risk delivery due to uncontrolled diabetes - higher risk for delivery and  complication - high risk for 's NICU care.  Informed her that we do no have NICU care at out hospital and our ability to take of 's complication is limitted.  I strongly recommended transferring her care  to the [facility] or the Hospital of her choice that has the NICU care and has the ability to take care of her and her  had complications arise.   There will  be a high chance of transferring her  for higher level of care if she delivers here.  Patient and her  refused transferring, request to stay with this hospital and are willing to take the risks.    She was sent to L&D for medical induction.  Information was given to nursing staff.      Group B strep is unknown.  Will obtain GBS screening in L&D.  Will treat for group B strep when she is in active labor for now.    Assessments and plan discussed with patient and her  in details.  All of their questions were answered.    Mackenzie Dee MD.

## 2024-06-22 NOTE — PROGRESS NOTES
AROM at 0014, large amount of clear fluids noted and IUPC and FSE placed by Dr. Dee at this time. Patient remains 3cm/80%/-1 at this time. After ROM patient became tachystystole and writer halved her pitocin to 3 at this time.

## 2024-06-22 NOTE — PROGRESS NOTES
Feeling more pelvic pressure and back pain with contractions.      BP remained to be elevated - took a procardia XL 30 mg and hydralazin per protocol.  LDH, LFT, and platelets were normal.    NST reactive    Cervix:  3/80/-2 mid position, head engaged   - AROM with clear fluid   - IUPC and scalp electrode placed  Will continue to monitor BP closely.    Will call for CS if not able to control BP.    Has been in house, on floor since 10 pm and plan to stay on the floor.    A/P discussed with patient and her .    Mackenzie Dee MD.

## 2024-06-22 NOTE — PROGRESS NOTES
Dr. Dee updated regarding a few significant variable decels, initial position change to left side was ineffective, patient was then repositioned to right side and FHR tracing increased. Writer stopped pitocin at this time. Dr. Dee also updated that patient's BP has significantly dropped and is now high /50-60's, patient asymptomatic and writer consulted with SHERRI Machado and he recommended another 500 mL bolus of LR at this time. Dr. Dee gave verbal orders to administer a 500 mL bolus or LR for FHR tracing and low BP.

## 2024-06-22 NOTE — ANESTHESIA PROCEDURE NOTES
TAP Procedure Note    Pre-Procedure   Staff -        Performed By: CRNA       Location: OR       Pre-Anesthestic Checklist: patient identified, IV checked, site marked, risks and benefits discussed, informed consent, monitors and equipment checked, pre-op evaluation, at physician/surgeon's request and post-op pain management  Timeout:       Correct Patient: Yes        Correct Procedure: Yes        Correct Site: Yes        Correct Position: Yes        Correct Laterality: Yes        Site Marked: Yes  Procedure Documentation  Procedure: TAP       Laterality: bilateral       Patient Position: supine       Patient Prep/Sterile Barriers: sterile gloves, mask       Skin prep: Chloraprep       Needle Type: insulated       Needle Gauge: 22.        Needle Length (millimeters): 100        Ultrasound guided       1. Ultrasound was used to identify targeted nerve, plexus, vascular marker, or fascial plane and place a needle adjacent to it in real-time.       2. Ultrasound was used to visualize the spread of anesthetic in close proximity to the above referenced structure.       3. A permanent image is entered into the patient's record.    Assessment/Narrative         The placement was negative for: blood aspirated, painful injection and site bleeding       Paresthesias: No.       Test dose of mL at.         Test dose negative, 3 minutes after injection, for signs of intravascular, subdural, or intrathecal injection.       Bolus given via needle..        Secured via.        Insertion/Infusion Method: Single Shot       Complications: none       Injection made incrementally with aspirations every 5 mL.     Comments:  Pt tolerated the procedure well as under General Anesthesia.  There was good visualization of the space between the internal oblique and the transverses abdominis.  There was also good visualization of fluid dissection in this layer.  No complications were noted.  I will follow up with this pt if needed.      FOR [facility]  "([facility]) ONLY:   Pain Team Contact information: please page the Pain Team Via Last Guide. Search \"Pain\". During daytime hours, please page the attending first. At night please page the resident first.      "

## 2024-06-22 NOTE — PROGRESS NOTES
Overall stable.  Reactive NST. Irregular contractions, every 2-5 minutes.  Not feeling the contractions.  Blood pressure remains to be fluctuating, has not needed the medication for it.  Tolerating the mag well.  Blood sugar has been stable.    Cervix: 3/70/-2, mid position.      Continue with the current care.    Anticipate SNVD.      Mackenzie Dee MD.

## 2024-06-22 NOTE — PROGRESS NOTES
Blood pressure has been high, responding to the labetalol at 80 mg which brought the blood pressure down to 150s/100s.  No symptoms for preeclampsia.    Switch to the hydralazine IV per protocol.  Discussed with MFM, Dr. Costello recommended a dose of Procardia XL in addition to the hydralazine per protocol.    Will continue to monitor closely.  Strict I/O - limited to 125 cc/h or pulmonary hypertension and/or edema.  Also will initiate NPO.    If not able to control the blood pressure with the medications, will consider .    NST continued to be reactive, overall category 1.  No contraction appreciated on the Lilia.  Nursing staff has not started the induction yet.  Will start induction with Pitocinon per protocol.      Cervix unchanged: /-3.  Failed AROM attempt.    Tolerated mag well.  Good urine output.  Pending for lab: CBC, CMP, mag level and at LDH.    A/P discussed this with patient and her , all of their questions were answered.    Appreciate Dr. Costello's expertise and assistance.    Plans to consult telemetry NICU at the time of delivery.    Mackenzie Dee MD.

## 2024-06-22 NOTE — PROGRESS NOTES
Patient requesting epidural placement. SHERRI Machado in house and updated regarding patient's request. 500 ml bolus of LR initiated at this time.

## 2024-06-22 NOTE — PROGRESS NOTES
Patient refused j80 mg dose of Labetolol, would like to wait for a few consequtive BP readings in order to continue with IV medications

## 2024-06-22 NOTE — PROGRESS NOTES
S:  Section Delivery  B: Primary  Section 0358  A: Baby delivered, cord clamping delayed for 60-90 seconds, then brought to pre- warmed infant warmer. Infant stimulated and dried. Infant then brought to mother and placed skin to skin for bonding. Apgars 9/9. Educated mother on expected feeding readiness cues and encouraged her to observe for feeding cues. Mother informed that breast feeding assistance would be provided.   R: Mother and baby bonding well. Anticipate first feed within the hour.

## 2024-06-23 LAB
GLUCOSE BLDC GLUCOMTR-MCNC: 108 MG/DL (ref 70–99)
HGB BLD-MCNC: 8.8 G/DL (ref 11.7–15.7)
MAGNESIUM SERPL-MCNC: 5.6 MG/DL (ref 1.7–2.3)
PLATELET # BLD AUTO: 236 10E3/UL (ref 150–450)

## 2024-06-23 PROCEDURE — 250N000011 HC RX IP 250 OP 636: Mod: JZ | Performed by: FAMILY MEDICINE

## 2024-06-23 PROCEDURE — 85049 AUTOMATED PLATELET COUNT: CPT | Performed by: FAMILY MEDICINE

## 2024-06-23 PROCEDURE — 85018 HEMOGLOBIN: CPT | Performed by: FAMILY MEDICINE

## 2024-06-23 PROCEDURE — 36415 COLL VENOUS BLD VENIPUNCTURE: CPT | Performed by: FAMILY MEDICINE

## 2024-06-23 PROCEDURE — 99232 SBSQ HOSP IP/OBS MODERATE 35: CPT | Mod: 24 | Performed by: FAMILY MEDICINE

## 2024-06-23 PROCEDURE — 120N000013 HC R&B IMCU

## 2024-06-23 PROCEDURE — 250N000013 HC RX MED GY IP 250 OP 250 PS 637: Performed by: FAMILY MEDICINE

## 2024-06-23 PROCEDURE — 83735 ASSAY OF MAGNESIUM: CPT | Performed by: FAMILY MEDICINE

## 2024-06-23 RX ORDER — FERROUS SULFATE 325(65) MG
325 TABLET ORAL DAILY
Status: DISCONTINUED | OUTPATIENT
Start: 2024-06-23 | End: 2024-06-25 | Stop reason: HOSPADM

## 2024-06-23 RX ORDER — NIFEDIPINE 30 MG/1
60 TABLET, EXTENDED RELEASE ORAL DAILY
Status: DISCONTINUED | OUTPATIENT
Start: 2024-06-23 | End: 2024-06-25 | Stop reason: HOSPADM

## 2024-06-23 RX ADMIN — NIFEDIPINE 30 MG: 30 TABLET, EXTENDED RELEASE ORAL at 12:54

## 2024-06-23 RX ADMIN — ACETAMINOPHEN 975 MG: 325 TABLET, FILM COATED ORAL at 17:13

## 2024-06-23 RX ADMIN — FERROUS SULFATE TAB 325 MG (65 MG ELEMENTAL FE) 325 MG: 325 (65 FE) TAB at 15:58

## 2024-06-23 RX ADMIN — PRENATAL VIT W/ FE FUMARATE-FA TAB 27-0.8 MG 1 TABLET: 27-0.8 TAB at 20:00

## 2024-06-23 RX ADMIN — ENOXAPARIN SODIUM 40 MG: 40 INJECTION SUBCUTANEOUS at 19:59

## 2024-06-23 RX ADMIN — ENOXAPARIN SODIUM 40 MG: 40 INJECTION SUBCUTANEOUS at 08:04

## 2024-06-23 RX ADMIN — MAGNESIUM SULFATE HEPTAHYDRATE 1.75 G/HR: 40 INJECTION, SOLUTION INTRAVENOUS at 01:49

## 2024-06-23 RX ADMIN — IBUPROFEN 800 MG: 800 TABLET, FILM COATED ORAL at 19:59

## 2024-06-23 RX ADMIN — KETOROLAC TROMETHAMINE 30 MG: 30 INJECTION, SOLUTION INTRAMUSCULAR at 08:04

## 2024-06-23 RX ADMIN — IBUPROFEN 800 MG: 800 TABLET, FILM COATED ORAL at 14:04

## 2024-06-23 RX ADMIN — SENNOSIDES AND DOCUSATE SODIUM 1 TABLET: 8.6; 5 TABLET ORAL at 20:00

## 2024-06-23 RX ADMIN — NIFEDIPINE 30 MG: 30 TABLET, EXTENDED RELEASE ORAL at 09:00

## 2024-06-23 RX ADMIN — SENNOSIDES AND DOCUSATE SODIUM 2 TABLET: 8.6; 5 TABLET ORAL at 08:59

## 2024-06-23 RX ADMIN — KETOROLAC TROMETHAMINE 30 MG: 30 INJECTION, SOLUTION INTRAMUSCULAR at 01:49

## 2024-06-23 RX ADMIN — ACETAMINOPHEN 975 MG: 325 TABLET, FILM COATED ORAL at 04:53

## 2024-06-23 RX ADMIN — ACETAMINOPHEN 975 MG: 325 TABLET, FILM COATED ORAL at 11:16

## 2024-06-23 RX ADMIN — ACETAMINOPHEN 975 MG: 325 TABLET, FILM COATED ORAL at 22:58

## 2024-06-23 ASSESSMENT — ACTIVITIES OF DAILY LIVING (ADL)
ADLS_ACUITY_SCORE: 18

## 2024-06-23 NOTE — PROGRESS NOTES
[facility] Post-Op / Progress Note         Assessment and Plan:    Assessment:   Post-operative day #1  Low transverse primary  section  L&D complications: Fetal intolerance with nonreactive NST (persistent late decelerations)  Uncontrolled diabetes - hemoglobin A1c 8.3  Preeclampsia with severe features  Morbid obesity  Major depression with autism spectrum disorder  Rh- during the pregnancy  Anemia secondary to acute blood loss from the surgery      Doing well.  Clean wound without signs of infection.  Normal healing wound.  No immediate surgical complications identified.  No excessive bleeding  Pain well-controlled.  Blood pressure been high - on Procardia XL 30 mg  Blood sugar have been normal - off the insulin drip, not on medication  Mental health overall stable - no safety concerns      Plan:   Ambulation encouraged  Breast feeding strategies discussed  Monitor wound for signs of infection  Pain control measures as needed  Reportable signs and symptoms dicussed with the patient  No program is needed, his blood type is O-.  Start iron supplementation  Increase Procardia XL to 60 mg daily.              -Continue to monitor the blood pressure, the goal is to be less than 140/90.  Stop the magnesium infusion.  Continue with Lovenox for DVT prophylaxis  Anticipate discharge in 1-2 days          -Follow-up in 2-3 days after discharge           -Blood pressure machine cuff to be given at the time discharge.           Interval History:   Doing well.  Pain is well-controlled.  No fever.  No wound drainage, warmth or significant erythema.  Good appetite - no nausea, vomiting, diarrhea or constipation.  No headache or dizziness.  No acute visual change.  Denies chest pain, shortness of breath, leg swelling or abdominal pain.  Ambulatory.  Blood pressure has been on the higher side but he has been completely asymptomatic.  Breastfeeding well.  No other concern from patient,  and  nursing staff.          Significant Problems:      Past Medical History:   Diagnosis Date    Anxiety     Autism     Depression     History of anemia              Review of Systems:    The patient denies any chest pain, shortness of breath, excessive pain, fever, chills, purulent drainage from the wound, nausea or vomiting.          Medications:   All medications related to the patient's surgery have been reviewed  Current Facility-Administered Medications   Medication Dose Route Frequency Provider Last Rate Last Admin    acetaminophen (TYLENOL) tablet 975 mg  975 mg Oral Q6H Mackenzie Dee MD   975 mg at 06/23/24 1116    acetaminophen (TYLENOL) tablet 975 mg  975 mg Oral Q4H PRN Mackenzie Dee MD   975 mg at 06/21/24 1642    [START ON 6/24/2024] bisacodyl (DULCOLAX) suppository 10 mg  10 mg Rectal Daily PRN Mackenzie Dee MD        calcium gluconate 10 % injection 1 g  1 g Intravenous Once PRN Karen Bangura MD        carboprost (HEMABATE) injection 250 mcg  250 mcg Intramuscular Q15 Min PRN Mackenzie Dee MD        And    loperamide (IMODIUM) capsule 4 mg  4 mg Oral Once PRN Mackenzie Dee MD        dextrose 10% BOLUS 150 mL  150 mL Intravenous Q15 Min PRN Karen Bangura MD        Or    glucose gel 15-30 g  15-30 g Oral Q15 Min PRN Karen Bangura MD        Or    dextrose 50 % injection 25-50 mL  25-50 mL Intravenous Q15 Min PRN Karen Bangura MD        Or    glucagon injection 1 mg  1 mg Subcutaneous Q15 Min PRN Karen Bangura MD        dextrose 10% infusion   Intravenous Continuous PRN Karen Bangura MD   Stopped at 06/22/24 0635    dextrose 5% in lactated ringers infusion   Intravenous Continuous Mackenzie Dee MD        glucose gel 15-30 g  15-30 g Oral Q15 Min PRN Mackenzie Dee MD        Or    dextrose 50 % injection 25-50 mL  25-50 mL Intravenous Q15 Min PRN Mackenzie Dee MD        Or    glucagon injection 1 mg  1 mg Subcutaneous Q15 Min PRN Mackenzie Dee MD        enoxaparin ANTICOAGULANT  (LOVENOX) injection 40 mg  40 mg Subcutaneous Q12H Mackenzie Dee MD   40 mg at 06/23/24 0804    hydrALAZINE (APRESOLINE) injection 10 mg  10 mg Intravenous Q20 Min PRN Karen Bangura MD   10 mg at 06/22/24 0038    hydrocortisone (Perianal) (ANUSOL-HC) 2.5 % cream   Rectal TID PRN Mackenzie Dee MD        ibuprofen (ADVIL/MOTRIN) tablet 800 mg  800 mg Oral Q6H Mackenzie Dee MD   800 mg at 06/23/24 1404    insulin regular (MYXREDLIN) 1 unit/mL infusion   Intravenous Continuous PRN Karen Bangura MD   Stopped at 06/22/24 0635    labetalol (NORMODYNE/TRANDATE) injection 20 mg  20 mg Intravenous Q2H PRN Karen Bangura MD   20 mg at 06/21/24 1617    labetalol (NORMODYNE/TRANDATE) injection 20-40 mg  20-40 mg Intravenous Q10 Min PRN Karen Bangura MD   80 mg at 06/21/24 2152    lactated ringers infusion   mL/hr Intravenous Continuous Karen Bangura MD   Stopped at 06/23/24 0958    lanolin cream   Topical Q1H PRN Mackenzie Dee MD        lidocaine (LMX4) kit   Topical Q1H PRN Mackenzie Dee MD        lidocaine 1 % 0.1-1 mL  0.1-1 mL Other Q1H PRN Mackenzie Dee MD        lidocaine 1 % 0.1-1 mL  0.1-1 mL Other Q1H PRN Karen Bangura MD        lidocaine 1 % 0.1-20 mL  0.1-20 mL Subcutaneous Once PRN Mackenzie Dee MD        loperamide (IMODIUM) capsule 2 mg  2 mg Oral Q2H PRN Mackenzie Dee MD        magnesium sulfate 2 g in 50 mL sterile water intermittent infusion  2 g Intravenous Once PRN Karen Bangura MD        magnesium sulfate 4 g in 100 mL sterile water intermittent infusion  4 g Intravenous Once PRN Karen Bangura MD        methylergonovine (METHERGINE) injection 200 mcg  200 mcg Intramuscular Q2H PRN Mackenzie Dee MD        metoclopramide (REGLAN) injection 10 mg  10 mg Intravenous Q6H PRN Mackenzie Dee MD        Or    metoclopramide (REGLAN) tablet 10 mg  10 mg Oral Q6H PRN Mackenzie Dee MD        midazolam (VERSED) injection 2 mg  2 mg Intravenous Q5 Min PRN Karen Bangura MD         misoprostol (CYTOTEC) tablet 400 mcg  400 mcg Oral ONCE PRN REPEAT PER INSTRUCTIONS Mackenzie Dee MD        Or    misoprostol (CYTOTEC) suppository 800 mcg  800 mcg Rectal ONCE PRN REPEAT PER INSTRUCTIONS Mackenzie Dee MD        nalbuphine (NUBAIN) injection 2.5-5 mg  2.5-5 mg Intravenous Q6H PRN Carter Martel APRN CRNA        NIFEdipine (PROCARDIA) capsule 10-20 mg  10-20 mg Oral Q20 Min PRN Karen Bangura MD   20 mg at 06/21/24 0941    NIFEdipine ER OSMOTIC (PROCARDIA XL) 24 hr tablet 60 mg  60 mg Oral Daily Mackenzie Dee MD   30 mg at 06/23/24 1254    No MMR Needed -  Assessment: Patient does not need MMR vaccine   Does not apply Continuous PRN Mackenzie Dee MD        No Tdap Needed - Assessment: Patient does not need Tdap vaccine   Does not apply Continuous PRN Mackenzie Dee MD        ondansetron (ZOFRAN ODT) ODT tab 4 mg  4 mg Oral Q6H PRN Mackenzie Dee MD        Or    ondansetron (ZOFRAN) injection 4 mg  4 mg Intravenous Q6H PRN Mackenzie Dee MD        oxyCODONE (ROXICODONE) tablet 5 mg  5 mg Oral Q4H PRN Mackenzie Dee MD        oxytocin (PITOCIN) 30 units in 500 mL 0.9% NaCl infusion  100-340 mL/hr Intravenous Continuous PRN Mackenzie Dee MD        oxytocin (PITOCIN) 30 units in 500 mL 0.9% NaCl infusion  340 mL/hr Intravenous Continuous PRN Mackenzie Dee MD        oxytocin (PITOCIN) 30 units in 500 mL 0.9% NaCl infusion  100-340 mL/hr Intravenous Continuous PRN Mackenzie Dee MD   Stopped at 06/22/24 0648    oxytocin (PITOCIN) injection 10 Units  10 Units Intramuscular Once PRN Mackenzie Dee MD        oxytocin (PITOCIN) injection 10 Units  10 Units Intramuscular Once PRN Mackenzie Dee MD        oxytocin (PITOCIN) injection 10 Units  10 Units Intramuscular Once PRN Mackenzie Dee MD        oxytocin (PITOCIN) injection    PRN Mackenzie Dee MD   10 Units at 06/22/24 0500    prenatal multivitamin w/iron per tablet 1 tablet  1 tablet Oral Daily Mackenzie Dee MD   1 tablet at 06/22/24 2036    prochlorperazine  (COMPAZINE) injection 10 mg  10 mg Intravenous Q6H PRN Mackenzie Dee MD        Or    prochlorperazine (COMPAZINE) tablet 10 mg  10 mg Oral Q6H PRN Mackenzie Dee MD        Or    prochlorperazine (COMPAZINE) suppository 25 mg  25 mg Rectal Q12H PRN Mackenzie Dee MD        rho(D) immune globulin (RHOPHYLAC) injection 300 mcg  300 mcg Intravenous Once Mackenzie Dee MD        Or    rho(D) immune globulin (RHOPHYLAC) injection 300 mcg  300 mcg Intramuscular Once Mackenzie Dee MD        senna-docusate (SENOKOT-S/PERICOLACE) 8.6-50 MG per tablet 1 tablet  1 tablet Oral BID Mackenzie Dee MD   1 tablet at 06/22/24 2036    Or    senna-docusate (SENOKOT-S/PERICOLACE) 8.6-50 MG per tablet 2 tablet  2 tablet Oral BID Mackenzie Dee MD   2 tablet at 06/23/24 0859    simethicone (MYLICON) chewable tablet 80 mg  80 mg Oral 4x Daily PRN Mackenzie Dee MD        sodium chloride (PF) 0.9% PF flush 3 mL  3 mL Intracatheter Q8H Mackenzie Dee MD   3 mL at 06/23/24 1306    sodium chloride (PF) 0.9% PF flush 3 mL  3 mL Intracatheter q1 min prn Mackenzie Dee MD   3 mL at 06/23/24 0829    sodium chloride (PF) 0.9% PF flush 3 mL  3 mL Intracatheter Q8H Karen Bangura MD   3 mL at 06/21/24 0900    sodium chloride (PF) 0.9% PF flush 3 mL  3 mL Intracatheter q1 min prn Karen Bangura MD        sodium chloride (PF) 0.9% PF flush 3 mL  3 mL Intracatheter Q8H Karen Bangura MD   3 mL at 06/21/24 0930    sodium chloride (PF) 0.9% PF flush 3 mL  3 mL Intracatheter q1 min prn Karen Bangura MD        sodium chloride (PF) 0.9% PF flush 3 mL  3 mL Intracatheter Q8H Karen Bangura MD   3 mL at 06/23/24 1305    sodium chloride (PF) 0.9% PF flush 3 mL  3 mL Intracatheter q1 min prn Karen Bangura MD        sodium chloride 0.9 % infusion   Intravenous Continuous PRN Karen Bangura MD   Stopped at 06/21/24 1942    [START ON 6/24/2024] sodium phosphate (FLEET ENEMA) 1 enema  1 enema Rectal Daily PRN Mackenzie Dee MD        tranexamic acid 1  g in 100 mL NS IV bag (premix)  1 g Intravenous Q30 Min Mackenzie THOMAS Mai, MD                 Physical Exam:   All vitals stable  Patient Vitals for the past 24 hrs:   BP Temp Temp src Pulse Resp SpO2   06/23/24 1250 (!) 156/76 97.7  F (36.5  C) Oral 114 18 97 %   06/23/24 1000 136/68 -- -- 98 -- --   06/23/24 0901 (!) 153/89 -- -- 102 -- --   06/23/24 0900 (!) 153/89 -- -- -- -- --   06/23/24 0804 (!) 155/87 -- -- -- -- --   06/23/24 0800 (!) 155/87 -- -- 92 -- --   06/23/24 0730 (!) 149/93 98.1  F (36.7  C) Oral -- 18 --   06/23/24 0700 130/78 -- -- 81 -- 95 %   06/23/24 0600 137/66 -- -- 83 -- 97 %   06/23/24 0500 (!) 146/89 -- -- 91 -- 93 %   06/23/24 0400 138/86 98.4  F (36.9  C) Oral 85 18 --   06/23/24 0300 (!) 143/87 -- -- 87 -- --   06/23/24 0200 138/84 -- -- 80 -- 97 %   06/23/24 0100 (!) 129/93 -- -- 92 -- 97 %   06/23/24 0010 (!) 151/101 98.3  F (36.8  C) Oral 86 18 (!) 89 %   06/22/24 2300 (!) 137/92 -- -- 94 -- 95 %   06/22/24 2200 (!) 148/91 -- -- 101 -- (!) 88 %   06/22/24 2100 (!) 145/88 -- -- 94 -- 92 %   06/22/24 2005 (!) 141/76 98.2  F (36.8  C) Oral 87 18 95 %   06/22/24 1805 (!) 145/86 -- -- -- -- --   06/22/24 1600 137/73 -- -- 90 -- 91 %   06/22/24 1548 -- -- -- -- -- 93 %   06/22/24 1500 137/88 -- -- 99 -- 96 %     Wound clean and dry with minimal or no drainage.  Surrounding skin with minimal erythema.    Constitutional:   awake, alert, cooperative, no apparent distress, comfortable in bed     Lungs:   Clear, no wheezes or crackle.  Good respiratory effort throughout.     Cardiovascular:   Regular rate and rhythm, no murmur.     Abdomen:   Soft, nondistended.  Fundus is formed and nontender at umbilical area.  Normal bowel sounds.  No CVA tenderness.     Musculoskeletal:   Trace edema bilaterally to below the knees.     Skin:   Lower abdominal incision is clean and dry no irritation, redness or drainage.  No warmth to the touch..               Data:   All laboratory data related to this  surgery reviewed  Results for orders placed or performed during the hospital encounter of 06/21/24 (from the past 24 hour(s))   Platelet count   Result Value Ref Range    Platelet Count 272 150 - 450 10e3/uL   Magnesium   Result Value Ref Range    Magnesium 5.8 (H) 1.7 - 2.3 mg/dL   Platelet count   Result Value Ref Range    Platelet Count 236 150 - 450 10e3/uL   Magnesium   Result Value Ref Range    Magnesium 5.6 (H) 1.7 - 2.3 mg/dL   Hemoglobin   Result Value Ref Range    Hemoglobin 8.8 (L) 11.7 - 15.7 g/dL   Glucose by meter   Result Value Ref Range    GLUCOSE BY METER POCT 108 (H) 70 - 99 mg/dL     No imaging studies have been ordered    Mackenzie Neal Mai, MD

## 2024-06-23 NOTE — PROGRESS NOTES
S: Shift review; 0814-2530  B:Lidia is a , day 1 post primary  birth. DM2 mother  A: Stable post-op, incision is intact and dry, Lung sounds-clear, voiding spontaneously, independent with mobility, although not moving much. Lovanox started. BP continues elevated at times on Procardia dose. Magnesium infusion discontinued at 1000. Pain control achieved with p.o. pain meds. Handles baby with growing confidence.  present and supportive.   R: Continue with plan of care. Offer pain meds routinely.

## 2024-06-23 NOTE — ANESTHESIA POSTPROCEDURE EVALUATION
Patient: Lidia Gallegos    Procedure: Procedure(s):   SECTION       Anesthesia Type:  Epidural    Note:  Disposition: Inpatient   Postop Pain Control: Uneventful            Sign Out: Well controlled pain   PONV: No   Neuro/Psych: Uneventful            Sign Out: Acceptable/Baseline neuro status   Airway/Respiratory: Uneventful            Sign Out: Acceptable/Baseline resp. status   CV/Hemodynamics: Uneventful            Sign Out: Acceptable CV status   Other NRE: NONE   DID A NON-ROUTINE EVENT OCCUR? No    Event details/Postop Comments:  Pt was happy with her anesthesia care.  No complications.  I advised the pt she may have some soreness at the epidural site and this is normal.  However if the soreness continues over a week or if redness is noted around the site to let anesthesia know.  I will follow up with the pt if needed.     Epidural-to- Updated ASA: 2 Emergent      Last vitals:  Vitals Value Taken Time   /94 24 0645   Temp 97.2  F (36.2  C) 24 0555   Pulse 95 24 0649   Resp 35 24 0649   SpO2 94 % 24 0650   Vitals shown include unfiled device data.    Electronically Signed By: SOFYA Iraheta CRNA  2024  10:23 AM

## 2024-06-23 NOTE — PLAN OF CARE
Goal Outcome Evaluation:    Day 1 post primary  for fetal intolerance, complicated by diabetes and hypertension. Nifedipine dose was increased this afternoon. Magnesium sulfate was turned off this morning. No HA, no change of vision, no clonus (had clonus in AM), reflexes are +2. Edema bilateral feet/legs +2. Incision well approximated with liquid bandage, ice pack for comfort. Pain managed with scheduled Ibuprofen and Tylenol. Patient is feeling better, ambulated hallways twice in the past four hours. Had a BM, voiding spontaneously. Tolerating diet well. Is happy she is able to be up caring for baby. Birth Certificate was verified and sent down to registration. Plan is to discharge home tomorrow.

## 2024-06-23 NOTE — PLAN OF CARE
S: Shift review  B:Lidia is a , day 1 post  birth.  A: Stable post-op, incision is clean dry & intact with no drainage present, independent with mobility, pain control achieved with p.o. pain meds.   R: Continue with plan of care.

## 2024-06-24 LAB — GLUCOSE BLDC GLUCOMTR-MCNC: 160 MG/DL (ref 70–99)

## 2024-06-24 PROCEDURE — 250N000013 HC RX MED GY IP 250 OP 250 PS 637: Performed by: FAMILY MEDICINE

## 2024-06-24 PROCEDURE — 120N000013 HC R&B IMCU

## 2024-06-24 PROCEDURE — 99232 SBSQ HOSP IP/OBS MODERATE 35: CPT | Mod: 24 | Performed by: FAMILY MEDICINE

## 2024-06-24 PROCEDURE — 250N000011 HC RX IP 250 OP 636: Mod: JZ | Performed by: FAMILY MEDICINE

## 2024-06-24 RX ORDER — LABETALOL 100 MG/1
200 TABLET, FILM COATED ORAL EVERY 12 HOURS SCHEDULED
Status: DISCONTINUED | OUTPATIENT
Start: 2024-06-24 | End: 2024-06-25 | Stop reason: HOSPADM

## 2024-06-24 RX ORDER — LABETALOL 100 MG/1
100 TABLET, FILM COATED ORAL EVERY 12 HOURS SCHEDULED
Status: DISCONTINUED | OUTPATIENT
Start: 2024-06-24 | End: 2024-06-24

## 2024-06-24 RX ORDER — LABETALOL 100 MG/1
100 TABLET, FILM COATED ORAL ONCE
Status: COMPLETED | OUTPATIENT
Start: 2024-06-24 | End: 2024-06-24

## 2024-06-24 RX ADMIN — ACETAMINOPHEN 975 MG: 325 TABLET, FILM COATED ORAL at 04:46

## 2024-06-24 RX ADMIN — IBUPROFEN 800 MG: 800 TABLET, FILM COATED ORAL at 20:13

## 2024-06-24 RX ADMIN — ACETAMINOPHEN 975 MG: 325 TABLET, FILM COATED ORAL at 17:15

## 2024-06-24 RX ADMIN — PRENATAL VIT W/ FE FUMARATE-FA TAB 27-0.8 MG 1 TABLET: 27-0.8 TAB at 21:24

## 2024-06-24 RX ADMIN — ACETAMINOPHEN 975 MG: 325 TABLET, FILM COATED ORAL at 10:43

## 2024-06-24 RX ADMIN — ENOXAPARIN SODIUM 40 MG: 40 INJECTION SUBCUTANEOUS at 08:37

## 2024-06-24 RX ADMIN — SENNOSIDES AND DOCUSATE SODIUM 1 TABLET: 8.6; 5 TABLET ORAL at 08:41

## 2024-06-24 RX ADMIN — NIFEDIPINE 60 MG: 30 TABLET, EXTENDED RELEASE ORAL at 08:40

## 2024-06-24 RX ADMIN — LABETALOL HYDROCHLORIDE 100 MG: 100 TABLET, FILM COATED ORAL at 14:19

## 2024-06-24 RX ADMIN — IBUPROFEN 800 MG: 800 TABLET, FILM COATED ORAL at 02:13

## 2024-06-24 RX ADMIN — LABETALOL HYDROCHLORIDE 100 MG: 100 TABLET, FILM COATED ORAL at 10:44

## 2024-06-24 RX ADMIN — IBUPROFEN 800 MG: 800 TABLET, FILM COATED ORAL at 08:40

## 2024-06-24 RX ADMIN — FERROUS SULFATE TAB 325 MG (65 MG ELEMENTAL FE) 325 MG: 325 (65 FE) TAB at 08:41

## 2024-06-24 RX ADMIN — IBUPROFEN 800 MG: 800 TABLET, FILM COATED ORAL at 14:19

## 2024-06-24 RX ADMIN — ACETAMINOPHEN 975 MG: 325 TABLET, FILM COATED ORAL at 23:35

## 2024-06-24 RX ADMIN — LABETALOL HYDROCHLORIDE 200 MG: 100 TABLET, FILM COATED ORAL at 21:24

## 2024-06-24 RX ADMIN — ENOXAPARIN SODIUM 40 MG: 40 INJECTION SUBCUTANEOUS at 20:13

## 2024-06-24 ASSESSMENT — ACTIVITIES OF DAILY LIVING (ADL)
ADLS_ACUITY_SCORE: 18

## 2024-06-24 NOTE — PLAN OF CARE
S: Shift review  B:Lidia is a , day 2 post  birth.  A: Stable post-op, incision is dry and intact, had episode of loose stool during the night, independent with mobility, pain control achieved with p.o. pain meds. Continues with elevated blood pressures, reflexes are 2+, no clonus.  R: Continue with plan of care.

## 2024-06-25 VITALS
WEIGHT: 293 LBS | BODY MASS INDEX: 41.02 KG/M2 | DIASTOLIC BLOOD PRESSURE: 84 MMHG | HEIGHT: 71 IN | HEART RATE: 109 BPM | TEMPERATURE: 98.1 F | SYSTOLIC BLOOD PRESSURE: 137 MMHG | OXYGEN SATURATION: 97 % | RESPIRATION RATE: 16 BRPM

## 2024-06-25 PROBLEM — O14.10 PREECLAMPSIA, SEVERE: Status: ACTIVE | Noted: 2024-06-25

## 2024-06-25 LAB — GLUCOSE BLDC GLUCOMTR-MCNC: 93 MG/DL (ref 70–99)

## 2024-06-25 PROCEDURE — 250N000013 HC RX MED GY IP 250 OP 250 PS 637: Performed by: FAMILY MEDICINE

## 2024-06-25 PROCEDURE — 250N000011 HC RX IP 250 OP 636: Mod: JZ | Performed by: FAMILY MEDICINE

## 2024-06-25 RX ORDER — IBUPROFEN 800 MG/1
800 TABLET, FILM COATED ORAL EVERY 6 HOURS
Qty: 90 TABLET | Refills: 1 | Status: SHIPPED | OUTPATIENT
Start: 2024-06-25 | End: 2024-07-17

## 2024-06-25 RX ORDER — NALOXONE HYDROCHLORIDE 0.4 MG/ML
0.2 INJECTION, SOLUTION INTRAMUSCULAR; INTRAVENOUS; SUBCUTANEOUS
Status: DISCONTINUED | OUTPATIENT
Start: 2024-06-25 | End: 2024-06-25 | Stop reason: HOSPADM

## 2024-06-25 RX ORDER — FERROUS SULFATE 325(65) MG
325 TABLET ORAL DAILY
Qty: 14 TABLET | Refills: 0 | Status: ON HOLD | OUTPATIENT
Start: 2024-06-25 | End: 2024-07-08

## 2024-06-25 RX ORDER — NALOXONE HYDROCHLORIDE 0.4 MG/ML
0.4 INJECTION, SOLUTION INTRAMUSCULAR; INTRAVENOUS; SUBCUTANEOUS
Status: DISCONTINUED | OUTPATIENT
Start: 2024-06-25 | End: 2024-06-25 | Stop reason: HOSPADM

## 2024-06-25 RX ORDER — ACETAMINOPHEN 500 MG
1000 TABLET ORAL EVERY 6 HOURS PRN
Status: SHIPPED
Start: 2024-06-25

## 2024-06-25 RX ORDER — LABETALOL 200 MG/1
200 TABLET, FILM COATED ORAL EVERY 12 HOURS
Qty: 60 TABLET | Refills: 1 | Status: ON HOLD | OUTPATIENT
Start: 2024-06-25 | End: 2024-07-08

## 2024-06-25 RX ORDER — AMOXICILLIN 250 MG
1 CAPSULE ORAL 2 TIMES DAILY
Qty: 30 TABLET | Refills: 1 | Status: SHIPPED | OUTPATIENT
Start: 2024-06-25 | End: 2024-07-17

## 2024-06-25 RX ADMIN — LABETALOL HYDROCHLORIDE 200 MG: 100 TABLET, FILM COATED ORAL at 07:45

## 2024-06-25 RX ADMIN — IBUPROFEN 800 MG: 800 TABLET, FILM COATED ORAL at 02:00

## 2024-06-25 RX ADMIN — ACETAMINOPHEN 975 MG: 325 TABLET, FILM COATED ORAL at 05:06

## 2024-06-25 RX ADMIN — ACETAMINOPHEN 975 MG: 325 TABLET, FILM COATED ORAL at 10:55

## 2024-06-25 RX ADMIN — ENOXAPARIN SODIUM 40 MG: 40 INJECTION SUBCUTANEOUS at 07:49

## 2024-06-25 RX ADMIN — NIFEDIPINE 60 MG: 30 TABLET, EXTENDED RELEASE ORAL at 07:46

## 2024-06-25 RX ADMIN — IBUPROFEN 800 MG: 800 TABLET, FILM COATED ORAL at 07:44

## 2024-06-25 ASSESSMENT — ACTIVITIES OF DAILY LIVING (ADL)
ADLS_ACUITY_SCORE: 18

## 2024-06-25 NOTE — PROGRESS NOTES
S: Shift review  B:Lidia is a , day 4 post  birth.  A: Stable post-op, incision is clean, dry and open to air, Lung sounds-clear, bowel sounds active in all 4 quadrants, independent with mobility, pain control achieved with p.o. pain meds. Handles baby with confidence.  R: Continue with plan of care. Offer pain meds routinely.

## 2024-06-25 NOTE — PROGRESS NOTES
S: Discharge: Discharge  to home  &     B: Patient had a  delivery with complications of uncontrolled Type 2 diabetes & severe preeclampsia in pregnancy. Baby boy Baby's name Nasima, bottle:  Similac. Support person's name Robles.     A: Pt states understanding of post partum discharge instructions, meds, s/s of infection & depression, BP cuff use & when to follow up.    R: Post partum Discharge instructions reviewed and questions answered.  Belongings gathered and returned to the patient. Agreed to follow up tomorrow or sooner with any question or concerns.     Nursing Discharge Checklist:    Pneumovax screened and given, if appropriate: N/A  Influenza vaccine screened and given, if appropriate: N/A  Staples removed (): N/A  Breast milk returned: N/A  Hydrogel pads sent home:N/A  Birth Certificate Done: YES  Public Health Referral Made: N/A

## 2024-06-25 NOTE — PROGRESS NOTES
[facility] Post-Op / Progress Note         Assessment and Plan:    Assessment:   Post-operative day #2  Low transverse primary  section  L&D complications: Fetal intolerance to labor  Severe preeclampsia  Untreated diabetes mellitus, likely GDM A2.   Pre-pregnancy prediabetes  Morbid obesity  High functioning autism  Acute blood loss anemia      Doing well.  Normal healing wound.  No excessive bleeding  Pain well-controlled.  BP not yet normalized.       Plan:   Blood pressures continue to run high, 140s-150s/80s-90s. She was treated with magnesium sulfate for neuroprotection due to persistent severe range BP in labor, urine protein/creatinine ratio elevated early in labor.   Magnesium was stopped yesterday. She had been started on procardia XL 30 mg daily and it was increased to 60 mg daily. I added labetalol this am and monitored through the day. With no noticeable effect after the first 100 mg, I gave her an extra 100 mg later in the day with persistent elevated BP. Will keep her on 200 mg bid for now and monitor. If stable hopefully discharge in am with close supervision. Has follow up scheduled for Wednesday afternoon in clinic.     Her blood sugar has not been followed closed since delivery. She likely has GDM A2, prediabetic prior to pregnancy and very poorly controlled/ignored GDM during pregnancy. Will check one more glucose tomorrow am.     O negative, baby O negative. Rhogam not needed.     High functioning autism, doing well with self and baby cares,  very supportive. She denies diagnosis of diabetes and declined eval and treatment in pregnancy. Will need reeval 4-6 weeks postpartum or at 3 months at latest with A1C.   Recommend weight loss/healthy diet.     Hgb pre-labor was 11.0, down to 8.8 postpartum due to acute blood loss from surgery. Iron was started orally, continue on discharge.     She has been on lovenox postpartum due to high risk  (obesity/HTN/Diabetes). Will continue until discharge. Encourage Ambulation while here.     Breast feeding strategies discussed but she is mostly formula feeding. Encourage pumping as desired.     Anticipate discharge tomorrow           Interval History:   Doing well.  Pain is well-controlled.  No fevers.  No history of wound drainage, warmth or significant erythema.  Good appetite.  Denies chest pain, shortness of breath, nausea or vomiting. Is ambulatory but mostly in bed.           Significant Problems:   Blood pressures continue to run high, 140s-150s/80s-90s. She was treated with magnesium sulfate for neuroprotection due to persistent severe range BP in labor, protein/creatinine ratio was elevated on admission.   Magnesium was stopped yesterday. She had been started on procardia XL 30 mg daily and it was increased to 60 mg daily. I added labetalol this am and monitored through the day. With no noticeable effect after the first 100 mg, I gave her an extra 100 mg later in the day with persistent elevated BP. Will keep her on 200 mg bid for now and monitor.     Her blood sugar has not been followed closed since delivery. She likely has GDM A2, prediabetic prior to pregnancy and very poorly controlled/ignored GDM during pregnancy. Will check one more glucose tomorrow am.     O negative, baby O negative. Rhogam not needed.     High functioning autism, doing well with self and baby cares,  very supportive. She denies diagnosis of diabetes and declined eval and treatment in pregnancy. Will need reeval 4-6 weeks postpartum or at 3 months at latest with A1C.   Recommend weight loss/healthy diet.     Hgb pre-labor was 11.0, down to 8.8 postpartum due to acute blood loss from surgery.           Review of Systems:   As above         Medications:   All medications related to the patient's surgery have been reviewed  Current Facility-Administered Medications   Medication Dose Route Frequency Provider Last Rate Last  "Admin    acetaminophen (TYLENOL) tablet 975 mg  975 mg Oral Q6H Mackenzie Dee MD   975 mg at 06/24/24 1715    acetaminophen (TYLENOL) tablet 975 mg  975 mg Oral Q4H PRN Mackenzie Dee MD   975 mg at 06/21/24 1642    bisacodyl (DULCOLAX) suppository 10 mg  10 mg Rectal Daily PRN Mackenzie Dee MD        BUPivacaine liposome (EXPAREL) LA inj was given in the infiltration site to produce post-op analgesia. Duration of action is up to 72 hours. Other \"marlon\" meds should not be given for 96 hours except for lidocaine 4% patch. This is for INFORMATION ONLY.   Does not apply Continuous PRN Carter Martel APRN CRNA        calcium gluconate 10 % injection 1 g  1 g Intravenous Once PRN Karen Bangura MD        carboprost (HEMABATE) injection 250 mcg  250 mcg Intramuscular Q15 Min PRN Mackenzie Dee MD        And    loperamide (IMODIUM) capsule 4 mg  4 mg Oral Once PRN Mackenzie Dee MD        dextrose 10% BOLUS 150 mL  150 mL Intravenous Q15 Min PRN Karen Bangura MD        Or    glucose gel 15-30 g  15-30 g Oral Q15 Min PRN Karen Bangura MD        Or    dextrose 50 % injection 25-50 mL  25-50 mL Intravenous Q15 Min PRN Karen Bangura MD        Or    glucagon injection 1 mg  1 mg Subcutaneous Q15 Min PRN Karen Bangura MD        dextrose 10% infusion   Intravenous Continuous PRN Karen Bangura MD   Stopped at 06/22/24 0635    dextrose 5% in lactated ringers infusion   Intravenous Continuous Mackenzie Dee MD        glucose gel 15-30 g  15-30 g Oral Q15 Min PRN Mackenzie Dee MD        Or    dextrose 50 % injection 25-50 mL  25-50 mL Intravenous Q15 Min PRN Mackenzie Dee MD        Or    glucagon injection 1 mg  1 mg Subcutaneous Q15 Min PRN Mackenzie Dee MD        enoxaparin ANTICOAGULANT (LOVENOX) injection 40 mg  40 mg Subcutaneous Q12H Mackenzie Dee MD   40 mg at 06/24/24 2013    ferrous sulfate (FEROSUL) tablet 325 mg  325 mg Oral Daily Mackenzie Dee MD   325 mg at 06/24/24 0841    hydrALAZINE (APRESOLINE) " injection 10 mg  10 mg Intravenous Q20 Min PRN Karen Bangura MD   10 mg at 06/22/24 0038    hydrocortisone (Perianal) (ANUSOL-HC) 2.5 % cream   Rectal TID PRN Mackenzie Dee MD        ibuprofen (ADVIL/MOTRIN) tablet 800 mg  800 mg Oral Q6H Mackenzie Dee MD   800 mg at 06/24/24 2013    insulin regular (MYXREDLIN) 1 unit/mL infusion   Intravenous Continuous PRN Karen Bangura MD   Stopped at 06/22/24 0635    labetalol (NORMODYNE) tablet 200 mg  200 mg Oral Q12H DANYELLE (08/20) Kim Armando MD        labetalol (NORMODYNE/TRANDATE) injection 20 mg  20 mg Intravenous Q2H PRN Karen Bangura MD   20 mg at 06/21/24 1617    labetalol (NORMODYNE/TRANDATE) injection 20-40 mg  20-40 mg Intravenous Q10 Min PRN Karen Bangura MD   80 mg at 06/21/24 2152    lactated ringers infusion   mL/hr Intravenous Continuous Karen Bangura MD   Stopped at 06/23/24 0958    lanolin cream   Topical Q1H PRN Mackenzie Dee MD        lidocaine (LMX4) kit   Topical Q1H PRN Mackenzie Dee MD        lidocaine 1 % 0.1-1 mL  0.1-1 mL Other Q1H PRN Mackenzie Dee MD        lidocaine 1 % 0.1-1 mL  0.1-1 mL Other Q1H PRN Karen Bangura MD        lidocaine 1 % 0.1-20 mL  0.1-20 mL Subcutaneous Once PRN Mackenzie Dee MD        loperamide (IMODIUM) capsule 2 mg  2 mg Oral Q2H PRN Mackenzie Dee MD        magnesium sulfate 2 g in 50 mL sterile water intermittent infusion  2 g Intravenous Once PRN Karen Bangura MD        magnesium sulfate 4 g in 100 mL sterile water intermittent infusion  4 g Intravenous Once PRN Karen Bangura MD        methylergonovine (METHERGINE) injection 200 mcg  200 mcg Intramuscular Q2H PRN Mackenzie Dee MD        metoclopramide (REGLAN) injection 10 mg  10 mg Intravenous Q6H PRN Mackenzie Dee MD        Or    metoclopramide (REGLAN) tablet 10 mg  10 mg Oral Q6H PRN Mackenzie Dee MD        midazolam (VERSED) injection 2 mg  2 mg Intravenous Q5 Min PRN Karen Bangura MD        misoprostol  (CYTOTEC) tablet 400 mcg  400 mcg Oral ONCE PRN REPEAT PER INSTRUCTIONS Mackenzie Dee MD        Or    misoprostol (CYTOTEC) suppository 800 mcg  800 mcg Rectal ONCE PRN REPEAT PER INSTRUCTIONS Mackenzie Dee MD        nalbuphine (NUBAIN) injection 2.5-5 mg  2.5-5 mg Intravenous Q6H PRN Carter Martel APRN CRNA        NIFEdipine (PROCARDIA) capsule 10-20 mg  10-20 mg Oral Q20 Min PRN Karen Bangura MD   20 mg at 06/21/24 0941    NIFEdipine ER OSMOTIC (PROCARDIA XL) 24 hr tablet 60 mg  60 mg Oral Daily Mackenzie Dee MD   60 mg at 06/24/24 0840    No MMR Needed -  Assessment: Patient does not need MMR vaccine   Does not apply Continuous PRN Mackenzie Dee MD        No Tdap Needed - Assessment: Patient does not need Tdap vaccine   Does not apply Continuous PRN Mackenzie Dee MD        ondansetron (ZOFRAN ODT) ODT tab 4 mg  4 mg Oral Q6H PRN Mackenzie Dee MD        Or    ondansetron (ZOFRAN) injection 4 mg  4 mg Intravenous Q6H PRN Mackenzie Dee MD        oxyCODONE (ROXICODONE) tablet 5 mg  5 mg Oral Q4H PRN Mackenzie Dee MD        oxytocin (PITOCIN) 30 units in 500 mL 0.9% NaCl infusion  100-340 mL/hr Intravenous Continuous PRN Mackenzie Dee MD        oxytocin (PITOCIN) 30 units in 500 mL 0.9% NaCl infusion  340 mL/hr Intravenous Continuous PRN Mackenzie Dee MD        oxytocin (PITOCIN) 30 units in 500 mL 0.9% NaCl infusion  100-340 mL/hr Intravenous Continuous PRN Mackenzie Dee MD   Stopped at 06/22/24 0648    oxytocin (PITOCIN) injection 10 Units  10 Units Intramuscular Once PRN Mackenzie Dee MD        oxytocin (PITOCIN) injection 10 Units  10 Units Intramuscular Once PRN Mackenzie Dee MD        oxytocin (PITOCIN) injection 10 Units  10 Units Intramuscular Once PRN Mackenzie Dee MD        oxytocin (PITOCIN) injection    PRN Mackenzie Dee MD   10 Units at 06/22/24 0500    prenatal multivitamin w/iron per tablet 1 tablet  1 tablet Oral Daily Mackenzie Dee MD   1 tablet at 06/23/24 2000    prochlorperazine (COMPAZINE)  injection 10 mg  10 mg Intravenous Q6H PRN Mackenzie Dee MD        Or    prochlorperazine (COMPAZINE) tablet 10 mg  10 mg Oral Q6H PRN Mackenzie Dee MD        Or    prochlorperazine (COMPAZINE) suppository 25 mg  25 mg Rectal Q12H PRN Mackenzie Dee MD        senna-docusate (SENOKOT-S/PERICOLACE) 8.6-50 MG per tablet 1 tablet  1 tablet Oral BID Mackenzie Dee MD   1 tablet at 06/24/24 0841    Or    senna-docusate (SENOKOT-S/PERICOLACE) 8.6-50 MG per tablet 2 tablet  2 tablet Oral BID Mackenzie Dee MD   2 tablet at 06/23/24 0859    simethicone (MYLICON) chewable tablet 80 mg  80 mg Oral 4x Daily PRN Mackenzie Dee MD        sodium chloride 0.9 % infusion   Intravenous Continuous PRN Karen Bangura MD   Stopped at 06/21/24 1942    sodium phosphate (FLEET ENEMA) 1 enema  1 enema Rectal Daily PRN Mackenzie Dee MD        tranexamic acid 1 g in 100 mL NS IV bag (premix)  1 g Intravenous Q30 Min PRN Mackenzie Dee MD                 Physical Exam:   All vitals stable  Patient Vitals for the past 12 hrs:   BP Temp Temp src Pulse Resp Weight   06/24/24 1955 (!) 141/91 -- -- 102 -- --   06/24/24 1715 (!) 147/84 -- -- 96 -- --   06/24/24 1545 (!) 149/89 98.3  F (36.8  C) Oral 94 18 137.1 kg (302 lb 3.2 oz)   06/24/24 1421 (!) 150/93 -- -- -- -- --   06/24/24 1144 (!) 147/100 -- -- 91 -- --   06/24/24 0947 (!) 148/90 -- -- 101 -- --     Vitals noted.  Patient alert, oriented, and in no acute distress.   Neck:  Supple without lymphadenopathy, JVD or masses.   CV:  RRR without murmur.   Respiratory:  Lungs clear to auscultation bilaterally.   Abdomen:  Soft, tender with palpation of the uterine fundus which is firm and at the level of the umbilicus, nondistended with good bowel sounds and no masses or hepatosplenomegaly.  Incision is clean, dry and intact without drainage, bleeding, dehiscence or erythema.   Extremities with trace edema.           Data:   All laboratory data related to this surgery reviewed    Kim Lino  MD Tr

## 2024-06-25 NOTE — PLAN OF CARE
Goal Outcome Evaluation: Pt excited to be discharged to home, stating understanding of post partum education & demonstration given on BP cuff use.

## 2024-06-25 NOTE — PLAN OF CARE
Goal Outcome Evaluation:    Shift note    2 day postpartum. Primary  delivery with hypertensive disorder    Blood pressure continues to be elevated at 140-150/80-90. Reflexes WNL, no clonus. Patient denies headache, vision changes, or abdominal pain. FF with light rubra lochia, no clots. Incision dry and intact, using binder for support. Up and about without difficulty, voiding spontaneously.  Independent with self and  cares. Edema 2+ dependent, weight down since yesterday. Pain denied, tylenol and ibuprofen given scheduled. Appropriate bonding with , independent with cares, asking appropriate questions.     Continue with normal  postpartum assessments and pathway. Offer assistance as needed with cares and feedings. Plan for discharge on 24. Report given to receiving RN, Vee

## 2024-06-25 NOTE — DISCHARGE INSTRUCTIONS
[facility] Discharge Instructions     Discharge disposition:  Discharged to home       Diet:  Diabetic (1800 ADA)       Activity Lifting restricted to 20 pounds for the first 2 wks, then increase by 10 pounds every week thereafter.  No tub soaks for >15 minutes the first 2 wks then as desired.  No driving for 2 week(s).  No sex for 6 week(s).        Follow-up: Follow up with Dr. Mackenzie Dee on 24 at 4:00 pm. Please arrive at 3:40 to check in       Additional instructions: [facility] staff is available 24 hours 7 days for questions about you or your baby.  Please don't hesitate to call with any concerns.        Additional Patient Information:  Enjoy beautiful little Mack!     Section: What to Expect at Home  Your Recovery     A  section, or , is surgery to deliver your baby through a cut that the doctor makes in your lower belly and uterus. The cut is called an incision.  You may have some pain in your lower belly and need pain medicine for 1 to 2 weeks. You can expect some vaginal bleeding for several weeks. You will probably need about 6 weeks to fully recover.  It's important to take it easy while the incision heals. Avoid heavy lifting, strenuous activities, and exercises that strain the belly muscles while you recover. Ask a family member or friend for help with housework, cooking, and shopping.  This care sheet gives you a general idea about how long it will take for you to recover. But each person recovers at a different pace. Follow the steps below to get better as quickly as possible.  How can you care for yourself at home?  Activity    Rest when you feel tired. Getting enough sleep will help you recover.     Try to walk each day. Start by walking a little more than you did the day before. Bit by bit, increase the amount you walk. Walking boosts blood flow and helps prevent pneumonia, constipation, and blood clots.     Avoid strenuous  activities, such as bicycle riding, jogging, weightlifting, and aerobic exercise, for 6 weeks or until your doctor says it is okay.     Until your doctor says it is okay, do not lift anything heavier than your baby.     Do not do sit-ups or other exercises that strain the belly muscles for 6 weeks or until your doctor says it is okay.     Hold a pillow over your incision when you cough or take deep breaths. This will support your belly and decrease your pain.     You may shower as usual. Pat the incision dry when you are done.     You will have some vaginal bleeding. Wear sanitary pads. Do not douche or use tampons until your doctor says it is okay.     Ask your doctor when you can drive again.     You will probably need to take at least 6 weeks off work. It depends on the type of work you do and how you feel.     Ask your doctor when it is okay for you to have sex.   Diet    You can eat your normal diet. If your stomach is upset, try bland, low-fat foods like plain rice, broiled chicken, toast, and yogurt.     Drink plenty of fluids (unless your doctor tells you not to).     You may notice that your bowel movements are not regular right after your surgery. This is common. Try to avoid constipation and straining with bowel movements. You may want to take a fiber supplement every day. If you have not had a bowel movement after a couple of days, ask your doctor about taking a mild laxative.     If you are breastfeeding, limit alcohol. Alcohol can cause a lack of energy and other health problems for the baby when a breastfeeding woman drinks heavily. It can also get in the way of a mom's ability to feed her baby or to care for the child in other ways. There isn't a lot of research about exactly how much alcohol can harm a baby. Having no alcohol is the safest choice for your baby. If you choose to have a drink now and then, have only one drink, and limit the number of occasions that you have a drink. Wait to breastfeed  at least 2 hours after you have a drink to reduce the amount of alcohol the baby may get in the milk.   Medicines    Your doctor will tell you if and when you can restart your medicines. You will also get instructions about taking any new medicines.     If you stopped taking aspirin or some other blood thinner, your doctor will tell you when to start taking it again.     Take pain medicines exactly as directed.  If the doctor gave you a prescription medicine for pain, take it as prescribed.  If you are not taking a prescription pain medicine, ask your doctor if you can take an over-the-counter medicine.     If you think your pain medicine is making you sick to your stomach:  Take your medicine after meals (unless your doctor has told you not to).  Ask your doctor for a different pain medicine.     If your doctor prescribed antibiotics, take them as directed. Do not stop taking them just because you feel better. You need to take the full course of antibiotics.   Incision care    If you have strips of tape on the incision, leave the tape on for a week or until it falls off.     Wash the area daily with warm, soapy water, and pat it dry. Don't use hydrogen peroxide or alcohol, which can slow healing. You may cover the area with a gauze bandage if it weeps or rubs against clothing. Change the bandage every day.     Keep the area clean and dry.   Other instructions    If you breastfeed your baby, you may be more comfortable while you are healing if you don't rest your baby on your belly. Try tucking your baby under your arm, with your baby's body along the side you will be feeding on. Support your baby's upper body with your arm. With that hand you can control your baby's head to bring your baby's mouth to your breast. This is sometimes called the football hold.   Follow-up care is a key part of your treatment and safety. Be sure to make and go to all appointments, and call your doctor if you are having problems. It's  also a good idea to know your test results and keep a list of the medicines you take.  When should you call for help?  Share this information with your partner, family, or a friend. They can help you watch for warning signs.  Call 911  anytime you think you may need emergency care. For example, call if:    You feel you cannot stop from hurting yourself, your baby, or someone else.     You passed out (lost consciousness).     You have chest pain, are short of breath, or cough up blood.     You have a seizure.   Where to get help 24 hours a day, 7 days a week   If you or someone you know talks about suicide, self-harm, a mental health crisis, a substance use crisis, or any other kind of emotional distress, get help right away. You can:    Call the Suicide and Crisis Lifeline at 988.     Call 6-562-862-TALK (1-398.109.4673).     Text HOME to 345054 to access the Crisis Text Line.   Consider saving these numbers in your phone.  Go to [employer] for more information or to chat online.  Call your doctor or midwife now or seek immediate medical care if:    You have loose stitches, or your incision comes open.     You have signs of hemorrhage (too much bleeding), such as:  Heavy vaginal bleeding. This means that you are soaking through one or more pads in an hour. Or you pass blood clots bigger than an egg.  Feeling dizzy or lightheaded, or you feel like you may faint.  Feeling so tired or weak that you cannot do your usual activities.  A fast or irregular heartbeat.  New or worse belly pain.     You have symptoms of infection, such as:  Increased pain, swelling, warmth, or redness.  Red streaks leading from the incision.  Pus draining from the incision.  A fever.  Frequent or painful urination or blood in your urine.  Vaginal discharge that smells bad.  New or worse belly pain.     You have symptoms of a blood clot in your leg (called a deep vein thrombosis), such as:  Pain in the calf, back of the knee, thigh, or  "groin.  Swelling in the leg or groin.  A color change on the leg or groin. The skin may be reddish or purplish, depending on your usual skin color.     You have signs of preeclampsia, such as:  Sudden swelling of your face, hands, or feet.  New vision problems (such as dimness, blurring, or seeing spots).  A severe headache.     You have signs of heart failure, such as:  New or increased shortness of breath.  New or worse swelling in your legs, ankles, or feet.  Sudden weight gain, such as more than 2 to 3 pounds in a day or 5 pounds in a week.  Feeling so tired or weak that you cannot do your usual activities.     You had spinal or epidural pain relief and have:  New or worse back pain.  Increased pain, swelling, warmth, or redness at the injection site.  Tingling, weakness, or numbness in your legs or groin.   Watch closely for changes in your health, and be sure to contact your doctor or midwife if:    Your vaginal bleeding isn't decreasing.     You feel sad, anxious, or hopeless for more than a few days.     You are having problems with your breasts or breastfeeding.   Where can you learn more?  Go to https://www.[employer]/patiented  Enter M806 in the search box to learn more about \" Section: What to Expect at Home.\"  Current as of: July 10, 2023               Content Version: 14.0    3252-9687 [employer].   Care instructions adapted under license by your healthcare professional. If you have questions about a medical condition or this instruction, always ask your healthcare professional. [employer] disclaims any warranty or liability for your use of this information.    Gestational Diabetes Diet: Care Instructions  Overview     Gestational diabetes is a form of diabetes that can happen during pregnancy. It usually goes away after the baby is born. Gestational diabetes occurs when your body does not use insulin properly. Insulin helps sugar enter your cells, where it is " used for energy.  You may be able to manage your blood sugar while you are pregnant by eating a healthy diet and getting regular exercise. A dietitian or diabetes educator can help you make a food plan. This plan will help manage your blood sugar and provide good nutrition for you and your baby.  If diet and exercise don't help keep your blood sugar in target range, you may need diabetes medicine or insulin.  Follow-up care is a key part of your treatment and safety. Be sure to make and go to all appointments, and call your doctor if you are having problems. It's also a good idea to know your test results and keep a list of the medicines you take.  How can you care for yourself at home?  Learn which foods have carbohydrate. Eating too much carbohydrate will cause your blood sugar to go too high. Carbohydrate foods include:  Breads, cereals, pasta, and rice.  Dried beans and starchy vegetables, like corn, peas, and potatoes.  Fruits and fruit juice, milk, and yogurt.  Candy, table sugar, soda pop, and drinks sweetened with sugar.  Learn how much carbohydrate you need at meals and snacks. A dietitian or diabetes educator can teach you how to keep track of how much carbohydrate you eat.  Limit foods that have added sugar. This includes candy, desserts, and soda pop. These foods need to be counted as part of your total carbohydrate intake for the day.  Do not drink alcohol. Alcohol is not safe for you or your baby.  Do not skip meals. Your blood sugar may drop too low if you skip meals and use insulin.  Write down what you eat every day. Review your record with your dietitian or diabetes educator to see if you are eating the right amounts of foods.  Check your blood sugar first thing in the morning before you eat. Then check your blood sugar 1 to 2 hours after the first bite of each meal (or as your doctor recommends). This will help you see how the food you eat affects your blood sugar. Keep track of these levels.  "Share the record with your doctor.  When should you call for help?  Watch closely for changes in your health, and be sure to contact your doctor if:    You have questions about your diet.     You often have problems with high or low blood sugar.   Where can you learn more?  Go to https://www.[employer].net/patiented  Enter M291 in the search box to learn more about \"Gestational Diabetes Diet: Care Instructions.\"  Current as of: October 24, 2023               Content Version: 14.0    7725-0798 [employer].   Care instructions adapted under license by your healthcare professional. If you have questions about a medical condition or this instruction, always ask your healthcare professional. [employer] disclaims any warranty or liability for your use of this information.      "

## 2024-06-26 ENCOUNTER — OFFICE VISIT (OUTPATIENT)
Dept: FAMILY MEDICINE | Facility: CLINIC | Age: 29
End: 2024-06-26
Payer: COMMERCIAL

## 2024-06-26 ENCOUNTER — MEDICAL CORRESPONDENCE (OUTPATIENT)
Dept: HEALTH INFORMATION MANAGEMENT | Facility: CLINIC | Age: 29
End: 2024-06-26

## 2024-06-26 ENCOUNTER — PATIENT OUTREACH (OUTPATIENT)
Dept: CARE COORDINATION | Facility: CLINIC | Age: 29
End: 2024-06-26

## 2024-06-26 VITALS
SYSTOLIC BLOOD PRESSURE: 138 MMHG | BODY MASS INDEX: 40.54 KG/M2 | HEART RATE: 94 BPM | HEIGHT: 71 IN | RESPIRATION RATE: 20 BRPM | DIASTOLIC BLOOD PRESSURE: 84 MMHG | OXYGEN SATURATION: 98 % | WEIGHT: 289.6 LBS | TEMPERATURE: 98.6 F

## 2024-06-26 DIAGNOSIS — E11.65 TYPE 2 DIABETES MELLITUS WITH HYPERGLYCEMIA, WITHOUT LONG-TERM CURRENT USE OF INSULIN (H): ICD-10-CM

## 2024-06-26 DIAGNOSIS — F33.1 MAJOR DEPRESSIVE DISORDER, RECURRENT EPISODE, MODERATE (H): ICD-10-CM

## 2024-06-26 DIAGNOSIS — D62 ANEMIA DUE TO BLOOD LOSS, ACUTE: ICD-10-CM

## 2024-06-26 DIAGNOSIS — O14.10 SEVERE PRE-ECLAMPSIA, ANTEPARTUM: ICD-10-CM

## 2024-06-26 DIAGNOSIS — Z09 HOSPITAL DISCHARGE FOLLOW-UP: Primary | ICD-10-CM

## 2024-06-26 DIAGNOSIS — Z98.891 S/P PRIMARY LOW TRANSVERSE C-SECTION: ICD-10-CM

## 2024-06-26 PROCEDURE — 99496 TRANSJ CARE MGMT HIGH F2F 7D: CPT | Performed by: FAMILY MEDICINE

## 2024-06-26 ASSESSMENT — PAIN SCALES - GENERAL: PAINLEVEL: NO PAIN (0)

## 2024-06-26 NOTE — PROGRESS NOTES
[facility]    Background: Transitional Care Management program identified per system criteria and reviewed by [facility] team for possible outreach.    Assessment: Upon chart review, [facility] Team member will not proceed with patient outreach related to this episode of Transitional Care Management program due to reason below:    Patient has a follow up appointment with an appropriate provider today for hospital discharge    Plan: Transitional Care Management episode addressed appropriately per reason noted above.      LUCRECIA Guzmán  [facility] [place]    *Connected Care Resource Team does NOT follow patient ongoing. Referrals are identified based on internal discharge reports and the outreach is to ensure patient has an understanding of their discharge instructions.

## 2024-06-26 NOTE — PROGRESS NOTES
Assessment & Plan       ICD-10-CM    1. Hospital discharge follow-up  Z09       2. S/P primary low transverse   Z98.891       3. Anemia due to blood loss, acute  D62       4. Severe pre-eclampsia, antepartum  O14.10       5. Type 2 diabetes mellitus with hyperglycemia, without long-term current use of insulin (H)  E11.65       6. Major depressive disorder, recurrent episode, moderate (H)  F33.1          Reviewed the medical record from L&D.  She had an emergent low transverse  section secondary to failure to progress and fetal intolerance.  Pregnancy was complicated with uncontrolled diabetes and morbid obesity.  She developed preeclampsia with severe features during intrapartum care - was on magnesium infusion and required oral antihypertensive agent.  She was also on insulin drip for diabetes.  She had an eventful surgery except of excessive blood loss.  Her hemoglobin dropped from 14.0 admission to 8.8 at the time of discharge.  She was sent home with Procardia and metoprolol which controlled her blood pressure well.    Overall she is doing well.  Pain is well-controlled with Tylenol or Motrin, not taking oxycodone.  Bleeding is minimal.  Blood pressure at home has been stable in the range of 130s/80s and her blood pressure is normal today.  Negative preeclamptic symptoms.  Not checking her blood sugar at home; not on medication for diabetes by choice.  Hemoglobin A1c before delivery was 8.3. She is not symptomatic for anemia.    No concern of her mental health.  Great support from .  No suicidal homicidal ideation.  No hallucination.  Enjoying her , denied of being depressed or having anxiety problem.  She has no concern about her safety or her ability to care for self and her .    Continued wound care - to be clean and dry.  Symptoms that need to be seen or call in discussed.  Continue with the prenatal vitamin and iron sulfate.    Encouraged to continue monitoring her  "blood pressure.  Continue with Procardia and the labetalol at the current doses.  Discussed with her about hypotensive symptoms.  Informed that likely will be able to taper off slowly in the next couple months.  She should let me know if her blood pressures are persistently above 140/90 or below 110/70.  Preeclamptic symptoms also discussed.  She was specifically instructed to be seen immediately if develop preeclamptic symptoms with blood pressure above 140/90 or if her blood pressure is at or above 160/110 x 2 about 20 minutes apart, even with no symptoms.    Healthy diet discussed and encouraged.  Lifting restriction was reminded due to recent surgery.  Encouraged to monitor blood sugar closely, let me know if it persistently above 150 fasting.  He continued declined medication for diabetes as she does not believe that she has diabetes.    Symptoms the need to be seen to call and also discussed.  Instructed to call 911 or go to the emergency room if develop suicidal homicidal thoughts, plan or intention.      MED REC REQUIRED  Post Medication Reconciliation Status: discharge medications reconciled, continue medications without change  BMI  Estimated body mass index is 40.39 kg/m  as calculated from the following:    Height as of this encounter: 1.803 m (5' 11\").    Weight as of this encounter: 131.4 kg (289 lb 9.6 oz).   Weight management plan: Healthy diet discussed and encouraged        Libia Murillo is a 29 year old, presenting for the following health issues:  Hypertension and  followup        2024     3:43 PM   Additional Questions   Roomed by Suze BENOIT   Accompanied by Son and      History of Present Illness       Reason for visit:  Labor and delivery    She eats 2-3 servings of fruits and vegetables daily.She consumes 0 sweetened beverage(s) daily.She exercises with enough effort to increase her heart rate 30 to 60 minutes per day.  She exercises with enough effort to increase " her heart rate 4 days per week.   She is taking medications regularly.           Lidia is here today with her  for hospital follow-up.  She had an emergent low transverse  section secondary to failure to progress and fetal intolerance.  Pregnancy was complicated with uncontrolled diabetes and morbid obesity therefore she was induced at 38 weeks.  She developed preeclampsia with severe features during intrapartum care - was on magnesium infusion and required oral antihypertensive agent.  She was also on insulin drip for diabetes which control her blood sugar during the intrapartum care.  Her blood sugar was normal after delivery with low-carb diet.  She had an eventful surgery except of excessive blood loss.  Her hemoglobin dropped from 14.0 admission to 8.8 at the time of discharge.  Been taking the prenatal vitamin and iron sulfate.  She is not symptomatic for her anemia.  Her blood pressure was controlled Procardia and metoprolol and therefore she was sent home with them.  Been taking the medication prescribed.  Stated her blood pressure at home has been in the range of 130s/80s.  No headache or dizziness.  No acute visual change.  No chest pain or shortness of breath.  No leg swelling or upper abdominal pain. Pain is well-controlled with Tylenol or Motrin, not taking oxycodone.  Bleeding is minimal. Not checking her blood sugar at home; not on medication for diabetes by choice.  Hemoglobin A1c before delivery was 8.3.    No concern of her mental health.  Great support from .  No suicidal or homicidal ideation.  No hallucination.  Enjoying her , denied of being depressed or having anxiety problem.  She has no concern about her safety or her ability to care for self and her .      Review of Systems  Constitutional, HEENT, cardiovascular, pulmonary, gi and gu systems are negative, except as otherwise noted.      Objective    /84   Pulse 94   Temp 98.6  F (37  C)  "(Temporal)   Resp 20   Ht 1.803 m (5' 11\")   Wt 131.4 kg (289 lb 9.6 oz)   LMP 10/02/2023   SpO2 98%   Breastfeeding Yes   BMI 40.39 kg/m    Body mass index is 40.39 kg/m .  Physical Exam   GENERAL: alert and no distress, speaking in full sentences.  RESP: lungs clear to auscultation - no rales, rhonchi or wheezes  CV: regular rate and rhythm, no murmur, no peripheral edema  ABDOMEN: soft, nondistended, nontender, no palpable masses or organomegaly with normal sounds.  No CVA tenderness.  No tender with palpation to the upper right quadrant area.  Fundus is firm, about 3 cm below the umbilicus.  Nontender will place into the fundus.  MS: no gross musculoskeletal defects noted, no edema no focal weakness..  SKIN: no suspicious lesions or rashes.  No petechia or ecchymosis.  Lower abdominal incision is dry, clean, healing as expected.  No warmth to the touch.  Appropriate tenderness with rising to the touch.  NEURO: Normal strength and tone, mentation intact and speech normal.  No focal neurological deficit.  PSYCH: mentation appears normal, affect normal/bright.  Thought was intact, no suicidal homicidal ideation.  No hallucination.    No results found for any visits on 06/26/24.        Signed Electronically by: Mackenzie Neal Mai, MD    "

## 2024-06-30 NOTE — PROGRESS NOTES
Lidia Singhse  Gender: female  : 1995  [address]  [facility] [address]  [phone] (home)   Medical Record: [id number]  Primary Care Provider: Deion Price       [facility]   ?   Discharge Phone Call: Key Words/Key Times     How are you and the baby? We are doing well. Clinic was concerned about bili but it is improving and we are doing well    How are feedings going? Well, currently formula feeding but trying to breastfeed and started getting some flow. Reminded to reach out if she needs help breastfeeding, could see darío after a clinic visit    Voiding & Stooling? Oh yes! lots    Any questions or concerns? none    Follow-up appointment? Have had many      We want to provide excellent care here at [facility]. Do you have any feedback for us that would help us improve? No, our visit was good. We loved you guys       @ 1:45pm YADIRA Horne RN

## 2024-07-06 ENCOUNTER — NURSE TRIAGE (OUTPATIENT)
Dept: NURSING | Facility: CLINIC | Age: 29
End: 2024-07-06
Payer: COMMERCIAL

## 2024-07-06 ENCOUNTER — MYC MEDICAL ADVICE (OUTPATIENT)
Dept: FAMILY MEDICINE | Facility: CLINIC | Age: 29
End: 2024-07-06
Payer: COMMERCIAL

## 2024-07-06 ENCOUNTER — HOSPITAL ENCOUNTER (EMERGENCY)
Facility: CLINIC | Age: 29
Discharge: HOME | End: 2024-07-06
Attending: STUDENT IN AN ORGANIZED HEALTH CARE EDUCATION/TRAINING PROGRAM | Admitting: STUDENT IN AN ORGANIZED HEALTH CARE EDUCATION/TRAINING PROGRAM
Payer: COMMERCIAL

## 2024-07-06 VITALS
HEART RATE: 82 BPM | BODY MASS INDEX: 40.39 KG/M2 | OXYGEN SATURATION: 100 % | DIASTOLIC BLOOD PRESSURE: 92 MMHG | TEMPERATURE: 98.3 F | RESPIRATION RATE: 20 BRPM | HEIGHT: 71 IN | SYSTOLIC BLOOD PRESSURE: 131 MMHG

## 2024-07-06 DIAGNOSIS — K64.5 THROMBOSED EXTERNAL HEMORRHOIDS: ICD-10-CM

## 2024-07-06 PROCEDURE — 250N000013 HC RX MED GY IP 250 OP 250 PS 637: Performed by: STUDENT IN AN ORGANIZED HEALTH CARE EDUCATION/TRAINING PROGRAM

## 2024-07-06 PROCEDURE — 46083 INC THROMBOSED HROID XTRNL: CPT | Performed by: STUDENT IN AN ORGANIZED HEALTH CARE EDUCATION/TRAINING PROGRAM

## 2024-07-06 PROCEDURE — 99283 EMERGENCY DEPT VISIT LOW MDM: CPT | Mod: 25 | Performed by: STUDENT IN AN ORGANIZED HEALTH CARE EDUCATION/TRAINING PROGRAM

## 2024-07-06 RX ORDER — HYDROCODONE BITARTRATE AND ACETAMINOPHEN 5; 325 MG/1; MG/1
2 TABLET ORAL ONCE
Status: COMPLETED | OUTPATIENT
Start: 2024-07-06 | End: 2024-07-06

## 2024-07-06 RX ADMIN — HYDROCODONE BITARTRATE AND ACETAMINOPHEN 2 TABLET: 5; 325 TABLET ORAL at 13:39

## 2024-07-06 ASSESSMENT — COLUMBIA-SUICIDE SEVERITY RATING SCALE - C-SSRS
6. HAVE YOU EVER DONE ANYTHING, STARTED TO DO ANYTHING, OR PREPARED TO DO ANYTHING TO END YOUR LIFE?: NO
2. HAVE YOU ACTUALLY HAD ANY THOUGHTS OF KILLING YOURSELF IN THE PAST MONTH?: NO
1. IN THE PAST MONTH, HAVE YOU WISHED YOU WERE DEAD OR WISHED YOU COULD GO TO SLEEP AND NOT WAKE UP?: NO

## 2024-07-06 ASSESSMENT — ENCOUNTER SYMPTOMS
SHORTNESS OF BREATH: 0
ROS GI COMMENTS: ANAL PAIN
COUGH: 0
ABDOMINAL PAIN: 0
FEVER: 0

## 2024-07-06 ASSESSMENT — ACTIVITIES OF DAILY LIVING (ADL)
ADLS_ACUITY_SCORE: 35
ADLS_ACUITY_SCORE: 33
ADLS_ACUITY_SCORE: 33

## 2024-07-06 NOTE — ED TRIAGE NOTES
Pt reports rectal pain for the past 4 days. States she had a  2 weeks ago and has a history of hemorrhoids, has tried  multiple home treatments without improvement.      Triage Assessment (Adult)       Row Name 24 120          Triage Assessment    Airway WDL WDL        Respiratory WDL    Respiratory WDL WDL        Skin Circulation/Temperature WDL    Skin Circulation/Temperature WDL WDL

## 2024-07-06 NOTE — DISCHARGE INSTRUCTIONS
You were seen today for anal discomfort and found to have significantly thrombosed anal hemorrhoids.  We open this area up and removed as many clots.  We recommend for salt water or sitz bath's daily and every time after using the bathroom.  This is appropriate with lukewarm to warm water to help dissolve clots as well as provide some stimulation while compression during these bath is helpful to remove remaining clots.  Please keep a close eye on the area over the next 3 to 4 days and have this reevaluated by either your primary care doctor or general surgery physician.  We have placed a close consult with general surgery to continue to follow-up on this to ensure continued improvement.  Would recommend continuing her stool softeners for soft stool bowel movements and if you have MiraLAX or fiber type medications can always use these over stool softening/stimulant medication such as senna docusate.  Otherwise maintaining oral hydration is important and ibuprofen and Tylenol for pain control and can also use oxycodone for severe discomfort.

## 2024-07-06 NOTE — TELEPHONE ENCOUNTER
Telephone call  [facility] having  Hemorrhoid  pain she has tried preparation h she has taken tylenol and ibuprofen she has tried using diaper rash cream and she is having  rectal pain  8/10.  She has not slept in over 28 hours and she has a new born from 2 weeks ago.  She has not one a sitz bath because she had a  and they told her not to get her incision wet .    Per protocol see PCP with in 24 hours.  Care advice given.  Verbalizes understanding and agrees with plan.The [facility] a [facility] away so she will be coming into the [place] ED.      Marlee Reis RN   [facility] Nurse Advisor  7:46 AM 2024    Reason for Disposition   MODERATE-SEVERE rectal pain (i.e., interferes with school, work, or sleep)    Additional Information   Negative: Foreign body in rectum   Negative: Diarrhea is main symptom   Negative: Constipation is main symptom (e.g., pain or discomfort caused by passage of hard BMs)   Negative: Blood in or on bowel movement is main symptom   Negative: Pregnant   Negative: Pinworms are suspected (rectal itching; (white thread-like worm about size of a staple, moves)   Negative: [1] Mpox suspected (e.g., direct skin contact such as sex, recent travel to [place] or [place]) AND [2] symptoms of Mpox (e.g., rectal rash or sores, fever, muscle aches, or swollen lymph nodes)   Negative: [1] At risk for Mpox (men-who-have-sex-with-men) AND [2] possible exposure (e.g., multiple sex partners in past 21 days) AND [3] symptoms of Mpox (e.g., rectal rash or sores, fever, muscle aches, or swollen lymph nodes)   Negative: Sexual assault or rape (sexual intercourse or activity occurs without freely given consent), known or suspected   Negative: Injury to rectum   Negative: Large mass protruding out of rectum   Negative: Patient sounds very sick or weak to the triager   Negative: SEVERE rectal pain (e.g., excruciating, unable to have a bowel  movement)   Negative: [1] Rectal pain or redness AND [2] fever   Negative: [1] Sudden onset rectal pain AND [2] constipated (straining, rectal pressure or fullness) AND [3] NOT better after SITZ bath, suppository or enema    Protocols used: Rectal Symptoms-A-AH

## 2024-07-06 NOTE — ED PROVIDER NOTES
History     Chief Complaint   Patient presents with    Rectal/perineal Pain     HPI  Lidia Gallegos is a 29 year old female presents with severe anal discomfort.  Patient has a history of type 2 diabetes morbid obesity and recent pregnancy resulting in  secondary to emergent .  She notes that things been going well and she is been using stool softeners however she started having significant anal discomfort and does have a history of hemorrhoids.  She notes that she has inability to sit down or ambulate due to the severe discomfort to the anal area.  She denies any other new symptoms.    Allergies:  No Known Allergies    Problem List:    Patient Active Problem List    Diagnosis Date Noted    Preeclampsia, severe 2024     Priority: Medium    Abnormal genetic test during pregnancy - NIPS showed XYY 2024     Priority: Medium    Rh negative status during pregnancy 2024     Priority: Medium    High-risk pregnancy 2024     Priority: Medium    Morbid obesity (H) 2024     Priority: Medium    Gastroesophageal reflux disease with esophagitis without hemorrhage 2023     Priority: Medium    Major depressive disorder, recurrent episode, moderate (H) 2023     Priority: Medium    Autism spectrum disorder 2023     Priority: Medium    Type 2 diabetes mellitus with hyperglycemia, without long-term current use of insulin (H) 2023     Priority: Medium    Papanicolaou smear of cervix with low grade squamous intraepithelial lesion (LGSIL) 2021     Priority: Medium     18 NIL pap  21 LSIL pap  21 [place] bx: LUCERO 1  - [facility]  23 NIL Pap, Neg HR HPV @ 29 yo. Plan: cotest in 3 years.         Chronic bilateral low back pain without sciatica 2016     Priority: Medium        Past Medical History:    Past Medical History:   Diagnosis Date    Anxiety     Autism     Depression     History of anemia        Past Surgical History:    Past  Surgical History:   Procedure Laterality Date     SECTION N/A 2024    Procedure: PRIMARY LOW TRANSVERSE  SECTION;  Surgeon: Mackenzie Dee MD;  Location: [facility] L[facility]    INNER EAR SURGERY Right     age 2    WISDOM TOOTH EXTRACTION         Family History:    Family History   Problem Relation Age of Onset    No Known Problems Mother     Hypertension Father     Mental Illness Sister         bipolar    Attention Deficit Disorder Sister     Birth defects Brother         bladder - reportedly related to nuchal cord    Cancer Maternal Grandfather     Diabetes Paternal Grandmother     Diabetes Paternal Grandfather     Kidney failure Paternal Grandfather        Social History:  Marital Status:   [2]  Social History     Tobacco Use    Smoking status: Former     Current packs/day: 0.00     Types: Cigarettes     Quit date: 2022     Years since quittin.5    Smokeless tobacco: Never   Vaping Use    Vaping status: Never Used   Substance Use Topics    Alcohol use: Not Currently     Comment: occ    Drug use: Never        Medications:    acetaminophen (TYLENOL) 500 MG tablet  blood glucose (NO BRAND SPECIFIED) test strip  blood glucose (NO BRAND SPECIFIED) test strip  blood glucose monitoring (NO BRAND SPECIFIED) meter device kit  ferrous sulfate (FEROSUL) 325 (65 Fe) MG tablet  fluticasone (FLONASE) 50 MCG/ACT nasal spray  ibuprofen (ADVIL/MOTRIN) 800 MG tablet  labetalol (NORMODYNE) 200 MG tablet  NIFEdipine ER OSMOTIC (ADALAT CC) 60 MG 24 hr tablet  omeprazole (PRILOSEC) 40 MG DR capsule  Prenatal Vit-Fe Fumarate-FA (PRENATAL MULTIVITAMIN W/IRON) 27-0.8 MG tablet  senna-docusate (SENOKOT-S/PERICOLACE) 8.6-50 MG tablet  thin (NO BRAND SPECIFIED) lancets          Review of Systems   Constitutional:  Negative for fever.   Respiratory:  Negative for cough and shortness of breath.    Cardiovascular:  Negative for chest pain.   Gastrointestinal:  Negative for abdominal pain.        Anal pain   Skin:   "Negative for rash.   All other systems reviewed and are negative.      Physical Exam   BP: (!) 131/92  Pulse: 82  Temp: 98.3  F (36.8  C)  Resp: 20  Height: 180.3 cm (5' 11\")  SpO2: 100 %      Physical Exam  Constitutional:       General: She is not in acute distress.     Appearance: Normal appearance. She is not diaphoretic.   HENT:      Head: Normocephalic and atraumatic.      Mouth/Throat:      Mouth: Mucous membranes are moist.   Eyes:      General: No scleral icterus.     Conjunctiva/sclera: Conjunctivae normal.   Cardiovascular:      Rate and Rhythm: Normal rate.      Heart sounds: Normal heart sounds.   Pulmonary:      Effort: No respiratory distress.      Breath sounds: Normal breath sounds.   Abdominal:      General: Abdomen is flat.   Musculoskeletal:      Cervical back: Neck supple.   Skin:     General: Skin is warm.      Findings: No rash.      Comments: Large thrombosed external hemorrhoids on the left and inferior borders of the rectum.   Neurological:      Mental Status: She is alert.         ED [facility]    PROCEDURE: -Incision/Drainage    Date/Time: 7/6/2024 4:34 PM    Performed by: Royal Boston MD  Authorized by: Royal Boston MD    Risks, benefits and alternatives discussed.      UNIVERSAL PROTOCOL   Site Marked: Yes  Prior Images Obtained and Reviewed:  Yes  Required items: Required blood products, implants, devices and special equipment available    Patient identity confirmed:  Verbally with patient, provided demographic data, arm band and hospital-assigned identification number  Patient was reevaluated immediately before administering moderate or deep sedation or anesthesia  Confirmation Checklist:  Patient's identity using two indicators, procedure was appropriate and matched the consent or emergent situation, relevant allergies and correct equipment/implants were available  Time out: Immediately prior to the procedure a time out was " called    Universal Protocol: the Joint Commission Universal Protocol was followed    Preparation: Patient was prepped and draped in usual sterile fashion      LOCATION:      Type:  External thrombosed hemorrhoid    Size:  4 cm by 3 cm    Location:  Anogenital    Anogenital location:  Perianal    PRE-PROCEDURE DETAILS:     Skin preparation:  Chloraprep    PROCEDURE TYPE:     Complexity:  Complex    ANESTHESIA (see MAR for exact dosages):     Anesthesia method:  Local infiltration    Local anesthetic:  Lidocaine 1% WITH epi    PROCEDURE DETAILS:     Needle aspiration: no      Incision types:  Elliptical    Incision depth:  Dermal    Wound management:  Irrigated with saline and probed and deloculated    Drainage:  Bloody (Clots)    Drainage amount:  Moderate    Wound treatment:  Wound left open    Packing materials:  None    PROCEDURE    Patient Tolerance:  Patient tolerated the procedure well with no immediate complications                  No results found for this or any previous visit (from the past 24 hour(s)).    Medications   HYDROcodone-acetaminophen (NORCO) 5-325 MG per tablet 2 tablet (2 tablets Oral $Given 7/6/24 5975)       Assessments & Plan (with Medical Decision Making)     I have reviewed the nursing notes.    I have reviewed the findings, diagnosis, plan and need for follow up with the patient.    Medical Decision Making  29-year-old female presenting with significant anal rectal pain and found to have significantly enlarged external thrombosed hemorrhoids.  No other acute findings present.  Otherwise patient stable.  Bedside I&D was completed with colleague Eda.  Supportive care measures discussed after numerous clots excised.  General surgery consult placed.  Outpatient follow-up measures discussed and return precautions discussed and patient discharged home with pain support prior to leaving patient was provided with 10 mg of Norco.  Patient doing much better after procedure.    Discharge  Medication List as of 7/6/2024  2:59 PM          Final diagnoses:   Thrombosed external hemorrhoids       7/6/2024   [facility] EMERGENCY DEPT       Royal Boston MD  07/06/24 9284

## 2024-07-07 ENCOUNTER — ANESTHESIA (OUTPATIENT)
Dept: SURGERY | Facility: CLINIC | Age: 29
End: 2024-07-07
Payer: COMMERCIAL

## 2024-07-07 ENCOUNTER — HOSPITAL ENCOUNTER (OUTPATIENT)
Facility: CLINIC | Age: 29
Discharge: HOME | End: 2024-07-08
Attending: EMERGENCY MEDICINE | Admitting: STUDENT IN AN ORGANIZED HEALTH CARE EDUCATION/TRAINING PROGRAM
Payer: COMMERCIAL

## 2024-07-07 ENCOUNTER — ANESTHESIA EVENT (OUTPATIENT)
Dept: SURGERY | Facility: CLINIC | Age: 29
End: 2024-07-07
Payer: COMMERCIAL

## 2024-07-07 DIAGNOSIS — O13.9 PREGNANCY INDUCED HYPERTENSION, ANTEPARTUM: ICD-10-CM

## 2024-07-07 DIAGNOSIS — K62.89 RECTAL PAIN: ICD-10-CM

## 2024-07-07 DIAGNOSIS — K64.5 THROMBOSED EXTERNAL HEMORRHOIDS: Primary | ICD-10-CM

## 2024-07-07 DIAGNOSIS — O14.13 SEVERE PRE-ECLAMPSIA IN THIRD TRIMESTER: ICD-10-CM

## 2024-07-07 DIAGNOSIS — K64.5 THROMBOSED HEMORRHOIDS: ICD-10-CM

## 2024-07-07 PROCEDURE — 99285 EMERGENCY DEPT VISIT HI MDM: CPT | Mod: 25 | Performed by: EMERGENCY MEDICINE

## 2024-07-07 PROCEDURE — 250N000011 HC RX IP 250 OP 636: Performed by: EMERGENCY MEDICINE

## 2024-07-07 PROCEDURE — 96372 THER/PROPH/DIAG INJ SC/IM: CPT | Performed by: STUDENT IN AN ORGANIZED HEALTH CARE EDUCATION/TRAINING PROGRAM

## 2024-07-07 PROCEDURE — 250N000009 HC RX 250: Performed by: NURSE ANESTHETIST, CERTIFIED REGISTERED

## 2024-07-07 PROCEDURE — 99285 EMERGENCY DEPT VISIT HI MDM: CPT | Mod: 25

## 2024-07-07 PROCEDURE — 360N000075 HC SURGERY LEVEL 2, PER MIN: Performed by: STUDENT IN AN ORGANIZED HEALTH CARE EDUCATION/TRAINING PROGRAM

## 2024-07-07 PROCEDURE — 272N000001 HC OR GENERAL SUPPLY STERILE: Performed by: STUDENT IN AN ORGANIZED HEALTH CARE EDUCATION/TRAINING PROGRAM

## 2024-07-07 PROCEDURE — 250N000013 HC RX MED GY IP 250 OP 250 PS 637: Performed by: STUDENT IN AN ORGANIZED HEALTH CARE EDUCATION/TRAINING PROGRAM

## 2024-07-07 PROCEDURE — 250N000025 HC SEVOFLURANE, PER MIN: Performed by: STUDENT IN AN ORGANIZED HEALTH CARE EDUCATION/TRAINING PROGRAM

## 2024-07-07 PROCEDURE — 250N000009 HC RX 250: Performed by: EMERGENCY MEDICINE

## 2024-07-07 PROCEDURE — 370N000017 HC ANESTHESIA TECHNICAL FEE, PER MIN: Performed by: STUDENT IN AN ORGANIZED HEALTH CARE EDUCATION/TRAINING PROGRAM

## 2024-07-07 PROCEDURE — 96374 THER/PROPH/DIAG INJ IV PUSH: CPT | Mod: 59

## 2024-07-07 PROCEDURE — 96375 TX/PRO/DX INJ NEW DRUG ADDON: CPT | Mod: 59

## 2024-07-07 PROCEDURE — 250N000011 HC RX IP 250 OP 636: Performed by: STUDENT IN AN ORGANIZED HEALTH CARE EDUCATION/TRAINING PROGRAM

## 2024-07-07 PROCEDURE — 250N000011 HC RX IP 250 OP 636: Performed by: NURSE ANESTHETIST, CERTIFIED REGISTERED

## 2024-07-07 PROCEDURE — 250N000009 HC RX 250: Performed by: STUDENT IN AN ORGANIZED HEALTH CARE EDUCATION/TRAINING PROGRAM

## 2024-07-07 PROCEDURE — 258N000003 HC RX IP 258 OP 636: Performed by: NURSE ANESTHETIST, CERTIFIED REGISTERED

## 2024-07-07 PROCEDURE — 46083 INC THROMBOSED HROID XTRNL: CPT | Performed by: STUDENT IN AN ORGANIZED HEALTH CARE EDUCATION/TRAINING PROGRAM

## 2024-07-07 PROCEDURE — 99204 OFFICE O/P NEW MOD 45 MIN: CPT | Mod: 25 | Performed by: STUDENT IN AN ORGANIZED HEALTH CARE EDUCATION/TRAINING PROGRAM

## 2024-07-07 PROCEDURE — 710N000010 HC RECOVERY PHASE 1, LEVEL 2, PER MIN: Performed by: STUDENT IN AN ORGANIZED HEALTH CARE EDUCATION/TRAINING PROGRAM

## 2024-07-07 RX ORDER — FLUTICASONE PROPIONATE 50 MCG
1 SPRAY, SUSPENSION (ML) NASAL DAILY
Status: DISCONTINUED | OUTPATIENT
Start: 2024-07-07 | End: 2024-07-07

## 2024-07-07 RX ORDER — FENTANYL CITRATE 50 UG/ML
50 INJECTION, SOLUTION INTRAMUSCULAR; INTRAVENOUS EVERY 5 MIN PRN
Status: DISCONTINUED | OUTPATIENT
Start: 2024-07-07 | End: 2024-07-07 | Stop reason: HOSPADM

## 2024-07-07 RX ORDER — ONDANSETRON 4 MG/1
4 TABLET, ORALLY DISINTEGRATING ORAL EVERY 6 HOURS PRN
Status: DISCONTINUED | OUTPATIENT
Start: 2024-07-07 | End: 2024-07-08 | Stop reason: HOSPADM

## 2024-07-07 RX ORDER — ACETAMINOPHEN 325 MG/1
650 TABLET ORAL EVERY 4 HOURS PRN
Status: DISCONTINUED | OUTPATIENT
Start: 2024-07-10 | End: 2024-07-08 | Stop reason: HOSPADM

## 2024-07-07 RX ORDER — LABETALOL 100 MG/1
200 TABLET, FILM COATED ORAL EVERY 12 HOURS
Status: DISCONTINUED | OUTPATIENT
Start: 2024-07-07 | End: 2024-07-07

## 2024-07-07 RX ORDER — ONDANSETRON 2 MG/ML
INJECTION INTRAMUSCULAR; INTRAVENOUS PRN
Status: DISCONTINUED | OUTPATIENT
Start: 2024-07-07 | End: 2024-07-07

## 2024-07-07 RX ORDER — FENTANYL CITRATE 50 UG/ML
25 INJECTION, SOLUTION INTRAMUSCULAR; INTRAVENOUS EVERY 5 MIN PRN
Status: DISCONTINUED | OUTPATIENT
Start: 2024-07-07 | End: 2024-07-07 | Stop reason: HOSPADM

## 2024-07-07 RX ORDER — HEPARIN SODIUM 5000 [USP'U]/.5ML
5000 INJECTION, SOLUTION INTRAVENOUS; SUBCUTANEOUS
Status: COMPLETED | OUTPATIENT
Start: 2024-07-07 | End: 2024-07-07

## 2024-07-07 RX ORDER — LIDOCAINE 40 MG/G
CREAM TOPICAL 2 TIMES DAILY PRN
Qty: 28 G | Refills: 3 | Status: SHIPPED | OUTPATIENT
Start: 2024-07-07 | End: 2024-08-06

## 2024-07-07 RX ORDER — AMOXICILLIN 250 MG
1 CAPSULE ORAL 2 TIMES DAILY
Status: DISCONTINUED | OUTPATIENT
Start: 2024-07-07 | End: 2024-07-08 | Stop reason: HOSPADM

## 2024-07-07 RX ORDER — KETOROLAC TROMETHAMINE 30 MG/ML
30 INJECTION, SOLUTION INTRAMUSCULAR; INTRAVENOUS ONCE
Status: COMPLETED | OUTPATIENT
Start: 2024-07-07 | End: 2024-07-07

## 2024-07-07 RX ORDER — DEXAMETHASONE SODIUM PHOSPHATE 10 MG/ML
4 INJECTION, SOLUTION INTRAMUSCULAR; INTRAVENOUS
Status: DISCONTINUED | OUTPATIENT
Start: 2024-07-07 | End: 2024-07-07 | Stop reason: HOSPADM

## 2024-07-07 RX ORDER — SODIUM CHLORIDE, SODIUM LACTATE, POTASSIUM CHLORIDE, CALCIUM CHLORIDE 600; 310; 30; 20 MG/100ML; MG/100ML; MG/100ML; MG/100ML
INJECTION, SOLUTION INTRAVENOUS CONTINUOUS
Status: DISCONTINUED | OUTPATIENT
Start: 2024-07-07 | End: 2024-07-08 | Stop reason: HOSPADM

## 2024-07-07 RX ORDER — LIDOCAINE 40 MG/G
CREAM TOPICAL
Status: DISCONTINUED | OUTPATIENT
Start: 2024-07-07 | End: 2024-07-07 | Stop reason: HOSPADM

## 2024-07-07 RX ORDER — LORAZEPAM 2 MG/ML
1 INJECTION INTRAMUSCULAR ONCE
Status: COMPLETED | OUTPATIENT
Start: 2024-07-07 | End: 2024-07-07

## 2024-07-07 RX ORDER — ACETAMINOPHEN 325 MG/1
975 TABLET ORAL EVERY 8 HOURS
Status: DISCONTINUED | OUTPATIENT
Start: 2024-07-07 | End: 2024-07-08 | Stop reason: HOSPADM

## 2024-07-07 RX ORDER — IBUPROFEN 600 MG/1
600 TABLET, FILM COATED ORAL EVERY 6 HOURS PRN
Status: DISCONTINUED | OUTPATIENT
Start: 2024-07-07 | End: 2024-07-08 | Stop reason: HOSPADM

## 2024-07-07 RX ORDER — NALOXONE HYDROCHLORIDE 0.4 MG/ML
0.1 INJECTION, SOLUTION INTRAMUSCULAR; INTRAVENOUS; SUBCUTANEOUS
Status: DISCONTINUED | OUTPATIENT
Start: 2024-07-07 | End: 2024-07-07 | Stop reason: HOSPADM

## 2024-07-07 RX ORDER — ACETAMINOPHEN 325 MG/1
975 TABLET ORAL ONCE
Status: DISCONTINUED | OUTPATIENT
Start: 2024-07-07 | End: 2024-07-07 | Stop reason: HOSPADM

## 2024-07-07 RX ORDER — HYDROMORPHONE HYDROCHLORIDE 1 MG/ML
0.2 INJECTION, SOLUTION INTRAMUSCULAR; INTRAVENOUS; SUBCUTANEOUS EVERY 5 MIN PRN
Status: DISCONTINUED | OUTPATIENT
Start: 2024-07-07 | End: 2024-07-07 | Stop reason: HOSPADM

## 2024-07-07 RX ORDER — PROCHLORPERAZINE MALEATE 5 MG
10 TABLET ORAL EVERY 6 HOURS PRN
Status: DISCONTINUED | OUTPATIENT
Start: 2024-07-07 | End: 2024-07-08 | Stop reason: HOSPADM

## 2024-07-07 RX ORDER — NALOXONE HYDROCHLORIDE 0.4 MG/ML
0.2 INJECTION, SOLUTION INTRAMUSCULAR; INTRAVENOUS; SUBCUTANEOUS
Status: DISCONTINUED | OUTPATIENT
Start: 2024-07-07 | End: 2024-07-08 | Stop reason: HOSPADM

## 2024-07-07 RX ORDER — BUPIVACAINE HYDROCHLORIDE AND EPINEPHRINE 2.5; 5 MG/ML; UG/ML
INJECTION, SOLUTION INFILTRATION; PERINEURAL PRN
Status: DISCONTINUED | OUTPATIENT
Start: 2024-07-07 | End: 2024-07-07 | Stop reason: HOSPADM

## 2024-07-07 RX ORDER — OXYCODONE HYDROCHLORIDE 5 MG/1
5 TABLET ORAL EVERY 4 HOURS PRN
Status: DISCONTINUED | OUTPATIENT
Start: 2024-07-07 | End: 2024-07-08 | Stop reason: HOSPADM

## 2024-07-07 RX ORDER — LIDOCAINE 40 MG/G
CREAM TOPICAL 2 TIMES DAILY PRN
Status: COMPLETED | OUTPATIENT
Start: 2024-07-07 | End: 2024-07-07

## 2024-07-07 RX ORDER — CEFAZOLIN SODIUM/WATER 3 G/30 ML
3 SYRINGE (ML) INTRAVENOUS
Status: COMPLETED | OUTPATIENT
Start: 2024-07-07 | End: 2024-07-07

## 2024-07-07 RX ORDER — SODIUM CHLORIDE, SODIUM LACTATE, POTASSIUM CHLORIDE, CALCIUM CHLORIDE 600; 310; 30; 20 MG/100ML; MG/100ML; MG/100ML; MG/100ML
INJECTION, SOLUTION INTRAVENOUS CONTINUOUS
Status: DISCONTINUED | OUTPATIENT
Start: 2024-07-07 | End: 2024-07-07 | Stop reason: HOSPADM

## 2024-07-07 RX ORDER — HYDROMORPHONE HCL IN WATER/PF 6 MG/30 ML
0.4 PATIENT CONTROLLED ANALGESIA SYRINGE INTRAVENOUS
Status: DISCONTINUED | OUTPATIENT
Start: 2024-07-07 | End: 2024-07-08 | Stop reason: HOSPADM

## 2024-07-07 RX ORDER — NIFEDIPINE 30 MG/1
60 TABLET, EXTENDED RELEASE ORAL DAILY
Status: DISCONTINUED | OUTPATIENT
Start: 2024-07-07 | End: 2024-07-08 | Stop reason: HOSPADM

## 2024-07-07 RX ORDER — LIDOCAINE HYDROCHLORIDE 20 MG/ML
JELLY TOPICAL ONCE
Status: COMPLETED | OUTPATIENT
Start: 2024-07-07 | End: 2024-07-07

## 2024-07-07 RX ORDER — PROPOFOL 10 MG/ML
INJECTION, EMULSION INTRAVENOUS CONTINUOUS PRN
Status: DISCONTINUED | OUTPATIENT
Start: 2024-07-07 | End: 2024-07-07

## 2024-07-07 RX ORDER — OMEPRAZOLE 40 MG/1
40 CAPSULE, DELAYED RELEASE ORAL DAILY
Status: DISCONTINUED | OUTPATIENT
Start: 2024-07-07 | End: 2024-07-07

## 2024-07-07 RX ORDER — FENTANYL CITRATE 50 UG/ML
INJECTION, SOLUTION INTRAMUSCULAR; INTRAVENOUS PRN
Status: DISCONTINUED | OUTPATIENT
Start: 2024-07-07 | End: 2024-07-07

## 2024-07-07 RX ORDER — DEXAMETHASONE SODIUM PHOSPHATE 10 MG/ML
INJECTION, SOLUTION INTRAMUSCULAR; INTRAVENOUS PRN
Status: DISCONTINUED | OUTPATIENT
Start: 2024-07-07 | End: 2024-07-07

## 2024-07-07 RX ORDER — PRENATAL VIT/IRON FUM/FOLIC AC 27MG-0.8MG
1 TABLET ORAL DAILY
Status: DISCONTINUED | OUTPATIENT
Start: 2024-07-08 | End: 2024-07-08 | Stop reason: HOSPADM

## 2024-07-07 RX ORDER — ONDANSETRON 2 MG/ML
4 INJECTION INTRAMUSCULAR; INTRAVENOUS EVERY 30 MIN PRN
Status: DISCONTINUED | OUTPATIENT
Start: 2024-07-07 | End: 2024-07-07 | Stop reason: HOSPADM

## 2024-07-07 RX ORDER — HYDROXYZINE HYDROCHLORIDE 25 MG/1
25 TABLET, FILM COATED ORAL EVERY 6 HOURS PRN
Status: DISCONTINUED | OUTPATIENT
Start: 2024-07-07 | End: 2024-07-07 | Stop reason: HOSPADM

## 2024-07-07 RX ORDER — POLYETHYLENE GLYCOL 3350 17 G/17G
17 POWDER, FOR SOLUTION ORAL DAILY
Qty: 510 G | Refills: 0 | Status: SHIPPED | OUTPATIENT
Start: 2024-07-07 | End: 2024-08-06

## 2024-07-07 RX ORDER — PANTOPRAZOLE SODIUM 40 MG/1
40 TABLET, DELAYED RELEASE ORAL
Status: DISCONTINUED | OUTPATIENT
Start: 2024-07-08 | End: 2024-07-08 | Stop reason: HOSPADM

## 2024-07-07 RX ORDER — NALOXONE HYDROCHLORIDE 0.4 MG/ML
0.4 INJECTION, SOLUTION INTRAMUSCULAR; INTRAVENOUS; SUBCUTANEOUS
Status: DISCONTINUED | OUTPATIENT
Start: 2024-07-07 | End: 2024-07-08 | Stop reason: HOSPADM

## 2024-07-07 RX ORDER — LIDOCAINE HYDROCHLORIDE 10 MG/ML
INJECTION, SOLUTION INFILTRATION; PERINEURAL PRN
Status: DISCONTINUED | OUTPATIENT
Start: 2024-07-07 | End: 2024-07-07

## 2024-07-07 RX ORDER — OXYCODONE HYDROCHLORIDE 5 MG/1
5 TABLET ORAL EVERY 4 HOURS PRN
Qty: 10 TABLET | Refills: 0 | Status: SHIPPED | OUTPATIENT
Start: 2024-07-07

## 2024-07-07 RX ORDER — HYDROMORPHONE HYDROCHLORIDE 1 MG/ML
0.5 INJECTION, SOLUTION INTRAMUSCULAR; INTRAVENOUS; SUBCUTANEOUS EVERY 5 MIN PRN
Status: DISCONTINUED | OUTPATIENT
Start: 2024-07-07 | End: 2024-07-07 | Stop reason: HOSPADM

## 2024-07-07 RX ORDER — PROPOFOL 10 MG/ML
INJECTION, EMULSION INTRAVENOUS PRN
Status: DISCONTINUED | OUTPATIENT
Start: 2024-07-07 | End: 2024-07-07

## 2024-07-07 RX ORDER — DIBUCAINE 0.28 G/28G
OINTMENT TOPICAL 3 TIMES DAILY PRN
Status: DISCONTINUED | OUTPATIENT
Start: 2024-07-07 | End: 2024-07-08 | Stop reason: HOSPADM

## 2024-07-07 RX ORDER — ONDANSETRON 4 MG/1
4 TABLET, ORALLY DISINTEGRATING ORAL EVERY 30 MIN PRN
Status: DISCONTINUED | OUTPATIENT
Start: 2024-07-07 | End: 2024-07-07 | Stop reason: HOSPADM

## 2024-07-07 RX ORDER — HYDROMORPHONE HCL IN WATER/PF 6 MG/30 ML
0.2 PATIENT CONTROLLED ANALGESIA SYRINGE INTRAVENOUS
Status: DISCONTINUED | OUTPATIENT
Start: 2024-07-07 | End: 2024-07-08 | Stop reason: HOSPADM

## 2024-07-07 RX ORDER — ONDANSETRON 2 MG/ML
4 INJECTION INTRAMUSCULAR; INTRAVENOUS EVERY 6 HOURS PRN
Status: DISCONTINUED | OUTPATIENT
Start: 2024-07-07 | End: 2024-07-08 | Stop reason: HOSPADM

## 2024-07-07 RX ORDER — ALBUTEROL SULFATE 0.83 MG/ML
2.5 SOLUTION RESPIRATORY (INHALATION) EVERY 4 HOURS PRN
Status: DISCONTINUED | OUTPATIENT
Start: 2024-07-07 | End: 2024-07-07 | Stop reason: HOSPADM

## 2024-07-07 RX ORDER — HYDROMORPHONE HYDROCHLORIDE 1 MG/ML
0.5 INJECTION, SOLUTION INTRAMUSCULAR; INTRAVENOUS; SUBCUTANEOUS EVERY 30 MIN PRN
Status: DISCONTINUED | OUTPATIENT
Start: 2024-07-07 | End: 2024-07-07

## 2024-07-07 RX ORDER — OXYCODONE HYDROCHLORIDE 5 MG/1
10 TABLET ORAL EVERY 4 HOURS PRN
Status: DISCONTINUED | OUTPATIENT
Start: 2024-07-07 | End: 2024-07-08 | Stop reason: HOSPADM

## 2024-07-07 RX ORDER — POLYETHYLENE GLYCOL 3350 17 G/17G
17 POWDER, FOR SOLUTION ORAL DAILY
Status: DISCONTINUED | OUTPATIENT
Start: 2024-07-08 | End: 2024-07-08 | Stop reason: HOSPADM

## 2024-07-07 RX ORDER — LIDOCAINE 40 MG/G
CREAM TOPICAL
Status: DISCONTINUED | OUTPATIENT
Start: 2024-07-07 | End: 2024-07-08 | Stop reason: HOSPADM

## 2024-07-07 RX ORDER — CEFAZOLIN SODIUM/WATER 3 G/30 ML
3 SYRINGE (ML) INTRAVENOUS SEE ADMIN INSTRUCTIONS
Status: DISCONTINUED | OUTPATIENT
Start: 2024-07-07 | End: 2024-07-07 | Stop reason: HOSPADM

## 2024-07-07 RX ORDER — LIDOCAINE HYDROCHLORIDE 10 MG/ML
INJECTION, SOLUTION EPIDURAL; INFILTRATION; INTRACAUDAL; PERINEURAL PRN
Status: DISCONTINUED | OUTPATIENT
Start: 2024-07-07 | End: 2024-07-07 | Stop reason: HOSPADM

## 2024-07-07 RX ORDER — LIDOCAINE/PRILOCAINE 2.5 %-2.5%
CREAM (GRAM) TOPICAL
Status: DISCONTINUED | OUTPATIENT
Start: 2024-07-07 | End: 2024-07-08 | Stop reason: HOSPADM

## 2024-07-07 RX ORDER — ACETAMINOPHEN 325 MG/1
975 TABLET ORAL EVERY 6 HOURS PRN
Status: DISCONTINUED | OUTPATIENT
Start: 2024-07-07 | End: 2024-07-07 | Stop reason: HOSPADM

## 2024-07-07 RX ADMIN — SUGAMMADEX 300 MG: 100 INJECTION, SOLUTION INTRAVENOUS at 15:57

## 2024-07-07 RX ADMIN — DOCUSATE SODIUM AND SENNOSIDES 1 TABLET: 8.6; 5 TABLET, FILM COATED ORAL at 20:32

## 2024-07-07 RX ADMIN — Medication: at 09:11

## 2024-07-07 RX ADMIN — ACETAMINOPHEN 975 MG: 325 TABLET, FILM COATED ORAL at 17:29

## 2024-07-07 RX ADMIN — DIBUCAINE: 10 OINTMENT TOPICAL at 19:17

## 2024-07-07 RX ADMIN — Medication 3 G: at 15:07

## 2024-07-07 RX ADMIN — PROPOFOL 250 MG: 10 INJECTION, EMULSION INTRAVENOUS at 15:18

## 2024-07-07 RX ADMIN — LORAZEPAM 1 MG: 2 INJECTION INTRAMUSCULAR; INTRAVENOUS at 13:42

## 2024-07-07 RX ADMIN — ONDANSETRON 4 MG: 2 INJECTION INTRAMUSCULAR; INTRAVENOUS at 15:38

## 2024-07-07 RX ADMIN — PROPOFOL 100 MCG/KG/MIN: 10 INJECTION, EMULSION INTRAVENOUS at 15:18

## 2024-07-07 RX ADMIN — KETOROLAC TROMETHAMINE 30 MG: 30 INJECTION, SOLUTION INTRAMUSCULAR at 13:45

## 2024-07-07 RX ADMIN — FENTANYL CITRATE 50 MCG: 50 INJECTION INTRAMUSCULAR; INTRAVENOUS at 15:32

## 2024-07-07 RX ADMIN — LIDOCAINE HYDROCHLORIDE: 20 JELLY TOPICAL at 11:16

## 2024-07-07 RX ADMIN — LIDOCAINE HYDROCHLORIDE 50 MG: 10 INJECTION, SOLUTION INFILTRATION; PERINEURAL at 15:18

## 2024-07-07 RX ADMIN — LIDOCAINE AND PRILOCAINE: 25; 25 CREAM TOPICAL at 09:10

## 2024-07-07 RX ADMIN — FENTANYL CITRATE 50 MCG: 50 INJECTION INTRAMUSCULAR; INTRAVENOUS at 15:43

## 2024-07-07 RX ADMIN — LIDOCAINE: 40 CREAM TOPICAL at 23:19

## 2024-07-07 RX ADMIN — HEPARIN SODIUM 5000 UNITS: 10000 INJECTION, SOLUTION INTRAVENOUS; SUBCUTANEOUS at 15:24

## 2024-07-07 RX ADMIN — SODIUM CHLORIDE, POTASSIUM CHLORIDE, SODIUM LACTATE AND CALCIUM CHLORIDE: 600; 310; 30; 20 INJECTION, SOLUTION INTRAVENOUS at 15:07

## 2024-07-07 RX ADMIN — PHENYLEPHRINE HYDROCHLORIDE 200 MCG: 10 INJECTION INTRAVENOUS at 15:32

## 2024-07-07 RX ADMIN — IBUPROFEN 600 MG: 600 TABLET, FILM COATED ORAL at 23:23

## 2024-07-07 RX ADMIN — ROCURONIUM BROMIDE 50 MG: 50 INJECTION, SOLUTION INTRAVENOUS at 15:18

## 2024-07-07 RX ADMIN — PHENYLEPHRINE HYDROCHLORIDE 200 MCG: 10 INJECTION INTRAVENOUS at 15:38

## 2024-07-07 RX ADMIN — DEXAMETHASONE SODIUM PHOSPHATE 10 MG: 10 INJECTION, SOLUTION INTRAMUSCULAR; INTRAVENOUS at 15:38

## 2024-07-07 RX ADMIN — MIDAZOLAM 1 MG: 1 INJECTION INTRAMUSCULAR; INTRAVENOUS at 15:07

## 2024-07-07 ASSESSMENT — ACTIVITIES OF DAILY LIVING (ADL)
ADLS_ACUITY_SCORE: 19
ADLS_ACUITY_SCORE: 22
ADLS_ACUITY_SCORE: 35
ADLS_ACUITY_SCORE: 22
ADLS_ACUITY_SCORE: 35
ADLS_ACUITY_SCORE: 22
ADLS_ACUITY_SCORE: 35
ADLS_ACUITY_SCORE: 19
ADLS_ACUITY_SCORE: 35
ADLS_ACUITY_SCORE: 19
ADLS_ACUITY_SCORE: 35

## 2024-07-07 ASSESSMENT — COLUMBIA-SUICIDE SEVERITY RATING SCALE - C-SSRS
2. HAVE YOU ACTUALLY HAD ANY THOUGHTS OF KILLING YOURSELF IN THE PAST MONTH?: NO
6. HAVE YOU EVER DONE ANYTHING, STARTED TO DO ANYTHING, OR PREPARED TO DO ANYTHING TO END YOUR LIFE?: NO
1. IN THE PAST MONTH, HAVE YOU WISHED YOU WERE DEAD OR WISHED YOU COULD GO TO SLEEP AND NOT WAKE UP?: NO

## 2024-07-07 NOTE — ANESTHESIA PREPROCEDURE EVALUATION
Anesthesia Pre-Procedure Evaluation    Patient: Lidia Gallegos   MRN: [id number] : 1995        Procedure : Procedure(s):  INCISION, HEMORRHOID, THROMBOSED, EXTERNAL          Past Medical History:   Diagnosis Date    Anxiety     Autism     Depression     History of anemia       Past Surgical History:   Procedure Laterality Date     SECTION N/A 2024    Procedure: PRIMARY LOW TRANSVERSE  SECTION;  Surgeon: Mackenzie Dee MD;  Location: [facility] L[facility]    INNER EAR SURGERY Right     age 2    WISDOM TOOTH EXTRACTION        No Known Allergies   Social History     Tobacco Use    Smoking status: Former     Current packs/day: 0.00     Types: Cigarettes     Quit date: 2022     Years since quittin.5    Smokeless tobacco: Never   Substance Use Topics    Alcohol use: Not Currently     Comment: occ      Wt Readings from Last 1 Encounters:   24 126.1 kg (278 lb)        Anesthesia Evaluation   Pt has had prior anesthetic. Type: General and MAC.        ROS/MED HX  ENT/Pulmonary: Comment: Past smoker      Neurologic:  - neg neurologic ROS     Cardiovascular:     (+)  - - PIH  -  - -                                      METS/Exercise Tolerance:     Hematologic:  - neg hematologic  ROS     Musculoskeletal:       GI/Hepatic:     (+) GERD,                   Renal/Genitourinary:       Endo:     (+)  type II DM,   Using insulin, - using insulin pump.         Obesity,       Psychiatric/Substance Use: Comment: autism    (+) psychiatric history anxiety and depression       Infectious Disease:       Malignancy:       Other:            Physical Exam    Airway        Mallampati: II   TM distance: > 3 FB   Neck ROM: full   Mouth opening: > 3 cm    Respiratory Devices and Support         Dental           Cardiovascular   cardiovascular exam normal          Pulmonary   pulmonary exam normal                OUTSIDE LABS:  CBC:   Lab Results   Component Value Date    WBC 19.8 (H) 2024    WBC 12.0 (H) 2024  "   HGB 8.8 (L) 06/23/2024    HGB 10.8 (L) 06/22/2024    HCT 32.6 (L) 06/22/2024    HCT 32.5 (L) 06/21/2024     06/23/2024     06/22/2024     BMP:   Lab Results   Component Value Date     06/22/2024     (L) 06/21/2024    POTASSIUM 4.9 06/22/2024    POTASSIUM 3.9 06/21/2024    CHLORIDE 104 06/22/2024    CHLORIDE 102 06/21/2024    CO2 17 (L) 06/22/2024    CO2 17 (L) 06/21/2024    BUN 10.6 06/22/2024    BUN 10.3 06/21/2024    CR 0.85 06/22/2024    CR 0.76 06/21/2024    GLC 93 06/25/2024     (H) 06/23/2024     COAGS: No results found for: \"PTT\", \"INR\", \"FIBR\"  POC:   Lab Results   Component Value Date    HCG Positive (A) 11/24/2023     HEPATIC:   Lab Results   Component Value Date    ALBUMIN 3.0 (L) 06/22/2024    PROTTOTAL 5.6 (L) 06/22/2024    ALT 14 06/22/2024    AST 16 06/22/2024    ALKPHOS 142 06/22/2024    BILITOTAL 0.5 06/22/2024     OTHER:   Lab Results   Component Value Date    LACT 1.6 02/11/2023    A1C 8.3 (H) 06/21/2024    TERRI 7.6 (L) 06/22/2024    MAG 5.6 (H) 06/23/2024    LIPASE 77 04/13/2022    TSH 2.20 08/02/2023    CRP 3.6 04/19/2021       Anesthesia Plan    ASA Status:  2    NPO Status:  NPO Appropriate    Anesthesia Type: General.     - Airway: ETT   Induction: Intravenous.   Maintenance: Balanced.        Consents    Anesthesia Plan(s) and associated risks, benefits, and realistic alternatives discussed. Questions answered and patient/representative(s) expressed understanding.     - Discussed:     - Discussed with:  Patient      - Extended Intubation/Ventilatory Support Discussed: No.      - Patient is DNR/DNI Status: No     Use of blood products discussed: Yes.     - Discussed with: Patient.     - Consented: consented to blood products            Reason for refusal: other.     Postoperative Care    Pain management: IV analgesics, Multi-modal analgesia, Oral pain medications.   PONV prophylaxis: Ondansetron (or other 5HT-3), Dexamethasone or Solumedrol, Background " "Propofol Infusion     Comments:    Other Comments: The risks and benefits of anesthesia, and the alternatives where applicable, have been discussed with the patient, and they wish to proceed.             SOFYA Anderson CRNA    I have reviewed the pertinent notes and labs in the chart from the past 30 days and (re)examined the patient.  Any updates or changes from those notes are reflected in this note.     # Hyponatremia: Lowest Na = 132 mmol/L in last 30 days, will monitor as appropriate      # Hypoalbuminemia: Lowest albumin = 2.8 g/dL in the past 30 days , will monitor as appropriate     # DMII: A1C = 8.3 % (Ref range: <5.7 %) within past 6 months  # Obesity: Estimated body mass index is 38.77 kg/m  as calculated from the following:    Height as of this encounter: 1.803 m (5' 11\").    Weight as of this encounter: 126.1 kg (278 lb).      "

## 2024-07-07 NOTE — ANESTHESIA CARE TRANSFER NOTE
Patient: Lidia Gallegos    Procedure: Procedure(s):  INCISION and drainage of HEMORRHOID, THROMBOSED, EXTERNAL       Diagnosis: Thrombosed external hemorrhoids [K64.5]  Diagnosis Additional Information: No value filed.    Anesthesia Type:   General     Note:    Oropharynx: oropharynx clear of all foreign objects and spontaneously breathing  Level of Consciousness: drowsy  Oxygen Supplementation: face mask    Independent Airway: airway patency satisfactory and stable  Dentition: dentition unchanged  Vital Signs Stable: post-procedure vital signs reviewed and stable  Report to RN Given: handoff report given  Patient transferred to: PACU    Handoff Report: Identifed the Patient, Identified the Reponsible Provider, Reviewed the pertinent medical history, Discussed the surgical course, Reviewed Intra-OP anesthesia mangement and issues during anesthesia, Set expectations for post-procedure period and Allowed opportunity for questions and acknowledgement of understanding      Vitals:  Vitals Value Taken Time   BP     Temp     Pulse     Resp     SpO2         Electronically Signed By: SOFYA Anderson CRNA  July 7, 2024  4:21 PM

## 2024-07-07 NOTE — PROGRESS NOTES
S:  Outpatient admission for pain control following surgery.  B:  Lidia had a primary c/s 2 weeks ago. She had some external hemorrhoids thrombosed yesterday in the ED and returned today with increased pain and had a hemorrhoidectomy for an internal and external hemorrhoid.  A:  Patient admitted to room 355 for pain control following surgery. VSS. Gauze covering anus is dry. Pt voided following surgery. Up with SBA to bathroom. Pain 0/10 presently. C/s incision wnl. Oriented to room and POC.  R: Offer pain meds as needed, anticipate discharge tomorrow.

## 2024-07-07 NOTE — OR NURSING
Transfer from  pacu to Room post-partum  Transferred to bed via stand and pivot     S: 28 y/o f  S/P I&D hemorrhoid       Anesthesia Type:  general       Surgeon:  Dr. Cai       Allergies:  See Medication Reconciliation Record       DNR: np  (Yes,No)    B:  Pertinent Medical History:  2 weeks ago with recent severe rectal pain from hemorrhoids   Surgical History:    Past Surgical History:   Procedure Laterality Date     SECTION N/A 2024    Procedure: PRIMARY LOW TRANSVERSE  SECTION;  Surgeon: Mackenzie Dee MD;  Location: [facility] L[facility]    INNER EAR SURGERY Right     age 2    WISDOM TOOTH EXTRACTION          A:  EBL: 5        IVF:  500 LR        UOP:  no void        NPO:  No taking water and ice chips wo difficulty        Vomiting: No         Drainage: none        Skin Integrity: intact         Pain: denies        Alert oriented, moves all extremities independently. Calm,. Showing nursing pictures of her new baby       Report given to cece Miranda RN on post partum       See PACU record for ongoing assessment, vital signs and pain assessment.    R: Post-Op vitals and assessments as ordered/indicated per patient's condition.       Follow Post-Op orders and notify Physician prn.       Continue to involve patient/family in plan of care and discharge planning.       Reinforce Pre-Operative education.       Implement skin safety interventions as appropriate.    Name: Miriam LEHMAN

## 2024-07-07 NOTE — BRIEF OP NOTE
[facility]    Brief Operative Note    Pre-operative diagnosis: Thrombosed external hemorrhoids [K64.5]  Post-operative diagnosis Same as pre-operative diagnosis, left and right lateral external hemorrhoids with thrombosis    Procedure: INCISION and drainage of HEMORRHOID, THROMBOSED, EXTERNAL, N/A - Anus    Surgeon: Surgeons and Role:     * Rio Cai MD - Primary  Anesthesia: General   Estimated Blood Loss: 5 ml    Drains: None  Specimens: * No specimens in log *  Findings:   External hemorrhoids, acute thrombosis of left lateral external hemorrhoid. Right lateral hemorrhoid was previously incised and drained. Incision and evacuation of left lateral hemorrhoid performed without complication..  Complications: None.  Implants: * No implants in log *    Admit for post-op pain control  Ok for regular diet  Daily senna, miralax  Twice daily sitz baths  Scheduled tylenol, PRN oxycodone  Topical lidocaine cream to anus as needed for comfort  Follow up with colorectal surgery outpatient for further evaluation of hemorrhoids and possible excision      Rio Cai MD on 7/7/2024 at 4:02 PM

## 2024-07-07 NOTE — CONSULTS
Surgical Consultation/History and Physical  [facility]    Lidia is seen in consultation for thrombosed external hermorrhoid, at the request of Dr. Joellen Young    Chief Complaint:  anal pain    History of Present Illness: Lidia Gallegos is a 29 year old female presents with anal pain. She states she has been experiencing pain for several days and was seen in the emergency department yesterday for assessment. She was told she had thrombosed hemorrhoids at that time and evacuation of the thrombosed hemorrhoid was attempted under local anesthesia in the emergency department. She was subsequently discharged home but returned with continued pain. States she feels constipated, has been passing some gas but no bowel movement for several days due to pain. Denies nausea or emesis. No fevers or chills.     Patient Active Problem List   Diagnosis    Chronic bilateral low back pain without sciatica    Type 2 diabetes mellitus with hyperglycemia, without long-term current use of insulin (H)    Major depressive disorder, recurrent episode, moderate (H)    Autism spectrum disorder    Gastroesophageal reflux disease with esophagitis without hemorrhage    Papanicolaou smear of cervix with low grade squamous intraepithelial lesion (LGSIL)    Morbid obesity (H)    High-risk pregnancy    Abnormal genetic test during pregnancy - NIPS showed XYY    Rh negative status during pregnancy    Preeclampsia, severe       Past Medical History:   Diagnosis Date    Anxiety     Autism     Depression     History of anemia        Past Surgical History:   Procedure Laterality Date     SECTION N/A 2024    Procedure: PRIMARY LOW TRANSVERSE  SECTION;  Surgeon: Mackenzie Dee MD;  Location: [facility] L[facility]    INNER EAR SURGERY Right     age 2    WISDOM TOOTH EXTRACTION         Family History   Problem Relation Age of Onset    No Known Problems Mother     Hypertension Father     Mental Illness Sister         bipolar     Attention Deficit Disorder Sister     Birth defects Brother         bladder - reportedly related to nuchal cord    Cancer Maternal Grandfather     Diabetes Paternal Grandmother     Diabetes Paternal Grandfather     Kidney failure Paternal Grandfather        Social History     Tobacco Use    Smoking status: Former     Current packs/day: 0.00     Types: Cigarettes     Quit date: 2022     Years since quittin.5    Smokeless tobacco: Never   Substance Use Topics    Alcohol use: Not Currently     Comment: occ        History   Drug Use Unknown       Current Outpatient Medications   Medication Sig Dispense Refill    acetaminophen (TYLENOL) 500 MG tablet Take 2 tablets (1,000 mg) by mouth every 6 hours as needed for mild pain      blood glucose (NO BRAND SPECIFIED) test strip Use to test blood sugar 2 times daily or as directed. To accompany: Blood Glucose Monitor Brands: per insurance. 100 strip 6    blood glucose (NO BRAND SPECIFIED) test strip Use to test blood sugar 1-2 times daily or as directed. To accompany: Blood Glucose Monitor Brands: per insurance. 100 strip 6    blood glucose monitoring (NO BRAND SPECIFIED) meter device kit Use to test blood sugar 2 times daily or as directed. Preferred blood glucose meter OR supplies to accompany: Blood Glucose Monitor Brands: per insurance. 1 kit 0    ferrous sulfate (FEROSUL) 325 (65 Fe) MG tablet Take 1 tablet (325 mg) by mouth daily for 14 days 14 tablet 0    fluticasone (FLONASE) 50 MCG/ACT nasal spray Spray 1 spray into both nostrils daily 16 g 1    ibuprofen (ADVIL/MOTRIN) 800 MG tablet Take 1 tablet (800 mg) by mouth every 6 hours 90 tablet 1    labetalol (NORMODYNE) 200 MG tablet Take 1 tablet (200 mg) by mouth every 12 hours 60 tablet 1    NIFEdipine ER OSMOTIC (ADALAT CC) 60 MG 24 hr tablet Take 1 tablet (60 mg) by mouth daily 30 tablet 1    omeprazole (PRILOSEC) 40 MG DR capsule Take 1 capsule (40 mg) by mouth daily 90 capsule 1    Prenatal Vit-Fe  "Fumarate-FA (PRENATAL MULTIVITAMIN W/IRON) 27-0.8 MG tablet Take 1 tablet by mouth daily      senna-docusate (SENOKOT-S/PERICOLACE) 8.6-50 MG tablet Take 1 tablet by mouth 2 times daily 30 tablet 1    thin (NO BRAND SPECIFIED) lancets Use with lanceting device. To accompany: Blood Glucose Monitor Brands: per insurance. 100 each 6       No Known Allergies    Review of Systems:   10 point ROS negative other than what was listed in HPI    Physical Exam:  /88   Pulse 87   Temp 98.9  F (37.2  C) (Oral)   Resp 18   Ht 1.803 m (5' 11\")   Wt 126.1 kg (278 lb)   LMP 10/02/2023   SpO2 98%   Breastfeeding Yes   BMI 38.77 kg/m      Constitutional- No acute distress, well nourished, non-toxic  Eyes: Anicteric, no injection.  PERRL  ENT:  Normocephalic, atraumatic, Nose midline, moist mucus membranes  Neck - supple, no LAD  Respiratory- Nonlabored breathing on room air  Cardiovascular - Extremities warm and well perfused.  Abdomen - Soft, non-tender, obese  Rectal - external exam with circumferential enlarged external hemorrhoids, right lateral hemorrhoid with overlying ecchymosis. Markedly tender to palpation, digital rectal exam not performed due to patient discomfort. Evidence of attempted prior thrombus evacuation on lateral hemorrhoid, but hemorrhoid is still firm and tender.   Neuro - No focal neuro deficits, Alert and oriented x 3  Psych: Appropriate mood and affect  Musculoskeletal: Symmetric strength.  FROM upper and lower extremities.  Skin: Warm, Dry        Assessment:  1. Lidia Gallegos is a 29 year old female, now 12 days post-partum from  section delivery (pregnancy complicated by pre-eclampsia) who presents to emergency department with thrombosed external hemorrhoid. Evacuation attempted in emergency department yesterday, however appears that there is still residual thrombus causing significant pain in the right lateral external hemorrhoid.    Discussed with patient, recommend proceeding to " operating room for exam under anesthesia and evacuation of thrombosed external hemorrhoid vs excision of external hemorrhoid. Given the size of the hemorrhoids, will not attempt circumferential excision to reduce risk of anal stenosis. Discussed anticipated benefits and risks of procedure with patient, including risk of bleeding, infection, damage to sphincter muscles, and hemorrhoid recurrence. Patient to be admitted to post-op for pain control. OB/GYN team accepted primary for admission, appreciate assistance.    Plan:   1. Plan for exam under anesthesia and thrombosed hemorrhoid evacuation vs excision today  2. NPO        Rio Cai MD on 7/7/2024 at 3:04 PM

## 2024-07-07 NOTE — ED PROVIDER NOTES
History     Chief Complaint   Patient presents with    Hemorrhoids     HPI  History per patient, medical records    This is a 29-year-old female, history as below, presenting for concerns of hemorrhoids and rectal pain.  Patient was seen in this ED yesterday with the same complaint.  Exam showed large thrombosed external hemorrhoids on the left and inferior border of the rectum.  A bedside I&D was performed and patient noted improvement.  She has been doing sitz bath but overnight has had increased swelling around the rectum with pain, difficulty sitting.  She has not had a bowel movement for 3 days.  No fevers or chills.  Reported history of hemorrhoids but no history of rectal/anal surgeries.  No fevers or chills.  No other complaints.    Review of epic notes patient had recent  on 2024 after being admitted for uncontrolled gestational diabetes with severe preeclampsia.  She had induction, magnesium, but had failure to dilate, hence the .  Postoperatively she was started on nifedipine.    Allergies:  No Known Allergies    Problem List:    Patient Active Problem List    Diagnosis Date Noted    Thrombosed hemorrhoids 2024     Priority: Medium    Thrombosed external hemorrhoids 2024     Priority: Medium    Rectal pain 2024     Priority: Medium    Preeclampsia, severe 2024     Priority: Medium    Abnormal genetic test during pregnancy - NIPS showed XYY 2024     Priority: Medium    Rh negative status during pregnancy 2024     Priority: Medium    High-risk pregnancy 2024     Priority: Medium    Morbid obesity (H) 2024     Priority: Medium    Gastroesophageal reflux disease with esophagitis without hemorrhage 2023     Priority: Medium    Major depressive disorder, recurrent episode, moderate (H) 2023     Priority: Medium    Autism spectrum disorder 2023     Priority: Medium    Type 2 diabetes mellitus with hyperglycemia, without  long-term current use of insulin (H) 2023     Priority: Medium    Papanicolaou smear of cervix with low grade squamous intraepithelial lesion (LGSIL) 2021     Priority: Medium     18 NIL pap  21 LSIL pap  21 Goodman bx: LUCERO 1  - [facility]  23 NIL Pap, Neg HR HPV @ 27 yo. Plan: cotest in 3 years.         Chronic bilateral low back pain without sciatica 2016     Priority: Medium        Past Medical History:    Past Medical History:   Diagnosis Date    Anxiety     Autism     Depression     History of anemia        Past Surgical History:    Past Surgical History:   Procedure Laterality Date     SECTION N/A 2024    Procedure: PRIMARY LOW TRANSVERSE  SECTION;  Surgeon: Mackenzie Dee MD;  Location: [facility] L[facility]    INNER EAR SURGERY Right     age 2    WISDOM TOOTH EXTRACTION         Family History:    Family History   Problem Relation Age of Onset    No Known Problems Mother     Hypertension Father     Mental Illness Sister         bipolar    Attention Deficit Disorder Sister     Birth defects Brother         bladder - reportedly related to nuchal cord    Cancer Maternal Grandfather     Diabetes Paternal Grandmother     Diabetes Paternal Grandfather     Kidney failure Paternal Grandfather        Social History:  Marital Status:   [2]  Social History     Tobacco Use    Smoking status: Former     Current packs/day: 0.00     Types: Cigarettes     Quit date: 2022     Years since quittin.5    Smokeless tobacco: Never   Vaping Use    Vaping status: Never Used   Substance Use Topics    Alcohol use: Not Currently     Comment: occ    Drug use: Never        Medications:    lidocaine (LMX4) 4 % kit  oxyCODONE (ROXICODONE) 5 MG tablet  polyethylene glycol (MIRALAX) 17 GM/Dose powder          Review of Systems  All other ROS reviewed and are negative or non-contributory except as stated in HPI.     Physical Exam   BP: (!) 145/105  Pulse: 98  Temp: 98.9  F (37.2  " C)  Resp: 18  Height: 180.3 cm (5' 11\")  Weight: 126.1 kg (278 lb)  SpO2: 99 %      Physical Exam  Vitals and nursing note reviewed.   Constitutional:       Appearance: She is obese.      Comments: Very pleasant, uncomfortable appearing female lying on her side in the bed   HENT:      Head: Normocephalic.   Eyes:      Extraocular Movements: Extraocular movements intact.   Cardiovascular:      Rate and Rhythm: Normal rate and regular rhythm.   Pulmonary:      Effort: Pulmonary effort is normal.   Genitourinary:     Rectum: External hemorrhoid present.      Comments: Please see picture.  Large tender hemorrhoid on the left  side shows previous I&D.  Similarly large tender hemorrhoid with what appears to be a thrombosis noted on the right and a third tender area of hemorrhoidal tissue more towards the perineum.  Musculoskeletal:         General: Normal range of motion.      Cervical back: Normal range of motion.   Skin:     General: Skin is warm and dry.   Neurological:      General: No focal deficit present.      Mental Status: She is alert.   Psychiatric:      Comments: Appropriately anxious             ED Course (with Medical Decision Making)    Pt seen and examined by me.  RN and EPIC notes reviewed.       Patient, recent postpartum state, history as noted above, presenting with very tender thrombosed hemorrhoids.  Worsening despite I&D yesterday.    Patient does not want to take any pain or anxiety medications.  I elected to place LET over the already open to hemorrhoid and attempted to use Emla over the hemorrhoid on the right side.    I injected the previously I &D'd area with lidocaine 1% with epinephrine.  Attempted manual and scissor opening of the area with only small amount of tiny clots.  Lidocaine 1% with epinephrine also used to anesthetize the thrombosed hemorrhoid on the right scissors used to open the area but again only tiny clots expressed.  I also tried gentle manual compression over the area.  " Provided only temporary reduction.    I spoke with surgery.  They recommend trying ice over the area.  Ice was placed for at least 1/2 to 1-hour over the enlarged hemorrhoids, and patient got up to use the bathroom and on returning noted pain continued if not worsened.  She is so uncomfortable and I am concerned that these are not going to self reduce and she will have difficulty with any bowel movements.  Patient did finally agree to an IV, medications for discomfort.  I spoke again with surgery who very graciously accepted the patient with plans for taking her to the operating room to explore under general anesthesia.  Plan will be to admit her for pain control overnight.  This was all discussed at length with the patient and she is now comfortable with this plan.        Procedures    No results found for this or any previous visit (from the past 24 hour(s)).      Medications   lidocaine-prilocaine (EMLA) cream ( Topical Unhold 7/7/24 1713)   lidocaine 1 % 0.1-1 mL (has no administration in time range)   lidocaine (LMX4) kit (has no administration in time range)   sodium chloride (PF) 0.9% PF flush 3 mL (3 mLs Intracatheter Not Given 7/7/24 1751)   sodium chloride (PF) 0.9% PF flush 3 mL (has no administration in time range)   acetaminophen (TYLENOL) tablet 975 mg (975 mg Oral $Given 7/7/24 1729)   acetaminophen (TYLENOL) tablet 650 mg (has no administration in time range)   ondansetron (ZOFRAN ODT) ODT tab 4 mg (has no administration in time range)     Or   ondansetron (ZOFRAN) injection 4 mg (has no administration in time range)   prochlorperazine (COMPAZINE) injection 10 mg (has no administration in time range)     Or   prochlorperazine (COMPAZINE) tablet 10 mg (has no administration in time range)   senna-docusate (SENOKOT-S/PERICOLACE) 8.6-50 MG per tablet 1 tablet (1 tablet Oral $Given 7/7/24 2032)   polyethylene glycol (MIRALAX) Packet 17 g (has no administration in time range)   magnesium hydroxide (MILK  OF MAGNESIA) suspension 30 mL (has no administration in time range)   lactated ringers infusion ( Intravenous Not Given 7/7/24 1758)   HYDROmorphone (DILAUDID) injection 0.2 mg (has no administration in time range)     Or   HYDROmorphone (DILAUDID) injection 0.4 mg (has no administration in time range)   oxyCODONE (ROXICODONE) tablet 5 mg (has no administration in time range)     Or   oxyCODONE (ROXICODONE) tablet 10 mg (has no administration in time range)   ibuprofen (ADVIL/MOTRIN) tablet 600 mg (600 mg Oral $Given 7/7/24 2323)   NIFEdipine ER OSMOTIC (PROCARDIA XL) 24 hr tablet 60 mg (60 mg Oral Not Given 7/7/24 1753)   prenatal multivitamin w/iron per tablet 1 tablet (1 tablet Oral Not Given 7/7/24 1752)   naloxone (NARCAN) injection 0.2 mg (has no administration in time range)     Or   naloxone (NARCAN) injection 0.4 mg (has no administration in time range)     Or   naloxone (NARCAN) injection 0.2 mg (has no administration in time range)     Or   naloxone (NARCAN) injection 0.4 mg (has no administration in time range)   pantoprazole (PROTONIX) EC tablet 40 mg (has no administration in time range)   dibucaine (NUPERCAINAL) 1 % ointment ( Rectal $Given 7/7/24 1917)   lido-EPINEPHrine-tetracaine (LET) topical gel GEL ( Topical $Given 7/7/24 0911)   lidocaine (XYLOCAINE) 2 % external gel ( Topical $Given 7/7/24 1116)   LORazepam (ATIVAN) injection 1 mg (1 mg Intravenous $Given 7/7/24 1342)   ketorolac (TORADOL) injection 30 mg (30 mg Intravenous $Given 7/7/24 1345)   ceFAZolin Sodium (ANCEF) injection 3 g (3 g Intravenous $Given 7/7/24 1507)   heparin ANTICOAGULANT injection 5,000 Units (5,000 Units Subcutaneous $Given 7/7/24 1524)   lidocaine (LMX4) kit ( Topical $Given 7/7/24 2319)       Assessments & Plan    I have reviewed the findings, diagnosis, plan with the patient.    Current Discharge Medication List        START taking these medications    Details   lidocaine (LMX4) 4 % kit Apply topically 2 times daily  as needed for mild pain  Qty: 28 g, Refills: 3    Associated Diagnoses: Thrombosed external hemorrhoids; Thrombosed hemorrhoids; Rectal pain      oxyCODONE (ROXICODONE) 5 MG tablet Take 1 tablet (5 mg) by mouth every 4 hours as needed for moderate pain  Qty: 10 tablet, Refills: 0    Associated Diagnoses: Thrombosed external hemorrhoids; Thrombosed hemorrhoids; Rectal pain      polyethylene glycol (MIRALAX) 17 GM/Dose powder Take 17 g by mouth daily for 30 days  Qty: 510 g, Refills: 0    Associated Diagnoses: Thrombosed external hemorrhoids; Thrombosed hemorrhoids; Rectal pain             Final diagnoses:   Thrombosed hemorrhoids   Rectal pain     Disposition: Patient sent to the operating room    Note: Chart documentation done in part with Dragon Voice Recognition software. Although reviewed after completion, some word and grammatical errors may remain.   7/7/2024   [facility] [place] EMERGENCY DEPT       Joeleln Young MD  07/08/24 0152

## 2024-07-07 NOTE — ED TRIAGE NOTES
Pt presents with concerns of worsening hemorrhoids.  Pt was seen yesterday for thrombosed hemorrhoids that had clots removed.  Pt states that they are getting worse and she has not had a bowel movement in 3 days.      Triage Assessment (Adult)       Row Name 07/07/24 0759          Triage Assessment    Airway WDL WDL        Respiratory WDL    Respiratory WDL WDL        Skin Circulation/Temperature WDL    Skin Circulation/Temperature WDL WDL        Cardiac WDL    Cardiac WDL WDL        Peripheral/Neurovascular WDL    Peripheral Neurovascular WDL WDL        Cognitive/Neuro/Behavioral WDL    Cognitive/Neuro/Behavioral WDL WDL

## 2024-07-07 NOTE — ED NOTES
Patient sent to surgery, belongings placed in bag include: Clothing, shoes, wallet. Patient has cell phone in her hand.

## 2024-07-07 NOTE — OP NOTE
Procedure Date: 07/07/24    Pre-Op Diagnosis: thrombosed external hemorrhoid  Post-Op Diagnosis: Same    Procedure: Rectal Examination Under Anesthesia, incision and evacuation of thrombosed hemorrhoid  Surgeon: Rio Cai MD      EBL: 5 cc    Anesthesia: general    Indications: Lidia Gallegos presents for a rectal examination under anesthesia after she was found to have thrombosed external hemorrhoids. The indications, risks, benefits and alternatives to surgery were discussed and she understood the counseling offered and wishes to proceed.    Procedure: After informed consent was obtained, the patient was brought to the operating room and placed in prone-jackknife position. The perineum and buttocks were prepped and draped in the standard fashion, and a procedural time out performed. The external anus was examined. There were enlarged external hemorrhoids in the right lateral quadrant, left lateral quadrant, and anterior quadrant.  There appeared to be thrombosis of the left lateral hemorrhoid.  The right lateral hemorrhoid had been previously incised and drained. Digital examination was unremarkable for significant diminished tone, stricture, or palpable neoplasm. A speculum was then delivered into the anus, and circumferential evaluation of the rectum and anal canal performed. There was not evidence of significant internal hemorrhoids .    #15 scalpel was used to sharply incise the external hemorrhoid on the left lateral side.  The incision was opened up further using blunt dissection with a mosquito clamp and large amount of thrombosis was evacuated.  The wound was irrigated with sterile saline to remove any residual thrombus.  A small wedge of skin was excised over the area and discarded to allow for further drainage, the wound measured roughly 1 x 1 cm in size.  Hemostasis was achieved with electrocautery.  The previously opened external hemorrhoid on the right lateral quadrant was inspected for any  residual thrombus.  None was identified and the wound was irrigated with sterile saline.     Local anesthetic was administered. The anus was covered with gauze and mesh underwear.  The patient was allowed to recover from anesthesia and transferred stable to the recovery room. She tolerated the procedure well.    iRo Cai MD on 7/7/2024 at 5:31 PM

## 2024-07-07 NOTE — ANESTHESIA PROCEDURE NOTES
Airway       Patient location during procedure: OR       Procedure Start/Stop Times: 7/7/2024 3:21 PM  Staff -        CRNA: Sue Mcghee APRN CRNA       Performed By: CRNA  Consent for Airway        Urgency: elective  Indications and Patient Condition       Indications for airway management: diamante-procedural       Induction type:intravenous       Mask difficulty assessment: 1 - vent by mask    Final Airway Details       Final airway type: endotracheal airway       Successful airway: ETT - single and Oral  Endotracheal Airway Details        ETT size (mm): 6.5       Cuffed: yes       Successful intubation technique: video laryngoscopy       VL Blade Size: Glidescope 4       Grade View of Cords: 1       Adjucts: stylet       Position: Right       Measured from: lips       Secured at (cm): 23       Bite block used: None    Post intubation assessment        Placement verified by: capnometry, equal breath sounds and chest rise        Number of attempts at approach: 1       Number of other approaches attempted: 0       Secured with: tape       Ease of procedure: easy       Dentition: Intact and Unchanged    Medication(s) Administered   Medication Administration Time: 7/7/2024 3:21 PM

## 2024-07-08 VITALS
HEART RATE: 87 BPM | TEMPERATURE: 97.8 F | SYSTOLIC BLOOD PRESSURE: 148 MMHG | RESPIRATION RATE: 17 BRPM | DIASTOLIC BLOOD PRESSURE: 99 MMHG | BODY MASS INDEX: 38.92 KG/M2 | WEIGHT: 278 LBS | OXYGEN SATURATION: 98 % | HEIGHT: 71 IN

## 2024-07-08 PROBLEM — K64.5 THROMBOSED HEMORRHOIDS: Status: RESOLVED | Noted: 2024-07-07 | Resolved: 2024-07-08

## 2024-07-08 LAB
GLUCOSE BLDC GLUCOMTR-MCNC: 106 MG/DL (ref 70–99)
HGB BLD-MCNC: 11.5 G/DL (ref 11.7–15.7)

## 2024-07-08 PROCEDURE — 36415 COLL VENOUS BLD VENIPUNCTURE: CPT | Performed by: FAMILY MEDICINE

## 2024-07-08 PROCEDURE — 99212 OFFICE O/P EST SF 10 MIN: CPT | Mod: 24 | Performed by: FAMILY MEDICINE

## 2024-07-08 PROCEDURE — 85018 HEMOGLOBIN: CPT | Performed by: FAMILY MEDICINE

## 2024-07-08 PROCEDURE — 250N000013 HC RX MED GY IP 250 OP 250 PS 637: Performed by: STUDENT IN AN ORGANIZED HEALTH CARE EDUCATION/TRAINING PROGRAM

## 2024-07-08 PROCEDURE — 82962 GLUCOSE BLOOD TEST: CPT

## 2024-07-08 RX ORDER — LIDOCAINE/PRILOCAINE 2.5 %-2.5%
CREAM (GRAM) TOPICAL
DISCHARGE
Start: 2024-07-08

## 2024-07-08 RX ORDER — LABETALOL 200 MG/1
TABLET, FILM COATED ORAL
Qty: 30 TABLET | Refills: 0 | Status: SHIPPED | OUTPATIENT
Start: 2024-07-08

## 2024-07-08 RX ORDER — DIBUCAINE 0.28 G/28G
OINTMENT TOPICAL
DISCHARGE
Start: 2024-07-08

## 2024-07-08 RX ADMIN — DIBUCAINE: 10 OINTMENT TOPICAL at 06:00

## 2024-07-08 RX ADMIN — PRENATAL VIT W/ FE FUMARATE-FA TAB 27-0.8 MG 1 TABLET: 27-0.8 TAB at 08:09

## 2024-07-08 RX ADMIN — NIFEDIPINE 60 MG: 30 TABLET, EXTENDED RELEASE ORAL at 08:09

## 2024-07-08 RX ADMIN — PANTOPRAZOLE SODIUM 40 MG: 40 TABLET, DELAYED RELEASE ORAL at 06:45

## 2024-07-08 RX ADMIN — IBUPROFEN 600 MG: 600 TABLET, FILM COATED ORAL at 06:08

## 2024-07-08 RX ADMIN — ACETAMINOPHEN 975 MG: 325 TABLET, FILM COATED ORAL at 09:32

## 2024-07-08 RX ADMIN — ACETAMINOPHEN 975 MG: 325 TABLET, FILM COATED ORAL at 02:06

## 2024-07-08 RX ADMIN — POLYETHYLENE GLYCOL 3350 17 G: 17 POWDER, FOR SOLUTION ORAL at 08:09

## 2024-07-08 RX ADMIN — DOCUSATE SODIUM AND SENNOSIDES 1 TABLET: 8.6; 5 TABLET, FILM COATED ORAL at 08:09

## 2024-07-08 ASSESSMENT — ACTIVITIES OF DAILY LIVING (ADL)
ADLS_ACUITY_SCORE: 22

## 2024-07-08 NOTE — PLAN OF CARE
Goal Outcome Evaluation:    Patient was seen this morning by Dr Dee. Stable to discharge home. Dr Dee is unsure if patient will need follow up with general surgeon, typically will not need to. Precautions and education was provided. Patient verbalizes understanding. IV removed. Pain well controlled. Patient eating her breakfast. Voiding spontaneously. + flatus and burping. Patient's significant other on the way to drive her home.

## 2024-07-08 NOTE — DISCHARGE INSTRUCTIONS
[facility] Discharge Instructions     Discharge disposition:  Discharged to home       Diet:  High fiber diet, drink a lot of water       Activity Lifting restricted to 15 pounds  No heavy lifting, pushing, pulling for 3 week(s) due to recent   No driving or operating machinery while on narcotic analgesics  Pelvic rest: abstain from intercourse and do not use tampons for 3 week(s) - due to recent        Follow-up: Follow-up with Dr. Tse as scheduled  later this week.  Follow-up with the surgeon's recommendation.       Additional instructions: Increase water and fiber intake to prevent constipation.  Sitz bath 2-3 times a day until the hemorrhoid resolved.    Avoid aggressive wiping, may use baby wipes or manually cleaning until your hemorrhoidsresolved.  Lidocaine and Dibucaine topically as needed for hemorrhoidal pain.  Continue with stool softener daily even with normal bowel  movement; hold only if develops diarrhea.  Restarted the labetalol 0.5-1 tablet twice a day as needed to keep the blood pressure below 140/90.  Call the clinic  at  if you have any concerns or question.      Hemorrhoidectomy: What to Expect at Home  Your Recovery     After you have hemorrhoids removed, you can expect to feel better each day. Your anal area will be painful or ache for 2 to 4 weeks. And you may need pain medicine. It is common to have some light bleeding and clear or yellow fluids from your anus. This is most likely when you have a bowel movement. These symptoms may last for 1 to 2 months after surgery.  After 1 to 2 weeks, you should be able to do most of your normal activities. But don't do things that require a lot of effort. It is important to avoid heavy lifting and straining with bowel movements while you recover.  This care sheet gives you a general idea about how long it will take for you to recover. But each person recovers at a different pace. Follow  the steps below to get better as quickly as possible.  How can you care for yourself at home?  Activity    Rest when you feel tired.     Be active. Walking is a good choice.     Allow your body to heal. Don't move quickly or lift anything heavy until you are feeling better.     You may take showers and baths as usual. Pat your anal area dry when you are done.     You will probably need to take 1 to 2 weeks off work. It depends on the type of work you do and how you feel.   Diet    Follow your doctor's instructions about eating after surgery.     Start adding high-fiber foods to your diet 2 or 3 days after your surgery. This will make bowel movements easier. And it lowers the chance that you will get hemorrhoids again.     If your bowel movements are not regular right after surgery, try to avoid constipation and straining. Drink plenty of water. Your doctor may suggest fiber, a stool softener, or a mild laxative.   Medications    Your doctor will tell you if and when you can restart your medicines. He or she will also give you instructions about taking any new medicines.     If you stopped taking aspirin or some other blood thinner, your doctor will tell you when to start taking it again.     Be safe with medicines. Read and follow all instructions on the label.  If the doctor gave you a prescription medicine for pain, take it as prescribed.  If you are not taking a prescription pain medicine, ask your doctor if you can take an over-the-counter medicine.     If your doctor prescribed antibiotics, take them as directed. Do not stop taking them just because you feel better. You need to take the full course of antibiotics.     You may apply numbing medicines before and after bowel movements to relieve pain.   Other instructions    Sit in a few inches of warm water (sitz bath) for 15 to 20 minutes 3 times a day and after bowel movements. Then pat the area dry. Do this as long as you have pain in your anal area.     Avoid  sitting on the toilet for long periods of time or straining during bowel movements.     Keep your anal area clean.     Support your feet with a small step stool when you sit on the toilet. This helps flex your hips and places your pelvis in a squatting position. This can make bowel movements easier after surgery.     Use baby wipes or medicated pads, such as Tucks, instead of toilet paper after a bowel movement. These products do not irritate the anus.     If your doctor recommends it, use an over-the-counter hydrocortisone cream on the skin in your anal area. This can reduce pain and itching after surgery.     Apply ice several times a day for 10 minutes at a time.     Try lying on your stomach with a pillow under your hips to decrease swelling.   Follow-up care is a key part of your treatment and safety. Be sure to make and go to all appointments, and call your doctor if you are having problems. It's also a good idea to know your test results and keep a list of the medicines you take.  When should you call for help?   Call 911 anytime you think you may need emergency care. For example, call if:    You passed out (lost consciousness).     You are short of breath.   Call your doctor now or seek immediate medical care if:    You have signs of infection, such as:  Increased pain, swelling, warmth, or redness.  Red streaks leading from the area.  Pus draining from the area.  A fever.     You have pain that does not get better after you take your pain medicine.     You are sick to your stomach and cannot keep fluids down.     You have signs of a blood clot in your leg (called a deep vein thrombosis), such as:  Pain in your calf, back of the knee, thigh, or groin.  Redness and swelling in your leg or groin.     You cannot pass stools or gas.   Watch closely for changes in your health, and be sure to contact your doctor if you have any problems.  Where can you learn more?  Go to https://www.healthwise.net/patiented  Enter  "C417 in the search box to learn more about \"Hemorrhoidectomy: What to Expect at Home.\"  Current as of: July 26, 2023               Content Version: 14.0    4846-1617 [employer]   Care instructions adapted under license by your healthcare professional. If you have questions about a medical condition or this instruction, always ask your healthcare professional. [employer] disclaims any warranty or liability for your use of this information.      Hemorrhoids: Care Instructions  Overview     Hemorrhoids are swollen veins that develop in the anal canal. Bleeding during bowel movements, itching, and rectal pain are the most common symptoms. Hemorrhoids can be uncomfortable at times, but rarely are they a serious problem.  Most of the time, you can treat them with simple changes to your diet and bowel habits. These changes include eating more fiber and not straining to pass stools. Most hemorrhoids don't need surgery or other treatment unless they are very large and painful or bleed a lot.  Follow-up care is a key part of your treatment and safety. Be sure to make and go to all appointments, and call your doctor if you are having problems. It's also a good idea to know your test results and keep a list of the medicines you take.  How can you care for yourself at home?  Sit in a few inches of warm water (sitz bath) 3 times a day and after bowel movements. The warm water helps with pain and itching.  Put ice on your anal area several times a day for 10 minutes at a time. Put a thin cloth between the ice and your skin. Follow this by placing a warm, wet towel on the area for another 10 to 20 minutes.  Take pain medicines exactly as directed.  If the doctor gave you a prescription medicine for pain, take it as prescribed.  If you are not taking a prescription pain medicine, ask your doctor if you can take an over-the-counter medicine.  Keep the anal area clean, but be gentle. Use water and a " "fragrance-free soap, or use baby wipes or medicated pads such as Tucks.  Wear cotton underwear and loose clothing to decrease moisture in the anal area.  Eat more fiber. Include foods such as whole-grain breads and cereals, raw vegetables, raw and dried fruits, and beans.  Drink plenty of fluids. If you have kidney, heart, or liver disease and have to limit fluids, talk with your doctor before you increase the amount of fluids you drink.  Use a stool softener that contains bran or psyllium. You can save money by buying bran or psyllium (available in bulk at most health food stores) and sprinkling it on foods or stirring it into fruit juice. Or you can use a product such as Metamucil or Hydrocil.  Practice healthy bowel habits.  Go to the bathroom as soon as you have the urge.  Avoid straining to pass stools. Relax and give yourself time to let things happen naturally.  Do not hold your breath while passing stools.  Do not read while sitting on the toilet. Get off the toilet as soon as you have finished.  Take your medicines exactly as prescribed. Call your doctor if you think you are having a problem with your medicine.  When should you call for help?   Call 911 anytime you think you may need emergency care. For example, call if:    You pass maroon or very bloody stools.   Call your doctor now or seek immediate medical care if:    You have increased pain.     You have increased bleeding.   Watch closely for changes in your health, and be sure to contact your doctor if:    Your symptoms have not improved after 3 or 4 days.   Where can you learn more?  Go to https://www.[facility] [facility].net/patiented  Enter F228 in the search box to learn more about \"Hemorrhoids: Care Instructions.\"  Current as of: October 19, 2023               Content Version: 14.0    2159-3553 [employer]   Care instructions adapted under license by your healthcare professional. If you have questions about a medical condition or this " instruction, always ask your healthcare professional. [employer] disclaims any warranty or liability for your use of this information.

## 2024-07-08 NOTE — PROGRESS NOTES
At 0815 Dr Dee at bedside to discharge patient home. Educated on importance of taking stool softeners and Miralax, to only holding them if she has diarrhea. To take sitz bath at least twice daily. To keep the rectal area clean. Has Emla cream and Dibucaine as ordered to continue to use with gauze. Patient to stop taking iron. Continue taking PNV. All of patient's questions were answered. Feels confident in discharging home. Patient to call her significant other for a ride.

## 2024-07-08 NOTE — DISCHARGE SUMMARY
[facility]    Discharge Summary    Date of Admission:  2024  Date of Discharge:  2024  Discharging Provider: Mackenzie Neal Mai, MD  Date of Service (when I saw the patient): 24    Discharge Diagnoses   Thrombosed external hemorrhoid  Rectal pain due to hemorrhoid   Recent history of   Pregnancy-induced hypertension  Anemia secondary to blood loss from     History of Present Illness   Lidia Gallegos is an 29 year old female who presented to the ED yesterday for worsening of rectal pain.  She was seen in the ER on 24 for rectal pain and was diagnosed with external thrombosed hemorrhoid.  Bedside I&D and had significant relief from it.  Return to the ER again yesterday for worsening of the rectal pain.  General surgery was consulted, she was taken the OR for sedated if I&D of the hemorrhoid.  No complication with the procedure.  He was admitted overnight for pain control and observation.      Hospital Course   She did well overnight.  Pain is well-controlled.  Minimal bleeding.  No fever or chills.  Tolerating oral intake well.  Blood pressure overall was stable.  She is ready to go home.    Mackenzie Neal Mai, MD    Significant Results and Procedures   None    Pending Results   These results will be followed up by Mackenzie Dee MD.    Unresulted Labs Ordered in the Past 30 Days of this Admission       No orders found for last 31 day(s).            Code Status   Full Code       Primary Care Physician   Deion Price    Physical Exam   Temp: 97.8  F (36.6  C) Temp src: Oral BP: (!) 148/99 Pulse: 87   Resp: 17 SpO2: 98 % O2 Device: None (Room air) Oxygen Delivery: 8 LPM  Vitals:    24 0801   Weight: 126.1 kg (278 lb)     Vital Signs with Ranges  Temp:  [97.8  F (36.6  C)-98.9  F (37.2  C)] 97.8  F (36.6  C)  Pulse:  [] 87  Resp:  [16-22] 17  BP: (121-156)/() 148/99  SpO2:  [97 %-100 %] 98 %  I/O last 3 completed shifts:  In: 930 [P.O.:530;  I.V.:400]  Out: 1280 [Urine:1275; Blood:5]    Constitutional: Alert, pleasant, no acute distress.  Respiratory: Clear, no wheezes or crackle.  Cardiovascular: Regular rate and rhythm no murmur.  GI: Abdomen was soft, nondistended, nontender with normal bowel sounds.  Rectum: Slightly elevated irritated external hemorrhoid noted.  No active bleeding.  Musculoskeletal: No leg swelling.      Discharge Disposition   Discharged to home  Condition at discharge: Good    Consultations This Hospital Stay   Surgery     Time Spent on this Encounter   Mackenzie TRAVIS Mai, MD, personally saw the patient today and spent less than or equal to 30 minutes discharging this patient.    Discharge Orders      Reason for your hospital stay    You were admitted after undergoing incision and drainage of thrombosed external hermorrhoids     Follow-up and recommended labs and tests     Follow  up with colorectal surgery in 2-3 weeks for assessment and possible further treatment of hemorrhoids     Activity    Your activity upon discharge: no lifting more than 20 lbs for 2 weeks     Wound care and dressings    Instructions to care for your wound at home:   The hemorrhoids were left open to allow for drainage and prevent pressure from accumulating. You should take a sitz bath (soak in warm water for about 15 minutes) twice daily to keep the area clean  You do not need to pack the wounds. You  can cover the area with gauze to catch any drainage and change the dressing daily or more frequently if they get dirty.     Diet    Follow this diet upon discharge: Orders Placed This Encounter      Regular Diet Adult     Discharge Medications   Current Discharge Medication List        START taking these medications    Details   dibucaine (NUPERCAINAL) 1 % external ointment Apply to the rectum 3 x a day as needed for hemorrhoids    Associated Diagnoses: Thrombosed external hemorrhoids      lidocaine (LMX4) 4 % kit Apply topically 2 times daily as needed for  mild pain  Qty: 28 g, Refills: 3    Associated Diagnoses: Thrombosed external hemorrhoids; Thrombosed hemorrhoids; Rectal pain      lidocaine-prilocaine (EMLA) 2.5-2.5 % external cream Apply topically every 2 hours as needed for moderate pain    Associated Diagnoses: Thrombosed external hemorrhoids      oxyCODONE (ROXICODONE) 5 MG tablet Take 1 tablet (5 mg) by mouth every 4 hours as needed for moderate pain  Qty: 10 tablet, Refills: 0    Associated Diagnoses: Thrombosed external hemorrhoids; Thrombosed hemorrhoids; Rectal pain      polyethylene glycol (MIRALAX) 17 GM/Dose powder Take 17 g by mouth daily for 30 days  Qty: 510 g, Refills: 0    Associated Diagnoses: Thrombosed external hemorrhoids; Thrombosed hemorrhoids; Rectal pain           CONTINUE these medications which have CHANGED    Details   labetalol (NORMODYNE) 200 MG tablet Take 0.5-1 tablet twice a day to keep BP below 140/90  Qty: 30 tablet, Refills: 0    Associated Diagnoses: Pregnancy induced hypertension, antepartum; Severe pre-eclampsia in third trimester           CONTINUE these medications which have NOT CHANGED    Details   acetaminophen (TYLENOL) 500 MG tablet Take 2 tablets (1,000 mg) by mouth every 6 hours as needed for mild pain    Associated Diagnoses: S/P  section      ibuprofen (ADVIL/MOTRIN) 800 MG tablet Take 1 tablet (800 mg) by mouth every 6 hours  Qty: 90 tablet, Refills: 1    Associated Diagnoses: S/P  section      NIFEdipine ER OSMOTIC (ADALAT CC) 60 MG 24 hr tablet Take 1 tablet (60 mg) by mouth daily  Qty: 30 tablet, Refills: 1    Associated Diagnoses: Pregnancy induced hypertension, antepartum; Severe pre-eclampsia in third trimester      omeprazole (PRILOSEC) 40 MG DR capsule Take 1 capsule (40 mg) by mouth daily  Qty: 90 capsule, Refills: 1    Associated Diagnoses: Gastroesophageal reflux disease with esophagitis without hemorrhage      Prenatal Vit-Fe Fumarate-FA (PRENATAL MULTIVITAMIN W/IRON) 27-0.8 MG tablet  Take 1 tablet by mouth daily      senna-docusate (SENOKOT-S/PERICOLACE) 8.6-50 MG tablet Take 1 tablet by mouth 2 times daily  Qty: 30 tablet, Refills: 1    Associated Diagnoses: S/P  section      fluticasone (FLONASE) 50 MCG/ACT nasal spray Spray 1 spray into both nostrils daily  Qty: 16 g, Refills: 1    Associated Diagnoses: Nasal congestion           STOP taking these medications       ferrous sulfate (FEROSUL) 325 (65 Fe) MG tablet Comments:   Reason for Stopping:             Allergies   No Known Allergies  Data   Results for orders placed or performed during the hospital encounter of 24   Glucose by meter     Status: Abnormal   Result Value Ref Range    GLUCOSE BY METER POCT 106 (H) 70 - 99 mg/dL

## 2024-07-08 NOTE — ANESTHESIA POSTPROCEDURE EVALUATION
Patient: Lidia Gallegos    Procedure: Procedure(s):  INCISION and drainage of HEMORRHOID, THROMBOSED, EXTERNAL       Anesthesia Type:  General    Note:  Disposition: Outpatient   Postop Pain Control: Uneventful            Sign Out: Well controlled pain   PONV: No   Neuro/Psych: Uneventful            Sign Out: Acceptable/Baseline neuro status   Airway/Respiratory: Uneventful            Sign Out: Acceptable/Baseline resp. status   CV/Hemodynamics: Uneventful            Sign Out: Acceptable CV status   Other NRE: NONE   DID A NON-ROUTINE EVENT OCCUR? No    Event details/Postop Comments:  Pt was happy with anesthesia care.  No complications.  I will follow up with the pt if needed.           Last vitals:  Vitals Value Taken Time   /100 07/07/24 1645   Temp 97.8  F (36.6  C) 07/07/24 1615   Pulse     Resp 22 07/07/24 1645   SpO2 100 % 07/07/24 1645       Electronically Signed By: SOFYA Clark CRNA  July 8, 2024  11:33 AM

## 2024-07-08 NOTE — PROGRESS NOTES
S-(situation): Patient discharged to home via ambulatory with spouse Robles    B-(background): Observation goals met on 7/8    A-(assessment): patient stable post hemorrhoidectomy    R-(recommendations): Discharge instructions reviewed with patient. Listed belongings gathered and stayed with patient.  Patient Education resolved: Yes  New medications-Pt. Has been educated about reason of use and side effects Yes  Home medications returned to patient NA  Medication Bin checked and emptied on discharge Yes

## 2024-07-08 NOTE — PROGRESS NOTES
[facility] Post-Op Note         Assessment and Plan:    Assessment:   Post-operative day #1  Procedure(s):  INCISION and drainage of HEMORRHOID, THROMBOSED, EXTERNAL  Doing well.  Normal healing wound.  No excessive bleeding  Pain well-controlled.       Plan:   Discharge home today.  Orders written per Dr. Dee  Can follow up with general practitioner or OB  Continue use of stool softeners - Currently using Miralax  Avoid use of opioids for pain as they can cause constipation.  Tylenol is preferred.  OK to use over the counter lidocaine ointment/creams if needed.             Interval History:     Doing well.  Continues to improve.  Pain is well-controlled.  No fevers.  Patient reports noticeable pain control after surgical I+D than prior to procedure.  She reports no excessive bleeding.  She is having some BM results this am.             Physical Exam:   All vitals have been reviewed  Patient Vitals for the past 8 hrs:   BP Temp Temp src Pulse Resp SpO2   07/08/24 0608 (!) 148/99 -- -- -- -- 98 %   07/08/24 0603 -- 97.8  F (36.6  C) Oral -- 17 --   07/08/24 0203 138/81 98.3  F (36.8  C) Oral 87 18 98 %     I/O last 3 completed shifts:  In: 930 [P.O.:530; I.V.:400]  Out: 1280 [Urine:1275; Blood:5]  # Pain Assessment:      7/8/2024     7:00 AM   Current Pain Score   Patient currently in pain? no   - Lidia is experiencing pain due to thrombosed hemorrhoid and I+D procedure. Pain management was discussed and the plan was created in a collaborative fashion.  Lidia's response to the current recommendations: compliant  - Please see the plan for pain management as documented above    Minimal bleeding from surgical procedure           Data:   All laboratory/imaging data related to this surgery reviewed  Results for orders placed or performed during the hospital encounter of 07/07/24 (from the past 24 hour(s))   Glucose by meter   Result Value Ref Range    GLUCOSE BY METER POCT 106 (H) 70 - 99 mg/dL         Allison Linn PA-C

## 2024-07-08 NOTE — PLAN OF CARE
"Goal Outcome Evaluation:  Vitally stable. Pt has been ambulating hallways independently and frequently with steady gait. Took sitz bath this evening. Utilized dibucaine on gauze pad between buttocks. Has scant amount of bleeding at procedure area, and swelling. Voids adequately, taking in oral fluids well, had toast and jello this evening with no nausea/emesis. Bowels active in all 4 quadrants. Passing flatus. Pain is described as \"discomfort\" and \"better than before\". Dafne Lujan RN on 7/7/2024 at 10:20 PM      "

## 2024-07-08 NOTE — PLAN OF CARE
"Goal Outcome Evaluation:    Day 1 post hemorrhoidectomy. BP mildly elevated this morning, is scheduled for daily Nifedipine this AM. Afebrile. Pain has been minimal and patient states she is feeling so much better than before the procedure. Scant bleeding noted on peripad. Dibucaine and gauze to rectum area, slight swelling noted. + flatus. No BM yet since surgery, she feels she \"is getting closer to being able to go.\" Tolerating regular diet. She is hopeful to be able to discharge to home this morning.     "

## 2024-07-09 ENCOUNTER — MYC MEDICAL ADVICE (OUTPATIENT)
Dept: FAMILY MEDICINE | Facility: CLINIC | Age: 29
End: 2024-07-09
Payer: COMMERCIAL

## 2024-07-09 ENCOUNTER — TELEPHONE (OUTPATIENT)
Dept: FAMILY MEDICINE | Facility: CLINIC | Age: 29
End: 2024-07-09
Payer: COMMERCIAL

## 2024-07-12 ENCOUNTER — ALLIED HEALTH/NURSE VISIT (OUTPATIENT)
Dept: FAMILY MEDICINE | Facility: CLINIC | Age: 29
End: 2024-07-12
Payer: COMMERCIAL

## 2024-07-12 VITALS — DIASTOLIC BLOOD PRESSURE: 76 MMHG | SYSTOLIC BLOOD PRESSURE: 113 MMHG | HEART RATE: 99 BPM

## 2024-07-12 DIAGNOSIS — O14.10 SEVERE PRE-ECLAMPSIA, ANTEPARTUM: Primary | ICD-10-CM

## 2024-07-12 PROCEDURE — 99207 PR NO CHARGE NURSE ONLY: CPT

## 2024-07-12 NOTE — PROGRESS NOTES
Lidia Gallegos is a 29 year old patient who comes in today for a Blood Pressure check.  Initial BP:  /76   Pulse 99   LMP 10/02/2023      99  Disposition: results routed to provider  Pia bazan

## 2024-07-17 ENCOUNTER — MYC REFILL (OUTPATIENT)
Dept: FAMILY MEDICINE | Facility: CLINIC | Age: 29
End: 2024-07-17
Payer: COMMERCIAL

## 2024-07-17 DIAGNOSIS — Z98.891 S/P CESAREAN SECTION: ICD-10-CM

## 2024-07-17 RX ORDER — AMOXICILLIN 250 MG
1 CAPSULE ORAL 2 TIMES DAILY
Qty: 180 TABLET | Refills: 2 | Status: SHIPPED | OUTPATIENT
Start: 2024-07-17 | End: 2024-07-17

## 2024-07-17 RX ORDER — AMOXICILLIN 250 MG
1 CAPSULE ORAL 2 TIMES DAILY
Qty: 180 TABLET | Refills: 2 | Status: SHIPPED | OUTPATIENT
Start: 2024-07-17

## 2024-07-19 RX ORDER — IBUPROFEN 800 MG/1
800 TABLET, FILM COATED ORAL EVERY 6 HOURS
Qty: 90 TABLET | Refills: 0 | Status: SHIPPED | OUTPATIENT
Start: 2024-07-19

## 2024-07-24 ENCOUNTER — MYC MEDICAL ADVICE (OUTPATIENT)
Dept: FAMILY MEDICINE | Facility: CLINIC | Age: 29
End: 2024-07-24
Payer: COMMERCIAL

## 2024-07-24 ENCOUNTER — MEDICAL CORRESPONDENCE (OUTPATIENT)
Dept: HEALTH INFORMATION MANAGEMENT | Facility: CLINIC | Age: 29
End: 2024-07-24
Payer: COMMERCIAL

## 2024-07-24 NOTE — TELEPHONE ENCOUNTER
Please advise as covering provider- would this be okay to just do a nurse visit, or do you recommend it still to be with a provider?

## 2024-08-09 ENCOUNTER — ALLIED HEALTH/NURSE VISIT (OUTPATIENT)
Dept: FAMILY MEDICINE | Facility: CLINIC | Age: 29
End: 2024-08-09
Payer: COMMERCIAL

## 2024-08-09 VITALS — SYSTOLIC BLOOD PRESSURE: 118 MMHG | DIASTOLIC BLOOD PRESSURE: 80 MMHG | HEART RATE: 70 BPM

## 2024-08-09 DIAGNOSIS — I10 HIGH BLOOD PRESSURE: Primary | ICD-10-CM

## 2024-08-09 PROCEDURE — 99207 PR NO CHARGE NURSE ONLY: CPT

## 2024-08-09 NOTE — PROGRESS NOTES
Lidia Gallegos is a 29 year old patient who comes in today for a Blood Pressure check.  Initial BP:  /80   Pulse 70   LMP 10/02/2023      70  Disposition: results routed to provider         Patient was wondering if she is needing to get other blood tests done after her surgeries and since it's been a little while since her last blood draws. She states last time she saw Dr. Dee, he stated that he was going to order a more in depth panel this time just to make sure everything was okay. Patient had to get going so was unable to wait for provider to become available, but let patient know we would MyChart message/call her to let her know of his response.           Suze Luke LPN

## 2024-08-12 NOTE — PROGRESS NOTES
Her blood pressure is now normal.  Looks like she stopped taking the labetalol and the Procardia, recheck to be sure that it is the case.  If not, I recommend to stop the labetalol and monitor her blood pressure.  If it persistently below 140/90 then she may stop the Procardia.  If the blood pressure is persistently above 140/90 then please let me know.  The goal for her blood pressure to be less than 140/90.    No lab is needed today.  Look like she is scheduled to see Dr. Bangura on September 12.  Dr. Bangura will likely be ordering lab, especially with a hemoglobin A1c.    Thank you

## 2024-08-21 ENCOUNTER — NURSE TRIAGE (OUTPATIENT)
Dept: NURSING | Facility: CLINIC | Age: 29
End: 2024-08-21
Payer: COMMERCIAL

## 2024-08-21 ENCOUNTER — HOSPITAL ENCOUNTER (EMERGENCY)
Facility: CLINIC | Age: 29
Discharge: HOME | End: 2024-08-21
Attending: STUDENT IN AN ORGANIZED HEALTH CARE EDUCATION/TRAINING PROGRAM | Admitting: STUDENT IN AN ORGANIZED HEALTH CARE EDUCATION/TRAINING PROGRAM
Payer: COMMERCIAL

## 2024-08-21 VITALS
TEMPERATURE: 98.1 F | HEIGHT: 72 IN | OXYGEN SATURATION: 96 % | SYSTOLIC BLOOD PRESSURE: 145 MMHG | RESPIRATION RATE: 18 BRPM | DIASTOLIC BLOOD PRESSURE: 95 MMHG | WEIGHT: 270 LBS | HEART RATE: 86 BPM | BODY MASS INDEX: 36.57 KG/M2

## 2024-08-21 DIAGNOSIS — L76.82 INCISIONAL PAIN: ICD-10-CM

## 2024-08-21 PROCEDURE — 99283 EMERGENCY DEPT VISIT LOW MDM: CPT | Performed by: STUDENT IN AN ORGANIZED HEALTH CARE EDUCATION/TRAINING PROGRAM

## 2024-08-21 PROCEDURE — 99282 EMERGENCY DEPT VISIT SF MDM: CPT

## 2024-08-21 ASSESSMENT — COLUMBIA-SUICIDE SEVERITY RATING SCALE - C-SSRS
1. IN THE PAST MONTH, HAVE YOU WISHED YOU WERE DEAD OR WISHED YOU COULD GO TO SLEEP AND NOT WAKE UP?: NO
6. HAVE YOU EVER DONE ANYTHING, STARTED TO DO ANYTHING, OR PREPARED TO DO ANYTHING TO END YOUR LIFE?: NO
2. HAVE YOU ACTUALLY HAD ANY THOUGHTS OF KILLING YOURSELF IN THE PAST MONTH?: NO

## 2024-08-22 NOTE — DISCHARGE INSTRUCTIONS
Your exam today is very reassuring.  The incision appears well-healing and I do not see any signs of infection.  It is also very reassuring that you do not have deeper abdominal pain/tenderness.    You may be experiencing some nerve pain from the incision or you could potentially be getting your first period after the delivery.    We discussed potentially doing some blood work/urine testing, but I do think it is reasonable to monitor symptoms closely at home.    Try some Tylenol/ibuprofen and heat to the area.  Follow-up with your primary doctor.  Return to the emergency department immediately for worsened/sustained pain, any fever, urinary symptoms, vomiting, other new or worsening symptoms.

## 2024-08-22 NOTE — ED TRIAGE NOTES
"Patient here with  incision pain. She states it feels like cramping \"but worse\". Patient is 2 months post partum and has not had a period as of yet. She is not breast feeding.        "

## 2024-08-22 NOTE — ED PROVIDER NOTES
History     Chief Complaint   Patient presents with    Abdominal Pain     HPI  Lidia Gallegos is a 29 year old female with history of diabetes, depression, obesity, recent  delivery who presents for evaluation of lower abdominal/incision pain.  Patient underwent  delivery on .  She had some postop anemia but the procedure was otherwise uncomplicated.  The surgical incision has been very well-healing and she has not had any issues since.  Patient has not yet had a menstrual cycle since delivery.  Starting tonight around 11 PM she developed some superficial discomfort over the incision site.  She denies any recent injury or strain to the area.  The flares of pain, without obvious cause and usually last for about 30 seconds before improving.  Patient describes that the pain feels like menstrual cramps, but found it odd that they were so superficial in nature.  She otherwise denies any fever, chills, nausea or vomiting, vaginal discharge/pain, changes in bowel or urinary habits, other complaints today.    Allergies:  No Known Allergies    Problem List:    Patient Active Problem List    Diagnosis Date Noted    Thrombosed external hemorrhoids 2024     Priority: Medium    Rectal pain 2024     Priority: Medium    Preeclampsia, severe 2024     Priority: Medium    Abnormal genetic test during pregnancy - NIPS showed XYY 2024     Priority: Medium    Rh negative status during pregnancy 2024     Priority: Medium    High-risk pregnancy 2024     Priority: Medium    Morbid obesity (H) 2024     Priority: Medium    Gastroesophageal reflux disease with esophagitis without hemorrhage 2023     Priority: Medium    Major depressive disorder, recurrent episode, moderate (H) 2023     Priority: Medium    Autism spectrum disorder 2023     Priority: Medium    Type 2 diabetes mellitus with hyperglycemia, without long-term current use of insulin (H) 2023      Priority: Medium    Papanicolaou smear of cervix with low grade squamous intraepithelial lesion (LGSIL) 2021     Priority: Medium     18 NIL pap  21 LSIL pap  21 Ira bx: LUCERO 1  - [facility]  23 NIL Pap, Neg HR HPV @ 27 yo. Plan: cotest in 3 years.         Chronic bilateral low back pain without sciatica 2016     Priority: Medium      Past Medical History:    Past Medical History:   Diagnosis Date    Anxiety     Autism     Depression     History of anemia      Past Surgical History:    Past Surgical History:   Procedure Laterality Date     SECTION N/A 2024    Procedure: PRIMARY LOW TRANSVERSE  SECTION;  Surgeon: Mackenzie Dee MD;  Location: [facility]    INNER EAR SURGERY Right     age 2    WISDOM TOOTH EXTRACTION       Family History:    Family History   Problem Relation Age of Onset    No Known Problems Mother     Hypertension Father     Mental Illness Sister         bipolar    Attention Deficit Disorder Sister     Birth defects Brother         bladder - reportedly related to nuchal cord    Cancer Maternal Grandfather     Diabetes Paternal Grandmother     Diabetes Paternal Grandfather     Kidney failure Paternal Grandfather      Social History:  Marital Status:   [2]  Social History     Tobacco Use    Smoking status: Former     Current packs/day: 0.00     Types: Cigarettes     Quit date: 2022     Years since quittin.6    Smokeless tobacco: Never   Vaping Use    Vaping status: Never Used   Substance Use Topics    Alcohol use: Not Currently     Comment: occ    Drug use: Never      Medications:    acetaminophen (TYLENOL) 500 MG tablet  dibucaine (NUPERCAINAL) 1 % external ointment  fluticasone (FLONASE) 50 MCG/ACT nasal spray  ibuprofen (ADVIL/MOTRIN) 800 MG tablet  labetalol (NORMODYNE) 200 MG tablet  lidocaine (LMX4) 4 % kit  lidocaine-prilocaine (EMLA) 2.5-2.5 % external cream  NIFEdipine ER OSMOTIC (ADALAT CC) 60 MG 24 hr tablet  omeprazole  (PRILOSEC) 40 MG DR capsule  oxyCODONE (ROXICODONE) 5 MG tablet  Prenatal Vit-Fe Fumarate-FA (PRENATAL MULTIVITAMIN W/IRON) 27-0.8 MG tablet  senna-docusate (SENOKOT-S/PERICOLACE) 8.6-50 MG tablet      Review of Systems   All other systems reviewed and are negative.  See HPI.    Physical Exam   BP: (!) 145/99  Pulse: 86  Temp: 98.1  F (36.7  C)  Resp: 18  Height: 182.9 cm (6')  Weight: 122.5 kg (270 lb)  SpO2: 96 %    Physical Exam  Vitals and nursing note reviewed.   Constitutional:       General: She is not in acute distress.     Appearance: Normal appearance. She is obese. She is not diaphoretic.      Comments: Appears nontoxic.  No acute distress.   HENT:      Head: Atraumatic.      Mouth/Throat:      Mouth: Mucous membranes are moist.   Eyes:      General: No scleral icterus.     Conjunctiva/sclera: Conjunctivae normal.   Cardiovascular:      Rate and Rhythm: Normal rate and regular rhythm.      Heart sounds: Normal heart sounds.   Pulmonary:      Effort: No respiratory distress.      Breath sounds: Normal breath sounds.   Abdominal:      General: Abdomen is flat. There is no distension.      Tenderness: There is no abdominal tenderness. There is no guarding or rebound. Negative signs include McBurney's sign.      Comments: Patient has a well-healing horizontal surgical incision to the lower abdomen/suprapubic region.  No evidence of dehiscence.  No erythema, warmth, or discharge.  No areas of fluctuance or induration.  Separately, patient does not have any deeper tenderness to palpation over the lower abdomen.  No rebound or guarding.   Musculoskeletal:      Cervical back: Neck supple.   Skin:     General: Skin is warm.      Capillary Refill: Capillary refill takes less than 2 seconds.      Findings: No rash.   Neurological:      General: No focal deficit present.      Mental Status: She is alert and oriented to person, place, and time.   Psychiatric:         Mood and Affect: Mood normal.       ED Course         Procedures            No results found for this or any previous visit (from the past 24 hour(s)).    Medications - No data to display    Assessments & Plan (with Medical Decision Making)     I have reviewed the nursing notes.    I have reviewed the findings, diagnosis, plan and need for follow up with the patient.  Medical Decision Making  Lidia Gallegos is a 29 year old female with history of diabetes, depression, obesity, recent  delivery who presents for evaluation of lower abdominal/incision pain.  Mild hypertension on arrival, though this improved spontaneously.  Exam is very reassuring.  The surgical incision from her  appears very well-healing.  No signs of infection or dehiscence.  She does not have any particular tenderness to the incision itself or deeper in the abdomen.  No upper abdominal tenderness either.  The cause of her discomfort is unclear.  She is adamant that the pain is very superficial in nature and she also denies any systemic/infectious symptoms currently.  We discussed possible etiologies including nerve related pain from the surgery/incision, possible return of menstrual cycle and associated cramps.  I doubt more sinister intra-abdominal causes like appendicitis, postpartum preeclampsia, or even a UTI given nontender exam, but we did discuss potentially performing some blood work and a urinalysis.  Patient instead opted for close monitoring of symptoms at home, which I think is very reasonable since she is now pain-free, nontender.  Advised Tylenol/ibuprofen.  Patient will follow-up with her PCP and agrees to return sooner for any new or worsening symptoms.    Discharge Medication List as of 2024 11:51 PM        Final diagnoses:   Incisional pain     2024   [facility] EMERGENCY DEPT       Candelario Bob MD  [phone]

## 2024-08-22 NOTE — TELEPHONE ENCOUNTER
Patient calling reports being 2 months postpartum with an episode of intense pain at the incision site. Reports this intense pain lasted for a few minutes but has subsided with pain rated 2/10. Denies gaping wound, bleeding and rash. Advised to see a provider within 24 hours with patient agreeable to the plan.     Lidia Cash RN 24 11:03 PM   [facility] Triage Nurse Advisor        Reason for Disposition   [1] 48 hours since surgery AND [2] wound is becoming more tender    Additional Information   Negative: [1] Wound gaping open AND [2] visible internal organs   Negative: Sounds like a life-threatening emergency to the triager   Negative: [1] Bleeding from incision AND [2] won't stop after 10 minutes of direct pressure (using correct technique)   Negative: [1] Widespread rash AND [2] bright red, sunburn-like   Negative: Severe pain in the incision   Negative: [1] Incision gaping open AND [2] < 48 hours since wound re-opened   Negative: [1] Incision gaping open AND [2] length of opening > 2 inches (5 cm)   Negative: Patient sounds very sick or weak to the triager   Negative: Incision looks infected (spreading redness, pain)   Negative: [1] Incision looks infected (spreading redness, pain) AND [2] large red area (> 2 in. or 5 cm)   Negative: Pus or bad-smelling fluid draining from incision   Negative: Fever 100.4 F (38.0 C) or higher   Negative: Dressing soaked with blood or body fluid (e.g., drainage)   Negative: [1] Caller has URGENT question AND [2] triager unable to answer question   Negative: Suture or staple removal is overdue   Negative: [1] Small red area or streak AND [2] no fever   Negative: [1] Clear or blood-tinged fluid draining from wound AND [2] no fever    Protocols used: Postpartum -  Incision Symptoms-A-AH

## 2024-08-24 ENCOUNTER — MYC MEDICAL ADVICE (OUTPATIENT)
Dept: FAMILY MEDICINE | Facility: CLINIC | Age: 29
End: 2024-08-24
Payer: COMMERCIAL

## 2024-09-12 ENCOUNTER — PRENATAL OFFICE VISIT (OUTPATIENT)
Dept: FAMILY MEDICINE | Facility: CLINIC | Age: 29
End: 2024-09-12
Payer: COMMERCIAL

## 2024-09-12 VITALS
TEMPERATURE: 98.1 F | HEART RATE: 69 BPM | DIASTOLIC BLOOD PRESSURE: 72 MMHG | RESPIRATION RATE: 20 BRPM | BODY MASS INDEX: 39.22 KG/M2 | SYSTOLIC BLOOD PRESSURE: 106 MMHG | WEIGHT: 274 LBS | OXYGEN SATURATION: 96 % | HEIGHT: 70 IN

## 2024-09-12 DIAGNOSIS — Z30.09 STERILIZATION CONSULT: ICD-10-CM

## 2024-09-12 DIAGNOSIS — E11.65 TYPE 2 DIABETES MELLITUS WITH HYPERGLYCEMIA, WITHOUT LONG-TERM CURRENT USE OF INSULIN (H): ICD-10-CM

## 2024-09-12 PROCEDURE — 99207 PR POST PARTUM EXAM: CPT | Performed by: FAMILY MEDICINE

## 2024-09-12 RX ORDER — ACETAMINOPHEN AND CODEINE PHOSPHATE 120; 12 MG/5ML; MG/5ML
0.35 SOLUTION ORAL DAILY
Qty: 84 TABLET | Refills: 3 | Status: SHIPPED | OUTPATIENT
Start: 2024-09-12

## 2024-09-12 ASSESSMENT — PAIN SCALES - GENERAL: PAINLEVEL: NO PAIN (0)

## 2024-09-12 NOTE — PROGRESS NOTES
"Lidia is here for a 6-week postpartum checkup.    She had a  of a viable boy, weight 8 pounds 9 oz., with Gestational Diabetes - diet controlled, Preeclampsia complications. Date of delivery was 24. Since delivery, she has not been breast feeding.  She has No signs of infection, bleeding or other complications.  She is not pregnant.  We discussed contraceptions and she has chosen tubal ligation.      Post partum tubal: No  History of Gestational Diabetes? Yes  Type of Delivery:    Feeding Method:  Formula  If initiated breast feeding and stopped, how long did you breast feed?:  attempted for 1.5-2 months    REVIEW OF SYSTEMS:  Ears/Nose/Throat: negative  Respiratory: negative  Cardiovascular: negative  Gastrointestinal: negative  Genitourinary: negative  Musculoskeletal: negative    Neurologic: negative   Skin: negative   Endocrine:  negative  Vagina: negative  Cervix: negative  Breasts: negative  Vulva: negative  Episiotomy: negative    Contraception Plan: tubal ligation: Patient desires tubal ligation for ultimate birth control.  We discussed the risk benefits and alternatives of tubal ligation today.  She is very secure in her decision and is amenable to bridge method with OCPs    Medical History Reviewed yes -GDM a: Patient had declined meds during delivery and during her prenatal period.  She is amenable to retesting but does not want to GTT.  We did discuss doing a an A1c in approximately 1 month  Family History Reviewed yes -no changes  Problem List Updated yes -no changes    EXAM:  /72   Pulse 69   Temp 98.1  F (36.7  C) (Temporal)   Resp 20   Ht 1.778 m (5' 10\")   Wt 124.3 kg (274 lb)   LMP 2024 (Approximate)   SpO2 96%   Breastfeeding No   BMI 39.31 kg/m    HEENT: grossly normal.  NECK: no lymphadenopathy or thyroidomegaly.  LUNGS: CTA X 2, no rales or crackles.  BACK: No spinal or CVA tenderness.  HEART: RRR without murmurs clicks or gallops.  ABDOMEN: soft, " non tender, good bowel sounds, without masses rebound, guarding or tenderness.  Pfannenstiel incision appears clean dry and intact  PELVIC: Deferred    EXTREMITIES:  warm to touch, good pulses, no ankle edema or calf tenderness.  NEUROLOGIC: grossly normal.    ASSESSMENT:   6-week postpartum exam after c/s for maternal complications.    PLAN:    Screening for chronic diabetes mellitus in approximately 1 month.  Patient also did be referred to gynecology for evaluation for possible BTL.  She is very secure in this decision.  She had a very high risk pregnancy and does not desire to do this again.  She also had a primary  section during her delivery and has a lot of feelings regarding her prenatal care..  Given her high risk she would appreciate evaluation for bilateral tubal ligation.  She does not desire to labor again nor does she desire to be pregnant again.  The risk benefits and alternatives of bilateral tubal ligation plus or minus appendectomy were discussed with the patient at length.  She is aware that it would be a laparoscopic procedure.  She is also aware that she will need referral to gynecology and allowing surgeon to perform this procedure for her.  We would love to support her in any way possible going forward to obtain the contraceptive method that she so much desires.  For now she appreciates a bridge method with a oral contraceptive.  Given that she has hypertension and a possible evolution to chronic hypertension outside of pregnancy we did discuss doing a progesterone only method and prescription has been sent    One-hour glucose tolerance test needed?  Patient declining GTT and is amenable to A1c in approximately 1 month  Desires tubal ligation for contraception.  Rx sent today for bridge method with progestin only contraceptive

## 2024-10-09 ENCOUNTER — OFFICE VISIT (OUTPATIENT)
Dept: FAMILY MEDICINE | Facility: CLINIC | Age: 29
End: 2024-10-09
Payer: COMMERCIAL

## 2024-10-09 VITALS
TEMPERATURE: 97.8 F | HEART RATE: 86 BPM | BODY MASS INDEX: 38.71 KG/M2 | WEIGHT: 270.4 LBS | DIASTOLIC BLOOD PRESSURE: 80 MMHG | RESPIRATION RATE: 20 BRPM | OXYGEN SATURATION: 97 % | SYSTOLIC BLOOD PRESSURE: 106 MMHG | HEIGHT: 70 IN

## 2024-10-09 DIAGNOSIS — M51.369 DEGENERATION OF INTERVERTEBRAL DISC OF LUMBAR REGION, UNSPECIFIED WHETHER PAIN PRESENT: Primary | ICD-10-CM

## 2024-10-09 DIAGNOSIS — M54.50 CHRONIC BILATERAL LOW BACK PAIN WITHOUT SCIATICA: ICD-10-CM

## 2024-10-09 DIAGNOSIS — E11.65 TYPE 2 DIABETES MELLITUS WITH HYPERGLYCEMIA, WITHOUT LONG-TERM CURRENT USE OF INSULIN (H): ICD-10-CM

## 2024-10-09 DIAGNOSIS — F33.1 MAJOR DEPRESSIVE DISORDER, RECURRENT EPISODE, MODERATE (H): ICD-10-CM

## 2024-10-09 DIAGNOSIS — F84.0 AUTISM SPECTRUM DISORDER: ICD-10-CM

## 2024-10-09 DIAGNOSIS — G89.29 CHRONIC BILATERAL LOW BACK PAIN WITHOUT SCIATICA: ICD-10-CM

## 2024-10-09 LAB
CHOLEST SERPL-MCNC: 166 MG/DL
CREAT UR-MCNC: 414.5 MG/DL
EST. AVERAGE GLUCOSE BLD GHB EST-MCNC: 120 MG/DL
FASTING STATUS PATIENT QL REPORTED: NO
HBA1C MFR BLD: 5.8 %
HDLC SERPL-MCNC: 35 MG/DL
LDLC SERPL CALC-MCNC: 96 MG/DL
MICROALBUMIN UR-MCNC: 181.4 MG/L
MICROALBUMIN/CREAT UR: 43.76 MG/G CR (ref 0–25)
NONHDLC SERPL-MCNC: 131 MG/DL
TRIGL SERPL-MCNC: 177 MG/DL

## 2024-10-09 PROCEDURE — 83036 HEMOGLOBIN GLYCOSYLATED A1C: CPT | Performed by: FAMILY MEDICINE

## 2024-10-09 PROCEDURE — 36415 COLL VENOUS BLD VENIPUNCTURE: CPT | Performed by: FAMILY MEDICINE

## 2024-10-09 PROCEDURE — 82043 UR ALBUMIN QUANTITATIVE: CPT | Performed by: FAMILY MEDICINE

## 2024-10-09 PROCEDURE — 80061 LIPID PANEL: CPT | Performed by: FAMILY MEDICINE

## 2024-10-09 PROCEDURE — 99214 OFFICE O/P EST MOD 30 MIN: CPT | Performed by: FAMILY MEDICINE

## 2024-10-09 PROCEDURE — 82570 ASSAY OF URINE CREATININE: CPT | Performed by: FAMILY MEDICINE

## 2024-10-09 RX ORDER — NORTRIPTYLINE HYDROCHLORIDE 10 MG/1
CAPSULE ORAL
Qty: 67 CAPSULE | Refills: 0 | Status: SHIPPED | OUTPATIENT
Start: 2024-10-09 | End: 2024-11-15

## 2024-10-09 ASSESSMENT — PAIN SCALES - GENERAL: PAINLEVEL: SEVERE PAIN (6)

## 2024-10-09 ASSESSMENT — ENCOUNTER SYMPTOMS: BACK PAIN: 1

## 2024-10-09 NOTE — PROGRESS NOTES
Assessment & Plan     (M51.369) Degeneration of intervertebral disc of lumbar region, unspecified whether pain present  (primary encounter diagnosis)  Comment: Patient with imaging in 2019 significant for likely degenerative disc disease.  Patient has been ordered plain films given her marked increase in symptoms.  She had been trying chiropractic exercises and treatments but this has not affected her pain significantly.  We discussed as well doing an MRI order and patient is amenable to that.  Will do formal physical therapy referral as well.  Trial Zanaflex and see if she responds well to nortriptyline.  Plan: MR Lumbar Spine w/o Contrast, nortriptyline         (PAMELOR) 10 MG capsule, tiZANidine (ZANAFLEX)         4 MG tablet    (E11.65) Type 2 diabetes mellitus with hyperglycemia, without long-term current use of insulin (H)  Comment: Obtain labs as ordered prior.  Optometry referral done  Plan: Lipid panel reflex to direct LDL Non-fasting,         Albumin Random Urine Quantitative with Creat         Ratio, OPTOMETRY REFERRAL    (M54.50,  G89.29) Chronic bilateral low back pain without sciatica  Comment: As above patient with almost daily symptoms that are affecting her ADLs significantly.  She is currently pursuing SSI and imaging has been ordered.  Papers have been filled out as well.  This is a longstanding problem with waxing and waning symptoms that has a marked worsening since her delivery earlier this year  Plan: XR Lumbar Spine 2/3 Views, Physical Therapy          Referral, MR Lumbar Spine w/o         Contrast, nortriptyline (PAMELOR) 10 MG         capsule, tiZANidine (ZANAFLEX) 4 MG tablet    (F84.0) Autism spectrum disorder  Comment: Patient has autism spectrum disorder which affects her day-to-day coping.  With the addition of her new baby she has had some troubles coping in particular with anxiety and overstimulation.  She denies any SI/HI/AVH.  But this does affect her ADLs and her  general coping    (F33.1) Major depressive disorder, recurrent episode, moderate (H)  Comment: Patient with a history of major depression/anxiety disorder likely mixed disorder more than simple depression.  She is having issues with coping and adjusting to new life with baby.  Will continue to monitor symptoms.  Add on nortriptyline for now low threshold to add on SSRI in the future plus or minus Abilify             Subjective   Lidia is a 29 year old, presenting for the following health issues:  Back Pain        10/9/2024     5:08 PM   Additional Questions   Roomed by Suze BENOIT     History of Present Illness       Back Pain:  She presents for follow up of back pain. Patient's back pain is a chronic problem.  Location of back pain:  Right lower back and left lower back  Description of back pain: dull ache, sharp and stabbing  Back pain spreads: nowhere    Since patient first noticed back pain, pain is: rapidly worsening  Does back pain interfere with her job:  Not applicable      She is taking medications regularly.     Patient is a 29-year-old female with a history of chronic back pain.  She was initially evaluated in  and found at that time to have arthritic changes in her back and lumbar spine.  There is imaging significant for possible degenerative disc disease as well.  She was then again seen in  and  for the same.  This has been a waxing and waning symptom for her however after her delivery earlier this year patient has noted a marked increase in her symptoms with difficulties on ambulation on most days and difficulties with pain and muscle spasms almost daily.  She reports that she had been taking Flexeril before and then narcotics that were supplied to her after her  which helped some but caused a significant amount of sedation.  She is amenable to reimaging with x-ray and likely MRI based on her prior known history.  She is also amenable to referral to physical therapy.    At this  "time she is pursuing medical disability for the same reason.  Since her delivery she has had significant issues coping with a lot of anxiety and difficulties with day-to-day ADLs as well due to her pain and her chronic anxiety.  She denies any SI/HI/AVH.  She is bonding well with her baby and feels good support from her family but has had difficulties in coping especially with the interruption in her day-to-day life.  She has a known history of autism spectrum disorder and does have difficulties with overstimulation on a regular basis.        Objective    /80   Pulse 86   Temp 97.8  F (36.6  C) (Temporal)   Resp 20   Ht 1.778 m (5' 10\")   Wt 122.7 kg (270 lb 6.4 oz)   LMP 10/01/2024 (Approximate)   SpO2 97%   BMI 38.80 kg/m    Body mass index is 38.8 kg/m .  Physical Exam  Constitutional:       General: She is not in acute distress.     Appearance: She is obese. She is not ill-appearing, toxic-appearing or diaphoretic.   HENT:      Head: Normocephalic.      Right Ear: Tympanic membrane normal.      Left Ear: Tympanic membrane normal.   Eyes:      General: No scleral icterus.     Pupils: Pupils are equal, round, and reactive to light.   Cardiovascular:      Rate and Rhythm: Normal rate and regular rhythm.      Pulses: Normal pulses.   Pulmonary:      Effort: Pulmonary effort is normal. No respiratory distress.      Breath sounds: Normal breath sounds. No stridor. No wheezing, rhonchi or rales.   Abdominal:      General: Abdomen is flat. Bowel sounds are normal.      Palpations: Abdomen is soft.   Musculoskeletal:      Comments: Patient identifies pain at bilateral sacroiliac joints and muscle bulk in the lower lumbar spine   Skin:     General: Skin is warm.      Capillary Refill: Capillary refill takes less than 2 seconds.   Neurological:      Mental Status: She is alert. Mental status is at baseline.   Psychiatric:         Behavior: Behavior normal.         Thought Content: Thought content normal.    "      Judgment: Judgment normal.                Signed Electronically by: Karen Bangura MD

## 2024-10-11 ENCOUNTER — HOSPITAL ENCOUNTER (OUTPATIENT)
Dept: GENERAL RADIOLOGY | Facility: CLINIC | Age: 29
Discharge: HOME | End: 2024-10-11
Attending: FAMILY MEDICINE | Admitting: FAMILY MEDICINE
Payer: COMMERCIAL

## 2024-10-11 DIAGNOSIS — G89.29 CHRONIC BILATERAL LOW BACK PAIN WITHOUT SCIATICA: ICD-10-CM

## 2024-10-11 DIAGNOSIS — M54.50 CHRONIC BILATERAL LOW BACK PAIN WITHOUT SCIATICA: ICD-10-CM

## 2024-10-11 PROCEDURE — 72100 X-RAY EXAM L-S SPINE 2/3 VWS: CPT

## 2024-10-14 ENCOUNTER — HOSPITAL ENCOUNTER (EMERGENCY)
Facility: CLINIC | Age: 29
Discharge: HOME | End: 2024-10-14
Attending: EMERGENCY MEDICINE | Admitting: EMERGENCY MEDICINE
Payer: COMMERCIAL

## 2024-10-14 ENCOUNTER — NURSE TRIAGE (OUTPATIENT)
Dept: FAMILY MEDICINE | Facility: CLINIC | Age: 29
End: 2024-10-14
Payer: COMMERCIAL

## 2024-10-14 VITALS
DIASTOLIC BLOOD PRESSURE: 93 MMHG | HEART RATE: 85 BPM | WEIGHT: 276.5 LBS | HEIGHT: 71 IN | RESPIRATION RATE: 18 BRPM | OXYGEN SATURATION: 100 % | TEMPERATURE: 97.8 F | BODY MASS INDEX: 38.71 KG/M2 | SYSTOLIC BLOOD PRESSURE: 130 MMHG

## 2024-10-14 DIAGNOSIS — N75.1 ABSCESS OF BARTHOLIN GLAND: ICD-10-CM

## 2024-10-14 PROCEDURE — 56420 I&D BARTHOLINS GLAND ABSCESS: CPT | Performed by: EMERGENCY MEDICINE

## 2024-10-14 PROCEDURE — 99284 EMERGENCY DEPT VISIT MOD MDM: CPT | Mod: 25 | Performed by: EMERGENCY MEDICINE

## 2024-10-14 PROCEDURE — 250N000013 HC RX MED GY IP 250 OP 250 PS 637: Performed by: EMERGENCY MEDICINE

## 2024-10-14 PROCEDURE — 99283 EMERGENCY DEPT VISIT LOW MDM: CPT | Mod: 25 | Performed by: EMERGENCY MEDICINE

## 2024-10-14 RX ORDER — OXYCODONE HYDROCHLORIDE 5 MG/1
10 TABLET ORAL ONCE
Status: COMPLETED | OUTPATIENT
Start: 2024-10-14 | End: 2024-10-14

## 2024-10-14 RX ORDER — OXYCODONE HYDROCHLORIDE 5 MG/1
5 TABLET ORAL EVERY 6 HOURS PRN
Qty: 12 TABLET | Refills: 0 | Status: SHIPPED | OUTPATIENT
Start: 2024-10-14 | End: 2024-10-17

## 2024-10-14 RX ORDER — SULFAMETHOXAZOLE AND TRIMETHOPRIM 800; 160 MG/1; MG/1
1 TABLET ORAL 2 TIMES DAILY
Qty: 20 TABLET | Refills: 0 | Status: SHIPPED | OUTPATIENT
Start: 2024-10-14 | End: 2024-10-23

## 2024-10-14 RX ADMIN — OXYCODONE HYDROCHLORIDE 10 MG: 5 TABLET ORAL at 10:37

## 2024-10-14 ASSESSMENT — ACTIVITIES OF DAILY LIVING (ADL)
ADLS_ACUITY_SCORE: 35
ADLS_ACUITY_SCORE: 35

## 2024-10-14 NOTE — DISCHARGE INSTRUCTIONS
You had a cyst of your Bartholin gland.  This was incised and drained, and a small catheter was placed.  This catheter may fall out, do not try to reinsert it.    You should do sitz bath's, do this several times per day to help reduce the swelling and encourage drainage    Start antibiotics today.  Complete the course even if you begin to feel better    You should follow-up with your OB/GYN in clinic.  They should recheck to make sure that this is resolving, and also discuss further surgical intervention if it is not resolving    You may take the oxycodone every 6 hours as needed for severe pain.  Use caution since this can make you drowsy, do not drive or drink alcohol if taking this medicine.  It is okay to alternate this with Tylenol or ibuprofen per bottle instructions    If you develop any significant new or worsening symptoms do not hesitate to return to the emergency room for evaluation

## 2024-10-14 NOTE — ED PROVIDER NOTES
History     Chief Complaint   Patient presents with    Vaginal Pain    Groin Swelling     HPI  Lidia Gallegos is a 29 year old female who presents with concern of vaginal pain and swelling.  Was feeling well yesterday, but woke up at 3 AM with very sharp and stabbing pain in her left labia.  She feels a swollen lump down there that is very tender to touch.  Has tried taking ibuprofen to help with this pain but did not get any relief.  She denies running any fever.  Has not had pain up into her abdomen.  No vaginal bleeding.  She says otherwise she has been doing well postpartum, but does recount difficulty with thrombosed external hemorrhoids that required drainage in the emergency room and excision in the OR.  She does mention yesterday she was feeling well, but does indicate that she showered in the evening and used Summers Megan douche.  States that she is not breast-feeding due to difficulties with production    Allergies:  No Known Allergies    Problem List:    Patient Active Problem List    Diagnosis Date Noted    Thrombosed external hemorrhoids 07/07/2024     Priority: Medium    Rectal pain 07/07/2024     Priority: Medium    Preeclampsia, severe 06/25/2024     Priority: Medium    Abnormal genetic test during pregnancy - NIPS showed XYY 02/29/2024     Priority: Medium    Rh negative status during pregnancy 02/29/2024     Priority: Medium    High-risk pregnancy 02/27/2024     Priority: Medium    Morbid obesity (H) 01/19/2024     Priority: Medium    Gastroesophageal reflux disease with esophagitis without hemorrhage 12/01/2023     Priority: Medium    Major depressive disorder, recurrent episode, moderate (H) 08/02/2023     Priority: Medium    Autism spectrum disorder 08/02/2023     Priority: Medium    Type 2 diabetes mellitus with hyperglycemia, without long-term current use of insulin (H) 03/22/2023     Priority: Medium    Papanicolaou smear of cervix with low grade squamous intraepithelial lesion (LGSIL)  2021     Priority: Medium     18 NIL pap  21 LSIL pap  21 [place] bx: LUCERO 1  - [facility]  23 NIL Pap, Neg HR HPV @ 27 yo. Plan: cotest in 3 years.         Chronic bilateral low back pain without sciatica 2016     Priority: Medium        Past Medical History:    Past Medical History:   Diagnosis Date    Anxiety     Autism     Depression     History of anemia        Past Surgical History:    Past Surgical History:   Procedure Laterality Date     SECTION N/A 2024    Procedure: PRIMARY LOW TRANSVERSE  SECTION;  Surgeon: Mackenzie Dee MD;  Location: [facility] L[facility]    INNER EAR SURGERY Right     age 2    WISDOM TOOTH EXTRACTION         Family History:    Family History   Problem Relation Age of Onset    No Known Problems Mother     Hypertension Father     Mental Illness Sister         bipolar    Attention Deficit Disorder Sister     Birth defects Brother         bladder - reportedly related to nuchal cord    Cancer Maternal Grandfather     Diabetes Paternal Grandmother     Diabetes Paternal Grandfather     Kidney failure Paternal Grandfather        Social History:  Marital Status:   [2]  Social History     Tobacco Use    Smoking status: Former     Current packs/day: 0.00     Types: Cigarettes     Quit date: 2022     Years since quittin.7    Smokeless tobacco: Never   Vaping Use    Vaping status: Never Used   Substance Use Topics    Alcohol use: Not Currently     Comment: occ    Drug use: Never        Medications:    oxyCODONE (ROXICODONE) 5 MG tablet  sulfamethoxazole-trimethoprim (BACTRIM DS) 800-160 MG tablet  acetaminophen (TYLENOL) 500 MG tablet  ibuprofen (ADVIL/MOTRIN) 800 MG tablet  lidocaine (LMX4) 4 % kit  norethindrone (MICRONOR) 0.35 MG tablet  nortriptyline (PAMELOR) 10 MG capsule  omeprazole (PRILOSEC) 40 MG DR capsule  tiZANidine (ZANAFLEX) 4 MG tablet          Review of Systems   All other systems reviewed and are negative.      Physical Exam  "  BP: (!) 127/90  Pulse: 86  Temp: 97.8  F (36.6  C)  Resp: 23  Height: 180.3 cm (5' 11\")  Weight: 125.4 kg (276 lb 8 oz)  SpO2: 100 %      Physical Exam  Vitals and nursing note reviewed.   Constitutional:       General: She is not in acute distress.     Appearance: She is obese. She is not toxic-appearing.   Pulmonary:      Effort: Pulmonary effort is normal.   Abdominal:      Palpations: Abdomen is soft.      Tenderness: There is no abdominal tenderness.   Genitourinary:     Labia:         Left: Tenderness present. No rash.        Skin:     General: Skin is warm.   Neurological:      Mental Status: She is alert.   Psychiatric:         Mood and Affect: Mood normal.         Behavior: Behavior normal.         ED [facility]    PROCEDURE: -Incision/Drainage    Date/Time: 10/14/2024 11:12 AM    Performed by: Jaclyn Price DO  Authorized by: Jaclyn Price DO    Risks, benefits and alternatives discussed.      LOCATION:      Type:  Bartholin cyst    Size:  2 cm    Location:  Anogenital    Anogenital location:  Bartholin's gland    PRE-PROCEDURE DETAILS:     Skin preparation:  Betadine    PROCEDURE TYPE:     Complexity:  Simple    ANESTHESIA (see MAR for exact dosages):     Anesthesia method:  Local infiltration    Local anesthetic:  Lidocaine 1% w/o epi    PROCEDURE DETAILS:     Incision types:  Stab incision    Scalpel blade:  11    Wound management:  Probed and deloculated    Drainage:  Bloody    Drainage amount:  Moderate    Packing materials:  Word catheter    PROCEDURE    Patient Tolerance:  Patient tolerated the procedure well with no immediate complications                Critical Care time:  none              No results found for this or any previous visit (from the past 24 hour(s)).    Medications   oxyCODONE (ROXICODONE) tablet 10 mg (10 mg Oral $Given 10/14/24 1037)       Assessments & Plan (with Medical Decision Making)  Lidia is a " 29-year-old female presenting with concern of vaginal/groin pain that woke her from sleep suddenly this morning.  See history and physical exam as above  29-year-old female in mild distress secondary to pain, is hypertensive with blood pressure of 127/90 but otherwise vitally stable and afebrile.  On examination, she indicated area of pain, first thinking that it was a hernia, but this is located in the labial region rather than in her inguinal region, and have high suspicion for Bartholin gland cyst rather than hernia.  Explained likely diagnosis to patient and recommendation for incision and drainage.  She does have a safe ride home, and since she is already taken Tylenol and ibuprofen without relief, and is still in significant pain, will give a dose of oxycodone to help with acute severe pain.  Also discussed the incision and drainage procedure, and she is agreeable to proceed  I&D procedure completed per procedure note above.  Patient tolerated this quite well.  Word catheter was placed, and informed patient that this may fall out, but that will be okay.  Advised to continue doing sitz bath's and provided with antibiotics and pain medication.  She should follow-up closely with her OB/GYN in office.  ED return precautions were discussed.  All questions were answered.  Discharged in stable condition.     I have reviewed the nursing notes.    I have reviewed the findings, diagnosis, plan and need for follow up with the patient.           Medical Decision Making  The patient's presentation was of low complexity (an acute and uncomplicated illness or injury).    The patient's evaluation involved:  history and exam without other MDM data elements    The patient's management necessitated moderate risk (prescription drug management including medications given in the ED) and moderate risk (a decision regarding minor procedure (incision & drainage) with risk factors of obesity).        Discharge Medication List as of  10/14/2024 11:42 AM        START taking these medications    Details   oxyCODONE (ROXICODONE) 5 MG tablet Take 1 tablet (5 mg) by mouth every 6 hours as needed for pain., Disp-12 tablet, R-0, E-Prescribe      sulfamethoxazole-trimethoprim (BACTRIM DS) 800-160 MG tablet Take 1 tablet by mouth 2 times daily for 10 days., Disp-20 tablet, R-0, E-Prescribe             Final diagnoses:   Abscess of Bartholin gland       10/14/2024   [facility] EMERGENCY DEPT       Jaclyn Price DO  10/14/24 3449

## 2024-10-14 NOTE — TELEPHONE ENCOUNTER
"S-(situation): Patient is calling and stated this morning she woke up at 3:00 am with moderate pain in her left groin.  Patient stated the pain is constant and there is a golf size ball in the area of the pain.  This RN can hear patient in pain, having moments of holding her breath.      B-(background): Patient stated she has not been diagnosed with a hernia in a past    R-(recommendations): Per RN protocol, patient advised to seek emergency care or to have office visit with provider approval.  This RN offered to go speak with a provider with availability today, but to call 911 or have someone take her to the ER at this time.  Patient stated she has a 4 month old child and she would need to try and contact her  or she could call her mother in law who lives 3 minutes from her.  Informed patient this RN would call back after she attempts to call her  and/or mother in law.  Advised patient to call 911 if unable to wait or contact family.  Patient stated understanding.     This RN phoned patient back.  Patient stated her mother in law is going to come over within 10 minutes to bring her into the hospital ED and watch her baby.  Advised patient again to call 911 for worsening symptoms.  Patient stated understanding.      Reason for Disposition   Swollen lump in groin and pulsating (like heartbeat)    Additional Information   Negative: Swelling of scrotum and has not previously been diagnosed with a hernia   Negative: SEVERE abdominal pain   Negative: Hernia is painful or tender to touch   Negative: Vomiting and can't reduce the hernia   Negative: Vomiting and abdomen looks much more swollen than usual   Negative: Vomiting and hernia is more painful or swollen than usual    Answer Assessment - Initial Assessment Questions  1. ONSET:  \"When did this first appear?\"      This morning-     2. APPEARANCE: \"What does it look like?\"      Bulges out    3. SIZE: \"How big is it?\" (inches, cm or compare to coins, " "fruit)      Golf ball    4. LOCATION: \"Where exactly is the hernia located?\"      Left lower groin    5. PATTERN: \"Does the swelling come and go, or has it been constant since it started?\"      Constant - 3 am    6. PAIN: \"Is there any pain?\" If Yes, ask: \"How bad is it?\"  (Scale 1-10; or mild, moderate, severe)     - MILD (1-3): Doesn't interfere with normal activities, abdomen soft and not tender to touch.      - MODERATE (4-7): Interferes with normal activities or awakens from sleep, abdomen tender to touch.      - SEVERE (8-10): Excruciating pain, doubled over, unable to do any normal activities.        Moderate     7. DIAGNOSIS: \"Have you been seen by a doctor (or NP/PA) for this?\" \"Did the doctor diagnose you as having a hernia?\"      No    8. OTHER SYMPTOMS: \"Do you have any other symptoms?\" (e.g., fever, abdomen pain, vomiting)      Back spasms    9. PREGNANCY: \"Is there any chance you are pregnant?\" \"When was your last menstrual period?\"      Unknown    Protocols used: Hernia-A-[place]  Elizabeth Frye RN    "

## 2024-10-14 NOTE — ED TRIAGE NOTES
PT presents with sharp and stabbing pain over the genital area, PT reprots swelling of right labial fold, no trauma, PT  reports it started at 0300H, she took pain medications and went back to sleep, then pain woke her up again at 0530H. Here for evaluation. History  done 4 months ago. Denies any issue with voiding.

## 2024-10-17 ENCOUNTER — HOSPITAL ENCOUNTER (EMERGENCY)
Facility: CLINIC | Age: 29
Discharge: HOME | End: 2024-10-18
Attending: STUDENT IN AN ORGANIZED HEALTH CARE EDUCATION/TRAINING PROGRAM | Admitting: STUDENT IN AN ORGANIZED HEALTH CARE EDUCATION/TRAINING PROGRAM
Payer: COMMERCIAL

## 2024-10-17 DIAGNOSIS — N89.8 VAGINAL HEMATOMA: ICD-10-CM

## 2024-10-17 PROCEDURE — 99285 EMERGENCY DEPT VISIT HI MDM: CPT | Mod: 25 | Performed by: STUDENT IN AN ORGANIZED HEALTH CARE EDUCATION/TRAINING PROGRAM

## 2024-10-17 PROCEDURE — 10140 I&D HMTMA SEROMA/FLUID COLLJ: CPT

## 2024-10-17 PROCEDURE — 99284 EMERGENCY DEPT VISIT MOD MDM: CPT | Performed by: STUDENT IN AN ORGANIZED HEALTH CARE EDUCATION/TRAINING PROGRAM

## 2024-10-17 ASSESSMENT — COLUMBIA-SUICIDE SEVERITY RATING SCALE - C-SSRS
1. IN THE PAST MONTH, HAVE YOU WISHED YOU WERE DEAD OR WISHED YOU COULD GO TO SLEEP AND NOT WAKE UP?: NO
2. HAVE YOU ACTUALLY HAD ANY THOUGHTS OF KILLING YOURSELF IN THE PAST MONTH?: NO
6. HAVE YOU EVER DONE ANYTHING, STARTED TO DO ANYTHING, OR PREPARED TO DO ANYTHING TO END YOUR LIFE?: NO

## 2024-10-18 ENCOUNTER — APPOINTMENT (OUTPATIENT)
Dept: CT IMAGING | Facility: CLINIC | Age: 29
End: 2024-10-18
Attending: STUDENT IN AN ORGANIZED HEALTH CARE EDUCATION/TRAINING PROGRAM
Payer: COMMERCIAL

## 2024-10-18 VITALS
HEART RATE: 88 BPM | SYSTOLIC BLOOD PRESSURE: 138 MMHG | DIASTOLIC BLOOD PRESSURE: 95 MMHG | WEIGHT: 276 LBS | TEMPERATURE: 97.9 F | RESPIRATION RATE: 18 BRPM | OXYGEN SATURATION: 99 % | BODY MASS INDEX: 38.49 KG/M2

## 2024-10-18 LAB
ALBUMIN SERPL BCG-MCNC: 4.1 G/DL (ref 3.5–5.2)
ALP SERPL-CCNC: 130 U/L (ref 40–150)
ALT SERPL W P-5'-P-CCNC: 50 U/L (ref 0–50)
ANION GAP SERPL CALCULATED.3IONS-SCNC: 16 MMOL/L (ref 7–15)
AST SERPL W P-5'-P-CCNC: 25 U/L (ref 0–45)
BASOPHILS # BLD AUTO: 0.1 10E3/UL (ref 0–0.2)
BASOPHILS NFR BLD AUTO: 1 %
BILIRUB SERPL-MCNC: 0.7 MG/DL
BUN SERPL-MCNC: 11.7 MG/DL (ref 6–20)
CALCIUM SERPL-MCNC: 8.9 MG/DL (ref 8.8–10.4)
CHLORIDE SERPL-SCNC: 100 MMOL/L (ref 98–107)
CREAT SERPL-MCNC: 0.98 MG/DL (ref 0.51–0.95)
EGFRCR SERPLBLD CKD-EPI 2021: 79 ML/MIN/1.73M2
EOSINOPHIL # BLD AUTO: 0.4 10E3/UL (ref 0–0.7)
EOSINOPHIL NFR BLD AUTO: 5 %
ERYTHROCYTE [DISTWIDTH] IN BLOOD BY AUTOMATED COUNT: 14.1 % (ref 10–15)
GLUCOSE SERPL-MCNC: 99 MG/DL (ref 70–99)
HCO3 SERPL-SCNC: 20 MMOL/L (ref 22–29)
HCT VFR BLD AUTO: 37.7 % (ref 35–47)
HGB BLD-MCNC: 12.4 G/DL (ref 11.7–15.7)
IMM GRANULOCYTES # BLD: 0 10E3/UL
IMM GRANULOCYTES NFR BLD: 0 %
LYMPHOCYTES # BLD AUTO: 2.9 10E3/UL (ref 0.8–5.3)
LYMPHOCYTES NFR BLD AUTO: 32 %
MCH RBC QN AUTO: 25.1 PG (ref 26.5–33)
MCHC RBC AUTO-ENTMCNC: 32.9 G/DL (ref 31.5–36.5)
MCV RBC AUTO: 76 FL (ref 78–100)
MONOCYTES # BLD AUTO: 0.7 10E3/UL (ref 0–1.3)
MONOCYTES NFR BLD AUTO: 8 %
NEUTROPHILS # BLD AUTO: 4.9 10E3/UL (ref 1.6–8.3)
NEUTROPHILS NFR BLD AUTO: 55 %
NRBC # BLD AUTO: 0 10E3/UL
NRBC BLD AUTO-RTO: 0 /100
PLATELET # BLD AUTO: 407 10E3/UL (ref 150–450)
POTASSIUM SERPL-SCNC: 3.9 MMOL/L (ref 3.4–5.3)
PROT SERPL-MCNC: 7 G/DL (ref 6.4–8.3)
RBC # BLD AUTO: 4.94 10E6/UL (ref 3.8–5.2)
SODIUM SERPL-SCNC: 136 MMOL/L (ref 135–145)
WBC # BLD AUTO: 9 10E3/UL (ref 4–11)

## 2024-10-18 PROCEDURE — 87070 CULTURE OTHR SPECIMN AEROBIC: CPT | Performed by: FAMILY MEDICINE

## 2024-10-18 PROCEDURE — 250N000009 HC RX 250: Performed by: STUDENT IN AN ORGANIZED HEALTH CARE EDUCATION/TRAINING PROGRAM

## 2024-10-18 PROCEDURE — 72193 CT PELVIS W/DYE: CPT

## 2024-10-18 PROCEDURE — 36415 COLL VENOUS BLD VENIPUNCTURE: CPT | Performed by: STUDENT IN AN ORGANIZED HEALTH CARE EDUCATION/TRAINING PROGRAM

## 2024-10-18 PROCEDURE — 85025 COMPLETE CBC W/AUTO DIFF WBC: CPT | Performed by: STUDENT IN AN ORGANIZED HEALTH CARE EDUCATION/TRAINING PROGRAM

## 2024-10-18 PROCEDURE — 250N000013 HC RX MED GY IP 250 OP 250 PS 637: Performed by: STUDENT IN AN ORGANIZED HEALTH CARE EDUCATION/TRAINING PROGRAM

## 2024-10-18 PROCEDURE — 250N000011 HC RX IP 250 OP 636: Performed by: STUDENT IN AN ORGANIZED HEALTH CARE EDUCATION/TRAINING PROGRAM

## 2024-10-18 PROCEDURE — 10140 I&D HMTMA SEROMA/FLUID COLLJ: CPT | Performed by: FAMILY MEDICINE

## 2024-10-18 PROCEDURE — 99243 OFF/OP CNSLTJ NEW/EST LOW 30: CPT | Mod: 25 | Performed by: FAMILY MEDICINE

## 2024-10-18 PROCEDURE — 80053 COMPREHEN METABOLIC PANEL: CPT | Performed by: STUDENT IN AN ORGANIZED HEALTH CARE EDUCATION/TRAINING PROGRAM

## 2024-10-18 PROCEDURE — 87205 SMEAR GRAM STAIN: CPT | Performed by: FAMILY MEDICINE

## 2024-10-18 RX ORDER — IOPAMIDOL 755 MG/ML
500 INJECTION, SOLUTION INTRAVASCULAR ONCE
Status: COMPLETED | OUTPATIENT
Start: 2024-10-18 | End: 2024-10-18

## 2024-10-18 RX ORDER — OXYCODONE HYDROCHLORIDE 5 MG/1
5 TABLET ORAL ONCE
Status: COMPLETED | OUTPATIENT
Start: 2024-10-18 | End: 2024-10-18

## 2024-10-18 RX ORDER — IBUPROFEN 600 MG/1
600 TABLET, FILM COATED ORAL ONCE
Status: COMPLETED | OUTPATIENT
Start: 2024-10-18 | End: 2024-10-18

## 2024-10-18 RX ORDER — ACETAMINOPHEN 500 MG
1000 TABLET ORAL ONCE
Status: COMPLETED | OUTPATIENT
Start: 2024-10-18 | End: 2024-10-18

## 2024-10-18 RX ADMIN — ACETAMINOPHEN 1000 MG: 500 TABLET ORAL at 00:09

## 2024-10-18 RX ADMIN — IBUPROFEN 600 MG: 600 TABLET, FILM COATED ORAL at 00:09

## 2024-10-18 RX ADMIN — OXYCODONE HYDROCHLORIDE 5 MG: 5 TABLET ORAL at 00:09

## 2024-10-18 RX ADMIN — IOPAMIDOL 100 ML: 755 INJECTION, SOLUTION INTRAVENOUS at 00:32

## 2024-10-18 RX ADMIN — SODIUM CHLORIDE 60 ML: 9 INJECTION, SOLUTION INTRAVENOUS at 00:31

## 2024-10-18 ASSESSMENT — ACTIVITIES OF DAILY LIVING (ADL)
ADLS_ACUITY_SCORE: 35

## 2024-10-18 NOTE — ED PROVIDER NOTES
History     Chief Complaint   Patient presents with    Genital Problem     HPI  Lidia Gallegos is a 29 year old female who presents the emergency department for worsening left-sided vaginal pain.  She was in the emergency department 3 to 4 days ago, diagnosed with Bartholin gland abscess, drained and Word catheter placed, noticed that she was doing well over the past few days until today.  Noticed that its become more painful and seems to be more hard and swollen.  Has been taking the antibiotics as prescribed.  Unsure if the Word catheter fell out.  Has noticed daily purulent and bloody drainage.  No systemic symptoms.  Did not take anything in the last 12 hours for pain control.  Took an oxycodone earlier this morning.  Did contact GYN however is not able to be seen until November.    Allergies:  No Known Allergies    Problem List:    Patient Active Problem List    Diagnosis Date Noted    Thrombosed external hemorrhoids 07/07/2024     Priority: Medium    Rectal pain 07/07/2024     Priority: Medium    Preeclampsia, severe 06/25/2024     Priority: Medium    Abnormal genetic test during pregnancy - NIPS showed XYY 02/29/2024     Priority: Medium    Rh negative status during pregnancy 02/29/2024     Priority: Medium    High-risk pregnancy 02/27/2024     Priority: Medium    Morbid obesity (H) 01/19/2024     Priority: Medium    Gastroesophageal reflux disease with esophagitis without hemorrhage 12/01/2023     Priority: Medium    Major depressive disorder, recurrent episode, moderate (H) 08/02/2023     Priority: Medium    Autism spectrum disorder 08/02/2023     Priority: Medium    Type 2 diabetes mellitus with hyperglycemia, without long-term current use of insulin (H) 03/22/2023     Priority: Medium    Papanicolaou smear of cervix with low grade squamous intraepithelial lesion (LGSIL) 06/22/2021     Priority: Medium     9/21/18 NIL pap  6/22/21 LSIL pap  7/29/21 [place] bx: LUCERO 1  - [facility]  12/13/23 NIL Pap, Neg  HR HPV @ 27 yo. Plan: cotest in 3 years.         Chronic bilateral low back pain without sciatica 2016     Priority: Medium        Past Medical History:    Past Medical History:   Diagnosis Date    Anxiety     Autism     Depression     History of anemia        Past Surgical History:    Past Surgical History:   Procedure Laterality Date     SECTION N/A 2024    Procedure: PRIMARY LOW TRANSVERSE  SECTION;  Surgeon: Mackenzie Dee MD;  Location: [facility] L+[facility]    INNER EAR SURGERY Right     age 2    WISDOM TOOTH EXTRACTION         Family History:    Family History   Problem Relation Age of Onset    No Known Problems Mother     Hypertension Father     Mental Illness Sister         bipolar    Attention Deficit Disorder Sister     Birth defects Brother         bladder - reportedly related to nuchal cord    Cancer Maternal Grandfather     Diabetes Paternal Grandmother     Diabetes Paternal Grandfather     Kidney failure Paternal Grandfather        Social History:  Marital Status:   [2]  Social History     Tobacco Use    Smoking status: Former     Current packs/day: 0.00     Types: Cigarettes     Quit date: 2022     Years since quittin.8    Smokeless tobacco: Never   Vaping Use    Vaping status: Never Used   Substance Use Topics    Alcohol use: Not Currently     Comment: occ    Drug use: Never        Medications:    acetaminophen (TYLENOL) 500 MG tablet  ibuprofen (ADVIL/MOTRIN) 800 MG tablet  lidocaine (LMX4) 4 % kit  norethindrone (MICRONOR) 0.35 MG tablet  nortriptyline (PAMELOR) 10 MG capsule  omeprazole (PRILOSEC) 40 MG DR capsule  sulfamethoxazole-trimethoprim (BACTRIM DS) 800-160 MG tablet  tiZANidine (ZANAFLEX) 4 MG tablet          Review of Systems  Gen: No fevers, no unintentional weight changes  Head and neck: No headache, no neck pain  Eye: No eye pain, no vision changes  Respiratory: No shortness of breath, no cough  Cardiovascular: No chest pain, no palpitations  Abdominal: No  vomiting, no constipation, no diarrhea  Urinary: No dysuria, no hematuria  Musculoskeletal: No joint redness, no trauma  Neurologic: No confusion, no weakness, no sensation changes  Psychiatric: No suicidal ideation, no homicidal ideation    Physical Exam   BP: (!) 146/105  Pulse: 80  Temp: 97.9  F (36.6  C)  Resp: 18  Weight: 125.2 kg (276 lb)  SpO2: 100 %      Physical Exam  General: Alert, awake, no distress  Head: Normocephalic, atraumatic  Eyes: Grossly intact extraocular movements, conjunctiva clear, pupils equal   Mouth: Mucous membranes moist  Neck: Supple, grossly full range of motion, no observable masses  Respiratory: No distress, speaks in full sentences, clear to auscultation bilaterally  Cardiac: S1 and S2 cardiac sounds appreciated, normal rate, no edema  Abdominal: Soft, not distended, no tenderness appreciated  Neurologic: Normal level of consciousness, speech is clear and appropriate, moving all extremities grossly normal and symmetric  Skin: No gross rashes or lesions  Psych: Normal mood and appropriate for situation  External genital exam with female chaperone shows roughly 3 cm area of induration left-sided inferior vulvar region with open incision site, no active drainage, Word catheter not present        ED Course        Procedures                  Results for orders placed or performed during the hospital encounter of 10/17/24 (from the past 24 hour(s))   Comprehensive metabolic panel   Result Value Ref Range    Sodium 136 135 - 145 mmol/L    Potassium 3.9 3.4 - 5.3 mmol/L    Carbon Dioxide (CO2) 20 (L) 22 - 29 mmol/L    Anion Gap 16 (H) 7 - 15 mmol/L    Urea Nitrogen 11.7 6.0 - 20.0 mg/dL    Creatinine 0.98 (H) 0.51 - 0.95 mg/dL    GFR Estimate 79 >60 mL/min/1.73m2    Calcium 8.9 8.8 - 10.4 mg/dL    Chloride 100 98 - 107 mmol/L    Glucose 99 70 - 99 mg/dL    Alkaline Phosphatase 130 40 - 150 U/L    AST 25 0 - 45 U/L    ALT 50 0 - 50 U/L    Protein Total 7.0 6.4 - 8.3 g/dL    Albumin 4.1 3.5  - 5.2 g/dL    Bilirubin Total 0.7 <=1.2 mg/dL   CBC with platelets differential    Narrative    The following orders were created for panel order CBC with platelets differential.  Procedure                               Abnormality         Status                     ---------                               -----------         ------                     CBC with platelets and d...[[id number]]  Abnormal            Final result                 Please view results for these tests on the individual orders.   CBC with platelets and differential   Result Value Ref Range    WBC Count 9.0 4.0 - 11.0 10e3/uL    RBC Count 4.94 3.80 - 5.20 10e6/uL    Hemoglobin 12.4 11.7 - 15.7 g/dL    Hematocrit 37.7 35.0 - 47.0 %    MCV 76 (L) 78 - 100 fL    MCH 25.1 (L) 26.5 - 33.0 pg    MCHC 32.9 31.5 - 36.5 g/dL    RDW 14.1 10.0 - 15.0 %    Platelet Count 407 150 - 450 10e3/uL    % Neutrophils 55 %    % Lymphocytes 32 %    % Monocytes 8 %    % Eosinophils 5 %    % Basophils 1 %    % Immature Granulocytes 0 %    NRBCs per 100 WBC 0 <1 /100    Absolute Neutrophils 4.9 1.6 - 8.3 10e3/uL    Absolute Lymphocytes 2.9 0.8 - 5.3 10e3/uL    Absolute Monocytes 0.7 0.0 - 1.3 10e3/uL    Absolute Eosinophils 0.4 0.0 - 0.7 10e3/uL    Absolute Basophils 0.1 0.0 - 0.2 10e3/uL    Absolute Immature Granulocytes 0.0 <=0.4 10e3/uL    Absolute NRBCs 0.0 10e3/uL   CT Pelvis Soft Tissue w Contrast    Narrative    EXAM: CT PELVIS SOFT TISSUE W CONTRAST  LOCATION: formerly [facility]  DATE: 10/18/2024    INDICATION: please extend through vaginal tissue, recently drained left sided bartholin gland abscess, worsening pain and swelling  COMPARISON: None.  TECHNIQUE: CT scan of the pelvis was performed with IV contrast. Multiplanar reformats were obtained. Dose reduction techniques were used.  CONTRAST: Isovue 370, 100mL    FINDINGS:    PELVIC ORGANS: Intrapelvic structures appear normal. Asymmetric, 5.8 cm diameter round area of heterogeneous  high density enlargement along the vagina centered just to the left of midline. It is posterior to the urethra and anterior and distinct from the   rectum and anus. No associated soft tissue gas or surrounding inflammatory change. Uterus and ovaries appear normal.    MUSCULOSKELETAL: Normal.       Impression    IMPRESSION:  Rounded soft tissue density structure in the region of vagina centered slightly to the left is nonspecific. Hematoma or soft tissue mass could have this appearance. Abscess is also a consideration although there is no significant inflammatory change in   the adjacent fat.       Medications   oxyCODONE (ROXICODONE) tablet 5 mg (5 mg Oral $Given 10/18/24 0009)   acetaminophen (TYLENOL) tablet 1,000 mg (1,000 mg Oral $Given 10/18/24 0009)   ibuprofen (ADVIL/MOTRIN) tablet 600 mg (600 mg Oral $Given 10/18/24 0009)   iopamidol (ISOVUE-370) solution 500 mL (100 mLs Intravenous $Given 10/18/24 0032)   sodium chloride 0.9 % bag 100mL for CT scan flush use (60 mLs Intravenous $Given 10/18/24 0031)       Assessments & Plan (with Medical Decision Making)     Vital signs reassuring.  Discussed various means of analgesia we will give a dose of Tylenol, Motrin and oxycodone.    Notes she does feel better during her time in the ER after medications.Blood work is overall reassuring.  CT pelvis does show soft tissue density in the region of the vagina on the left side roughly 6 cm and heterogeneous, differential includes soft tissue mass, hematoma, abscess.  Discussed this case with the physician covering for OB/GYN tonight, they have evaluated her at bedside, did perform needle aspiration with bloody output that was sent to lab for pathology.  Roughly 5 cc was obtained.  They also did set her up with an appointment for surgical GYN evaluation in 5 days.  Patient states she does feel better and well enough to go home.  She will monitor her symptoms and come back to the ER if any acute worsening.  Otherwise she  will present to the GYN appointment.    Discussed todays ER visit and current agreed upon treatment plan. Education provided and all questions answered. Instructed to follow up with pcp to discuss ER visit, make sure they are doing well, and to follow up on any incidental findings. Encouraged to come back to the ER for re evaluation if worsening or any other concerns.    I have reviewed the nursing notes.    I have reviewed the findings, diagnosis, plan and need for follow up with the patient.          Discharge Medication List as of 10/18/2024  3:04 AM          Final diagnoses:   Vaginal hematoma       10/17/2024   [facility] EMERGENCY DEPT       Adi Fowler MD  10/18/24 0356

## 2024-10-18 NOTE — ED TRIAGE NOTES
Pt was seen on 10/14 for a Bartholin cyst; had this drained and prescribed abx  Has been taking abx and oxycodone. Feels the area has increased in size with worsening pain.          Triage Assessment (Adult)       Row Name 10/17/24 8700          Triage Assessment    Airway WDL WDL        Respiratory WDL    Respiratory WDL WDL        Cardiac WDL    Cardiac WDL WDL

## 2024-10-18 NOTE — DISCHARGE INSTRUCTIONS
10/23 at 1240 with dr rae at [facility]    Continue with Tylenol and Motrin and oxycodone for severe pain.  Continue with the current antibiotics.  There is an appointment set up for you that as noted above.  If you are feeling worse you come back to the emergency department.    Please call your primary doctor in the morning to discuss your ER visit, make sure you are doing well, and to follow up on any incidental findings. Please come back to the ER for re evaluation if you feel like things are getting worse or have other concerns.

## 2024-10-18 NOTE — ED NOTES
Chaperoned exam and procedure with Dr. Jun Sharpe for fluid aspiration of vulva. Pt tolerated procedure well. Fluid sample collected and walked to lab.

## 2024-10-18 NOTE — ED NOTES
Call received from lab with results from aerobic fluid gram strain from vulva aspiration at 0523 hrs.   No organisms seen. MD updated

## 2024-10-18 NOTE — CONSULTS
I was asked to see this patient in consultation from the ER tonight.    Subjective:  patient is a 29-year-old female who was seen on 10/14/2024 for a Bartholin's abscess.  This was drained and a Word catheter was placed.  She was also started on oral antibiotics.  She notes some continued drainage since placement of this.  She has been doing sitz bath's regularly.  However, on the morning of 10/17/2024, she developed increasing pain associated with this area.  She was seen in the ER where she was found to have a fluctuant mass in the left side of her vagina.  CT scan was obtained showing and up to 6 cm fluid collection which was unclear if it was abscess or hematoma or something else.  Of note, the Word catheter was no longer visible.  Patient does not recall a falling out, but also does not recall it being in place.    Objective:  Previous incision is noted.  No evidence of Word catheter is present.  There is a fluctuant area deep and proximal to the area of the incision.    After informed consent, the patient agreed to attempt to further drain this area and/or aspirate the area.    Anesthesia was obtained with local infiltration of 1% lidocaine with epinephrine.  Using a #11 blade, attempted to further drain the fluid collection.  Unfortunately, there was very little drainage noted, only a small amount of bleeding.  At this point, using a 10 cc syringe and an 18-gauge needle, I was able to aspirate 5 cc of dark bloody fluid with resulting decrease in the area of fluctuance.  This will be sent for culture.    There was still an area of fluctuance in the area of a typical Bartholin cyst.  Attempted to aspirate this with an 18-gauge needle and 10 cc syringe.  Nothing was obtained and at this point the procedure was abandoned.    Patient reported good pain control and there were no complications with the procedure.  EBL other than the aspirate was approximately 5 cc.    Assessment and plan.  Suspect hematoma  collection, but will send aspirate for culture.  Recommended continued wound care.  If her symptoms worsen, she is to let us know.  I recommended that she follow-up with Dr. Karen Bangura who she is seen recently in clinic.  If she needs further interventions or marsupialization, can also refer to gynecology if needed.

## 2024-10-23 ENCOUNTER — OFFICE VISIT (OUTPATIENT)
Dept: FAMILY MEDICINE | Facility: CLINIC | Age: 29
End: 2024-10-23
Payer: COMMERCIAL

## 2024-10-23 VITALS
SYSTOLIC BLOOD PRESSURE: 130 MMHG | BODY MASS INDEX: 37.49 KG/M2 | DIASTOLIC BLOOD PRESSURE: 76 MMHG | HEIGHT: 71 IN | WEIGHT: 267.8 LBS | RESPIRATION RATE: 18 BRPM | HEART RATE: 102 BPM | TEMPERATURE: 97.8 F

## 2024-10-23 DIAGNOSIS — G89.29 CHRONIC BILATERAL LOW BACK PAIN WITHOUT SCIATICA: Primary | ICD-10-CM

## 2024-10-23 DIAGNOSIS — M54.50 CHRONIC BILATERAL LOW BACK PAIN WITHOUT SCIATICA: Primary | ICD-10-CM

## 2024-10-23 DIAGNOSIS — N89.8 VAGINAL HEMATOMA: ICD-10-CM

## 2024-10-23 PROCEDURE — 99213 OFFICE O/P EST LOW 20 MIN: CPT | Performed by: FAMILY MEDICINE

## 2024-10-23 ASSESSMENT — PATIENT HEALTH QUESTIONNAIRE - PHQ9
SUM OF ALL RESPONSES TO PHQ QUESTIONS 1-9: 5
SUM OF ALL RESPONSES TO PHQ QUESTIONS 1-9: 5
10. IF YOU CHECKED OFF ANY PROBLEMS, HOW DIFFICULT HAVE THESE PROBLEMS MADE IT FOR YOU TO DO YOUR WORK, TAKE CARE OF THINGS AT HOME, OR GET ALONG WITH OTHER PEOPLE: SOMEWHAT DIFFICULT

## 2024-10-23 ASSESSMENT — PAIN SCALES - GENERAL: PAINLEVEL_OUTOF10: MILD PAIN (2)

## 2024-10-23 NOTE — PROGRESS NOTES
Assessment & Plan     (M54.50,  G89.29) Chronic bilateral low back pain without sciatica  (primary encounter diagnosis)  Comment: With continued severe bilateral back pain.  Often feels worse with ambulation and activity as well as lifting.  She is pending evaluation with physical therapy as well as MRIs pending    (N89.8) Vaginal hematoma  Comment: Continue sitz bath's and add saline/salt dissolution.  We did discuss expression as well as red flag symptoms.  She is still draining but the wound does appear to be open still and has not recollected.  There is no worsening of symptoms at this juncture.  Will continue to monitor.  Symptoms for worsening pain induration or other signs or symptoms of infection were discussed at length with the patient.  Follow-up as previously scheduled or as needed sooner if any issues.  The hematoma is likely to be reabsorbed but we did discuss signs or symptoms of infection for hematoma and patient is amenable to follow-up as needed        MED REC REQUIRED  Post Medication Reconciliation Status:       Patient has follow up planned in Dec. Will follow up prn sooner if issues.     Libia Murillo is a 29 year old, presenting for the following health issues:  ER F/U (Vaginal hematoma) and Back Pain        10/23/2024    12:52 PM   Additional Questions   Roomed by Lenora     History of Present Illness       Back Pain:  She presents for follow up of back pain. Patient's back pain is a chronic problem.  Location of back pain:  Right lower back and left lower back  Description of back pain: dull ache, sharp and stabbing  Back pain spreads: nowhere    Since patient first noticed back pain, pain is: rapidly worsening  Does back pain interfere with her job:  Not applicable       Reason for visit:  Hematoma and bartholin cyst  Symptom onset:  3-7 days ago  Symptoms include:  Pain in groin area and hematoma that was painful.  Symptom intensity:  Moderate  Symptom progression:  Staying the  "same  Had these symptoms before:  No  What makes it worse:  Sitting \"correctly\"  What makes it better:  Laying down sometimes   She is taking medications regularly.     Patient seen in ER on 10/14 for Bartholin's gland cyst.  At that time it was drained and Word catheter was placed.  Approximately 3 days later on 10/17/2024 patient represented to the ER.  She was having regular vaginal discharge and bleeding and purulent discharge coming from the site.  Unsure if the Word catheter fell out.  She reports that it feels stable now from prior.  She does not report any drainage but reports occasionally she does have a little bit of bloody discharge.  She has not tried to express anything.  She has been doing sitz bath's    ED/UC Followup:    Facility:  [facility] Emergency Dept   Date of visit: 10/17/2024-10/18/2024  Reason for visit: Vaginal hematoma   Current Status: Pain has slightly improved       Review of systems essentially negative/normal except what is noted above      Objective    /76 (BP Location: Right arm, Patient Position: Sitting, Cuff Size: Adult Large)   Pulse 102   Temp 97.8  F (36.6  C) (Temporal)   Resp 18   Ht 1.8 m (5' 10.87\")   Wt 121.5 kg (267 lb 12.8 oz)   LMP 10/01/2024 (Approximate)   BMI 37.49 kg/m    Body mass index is 37.49 kg/m .  Physical Exam  Constitutional:       General: She is not in acute distress.     Appearance: She is obese. She is not ill-appearing, toxic-appearing or diaphoretic.   Cardiovascular:      Pulses: Normal pulses.   Pulmonary:      Effort: Pulmonary effort is normal. No respiratory distress.   Genitourinary:         Comments: 1 cm incision just deep to the antritis.  There is approximately 4 cm of the posterior vaginal vault that is consistent with hematoma.  I attempted to express and express approximately 5 cc of old blood  Skin:     General: Skin is warm and dry.      Capillary Refill: Capillary refill takes less than 2 seconds. "   Neurological:      Mental Status: She is alert. Mental status is at baseline.                Signed Electronically by: Karen Bangura MD

## 2024-10-24 LAB
BACTERIA ASPIRATE CULT: ABNORMAL
GRAM STAIN RESULT: ABNORMAL
GRAM STAIN RESULT: ABNORMAL

## 2024-10-25 ENCOUNTER — HOSPITAL ENCOUNTER (OUTPATIENT)
Dept: MRI IMAGING | Facility: CLINIC | Age: 29
Discharge: HOME | End: 2024-10-25
Attending: FAMILY MEDICINE | Admitting: FAMILY MEDICINE
Payer: COMMERCIAL

## 2024-10-25 ENCOUNTER — MEDICAL CORRESPONDENCE (OUTPATIENT)
Dept: HEALTH INFORMATION MANAGEMENT | Facility: CLINIC | Age: 29
End: 2024-10-25

## 2024-10-25 ENCOUNTER — MYC MEDICAL ADVICE (OUTPATIENT)
Dept: FAMILY MEDICINE | Facility: CLINIC | Age: 29
End: 2024-10-25

## 2024-10-25 ENCOUNTER — OFFICE VISIT (OUTPATIENT)
Dept: OBGYN | Facility: OTHER | Age: 29
End: 2024-10-25
Attending: FAMILY MEDICINE
Payer: COMMERCIAL

## 2024-10-25 ENCOUNTER — TELEPHONE (OUTPATIENT)
Dept: OBGYN | Facility: CLINIC | Age: 29
End: 2024-10-25

## 2024-10-25 VITALS
WEIGHT: 269.5 LBS | BODY MASS INDEX: 37.73 KG/M2 | SYSTOLIC BLOOD PRESSURE: 139 MMHG | DIASTOLIC BLOOD PRESSURE: 102 MMHG

## 2024-10-25 DIAGNOSIS — Z87.59 H/O PRE-ECLAMPSIA: ICD-10-CM

## 2024-10-25 DIAGNOSIS — G89.29 CHRONIC BILATERAL LOW BACK PAIN WITHOUT SCIATICA: Primary | ICD-10-CM

## 2024-10-25 DIAGNOSIS — M54.50 CHRONIC BILATERAL LOW BACK PAIN WITHOUT SCIATICA: ICD-10-CM

## 2024-10-25 DIAGNOSIS — Z30.09 STERILIZATION CONSULT: Primary | ICD-10-CM

## 2024-10-25 DIAGNOSIS — G89.29 CHRONIC BILATERAL LOW BACK PAIN WITHOUT SCIATICA: ICD-10-CM

## 2024-10-25 DIAGNOSIS — M51.369 DEGENERATION OF INTERVERTEBRAL DISC OF LUMBAR REGION, UNSPECIFIED WHETHER PAIN PRESENT: ICD-10-CM

## 2024-10-25 DIAGNOSIS — Z98.891 H/O CESAREAN SECTION: ICD-10-CM

## 2024-10-25 DIAGNOSIS — M54.50 CHRONIC BILATERAL LOW BACK PAIN WITHOUT SCIATICA: Primary | ICD-10-CM

## 2024-10-25 DIAGNOSIS — N75.0 BARTHOLIN'S CYST: ICD-10-CM

## 2024-10-25 PROBLEM — O09.90 HIGH-RISK PREGNANCY: Status: RESOLVED | Noted: 2024-02-27 | Resolved: 2024-10-25

## 2024-10-25 PROBLEM — Z67.91 RH NEGATIVE STATUS DURING PREGNANCY: Status: RESOLVED | Noted: 2024-02-29 | Resolved: 2024-10-25

## 2024-10-25 PROBLEM — O14.10 PREECLAMPSIA, SEVERE: Status: RESOLVED | Noted: 2024-06-25 | Resolved: 2024-10-25

## 2024-10-25 PROBLEM — O28.5 ABNORMAL GENETIC TEST DURING PREGNANCY: Status: RESOLVED | Noted: 2024-02-29 | Resolved: 2024-10-25

## 2024-10-25 PROBLEM — O26.899 RH NEGATIVE STATUS DURING PREGNANCY: Status: RESOLVED | Noted: 2024-02-29 | Resolved: 2024-10-25

## 2024-10-25 PROCEDURE — 99459 PELVIC EXAMINATION: CPT | Performed by: OBSTETRICS & GYNECOLOGY

## 2024-10-25 PROCEDURE — 99203 OFFICE O/P NEW LOW 30 MIN: CPT | Performed by: OBSTETRICS & GYNECOLOGY

## 2024-10-25 PROCEDURE — 72148 MRI LUMBAR SPINE W/O DYE: CPT

## 2024-10-25 NOTE — TELEPHONE ENCOUNTER
[facility] SURGERY PLANNING/SCHEDULING WORKSHEET                                                     Lidia Gallegos                :  1995  MRN:  [id number]  Home Phone [phone]   Work Phone Not on file.   Mobile [phone]         Surgeon: Desire Decker DO    DIAGNOSIS:   Family status complete, infertility desires, Bartholian's cyst    SURGICAL PROCEDURE:  Bilateral laparoscopic tubal ligation, possible marsupilization of Bartholian's cyst    Surgery Location:  [facility] or [facility]=[place] [facility] [place]us  Patient Surgery Class:  SDS  Length of Procedure:  90 minutes  Type of anesthesia:  General    Multi-surgeon case: No  Additional OR technician needed for assistance?:   No  Vendor needed: No  Positioning:  Lithotomy  Laterality:  Bilateral  Date requested:   When able    Special Equipment: Ligassure  Special Instructions for patient:  Not needed  Precautions:  Diabetes  :  NOT NEEDED    Sterilization consent:  Yes and was signed on 10/25/2024.    Preop: Pre-op options: PCP  Pre-surgery consult needed:  Not applicable.  Postop evaluation needed:  2 weeks    ALLERGIES: No Known Allergies   BMI:There is no height or weight on file to calculate BMI.     The proposed surgical procedure is considered LOW risk.      Desire Decker DO    10/25/2024

## 2024-10-25 NOTE — PROGRESS NOTES
Subjective  29 year old non-pregnant female presents today to discuss a tubal ligation.  Patient had a c section 4 months ago.  She states it was an emergent due to her blood pressure.  Patient had pre-eclampsia.  Patient just had an MRI done for disc issues.  Patient has not had any abdominal surgeries.  Patient has only had this one child and does not want anymore.  She has tried birth control in the past.  Patient has tried the Nexplanon, IUD, and pills.  Patient admits to a Bartholian's cyst.  It has been there for over a week.  She is being followed by FP for this.  She is doing sitz baths and just finished antibiotics.  She feels the size of it has decreased significantly.  She has never had a cyst like this before.        I personally reviewed the CT and the findings showed a hematoma or soft tissue mass.      I also reviewed notes from previous office visits by Dr. Bangura.    ROS: 10 point ROS neg other than the symptoms noted above in the HPI.  Past Medical History:   Diagnosis Date    Anxiety     Autism     Depression     History of anemia      Past Surgical History:   Procedure Laterality Date     SECTION N/A 2024    Procedure: PRIMARY LOW TRANSVERSE  SECTION;  Surgeon: Mackenzie Dee MD;  Location: [facility] L[facility]    INNER EAR SURGERY Right     age 2    WISDOM TOOTH EXTRACTION       Family History   Problem Relation Age of Onset    No Known Problems Mother     Hypertension Father     Mental Illness Sister         bipolar    Attention Deficit Disorder Sister     Birth defects Brother         bladder - reportedly related to nuchal cord    Cancer Maternal Grandfather     Diabetes Paternal Grandmother     Diabetes Paternal Grandfather     Kidney failure Paternal Grandfather      Social History     Tobacco Use    Smoking status: Former     Current packs/day: 0.00     Types: Cigarettes     Quit date: 2022     Years since quittin.8    Smokeless tobacco: Never   Substance Use Topics     Alcohol use: Not Currently     Comment: occ         Objective  Vitals: BP (!) 139/102 (BP Location: Right arm, Cuff Size: Adult Regular)   Wt 122.2 kg (269 lb 8 oz)   LMP 10/01/2024 (Approximate)   BMI 37.73 kg/m    BMI= Body mass index is 37.73 kg/m .    General appearance=well developed, well-nourished female  Gait=normal  Psych=mood is stable, alert and oriented x3  Abd=soft, Nontender/nondistended, no masses, no signs of hernias, no evidence of hepatosplenomegaly  PELVIC:    External genitalia: large left labia majoria with obvious incision from where the Word catheter was placed, indurated, minimal erythema, no drainage from cyst  Urethral meatus: no lesions or prolapse noted, normal size  Urethra: no masses, non tender  Bladder: non tender, no fullness  Vagina: normal mucosa and rugae, no discharge.  Cervix: normal without lesion, no cervical motion tenderness, healthy, multiparous  Uterus: small, mobile, nontender.  Adnexa: non tender, without masses  Rectal: deffered  Ext=no clubbing or cyanosis, no swelling       Assessment  1.)  Family status complete, infertility desired  2.)  Left sided Bartholian's cyst  3.)  History of c section   4.)  History of severe pre-E      Plan  1.)  Schedule bilateral LTL, possible marsupilization of Bartholian's cyst=tubal consent signed today  2.)  Schedule pre-op with PCP      Acute, uncomplicated illness or injury, interpretation of CT findings ordered by a different provider, and surgery scheduled.  Nursing notes read and reviewed    Desire Decker DO

## 2024-10-28 ENCOUNTER — TELEPHONE (OUTPATIENT)
Dept: OBGYN | Facility: CLINIC | Age: 29
End: 2024-10-28
Payer: COMMERCIAL

## 2024-10-28 NOTE — TELEPHONE ENCOUNTER
[facility] SURGERY PLANNING/SCHEDULING WORKSHEET                                                     Lidia Gallegos                :  1995  MRN:  [id number]  Home Phone [phone]   Work Phone Not on file.   Mobile [phone]         Surgeon: Desire Decker DO     DIAGNOSIS:   Family status complete, infertility desires, Bartholian's cyst     SURGICAL PROCEDURE:  Bilateral laparoscopic tubal ligation, possible marsupilization of Bartholian's cyst     Surgery Location:  [facility] or [facility] [facility] [place]  Patient Surgery Class:  SDS  Length of Procedure:  90 minutes  Type of anesthesia:  General     Multi-surgeon case: No  Additional OR technician needed for assistance?:   No  Vendor needed: No  Positioning:  Lithotomy  Laterality:  Bilateral  Date requested:   When able     Special Equipment: Ligassure  Special Instructions for patient:  Not needed  Precautions:  Diabetes  :  NOT NEEDED     Sterilization consent:  Yes and was signed on 10/25/2024.     Preop: Pre-op options: PCP  Pre-surgery consult needed:  Not applicable.  Postop evaluation needed:  2 weeks     ALLERGIES:   Allergies   No Known Allergies      BMI:There is no height or weight on file to calculate BMI.      The proposed surgical procedure is considered LOW risk.        Desire Decker DO    10/25/2024  SURGERY SCHEDULING AND PRECERTIFICATION    Medical Record Number: [id number]  Lidia Gallegos  YOB: 1995   Phone: [phone] (home)   Primary Provider: Deion Price    Reason for Admit:  ICD-10 CODE:  Z30.2, N75.0    Surgeon: Desire Decker DO  Surgical Procedure: Bilateral laparoscopic tubal ligation, possible marsupilization of Bartholian's cyst    Date of Surgery  Time of Surgery 7:30am  Surgery to be performed at:  [facility]  Status: Outpatient  Type of Anesthesia Anticipated: General    Sterilization consent:  Yes and  was signed on 10/25.    Pre-Op: On 11/22 with Dr Price at [place]  COVID testing:  Per Provider's discretion Covid testing is not indicated.     Post-Op:  2 weeks on 12/16 with Dr Decker at [facility] [place]    Pre-certification routed to [employer]:  Yes    Surgery packet mailed to patient's home address: Yes  Patient instructed NPO 12 hours prior to surgery, arrive 1.5 hours  prior to surgery, must have a .  Patient understood and agrees to the plan.      Requestor:  Deyanira Rodrigez     Location:  [place] [id number]

## 2024-10-30 ENCOUNTER — PATIENT OUTREACH (OUTPATIENT)
Dept: CARE COORDINATION | Facility: CLINIC | Age: 29
End: 2024-10-30
Payer: COMMERCIAL

## 2024-10-31 ENCOUNTER — TELEPHONE (OUTPATIENT)
Dept: FAMILY MEDICINE | Facility: CLINIC | Age: 29
End: 2024-10-31
Payer: COMMERCIAL

## 2024-11-01 ENCOUNTER — PATIENT OUTREACH (OUTPATIENT)
Dept: CARE COORDINATION | Facility: CLINIC | Age: 29
End: 2024-11-01
Payer: COMMERCIAL

## 2024-11-04 ENCOUNTER — THERAPY VISIT (OUTPATIENT)
Dept: PHYSICAL THERAPY | Facility: CLINIC | Age: 29
End: 2024-11-04
Attending: FAMILY MEDICINE
Payer: COMMERCIAL

## 2024-11-04 DIAGNOSIS — G89.29 CHRONIC BILATERAL LOW BACK PAIN WITHOUT SCIATICA: ICD-10-CM

## 2024-11-04 DIAGNOSIS — M54.50 CHRONIC BILATERAL LOW BACK PAIN WITHOUT SCIATICA: ICD-10-CM

## 2024-11-04 PROCEDURE — 97161 PT EVAL LOW COMPLEX 20 MIN: CPT | Mod: GP | Performed by: PHYSICAL THERAPIST

## 2024-11-04 PROCEDURE — 97140 MANUAL THERAPY 1/> REGIONS: CPT | Mod: GP | Performed by: PHYSICAL THERAPIST

## 2024-11-04 PROCEDURE — 97110 THERAPEUTIC EXERCISES: CPT | Mod: GP | Performed by: PHYSICAL THERAPIST

## 2024-11-04 NOTE — PROGRESS NOTES
PHYSICAL THERAPY EVALUATION  Type of Visit: Evaluation       Fall Risk Screen:  Fall screen completed by: PT  Have you fallen 2 or more times in the past year?: No  Have you fallen and had an injury in the past year?: No  Is patient a fall risk?: No    Subjective         Presenting condition or subjective complaint: (Patient-Rptd) Multilevel disc disease    30 y/o female presents for evaluation of low back pain that she states has been ongoing for years, but has gotten worse since giving birth to her son on 24 via emergency .  Per chart review, she was initially evaluated in 2019 and at that time, was found to have arthritic changes in her back and lumbar spine.  Imaging significant for possible degenerative disc disease as well.  She was seen again in  and  for the same.  Patient reports pain started when she was 17 (back in ).  States she was working at [employer] lifted something with her back and not her legs, collapsed to the ground in pain.  She has previously been seen by PT and chiropractors multiple times.  Has been told she has a weak core, has been trying to work on that.  States PT is the only reason she's still able to walk now.  Pain is usually on both sides, but worse on the left.  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  He weighs about 15 lbs.  Pain will get better/worse depending on what she's doing and the time of year - winter is worse because muscles and joints are stiff and it's hard for her to move.      Date of onset: 10/09/24 (Date of order)    Relevant medical history: (Patient-Rptd) Anemia; Depression; Mental Illness   Past Medical History:   Diagnosis Date    Anxiety     Autism     Depression     History of anemia        Dates & types of surgery: (Patient-Rptd) Cholesteatoma surgery, unknown time besides 2 years old, c section 2024   Past Surgical History:   Procedure Laterality Date     SECTION N/A  2024    Procedure: PRIMARY LOW TRANSVERSE  SECTION;  Surgeon: Mackenzie Dee MD;  Location: [facility] L[facility]    INNER EAR SURGERY Right     age 2    WISDOM TOOTH EXTRACTION         Prior diagnostic imaging/testing results: (Patient-Rptd) MRI; CT scan; X-ray     Prior therapy history for the same diagnosis, illness or injury: (Patient-Rptd) Yes (Patient-Rptd) Physical therapy    Prior Level of Function  Transfers: Independent  Ambulation: Independent  ADL: Independent    Living Environment  Social support: (Patient-Rptd) With a significant other or spouse   Type of home: (Patient-Rptd) House   Stairs to enter the home: (Patient-Rptd) No       Ramp: (Patient-Rptd) No   Stairs inside the home: (Patient-Rptd) Yes (Patient-Rptd) 8 Is there a railing: (Patient-Rptd) No     Help at home: (Patient-Rptd) Home management tasks (cooking, cleaning); Medication and/or finances; Other  Equipment owned: (Patient-Rptd) Straight Cane; Crutches     Employment: (Patient-Rptd) No    Hobbies/Interests: (Patient-Rptd) Magic the gathering and drawing    Patient goals for therapy: (Patient-Rptd) Stand for longer than 30 minutes    Pain assessment: See objective evaluation for additional pain details     Objective   LUMBAR SPINE EVALUATION  PAIN: Pain Level at Rest: 3/10  Pain Level with Use: 6/10  Pain Location: Bilateral low back pain - across belt line, L worse than R.  No symptoms going into the legs, no bowel or bladder issues, no buttock issues or pain   Pain Quality: Reports more of an aching pain  Pain Frequency: constant or usually a 2-3/10, but will sometimes shoot up to 5-6/10  Pain is Exacerbated By: Anything hunched over or twisting motions when not being careful, climbing stairs is painful - needs to engage core muscles and keep hands at her back to make sure she's not shifting weirdly, bending over, lifting anything more than 20 lbs (4 month old baby weighs about 15 lbs)  Pain is Relieved By: Lying on her R side - sometimes  lying on her back or L side, ibuprofen (takes almost regularly), Tylenol as needed (maybe 1x/week), has tried lidocaine patches and IcyHot patches but they didn't really work   INTEGUMENTARY (edema, incisions):   scar across low abdomen - one area near the R side appears to be red and irritated, more consistent with moisture induced irritation.  Recommended using powder or piece of cloth at this area to prevent excessive moisture build up.  Scar mobility poor, scar itself is very tender to palpation.   POSTURE: WFL  Forward head, rounded shoulders  GAIT:   Weightbearing Status: WBAT  Assistive Device(s): None  Gait Deviations: WFL  BALANCE/PROPRIOCEPTION: WFL  WEIGHTBEARING ALIGNMENT: WFL  NON-WEIGHTBEARING ALIGNMENT: WFL   ROM:  Decreased L hip IR/ER as compared to R hip   PELVIC/SI SCREEN:  No deficits noted in quick screen   STRENGTH: WFL    Test Movements:   During: produces, abolishes, increases, decreases, no effect, centralizing, peripheralizing   After: better, worse, no better, no worse, no effect, centralized, peripheralized    Symptomatic response Mechanical response    During testing After testing Effect - increased ROM, decreased ROM, or key functional test No Effect   Pretest symptoms standing: Pain L low back 3/10     Rep FIS Increases    Worse        Rep EIS No Effect    Better  Pain 1/10 afterwards        MYOTOMES: WNL  DTR S: WNL  CORD SIGNS: WNL  DERMATOMES: WNL  NEURAL TENSION:  No deficits noted  FLEXIBILITY: WFL; increased hip stiffness bilaterally, L>R  PALPATION:  Increased tightness bilateral paraspinals, L glutes/piriformis   SPINAL SEGMENTAL CONCLUSIONS: WFL  No hypermobility, no hypomobility - no tenderness reported by patient     Oswestry (BRITTNI) Questionnaire        10/30/2024     9:06 AM   OSWESTRY DISABILITY INDEX   Count 10    Sum 17    Oswestry Score (%) 34 %        Patient-reported       [facility] STarT Back  Most recent score:        10/30/2024     9:06 AM   [facility] START BACK  TOTAL SCORE   Total Score (all 9) 4        Patient-reported            Assessment & Plan   CLINICAL IMPRESSIONS  Medical Diagnosis: Chronic bilateral low back pain without sciatica (M54.50, G89.29)    Treatment Diagnosis: Chronic bilateral low back pain without sciatica (M54.50, G89.29)   Impression/Assessment: Patient is a 29 year old female with low back pain  complaints.  The following significant findings have been identified: Pain, Decreased ROM/flexibility, Decreased strength, Impaired muscle performance, Decreased activity tolerance, and Impaired posture. These impairments interfere with their ability to perform self care tasks, recreational activities, household chores, driving , household mobility, and community mobility as compared to previous level of function.     Clinical Decision Making (Complexity):  Clinical Presentation: Stable/Uncomplicated  Clinical Presentation Rationale: based on medical and personal factors listed in PT evaluation  Clinical Decision Making (Complexity): Low complexity    PLAN OF CARE  Treatment Interventions:  Modalities: Hot Pack, Ultrasound  Interventions: Gait Training, Manual Therapy, Neuromuscular Re-education, Therapeutic Activity, Therapeutic Exercise    Long Term Goals     PT Goal 1  Goal Identifier: 1  Goal Description: Patient will improve BRITTNI score by at least 13 points in order to demonstrate clinically significant improvement in back/leg trouble on the ability to manage everyday life.  Target Date: 01/13/25  PT Goal 2  Goal Identifier: 2  Goal Description: Patient will report low back pain at < 2/10 with activities in order to facilitate return to prior level of function.  Target Date: 01/13/25  PT Goal 3  Goal Identifier: 3  Goal Description: Patient will report ability to lift up to 20 lbs without increased back pain in order to make it easier to take care of her baby at home.  Target Date: 01/13/25      Frequency of Treatment: 2x/week  Duration of Treatment: 10  weeks    Recommended Referrals to Other Professionals:  Patient may benefit from seeing a therapist with pelvic health/women's health to further assess  scar and pelvic floor  Education Assessment:   Learner/Method: Patient;Listening;Reading;Demonstration;Pictures/Video    Risks and benefits of evaluation/treatment have been explained.   Patient/Family/caregiver agrees with Plan of Care.     Evaluation Time:     PT Eval, Low Complexity Minutes (75046): 35       Signing Clinician: KAROL Irene [facility]                                                                                   OUTPATIENT PHYSICAL THERAPY      PLAN OF TREATMENT FOR OUTPATIENT REHABILITATION   Patient's Last Name, First Name, Lidia Oconnell YOB: 1995   Provider's Name   [facility]   Medical Record No.  [id number]     Onset Date: 10/09/24 (Date of order)  Start of Care Date: 24     Medical Diagnosis:  Chronic bilateral low back pain without sciatica (M54.50, G89.29)      PT Treatment Diagnosis:  Chronic bilateral low back pain without sciatica (M54.50, G89.29) Plan of Treatment  Frequency/Duration: 2x/week/ 10 weeks    Certification date from 24 to 25         See note for plan of treatment details and functional goals     Viridiana Rolle, PT                         I CERTIFY THE NEED FOR THESE SERVICES FURNISHED UNDER        THIS PLAN OF TREATMENT AND WHILE UNDER MY CARE     (Physician attestation of this document indicates review and certification of the therapy plan).              Referring Provider:  Karen Bangura    Initial Assessment  See Epic Evaluation- Start of Care Date: 24

## 2024-11-07 ENCOUNTER — MYC REFILL (OUTPATIENT)
Dept: FAMILY MEDICINE | Facility: CLINIC | Age: 29
End: 2024-11-07
Payer: COMMERCIAL

## 2024-11-07 DIAGNOSIS — K21.00 GASTROESOPHAGEAL REFLUX DISEASE WITH ESOPHAGITIS WITHOUT HEMORRHAGE: ICD-10-CM

## 2024-11-07 RX ORDER — OMEPRAZOLE 40 MG/1
40 CAPSULE, DELAYED RELEASE ORAL DAILY
Qty: 90 CAPSULE | Refills: 0 | Status: SHIPPED | OUTPATIENT
Start: 2024-11-07

## 2024-11-11 ENCOUNTER — THERAPY VISIT (OUTPATIENT)
Dept: PHYSICAL THERAPY | Facility: CLINIC | Age: 29
End: 2024-11-11
Attending: FAMILY MEDICINE
Payer: COMMERCIAL

## 2024-11-11 DIAGNOSIS — G89.29 CHRONIC BILATERAL LOW BACK PAIN WITHOUT SCIATICA: Primary | ICD-10-CM

## 2024-11-11 DIAGNOSIS — M54.50 CHRONIC BILATERAL LOW BACK PAIN WITHOUT SCIATICA: Primary | ICD-10-CM

## 2024-11-11 PROCEDURE — 97110 THERAPEUTIC EXERCISES: CPT | Mod: GP | Performed by: PHYSICAL THERAPIST

## 2024-11-18 ENCOUNTER — THERAPY VISIT (OUTPATIENT)
Dept: PHYSICAL THERAPY | Facility: CLINIC | Age: 29
End: 2024-11-18
Attending: FAMILY MEDICINE
Payer: COMMERCIAL

## 2024-11-18 DIAGNOSIS — G89.29 CHRONIC BILATERAL LOW BACK PAIN WITHOUT SCIATICA: Primary | ICD-10-CM

## 2024-11-18 DIAGNOSIS — M54.50 CHRONIC BILATERAL LOW BACK PAIN WITHOUT SCIATICA: Primary | ICD-10-CM

## 2024-11-18 PROCEDURE — 97110 THERAPEUTIC EXERCISES: CPT | Mod: GP | Performed by: PHYSICAL THERAPIST

## 2024-11-22 PROBLEM — Z87.59 H/O PRE-ECLAMPSIA: Status: RESOLVED | Noted: 2024-10-25 | Resolved: 2024-11-22

## 2024-11-22 PROBLEM — Z98.891 H/O CESAREAN SECTION: Status: RESOLVED | Noted: 2024-10-25 | Resolved: 2024-11-22

## 2024-11-30 ENCOUNTER — MYC MEDICAL ADVICE (OUTPATIENT)
Dept: OBGYN | Facility: OTHER | Age: 29
End: 2024-11-30
Payer: COMMERCIAL

## 2024-12-02 ENCOUNTER — THERAPY VISIT (OUTPATIENT)
Dept: PHYSICAL THERAPY | Facility: CLINIC | Age: 29
End: 2024-12-02
Attending: FAMILY MEDICINE
Payer: COMMERCIAL

## 2024-12-02 DIAGNOSIS — M54.50 CHRONIC BILATERAL LOW BACK PAIN WITHOUT SCIATICA: Primary | ICD-10-CM

## 2024-12-02 DIAGNOSIS — G89.29 CHRONIC BILATERAL LOW BACK PAIN WITHOUT SCIATICA: Primary | ICD-10-CM

## 2024-12-02 PROBLEM — N75.0 BARTHOLIN'S CYST: Status: RESOLVED | Noted: 2024-10-25 | Resolved: 2024-12-02

## 2024-12-02 PROBLEM — Z98.51 S/P TUBAL LIGATION: Status: ACTIVE | Noted: 2024-12-02

## 2024-12-02 PROCEDURE — 999N000104 HC STATISTIC NO CHARGE: Performed by: PHYSICAL THERAPIST

## 2024-12-02 NOTE — TELEPHONE ENCOUNTER
Please let patient know she can lift >10lbs occasionally, however she should try to take it easy.    Desire Decker, DO

## 2024-12-02 NOTE — TELEPHONE ENCOUNTER
Pt had a bilateral tubal ligation on 11/29/24.    She states her discharge papers say she isn't able to lift >10 lbs.  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    Routing to Dr. Decker to further advise.    Hanane Valero RN-[facility]

## 2024-12-16 ENCOUNTER — MYC MEDICAL ADVICE (OUTPATIENT)
Dept: OBGYN | Facility: OTHER | Age: 29
End: 2024-12-16

## 2024-12-16 ENCOUNTER — OFFICE VISIT (OUTPATIENT)
Dept: OBGYN | Facility: OTHER | Age: 29
End: 2024-12-16
Payer: COMMERCIAL

## 2024-12-16 VITALS — DIASTOLIC BLOOD PRESSURE: 84 MMHG | WEIGHT: 283.4 LBS | SYSTOLIC BLOOD PRESSURE: 129 MMHG | BODY MASS INDEX: 40.66 KG/M2

## 2024-12-16 DIAGNOSIS — Z98.51 S/P TUBAL LIGATION: Primary | ICD-10-CM

## 2024-12-16 PROCEDURE — 99213 OFFICE O/P EST LOW 20 MIN: CPT | Performed by: OBSTETRICS & GYNECOLOGY

## 2024-12-16 NOTE — PROGRESS NOTES
Subjective  29 year old non-pregnant female presents today as a post-op from a LTL on 2024.  Patient states she is doing well.  No pain.  No vaginal bleeding.  No problems urinating.  Normal bowel movements.  We reviewed her pictures and pathology from surgery and all questions were answered.      ROS: 10 point ROS neg other than the symptoms noted above in the HPI.  Past Medical History:   Diagnosis Date    Anxiety     Autism     Depression     H/O pre-eclampsia 10/25/2024    History of anemia      Past Surgical History:   Procedure Laterality Date     SECTION N/A 2024    Procedure: PRIMARY LOW TRANSVERSE  SECTION;  Surgeon: Mackenzie Dee MD;  Location: [facility]    INNER EAR SURGERY Right     age 2    LAPAROSCOPIC TUBAL LIGATION Bilateral 2024    LTL    WISDOM TOOTH EXTRACTION       Family History   Problem Relation Age of Onset    No Known Problems Mother     Hypertension Father     Mental Illness Sister         bipolar    Attention Deficit Disorder Sister     Birth defects Brother         bladder - reportedly related to nuchal cord    Cancer Maternal Grandfather     Diabetes Paternal Grandmother     Diabetes Paternal Grandfather     Kidney failure Paternal Grandfather      Social History     Tobacco Use    Smoking status: Former     Current packs/day: 0.00     Types: Cigarettes     Quit date: 2022     Years since quittin.9    Smokeless tobacco: Never   Substance Use Topics    Alcohol use: Not Currently     Comment: occ         Objective  Vitals: /84 (BP Location: Left arm, Cuff Size: Adult Regular)   Wt 128.5 kg (283 lb 6.4 oz)   BMI 40.66 kg/m    BMI= Body mass index is 40.66 kg/m .      Abd=soft, Nontender/nondistended, incisions=healed well      Pathology=2024:  Specimens:   A) - Fallopian Tube Right, Sterilization                                                           B) - Fallopian Tube Left, Sterilization                                                   Final Diagnosis    A-B.  Fallopian tubes, right and left, bilateral salpingectomy: Complete cross-sections of bilateral fallopian tubes         Assessment  1.)  S/p LTL on 11/29/2024=benign pathology      Plan  1.)  Follow-up prn    25 minutes were spent on the date of the encounter doing chart review, history and exam, documentation, and further activities as noted above.  Desire Decker DO

## 2024-12-16 NOTE — TELEPHONE ENCOUNTER
Pt is asking if her restrictions are lifted or does she need to continue with her lifting restrictions.    Pt was seen by Dr. Decker today, 12/16, for a post op.  Pt had a LTL on 11/29/24.    Routing to Dr. Decker to clarify if pt needs to continue with lifting restrictions or if she can start lifting anything at this time.    Hanane Valero, RN-[facility]

## 2024-12-16 NOTE — PATIENT INSTRUCTIONS
Please call if you any questions.    [address]   [address]  [phone]        Desire Decker,

## 2024-12-20 ENCOUNTER — MEDICAL CORRESPONDENCE (OUTPATIENT)
Dept: HEALTH INFORMATION MANAGEMENT | Facility: CLINIC | Age: 29
End: 2024-12-20

## 2025-01-11 ENCOUNTER — HEALTH MAINTENANCE LETTER (OUTPATIENT)
Age: 30
End: 2025-01-11

## 2025-02-08 ENCOUNTER — MYC REFILL (OUTPATIENT)
Dept: FAMILY MEDICINE | Facility: CLINIC | Age: 30
End: 2025-02-08
Payer: COMMERCIAL

## 2025-02-08 DIAGNOSIS — K21.00 GASTROESOPHAGEAL REFLUX DISEASE WITH ESOPHAGITIS WITHOUT HEMORRHAGE: ICD-10-CM

## 2025-02-10 ENCOUNTER — MYC MEDICAL ADVICE (OUTPATIENT)
Dept: FAMILY MEDICINE | Facility: CLINIC | Age: 30
End: 2025-02-10
Payer: COMMERCIAL

## 2025-02-10 DIAGNOSIS — K21.00 GASTROESOPHAGEAL REFLUX DISEASE WITH ESOPHAGITIS WITHOUT HEMORRHAGE: ICD-10-CM

## 2025-02-10 RX ORDER — OMEPRAZOLE 40 MG/1
40 CAPSULE, DELAYED RELEASE ORAL DAILY
Qty: 90 CAPSULE | Refills: 4 | OUTPATIENT
Start: 2025-02-10

## 2025-02-21 PROBLEM — E11.29 TYPE 2 DIABETES MELLITUS WITH DIABETIC MICROALBUMINURIA, WITHOUT LONG-TERM CURRENT USE OF INSULIN (H): Status: ACTIVE | Noted: 2024-06-21

## 2025-02-21 PROBLEM — R80.9 TYPE 2 DIABETES MELLITUS WITH DIABETIC MICROALBUMINURIA, WITHOUT LONG-TERM CURRENT USE OF INSULIN (H): Status: ACTIVE | Noted: 2024-06-21

## 2025-03-26 ASSESSMENT — ANXIETY QUESTIONNAIRES
2. NOT BEING ABLE TO STOP OR CONTROL WORRYING: SEVERAL DAYS
1. FEELING NERVOUS, ANXIOUS, OR ON EDGE: MORE THAN HALF THE DAYS
3. WORRYING TOO MUCH ABOUT DIFFERENT THINGS: SEVERAL DAYS
5. BEING SO RESTLESS THAT IT IS HARD TO SIT STILL: NOT AT ALL
GAD7 TOTAL SCORE: 8
6. BECOMING EASILY ANNOYED OR IRRITABLE: MORE THAN HALF THE DAYS
GAD7 TOTAL SCORE: 8
IF YOU CHECKED OFF ANY PROBLEMS ON THIS QUESTIONNAIRE, HOW DIFFICULT HAVE THESE PROBLEMS MADE IT FOR YOU TO DO YOUR WORK, TAKE CARE OF THINGS AT HOME, OR GET ALONG WITH OTHER PEOPLE: SOMEWHAT DIFFICULT
8. IF YOU CHECKED OFF ANY PROBLEMS, HOW DIFFICULT HAVE THESE MADE IT FOR YOU TO DO YOUR WORK, TAKE CARE OF THINGS AT HOME, OR GET ALONG WITH OTHER PEOPLE?: SOMEWHAT DIFFICULT
7. FEELING AFRAID AS IF SOMETHING AWFUL MIGHT HAPPEN: SEVERAL DAYS
4. TROUBLE RELAXING: SEVERAL DAYS
7. FEELING AFRAID AS IF SOMETHING AWFUL MIGHT HAPPEN: SEVERAL DAYS

## 2025-03-30 ASSESSMENT — PATIENT HEALTH QUESTIONNAIRE - PHQ9
SUM OF ALL RESPONSES TO PHQ QUESTIONS 1-9: 17
SUM OF ALL RESPONSES TO PHQ QUESTIONS 1-9: 17
10. IF YOU CHECKED OFF ANY PROBLEMS, HOW DIFFICULT HAVE THESE PROBLEMS MADE IT FOR YOU TO DO YOUR WORK, TAKE CARE OF THINGS AT HOME, OR GET ALONG WITH OTHER PEOPLE: SOMEWHAT DIFFICULT

## 2025-03-31 ENCOUNTER — NURSE TRIAGE (OUTPATIENT)
Dept: FAMILY MEDICINE | Facility: CLINIC | Age: 30
End: 2025-03-31
Payer: COMMERCIAL

## 2025-03-31 ENCOUNTER — VIRTUAL VISIT (OUTPATIENT)
Dept: BEHAVIORAL HEALTH | Facility: CLINIC | Age: 30
End: 2025-03-31
Payer: COMMERCIAL

## 2025-03-31 ENCOUNTER — MYC REFILL (OUTPATIENT)
Dept: FAMILY MEDICINE | Facility: CLINIC | Age: 30
End: 2025-03-31
Payer: COMMERCIAL

## 2025-03-31 DIAGNOSIS — F84.0 AUTISM SPECTRUM DISORDER: ICD-10-CM

## 2025-03-31 DIAGNOSIS — F33.1 MAJOR DEPRESSIVE DISORDER, RECURRENT EPISODE, MODERATE WITH PERIPARTUM ONSET (H): Primary | ICD-10-CM

## 2025-03-31 DIAGNOSIS — F41.1 GENERALIZED ANXIETY DISORDER: ICD-10-CM

## 2025-03-31 DIAGNOSIS — G89.29 CHRONIC BILATERAL LOW BACK PAIN WITHOUT SCIATICA: ICD-10-CM

## 2025-03-31 DIAGNOSIS — M51.369 DEGENERATION OF INTERVERTEBRAL DISC OF LUMBAR REGION, UNSPECIFIED WHETHER PAIN PRESENT: ICD-10-CM

## 2025-03-31 DIAGNOSIS — M54.50 CHRONIC BILATERAL LOW BACK PAIN WITHOUT SCIATICA: ICD-10-CM

## 2025-03-31 PROCEDURE — 90791 PSYCH DIAGNOSTIC EVALUATION: CPT | Mod: 95 | Performed by: SOCIAL WORKER

## 2025-03-31 ASSESSMENT — ANXIETY QUESTIONNAIRES
GAD7 TOTAL SCORE: 12
3. WORRYING TOO MUCH ABOUT DIFFERENT THINGS: SEVERAL DAYS
7. FEELING AFRAID AS IF SOMETHING AWFUL MIGHT HAPPEN: SEVERAL DAYS
6. BECOMING EASILY ANNOYED OR IRRITABLE: MORE THAN HALF THE DAYS
GAD7 TOTAL SCORE: 12
7. FEELING AFRAID AS IF SOMETHING AWFUL MIGHT HAPPEN: SEVERAL DAYS
5. BEING SO RESTLESS THAT IT IS HARD TO SIT STILL: MORE THAN HALF THE DAYS
8. IF YOU CHECKED OFF ANY PROBLEMS, HOW DIFFICULT HAVE THESE MADE IT FOR YOU TO DO YOUR WORK, TAKE CARE OF THINGS AT HOME, OR GET ALONG WITH OTHER PEOPLE?: SOMEWHAT DIFFICULT
2. NOT BEING ABLE TO STOP OR CONTROL WORRYING: MORE THAN HALF THE DAYS
1. FEELING NERVOUS, ANXIOUS, OR ON EDGE: MORE THAN HALF THE DAYS
IF YOU CHECKED OFF ANY PROBLEMS ON THIS QUESTIONNAIRE, HOW DIFFICULT HAVE THESE PROBLEMS MADE IT FOR YOU TO DO YOUR WORK, TAKE CARE OF THINGS AT HOME, OR GET ALONG WITH OTHER PEOPLE: SOMEWHAT DIFFICULT
GAD7 TOTAL SCORE: 12
4. TROUBLE RELAXING: MORE THAN HALF THE DAYS

## 2025-03-31 ASSESSMENT — COLUMBIA-SUICIDE SEVERITY RATING SCALE - C-SSRS
1. IN THE PAST MONTH, HAVE YOU WISHED YOU WERE DEAD OR WISHED YOU COULD GO TO SLEEP AND NOT WAKE UP?: NO
2. HAVE YOU ACTUALLY HAD ANY THOUGHTS OF KILLING YOURSELF?: NO
6. IN YOUR LIFETIME, HAVE YOU EVER DONE ANYTHING, STARTED TO DO ANYTHING, OR PREPARED TO DO ANYTHING TO END YOUR LIFE?: NO

## 2025-03-31 NOTE — Clinical Note
Establishing care- psychotherapy. Patient reports considering pursuing medications to help with symptoms of depression and anxiety.

## 2025-03-31 NOTE — PROGRESS NOTES
[facility]         PATIENT'S NAME: Lidia Gallegos  PREFERRED NAME: Lidia  PRONOUNS: she/her  MRN: [id number]  : 1995  ADDRESS: [address] [place] [address]  ACCT. NUMBER:  [id number]  DATE OF SERVICE: 3/31/25  START TIME: 4:00pm  END TIME: 5:14pm  PREFERRED PHONE: [phone]  May we leave a program related message: Yes  EMERGENCY CONTACT: was obtained   Extended Emergency Contact Information  Primary Emergency Contact: Robles Arzola  Address: [address] [place] [address]  Home Phone: [phone]  Mobile Phone: [phone]  Relation: Spouse  Secondary Emergency Contact: Lidia Gallegos  Address: [address] [place] [address] Phone: [phone]  Relation: Stepparent    SERVICE MODALITY:  Video Visit:      Provider verified identity through the following two step process.  Patient provided:  Patient  and Patient address    Telemedicine Visit: The patient's condition can be safely assessed and treated via synchronous audio and visual telemedicine encounter.      Reason for Telemedicine Visit: Patient convenience (e.g. access to timely appointments / distance to available provider)    Originating Site (Patient Location): Patient's home    Distant Site (Provider Location): Provider Remote Setting- Home Office    Consent:  The patient/guardian has verbally consented to: the potential risks and benefits of telemedicine (video visit) versus in person care; bill my insurance or make self-payment for services provided; and responsibility for payment of non-covered services.     Patient would like the video invitation sent by:  My Chart    Mode of Communication:  Video Conference via [facility]    Distant Location (Provider):  Off-site    As the provider I attest to compliance with applicable laws and regulations related to telemedicine.    UNIVERSAL ADULT Mental Health DIAGNOSTIC ASSESSMENT    Identifying Information:  Patient  "is a 29 year old,   individual.  Patient was referred for an assessment by self.  Patient attended the session alone.    Therapist and patient reviewed consent and privacy policy including, but not limited to confidentiality, limits to confidentiality, patient rights, expectations, attendance. Patient reported understanding and provided verbal agreement.       Chief Complaint:   The reason for seeking services at this time is: \"Depression\".  The problem(s) began 10/14/24.    Patient has attempted to resolve these concerns in the past through therapy, primary care, and medications.    The most recent episode of depression has been triggered by life changes. Patient was busy with grad school and work until graduating last May (). She then had her baby in  () and reports \"anxiety was through the roof.\" She was diagnosed with gestational diabetes a month before she was due. She reports having pre-eclampsia and ended up requiring an emergency  at 37 weeks and 6 days. She reports she \"couldn't breast feed- I tried for 2 months.\" She reports times of \"feeling less than a mom\" for having a  and inability to breast feed. \"I started spiraling.\"   \"It s been a huge adjustment to being home with the baby and feeling stuck at the house with all the kids.\"  Feeling guilty for spending any time away from baby. Negative thoughts on the inside saying,  I m worthless    If I cry, it s an anxious/panic cry. \"I have to be with him and watch him.\" Times of feeling alone in caring fro baby due to spouse's work schedule. She is having sleep problems. \"I am not me anymore and I'm an extension of my 9 month old.   Patient reports tearfulness and worsening depression and her spouse recommended she pursue therapy.   She is also considering talking to doctor about medications for depression and anxiety.      Social/Family History:  Patient reported they grew up in other [place]  They were raised by " "biological parents  .  Parents were always together.  Patient reported that their childhood was fair.  Patient described their current relationships with family of origin as - \"not supportive.\"    The patient describes their cultural background as .  Cultural influences and impact on patient's life structure, values, norms, and healthcare:  Patient is accepting of Western Medicine and healthcare. Patient engages in her care by learning about her health and researching. She has a history of mental health care. Contextual influences on patient's health include: Individual Factors - not employed or in school anymore- adjusting to being at home with kids, and Family Factors - first delivery in June- worries about baby and stress of doing a lot of parenting while spouse has shift-work .    These factors will be addressed in the Preliminary Treatment plan. Patient identified their preferred language to be English. Patient reported they does not need the assistance of an  or other support involved in therapy.     Patient reported  being diagnosed with Autism by age 6 .   Patient's highest education level was graduate school  with a Master's degree in molecular and cellular biology.  Patient identified the following learning problems:   Patient reports there were social/learning issues growing up due to Autism diagnosis- had a  at school and /psychologist that helped . Modifications will not be used to assist communication in therapy. Patient reports they are  able to understand written materials.    Patient reported the following relationship history- dating.  Patient's current relationship status is .   Patient identified their sexual orientation as bi-sexual.  Patient reported having 1 child([place])- 9 month old and 3 step children (ages 16, 14, 11). Patient identified partner as part of their support system.  Patient identified the quality of these relationships as " fair .      Patient's current living/housing situation involves staying in own home/apartment.  The immediate members of family and household include Robles Arzola, 40, and children. They report that housing is stable.    Patient is currently unemployed.  Patient reports their finances are obtained through family; other. Patient does not identify finances as a current stressor.      Patient reported that they have not been involved with the legal system. Patient does not report being under probation/ parole/ jurisdiction. They are not under any current court jurisdiction. .    Patient's Strengths and Limitations:  Patient identified the following strengths or resources that will help them succeed in treatment: family support, insight, intelligence, and motivation. Things that may interfere with the patient's success in treatment include: none identified.     Assessments:  The following assessments were completed by patient for this visit:  PHQ9:       5/6/2024     2:30 PM 5/20/2024     1:40 PM 5/21/2024     1:06 PM 10/23/2024    12:57 PM 12/19/2024     4:21 PM 2/21/2025     1:56 PM 3/30/2025     1:29 AM   PHQ-9 SCORE   PHQ-9 Total Score MyChart 0  0 5 (Mild depression) 8 (Mild depression)  17 (Moderately severe depression)   PHQ-9 Total Score 0 0 0 5  8  3 17        Patient-reported    Proxy-reported     GAD7:       11/1/2022     5:04 PM 5/15/2023     9:23 AM 7/29/2023    11:32 PM 8/28/2023     9:45 AM 2/24/2025     7:07 PM 3/26/2025    10:07 AM 3/31/2025     2:31 PM   JIGAR-7 SCORE   Total Score 14 (moderate anxiety) 5 (mild anxiety) 0 (minimal anxiety) 3 (minimal anxiety) 11 (moderate anxiety) 8 (mild anxiety) 12 (moderate anxiety)   Total Score 14 5 0 3 11  8  12        Patient-reported     CAGE-AID:       11/1/2022     5:08 PM   CAGE-AID Total Score   Total Score 0   Total Score MyChart 0 (A total score of 2 or greater is considered clinically significant)     PROMIS 10-Global Health (all questions and  answers displayed):       11/8/2022     8:18 AM 11/22/2022     5:22 PM 2/24/2025     7:07 PM 3/26/2025    10:08 AM   PROMIS 10   In general, would you say your health is:  Good Good Good   In general, would you say your quality of life is:  Very good Very good Very good   In general, how would you rate your physical health?  Good Very good Very good   In general, how would you rate your mental health, including your mood and your ability to think?  Good Fair Good   In general, how would you rate your satisfaction with your social activities and relationships?  Good Good Fair   In general, please rate how well you carry out your usual social activities and roles  Good Very good Good   To what extent are you able to carry out your everyday physical activities such as walking, climbing stairs, carrying groceries, or moving a chair?  Mostly Moderately Moderately   In the past 7 days, how often have you been bothered by emotional problems such as feeling anxious, depressed, or irritable?  Sometimes Often Often   In the past 7 days, how would you rate your fatigue on average?  Mild Moderate Moderate   In the past 7 days, how would you rate your pain on average, where 0 means no pain, and 10 means worst imaginable pain?  2 4 5   In general, would you say your health is: 2 3 3 3   In general, would you say your quality of life is: 3 4 4 4   In general, how would you rate your physical health? 3 3 4 4   In general, how would you rate your mental health, including your mood and your ability to think? 2 3 2 3   In general, how would you rate your satisfaction with your social activities and relationships? 3 3 3 2   In general, please rate how well you carry out your usual social activities and roles. (This includes activities at home, at work and in your community, and responsibilities as a parent, child, spouse, employee, friend, etc.) 2 3 4 3   To what extent are you able to carry out your everyday physical activities such  as walking, climbing stairs, carrying groceries, or moving a chair? 3 4 3 3   In the past 7 days, how often have you been bothered by emotional problems such as feeling anxious, depressed, or irritable? 4 3 4 4   In the past 7 days, how would you rate your fatigue on average? 1 2 3 3   In the past 7 days, how would you rate your pain on average, where 0 means no pain, and 10 means worst imaginable pain? 3 2 4 5   Global Mental Health Score 10 13 11  11    Global Physical Health Score 15 15 13  13    PROMIS TOTAL - SUBSCORES 25 28 24  24        Patient-reported     PROMIS 10-Global Health (only subscores and total score):       11/8/2022     8:18 AM 11/22/2022     5:22 PM 2/24/2025     7:07 PM 3/26/2025    10:08 AM   PROMIS-10 Scores Only   Global Mental Health Score 10 13 11  11    Global Physical Health Score 15 15 13  13    PROMIS TOTAL - SUBSCORES 25 28 24  24        Patient-reported     [place] Suicide Severity Rating Scale (Lifetime/Recent)      7/6/2024    12:04 PM 7/7/2024     8:03 AM 7/7/2024     5:00 PM 8/21/2024    11:34 PM 10/14/2024     9:14 AM 10/17/2024    11:26 PM 3/31/2025     2:31 PM   [place] Suicide Severity Rating (Lifetime/Recent)   Q1 Wished to be Dead (Past Month) 0-->no 0-->no 0-->no 0-->no 0-->no 0-->no    Q2 Suicidal Thoughts (Past Month) 0-->no 0-->no 0-->no 0-->no 0-->no 0-->no    Q6 Suicide Behavior (Lifetime) 0-->no 0-->no 0-->no 0-->no 0-->no 0-->no    If yes to Q6, within past 3 months?   0-->no       Level of Risk per Screen no risks indicated no risks indicated moderate risk no risks indicated no risks indicated no risks indicated    1. Wish to be Dead (Past 1 Month)       N   2. Non-Specific Active Suicidal Thoughts (Past 1 Month)       N   Calculated C-SSRS Risk Score (Lifetime/Recent)       No Risk Indicated        Patient-reported     [place] Suicide Severity Rating Scale (Short Version)      6/21/2024     8:36 AM 7/6/2024    12:04 PM 7/7/2024     8:03 AM 7/7/2024     5:00 PM  8/21/2024    11:34 PM 10/14/2024     9:14 AM 10/17/2024    11:26 PM   [place] Suicide Severity Rating (Short Version)   Q1 Wished to be Dead (Past Month) 0-->no 0-->no 0-->no 0-->no 0-->no 0-->no 0-->no   Q2 Suicidal Thoughts (Past Month) 0-->no 0-->no 0-->no 0-->no 0-->no 0-->no 0-->no   Q6 Suicide Behavior (Lifetime) 1-->yes 0-->no 0-->no 0-->no 0-->no 0-->no 0-->no   If yes to Q6, within past 3 months? 0-->no   0-->no      Level of Risk per Screen moderate risk no risks indicated no risks indicated moderate risk no risks indicated no risks indicated no risks indicated       Personal and Family Medical History:  Patient does report a family history of mental health concerns.  Patient reports family history includes Attention Deficit Disorder in her sister; Birth defects in her brother; Cancer in her maternal grandfather; Diabetes in her paternal grandfather and paternal grandmother; Hypertension in her father; Kidney failure in her paternal grandfather; Mental Illness in her sister; No Known Problems in her mother..     Patient does report Mental Health Diagnosis and/or Treatment.  Patient reported the following previous diagnoses which include(s): an anxiety disorder; depression; a personality disorder , Aspberger's Syndrome. Patient reported symptoms began in childhood.  Patient has received mental health services in the past:  case management; therapy; psychiatry; partial hospitalization program  .    Diagnosed with Autism at 6 years old.  Was learning issues- had a  at school and /psychologist that helped.    Age [age]- my parents got me into therapy and it didn t really help me.    9/13/2017- Suicidal thoughts, Depression Unspecified- Emergency Dept.  [facility].    Then had mental health intake with [facility] - [facility] 9/27/2017- Selena Wolff.    10/2017- anxiety with depression   9/2018- Anxiety with depression, Asperger's Syndrome by PCP (same as below)   PCP-  Yesika Osuna at [facility] 9/2020 : Dx: Anxiety with Depression, Asperger's syndrome.   College- bad relationships and other things- went to therapy for depression and anxiety.  PTSD from past relationship during college years.   Psychiatric Hospitalizations: no reported or noted in Care Everywhere. Did have ED visit in 2017.  Patient denies a history of civil commitment.      Currently, patient none  is receiving other mental health services. Patient would like to establish psychotherapy services.    Patient has had a physical exam to rule out medical causes for current symptoms.  Date of last physical exam was within the past year. Client was encouraged to follow up with PCP if symptoms were to develop. The patient has a [place] Primary Care Provider, who is named Karen Bangura..  Patient reports     Patient Active Problem List   Diagnosis    Chronic bilateral low back pain without sciatica    Type 2 diabetes mellitus with hyperglycemia, without long-term current use of insulin (H)    Major depressive disorder, recurrent episode, moderate (H)    Autism spectrum disorder    Gastroesophageal reflux disease with esophagitis without hemorrhage    Papanicolaou smear of cervix with low grade squamous intraepithelial lesion (LGSIL)    Morbid obesity (H)    Type 2 diabetes mellitus with diabetic microalbuminuria, without long-term current use of insulin (H)    Thrombosed external hemorrhoids    Rectal pain    S/P tubal ligation     Patient reports pain concerns including back pain.  Patient does not want help addressing pain concerns..   There are not significant appetite / nutritional concerns / weight changes.   Patient does not report a history of head injury / trauma / cognitive impairment.     Patient reports current meds as:   Current Outpatient Medications   Medication Sig Dispense Refill    omeprazole (PRILOSEC) 40 MG DR capsule Take 1 capsule (40 mg) by mouth daily. 90 capsule 4     tiZANidine (ZANAFLEX) 4 MG tablet Take 1 tablet (4 mg) by mouth 3 times daily as needed for muscle spasms. 90 tablet 2     No current facility-administered medications for this visit.       Medication Adherence:  Patient reports taking.  No psychotropic medications prescribed at this time.    Patient Allergies:  No Known Allergies    Medical History:    Past Medical History:   Diagnosis Date    Anxiety     Autism     Depression     H/O pre-eclampsia 10/25/2024    History of anemia          Current Mental Status Exam:   Appearance:  Appropriate    Eye Contact:  Good   Psychomotor:  Normal       Gait / station:  no problem  Attitude / Demeanor: Cooperative   Speech      Rate / Production: Normal/ Responsive      Volume:  Normal  volume      Language:  intact  Mood:   Anxious  Depressed   Affect:   Appropriate    Thought Content: Rumination   Thought Process: Coherent       Associations: No loosening of associations  Insight:   Fair   Judgment:  Intact   Orientation:  All  Attention/concentration: Good    Substance Use:   Patient did not report a family history of substance use concerns; see medical history section for details.  Patient has not received chemical dependency treatment in the past.  Patient has not ever been to detox.      Patient is not currently receiving any chemical dependency treatment.           Substance History of use Age of first use Date of last use     Pattern and duration of use (include amounts and frequency)   Alcohol currently use   21 03/24/25 Occasional use. Denies any concerns.   Cannabis   never used     REPORTS SUBSTANCE USE: N/A     Amphetamines   never used     REPORTS SUBSTANCE USE: N/A   Cocaine/crack    never used       REPORTS SUBSTANCE USE: N/A   Hallucinogens never used         REPORTS SUBSTANCE USE: N/A   Inhalants never used         REPORTS SUBSTANCE USE: N/A   Heroin never used         REPORTS SUBSTANCE USE: N/A   Other Opiates used in the past 22 02/10/20 REPORTS SUBSTANCE  USE: N/A   Benzodiazepine   never used     REPORTS SUBSTANCE USE: N/A   Barbiturates never used     REPORTS SUBSTANCE USE: N/A   Over the counter meds currently use 14 02/01/25 REPORTS SUBSTANCE USE: N/A   Caffeine currently use 17   REPORTS SUBSTANCE USE: N/A   Nicotine  never used     REPORTS SUBSTANCE USE: N/A   Other substances not listed above:  Identify:  never used     REPORTS SUBSTANCE USE: N/A     Patient reported the following problems as a result of their substance use: no problems, not applicable.      Substance Use: No symptoms    Based on the CAGE score of 0 and clinical interview there  are not indications of drug or alcohol abuse.    Significant Losses / Trauma / Abuse / Neglect Issues:   Patient did not  serve in the .  There are not indications or report of significant loss, trauma, abuse or neglect issues.   Therapist would like to inquire more about family of origin and developmental years to learn more about attachment.   Patient has not been a victim of exploitation.  Concerns for possible neglect are not present.     Safety Assessment:   Patient denies current or past homicidal ideation and behaviors.  Patient reports current/recent suicide ideation, plans, intent, or attempts.  Passive SI reported. See C-SSRS for more detail and safety plan below.  Patient denies current or past self-injurious behaviors.  Patient denied risk behaviors associated with substance use.  Patient denies any high risk behaviors associated with mental health symptoms.  Patient denied current or past personal safety concerns.    Patient denies past of current/recent assaultive behaviors.    Patient denied a history of sexual assault behaviors.     Patient reports there are not firearms in the house.    Patient reports the following protective factors: identifies reason for living, access to and engagement with healthcare, current engagement in treatment and/or motivation to establish therapeutic relationship,  lives in a responsibly safe environment, and responsibilities to others dedication to family or friends; safe and stable environment; regular sleep; sense of belonging; secure attachment; living with other people; daily obligations; structured day    Risk Plan:  See Recommendations for Safety and Risk Management Plan    Review of Symptoms per patient report:   Depression: Lack of interest or pleasure in doing things, Feeling sad, down, or depressed, Change in energy level, Change in sleep, Low self-worth, Suicidal ideation, Ruminations, and Frequent crying  Ally:  No Symptoms  Psychosis: No Symptoms  Anxiety: Excessive worry, Nervousness, Separation anxiety, Sleep disturbance, Ruminations, and Poor concentration  Panic:  No symptoms  Post Traumatic Stress Disorder:  No Symptoms   Eating Disorder: No Symptoms  ADD / ADHD:  No symptoms  Conduct Disorder: No symptoms  Autism Spectrum Disorder: Deficits in social communication and social interactions, Deficits in developing, maintaining, and understanding relationships, Deficits in social-emotional reciprocity, Hyper or hyporeacitivty to sensory input or unusual interest in sensory aspects , and Deficits in non-verbal communication behaviors used for social interaction  Obsessive Compulsive Disorder: No Symptoms  Personality Disorders:   none    Patient reports the following compulsive behaviors and treatment history: None.      Diagnostic Criteria:   Generalized Anxiety Disorder  A. Excessive anxiety and worry about a number of events or activities (such as work or school performance).   B. The person finds it difficult to control the worry.  C. Select 3 or more symptoms (required for diagnosis). Only one item is required in children.   - Being easily fatigued.    - Difficulty concentrating or mind going blank.    - Irritability.    - Muscle tension.    - Sleep disturbance (difficulty falling or staying asleep, or restless unsatisfying sleep).   D. The focus of the  anxiety and worry is not confined to features of an Axis I disorder.  E. The anxiety, worry, or physical symptoms cause clinically significant distress or impairment in social, occupational, or other important areas of functioning.   F. The disturbance is not due to the direct physiological effects of a substance (e.g., a drug of abuse, a medication) or a general medical condition (e.g., hyperthyroidism) and does not occur exclusively during a Mood Disorder, a Psychotic Disorder, or a Pervasive Developmental Disorder.    - The aformentioned symptoms began several year(s) ago and occurs 6 days per week and is experienced as moderate. Major Depressive Disorder  CRITERIA (A-C) REPRESENT A MAJOR DEPRESSIVE EPISODE - SELECT THESE CRITERIA  A) Recurrent episode(s) - symptoms have been present during the same 2-week period and represent a change from previous functioning 5 or more symptoms (required for diagnosis)   - Depressed mood. Note: In children and adolescents, can be irritable mood.     - Diminished interest or pleasure in all, or almost all, activities.    - Decreased sleep.    - Fatigue or loss of energy.    - Feelings of worthlessness or inappropriate and excessive guilt.    - Diminished ability to think or concentrate, or indecisiveness.    - Recurrent thoughts of death (not just fear of dying), recurrent suicidal ideation without a specific plan, or a suicide attempt or a specific plan for committing suicide.   B) The symptoms cause clinically significant distress or impairment in social, occupational, or other important areas of functioning  C) The episode is not attributable to the physiological effects of a substance or to another medical condition  D) The occurence of major depressive episode is not better explained by other thought / psychotic disorders  E) There has never been a manic episode or hypomanic episode Autism Spectrum Disorder Without accompanying intellectual impairment. , Without  accompanying language impairment. , A.  Persistent deficits in social communication and social interaction across multiple contexts as manifested by the following, currently or by history:, 1.  Deficits in social emotional reciprocity, ranging for example, from abnormal social approach and failure of normal back and forth conversation; to reduced sharing of interests, emotions, or affect; to failure to initiate or respond to social interactions. , 2.  Deficits in nonverbal communication behaviors used for social interaction, ranging, for example, from poorly integrated verbal and nonverbal communication; to abnormalities in eye contact and body language or deficits in understanding and use of gestures; to a total lack of facial expressions and non-verbal communication. , B.  Restricted, repetitive patterns of behavior, interests, or activities, as manifested by at least two of the following, currently or by history:, 3.  Highly restricted, fixated interests that are abnormal in intensity or focus. , 4.  Hyper- or hypo-reactivity to sensory input or unusual interest in sensory aspects of the environment., C.  Symptoms are/were present in the early developmental period., D.  Symptoms cause clinically significant impairment in social, occupational, or other important areas of current functioning. , E.  These disturbances are not better explained by intellectual disability (Intellectual developmental disorder) or global developmental delay.     Functional Status:  Patient reports the following functional impairments:  management of the household and or completion of tasks, organization, relationship(s), self-care, and social interactions.     Nonprogrammatic care:  Patient is requesting basic services to address current mental health concerns.    Clinical Summary:  1. Psychosocial Factors:  Limited social supports, Blended family, New baby .  Cultural and Contextual Factors: 29 year old, female, , master's level  education.   2. Principal DSM5 Diagnoses  (Sustained by DSM5 Criteria Listed Above):   Autism Spectrum Disorder 299.00(F84.0)  Associated Current severity for Criterion A and Criterion B: 299.00(F84.0) Without accompanying intellectual impairment and 299.00(F84.0) Without accompanying language impairment  296.32 (F33.1) Major Depressive Disorder, Recurrent Episode, Moderate With peripartum onset  300.02 (F41.1) Generalized Anxiety Disorder.  3. Other Diagnoses that is relevant to services:  none  4. Provisional Diagnosis:  296.32 (F33.1) Major Depressive Disorder, Recurrent Episode, Moderate With peripartum onset  300.02 (F41.1) Generalized Anxiety Disorder as evidenced by history of depression and anxiety per patient and medical record and present symptoms of anxiety and depression post partum.  5. Prognosis: Expect Improvement.  6. Likely consequences of symptoms if not treated: worsening symptoms requiring more interventions, such as considering medication, etc and possibly requiring higher level of care. Other consequences include worsening SI and safety concerns as patient has already indicated suicidal thoughts.  7. Patient strengths include:  caring, creative, educated, empathetic, has a previous history of therapy, insightful, intelligent, motivated, open to suggestions / feedback, and responsible parent .     Recommendations:     1. Plan for Safety and Risk Management: View [place] Safety Plan below.    Safety and Risk: Recommended that patient call 911 or go to the local ED should there be a change in any of these risk factors    Emergency Walk-In Options:   EmPATH Unit @ [facility] ([place]): [phone] - Specialized mental health emergency area designed to be calming  [facility] ([place]): [phone]  [facility] ([place]): [phone]  [facility]): [phone]    Mental Health Crisis Numbers:   National Suicide  Hotline: 988  Crisis Text Line: Text [place] to 865069   [facility] (LGBTQIA+): 3-260-094-5926     [facility] Number:  Christiano Sewell [facility] Crisis: [phone]    Peer Support (non-crisis)  [place] Warmline: 625.675.9095    Or text  Support  to 66032          Report to child / adult protection services was NA.     2. Patient's identified mental health concerns with a cultural influence will be [facility] coordination with [facility] .     3. Initial Treatment will focus on:    Depressed Mood - increasing engagement in enjoyable activities, learning and implementing cognitive reframing techniques. Utilizing supports. Decreasing frequency and duration of suicidal thoughts. Safety planning.   Anxiety - learning and implementing mindfulness skills, nervous system regulation skills, sleep hygiene skills .     4. Resources/Service Plan:    services are not indicated.   Modifications to assist communication are not indicated.   Additional disability accommodations are not indicated.      5. Collaboration:   Collaboration / coordination of treatment will be initiated with the following  support professionals: primary care physician.      6.  Referrals:   The following referral(s) will be initiated: Outpatient [facility].       A Release of Information has been obtained for the following: none at this time.      Clinical Substantiation/medical necessity for the above recommendations:  medically necessary to receive outpatient psychotherapy to prevent worsening depression and anxiety symptoms and for safety due to suicidal ideations present.     7. JAGJIT:    JAGJIT:  Discussed the general effects of drugs and alcohol on health and well-being. Provider gave patient information about the  effects of chemical use on their health and well being. There are not current concerns regarding substance use.     8. Records:   These were reviewed at time of assessment.   Information in this assessment  was obtained from the medical record and  provided by patient who is a fair historian.    Patient will have open access to their mental health medical record.    9.   Interactive Complexity: No    10. Safety Plan:   Lucio Safety Plan      Creation Date: 3/31/25 Last Update Date: 3/31/25      Step 1: Warning signs:    Warning Signs    Worsening depression    Worsening anxiety    Not getting enough sleep    Feelings of guilt/shame      Step 2: Internal coping strategies - Things I can do to take my mind off my problems without contacting another person:    Strategies    Engaging in enjoyable activities such as reading books, sewing    Diaphragm breathing    Walks      Step 3: People and social settings that provide distraction:    Name Contact Information    Spouse Contact information in phone       Places    Playing magic with friends      Step 4: People whom I can ask for help during a crisis:    Name Contact Information    Spouse Contact info in phone    [place] Warmline- Peer Support [phone] Or text  Support  to 58495      Step 5: Professionals or agencies I can contact during a crisis:    Clinician/Agency Name Phone Emergency Contact    [facility] Crisis [phone]     [facility] (LQBTQIA+) 1-902.302.8726       Local Emergency Department Emergency Department Address Emergency Department Phone    [place], [phone]    [facility], [phone]    [place], [place] [phone]      Suicide Prevention Lifeline Phone: Call or Text 663  Crisis Text Line: Text HOME to 305482     Step 6: Making the environment safer (plan for lethal means safety):   Did not identify any lethal methods     Optional: What is most important to me and worth living for?:   Spouse and children     [facility]io Safety Plan. Nicole Escobar and [place] Grande. Used with permission of the authors.            Provider Name/ Credentials:  Laura Sauceda, [facility]   March 31, 2025

## 2025-04-01 NOTE — TELEPHONE ENCOUNTER
Patient confirmed appointment.     Closing encounter.     Candelario Rader RN on 4/1/2025 at 8:21 AM

## 2025-04-01 NOTE — TELEPHONE ENCOUNTER
Patient requesting appointment to start medications for depression and anxiety. Scheduled with PCP for 05/15. Postponing to see if appointment works for patient.     Candelario Rader RN on 4/1/2025 at 7:26 AM        Reason for Disposition   Mild depression    Protocols used: Depression-A-[place]

## 2025-04-06 ENCOUNTER — MYC REFILL (OUTPATIENT)
Dept: FAMILY MEDICINE | Facility: CLINIC | Age: 30
End: 2025-04-06
Payer: COMMERCIAL

## 2025-04-06 DIAGNOSIS — M54.50 CHRONIC BILATERAL LOW BACK PAIN WITHOUT SCIATICA: ICD-10-CM

## 2025-04-06 DIAGNOSIS — G89.29 CHRONIC BILATERAL LOW BACK PAIN WITHOUT SCIATICA: ICD-10-CM

## 2025-04-06 DIAGNOSIS — M51.369 DEGENERATION OF INTERVERTEBRAL DISC OF LUMBAR REGION, UNSPECIFIED WHETHER PAIN PRESENT: ICD-10-CM

## 2025-04-08 ENCOUNTER — VIRTUAL VISIT (OUTPATIENT)
Dept: BEHAVIORAL HEALTH | Facility: CLINIC | Age: 30
End: 2025-04-08
Payer: COMMERCIAL

## 2025-04-08 ENCOUNTER — HOSPITAL ENCOUNTER (EMERGENCY)
Facility: CLINIC | Age: 30
Discharge: HOME | End: 2025-04-08
Attending: PHYSICIAN ASSISTANT | Admitting: PHYSICIAN ASSISTANT
Payer: COMMERCIAL

## 2025-04-08 VITALS
WEIGHT: 284.39 LBS | HEART RATE: 68 BPM | OXYGEN SATURATION: 100 % | BODY MASS INDEX: 39.81 KG/M2 | DIASTOLIC BLOOD PRESSURE: 76 MMHG | SYSTOLIC BLOOD PRESSURE: 140 MMHG | TEMPERATURE: 97.8 F | RESPIRATION RATE: 18 BRPM | HEIGHT: 71 IN

## 2025-04-08 DIAGNOSIS — F41.1 GENERALIZED ANXIETY DISORDER: ICD-10-CM

## 2025-04-08 DIAGNOSIS — M54.16 LUMBAR RADICULOPATHY: ICD-10-CM

## 2025-04-08 DIAGNOSIS — F33.1 MAJOR DEPRESSIVE DISORDER, RECURRENT EPISODE, MODERATE WITH PERIPARTUM ONSET (H): Primary | ICD-10-CM

## 2025-04-08 DIAGNOSIS — F84.0 AUTISM SPECTRUM DISORDER: ICD-10-CM

## 2025-04-08 PROCEDURE — 99284 EMERGENCY DEPT VISIT MOD MDM: CPT | Mod: 25 | Performed by: PHYSICIAN ASSISTANT

## 2025-04-08 PROCEDURE — 250N000011 HC RX IP 250 OP 636: Mod: JZ | Performed by: PHYSICIAN ASSISTANT

## 2025-04-08 PROCEDURE — 99284 EMERGENCY DEPT VISIT MOD MDM: CPT | Performed by: PHYSICIAN ASSISTANT

## 2025-04-08 PROCEDURE — 250N000012 HC RX MED GY IP 250 OP 636 PS 637: Performed by: PHYSICIAN ASSISTANT

## 2025-04-08 PROCEDURE — 96375 TX/PRO/DX INJ NEW DRUG ADDON: CPT | Performed by: PHYSICIAN ASSISTANT

## 2025-04-08 PROCEDURE — 90837 PSYTX W PT 60 MINUTES: CPT | Mod: 95 | Performed by: SOCIAL WORKER

## 2025-04-08 PROCEDURE — 96374 THER/PROPH/DIAG INJ IV PUSH: CPT | Performed by: PHYSICIAN ASSISTANT

## 2025-04-08 RX ORDER — KETOROLAC TROMETHAMINE 15 MG/ML
15 INJECTION, SOLUTION INTRAMUSCULAR; INTRAVENOUS ONCE
Status: COMPLETED | OUTPATIENT
Start: 2025-04-08 | End: 2025-04-08

## 2025-04-08 RX ORDER — METHYLPREDNISOLONE 4 MG/1
TABLET ORAL
Qty: 21 TABLET | Refills: 0 | Status: SHIPPED | OUTPATIENT
Start: 2025-04-08

## 2025-04-08 RX ORDER — DIAZEPAM 10 MG/2ML
5 INJECTION, SOLUTION INTRAMUSCULAR; INTRAVENOUS ONCE
Status: COMPLETED | OUTPATIENT
Start: 2025-04-08 | End: 2025-04-08

## 2025-04-08 RX ORDER — HYDROCODONE BITARTRATE AND ACETAMINOPHEN 5; 325 MG/1; MG/1
1 TABLET ORAL EVERY 6 HOURS PRN
Qty: 8 TABLET | Refills: 0 | Status: SHIPPED | OUTPATIENT
Start: 2025-04-08 | End: 2025-04-11

## 2025-04-08 RX ORDER — DIAZEPAM 5 MG/1
5 TABLET ORAL EVERY 12 HOURS PRN
Qty: 10 TABLET | Refills: 0 | Status: SHIPPED | OUTPATIENT
Start: 2025-04-08

## 2025-04-08 RX ADMIN — KETOROLAC TROMETHAMINE 15 MG: 15 INJECTION, SOLUTION INTRAMUSCULAR; INTRAVENOUS at 17:39

## 2025-04-08 RX ADMIN — PREDNISONE 50 MG: 20 TABLET ORAL at 17:39

## 2025-04-08 RX ADMIN — DIAZEPAM 5 MG: 5 INJECTION INTRAMUSCULAR; INTRAVENOUS at 17:40

## 2025-04-08 ASSESSMENT — ACTIVITIES OF DAILY LIVING (ADL)
ADLS_ACUITY_SCORE: 49
ADLS_ACUITY_SCORE: 49

## 2025-04-08 NOTE — PROGRESS NOTES
"[facility]                                     Progress Note    Patient Name: Lidia Gallegos  Date: 2025          Service Type: Individual      Session Start Time: 11:31am  Session End Time: 12:31pm     Session Length: 60 minutes    Session #: 1    Attendees: Client attended alone    Service Modality:  Video Visit:      Provider verified identity through the following two step process.  Patient provided:  Patient  and Patient is known previously to provider    Telemedicine Visit: The patient's condition can be safely assessed and treated via synchronous audio and visual telemedicine encounter.      Reason for Telemedicine Visit: Patient has requested telehealth visit    Originating Site (Patient Location): Patient's home    Distant Site (Provider Location): Provider Remote Setting- Home Office    Consent:  The patient/guardian has verbally consented to: the potential risks and benefits of telemedicine (video visit) versus in person care; bill my insurance or make self-payment for services provided; and responsibility for payment of non-covered services.     Patient would like the video invitation sent by:  My Chart    Mode of Communication:  Video Conference via Amwell    Distant Location (Provider):  Off-site    As the provider I attest to compliance with applicable laws and regulations related to telemedicine.    DATA  Extended Session (53+ minutes): PROLONGED SERVICE IN THE OUTPATIENT SETTING REQUIRING DIRECT (FACE-TO-FACE) PATIENT CONTACT BEYOND THE USUAL SERVICE:    - Patient's presenting concerns require more intensive intervention than could be completed within the usual service  Interactive Complexity: No  Crisis: No          Progress Since Last Session (Related to Symptoms / Goals / Homework):   Symptoms: Improving mood. Noticing some improvements with weather changes.  \"A lot less tense, but still feel anxious.\"    Homework: Partially completed-  making plans for positive " social connection and time with adults. Reports sleeping well.       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 Navigating anxiety with more separation ane independence. Worried about 14 year old and 11 year old- bullying.     Treatment Objective(s) Addressed in This Session:   use at least 2 coping skills for anxiety management in the next 2 weeks  Increase interest, engagement, and pleasure in doing things  Decrease frequency and intensity of feeling down, depressed, hopeless  Identify negative self-talk and behaviors: challenge core beliefs, myths, and actions       Intervention:  Patient centered- Patient processed thoughts, feelings and experiences contributing to mental health symptoms. Provided empathic listening, support and validation.   Positive reinforcement for action steps taken and use of coping skills.   Building therapeutic relationship.   Establishing treatment plan goals.      Assessments completed prior to visit:  The following assessments were completed by patient for this visit:  PHQ9:       5/20/2024     1:40 PM 5/21/2024     1:06 PM 10/23/2024    12:57 PM 12/19/2024     4:21 PM 2/21/2025     1:56 PM 3/30/2025     1:29 AM 4/3/2025     9:43 AM   PHQ-9 SCORE   PHQ-9 Total Score MyChart  0 5 (Mild depression) 8 (Mild depression)  17 (Moderately severe depression) 5 (Mild depression)   PHQ-9 Total Score 0 0 5  8  3 17  5        Patient-reported    Proxy-reported     GAD7:       11/1/2022     5:04 PM 5/15/2023     9:23 AM 7/29/2023    11:32 PM 8/28/2023     9:45 AM 2/24/2025     7:07 PM 3/26/2025    10:07 AM 3/31/2025     2:31 PM   JIGAR-7 SCORE    Total Score 14 (moderate anxiety) 5 (mild anxiety) 0 (minimal anxiety) 3 (minimal anxiety) 11 (moderate anxiety) 8 (mild anxiety) 12 (moderate anxiety)   Total Score 14 5 0 3 11  8  12        Patient-reported     CAGE-AID:       11/1/2022     5:08 PM   CAGE-AID Total Score   Total Score 0   Total Score MyChart 0 (A total score of 2 or greater is considered clinically significant)     PROMIS 10-Global Health (all questions and answers displayed):       11/8/2022     8:18 AM 11/22/2022     5:22 PM 2/24/2025     7:07 PM 3/26/2025    10:08 AM   PROMIS 10   In general, would you say your health is:  Good Good Good   In general, would you say your quality of life is:  Very good Very good Very good   In general, how would you rate your physical health?  Good Very good Very good   In general, how would you rate your mental health, including your mood and your ability to think?  Good Fair Good   In general, how would you rate your satisfaction with your social activities and relationships?  Good Good Fair   In general, please rate how well you carry out your usual social activities and roles  Good Very good Good   To what extent are you able to carry out your everyday physical activities such as walking, climbing stairs, carrying groceries, or moving a chair?  Mostly Moderately Moderately   In the past 7 days, how often have you been bothered by emotional problems such as feeling anxious, depressed, or irritable?  Sometimes Often Often   In the past 7 days, how would you rate your fatigue on average?  Mild Moderate Moderate   In the past 7 days, how would you rate your pain on average, where 0 means no pain, and 10 means worst imaginable pain?  2 4 5   In general, would you say your health is: 2 3 3 3   In general, would you say your quality of life is: 3 4 4 4   In general, how would you rate your physical health? 3 3 4 4   In general, how would you rate your mental health, including your mood and your ability to think? 2  3 2 3   In general, how would you rate your satisfaction with your social activities and relationships? 3 3 3 2   In general, please rate how well you carry out your usual social activities and roles. (This includes activities at home, at work and in your community, and responsibilities as a parent, child, spouse, employee, friend, etc.) 2 3 4 3   To what extent are you able to carry out your everyday physical activities such as walking, climbing stairs, carrying groceries, or moving a chair? 3 4 3 3   In the past 7 days, how often have you been bothered by emotional problems such as feeling anxious, depressed, or irritable? 4 3 4 4   In the past 7 days, how would you rate your fatigue on average? 1 2 3 3   In the past 7 days, how would you rate your pain on average, where 0 means no pain, and 10 means worst imaginable pain? 3 2 4 5   Global Mental Health Score 10 13 11  11    Global Physical Health Score 15 15 13  13    PROMIS TOTAL - SUBSCORES 25 28 24  24        Patient-reported     PROMIS 10-Global Health (only subscores and total score):       11/8/2022     8:18 AM 11/22/2022     5:22 PM 2/24/2025     7:07 PM 3/26/2025    10:08 AM   PROMIS-10 Scores Only   Global Mental Health Score 10 13 11  11    Global Physical Health Score 15 15 13  13    PROMIS TOTAL - SUBSCORES 25 28 24  24        Patient-reported     [place] Suicide Severity Rating Scale (Lifetime/Recent)      7/6/2024    12:04 PM 7/7/2024     8:03 AM 7/7/2024     5:00 PM 8/21/2024    11:34 PM 10/14/2024     9:14 AM 10/17/2024    11:26 PM 3/31/2025     2:31 PM   [place] Suicide Severity Rating (Lifetime/Recent)   Q1 Wished to be Dead (Past Month) 0-->no 0-->no 0-->no 0-->no 0-->no 0-->no    Q2 Suicidal Thoughts (Past Month) 0-->no 0-->no 0-->no 0-->no 0-->no 0-->no    Q6 Suicide Behavior (Lifetime) 0-->no 0-->no 0-->no 0-->no 0-->no 0-->no    If yes to Q6, within past 3 months?   0-->no       Level of Risk per Screen no risks indicated no risks  indicated moderate risk no risks indicated no risks indicated no risks indicated    1. Wish to be Dead (Past 1 Month)       N   2. Non-Specific Active Suicidal Thoughts (Past 1 Month)       N   Calculated C-SSRS Risk Score (Lifetime/Recent)       No Risk Indicated        Patient-reported     [place] Suicide Severity Rating Scale (Short Version)      6/21/2024     8:36 AM 7/6/2024    12:04 PM 7/7/2024     8:03 AM 7/7/2024     5:00 PM 8/21/2024    11:34 PM 10/14/2024     9:14 AM 10/17/2024    11:26 PM   [place] Suicide Severity Rating (Short Version)   Q1 Wished to be Dead (Past Month) 0-->no 0-->no 0-->no 0-->no 0-->no 0-->no 0-->no   Q2 Suicidal Thoughts (Past Month) 0-->no 0-->no 0-->no 0-->no 0-->no 0-->no 0-->no   Q6 Suicide Behavior (Lifetime) 1-->yes 0-->no 0-->no 0-->no 0-->no 0-->no 0-->no   If yes to Q6, within past 3 months? 0-->no   0-->no      Level of Risk per Screen moderate risk no risks indicated no risks indicated moderate risk no risks indicated no risks indicated no risks indicated         ASSESSMENT: Current Emotional / Mental Status (status of significant symptoms):   Risk status (Self / Other harm or suicidal ideation)   Patient denies current fears or concerns for personal safety.   Patient denies current or recent suicidal ideation or behaviors.   Patient denies current or recent homicidal ideation or behaviors.   Patient denies current or recent self injurious behavior or ideation.   Patient denies other safety concerns.   Patient reports there has been no change in risk factors since their last session.     Patient reports there has been no change in protective factors since their last session.     Recommended that patient call 911 or go to the local ED should there be a change in any of these risk factors     Appearance:   Appropriate    Eye Contact:   Good    Psychomotor Behavior: Normal    Attitude:   Cooperative    Orientation:   All   Speech    Rate /  Production: Talkative    Volume:  Normal    Mood:    Anxious     Affect:    Appropriate    Thought Content:  Clear    Thought Form:  Coherent  Logical    Insight:    Good      Medication Review:   No current psychiatric medications prescribed  Current Outpatient Medications   Medication Sig Dispense Refill    omeprazole (PRILOSEC) 40 MG DR capsule Take 1 capsule (40 mg) by mouth daily. 90 capsule 4    tiZANidine (ZANAFLEX) 4 MG tablet Take 1 tablet (4 mg) by mouth 3 times daily as needed for muscle spasms. 90 tablet 2       Medication Compliance:   Yes     Changes in Health Issues:   None reported     Chemical Use Review:   Substance Use: Chemical use reviewed, no active concerns identified . Reports occasional alcohol use, maybe about one/week.     Tobacco Use: No change in amount of tobacco use since last session.  Reports smoking about 1 cigarette per week.     Diagnosis:  1. Major depressive disorder, recurrent episode, moderate with peripartum onset (H)    2. Generalized anxiety disorder    3. Autism spectrum disorder        Collateral Reports Completed:   Not Applicable    PLAN: (Patient Tasks / Therapist Tasks / Other)  Patient is scheduled for follow up psychotherapy in a couple weeks. She reports she will go on [employer] to schedule with me after knowing more about other appointments for the children.   Prior to next session, Implement time for self and socialization with spouse/friends such as for birthday. Engage in walks outside at least 2x/week.    Recommended that patient call 911 or go to the local ED should there be a change in any risk factors.   Utilize Safety Plan as needed.     Emergency Walk-In Options:   [facility] Unit @ [facility] ([place]): [phone] - Specialized mental health emergency area designed to be calming  [facility] ([place]): [phone]  [facility] ([place]): [phone]  [facility]):  [phone]    Mental Health Crisis Numbers:   National Suicide Hotline: 988  Crisis Text Line: Text [place] to 105100     [facility] Numbers  Christiano Sewell [facility] Crisis: 1-588.334.9774    [facility] [employer] [facility] (LGBTQIA+): 1-383.492.5210     Peer Support (non-crisis)  [place] Warmline: 600.465.5271    Or text  Support  to 06610          STEVE Jerome April 8, 2025                                                          ______________________________________________________________________    Individual Treatment Plan    Patient's Name: Lidia Gallegos  YOB: 1995    Date of Creation: April 8, 2025   Date Treatment Plan Last Reviewed/Revised: April 8, 2025     DSM5 Diagnoses:   1. Major depressive disorder, recurrent episode, moderate with peripartum onset (H)    2. Generalized anxiety disorder    3. Autism spectrum disorder        Psychosocial / Contextual Factors:  , new baby- staying at home with baby, 3 step-children. Master's degree. History of SI. Lacks support from biological family. Receiving Count includes the [facility] support- YONIS TERAN.    PROMIS (reviewed every 90 days):       11/8/2022     8:18 AM 11/22/2022     5:22 PM 2/24/2025     7:07 PM 3/26/2025    10:08 AM   PROMIS-10 Scores Only   Global Mental Health Score 10 13 11  11    Global Physical Health Score 15 15 13  13    PROMIS TOTAL - SUBSCORES 25 28 24  24        Patient-reported       Referral / Collaboration:  Referral to another professional/service is not indicated at this time..    Anticipated number of session for this episode of care:  20  Anticipation frequency of session: Biweekly  Anticipated Duration of each session: 38-52 minutes  Treatment plan will be reviewed in 90 days or when goals have been changed.       MeasurableTreatment Goal(s) related to diagnosis / functional impairment(s)      Goal 1: Patient will decrease level of depression as evidenced by PHQ-9 score <5.    I will know I've met my goal when I don't feel as guilty for taking  "time for me.\"      Objective #A (Patient Action)                          Patient will Increase interest, engagement, and pleasure in doing things.  Status: New April 8, 2025      Intervention(s)  Therapist will assign homework for patient to engage in activities and socialization 3x/week..     Objective #B  Patient will Decrease frequency and intensity of feeling down, depressed, hopeless.  Status: New April 8, 2025      Intervention(s)  Therapist will coordinate care with PCP and refer to psychiatry if appropriate.  Therapist will assign homework for patient to engage in daily gratitude practice.   Therapist will teach sleep hygiene skills and assign homework for maintaining sleep routine.    Objective #C  Patient will Identify negative self-talk and behaviors: challenge core beliefs, myths, and actions.  Status: New April 8, 2025      Intervention(s)  Therapist will teach cognitive reframing skills and educate on programming/conditioning.    Therapist will assign homework for patient to implement cognitive reframing skills and engage in positive self talk. Therapist will assign homework to engage in journaling and daily positive self talk.         Goal 2: Patient will decrease level of anxiety as evidenced by JIGAR-7 score <5.    I will know I've met my goal when I don't over-think as much and I don't have that worry in the back of my head that something bad will happen.\"      Objective #A (Patient Action)                          Status: New April 8, 2025      Patient will use relaxation strategies 3x times per day to reduce the physical symptoms of anxiety.     Intervention(s)  Therapist will teach relaxation stategies, grounding and emotional regulation skills such as progressive muscle relaxation, deep breathing, meditation.  Therapist will assign homework for patient to implement relaxation strategies outside of session to feel grounded and decrease agitation.      Objective #B  Patient will practice deep " breathing at least 3x a day.                         Status: New April 8, 2025      Intervention(s)  Therapist will guide patient in deep breathing practices, mindfulness skills and meditation in sessions. Therapist will assign homework for patient to implement these practices daily.        Objective #C  Patient will use cognitive strategies identified in therapy to challenge anxious thoughts.  Status: New April 8, 2025      Intervention(s)  Therapist will teach cognitive strategies for anxiety reduction. Therapist will assign homework for patient to implement these strategies outside of session.        Goal 3- Autism Spectrum Disorder: Patient will navigate social and communication in relationships.    I will know I've met my goal when I feel confident in implementing communication skills.      Objective #A (Patient Action)    Status: New - Date: 4/8/2025      Patient will learn & utilize at least 2 assertive communication skills weekly.    Intervention(s)  Therapist will role-play effective communication skills.      Patient has reviewed and agreed to the above plan.      Laura Sauceda [facility]  April 8, 2025

## 2025-04-08 NOTE — ED PROVIDER NOTES
History     Chief Complaint   Patient presents with    Back Pain     HPI  Lidia Gallegos is a 29 year old female with a known history of lumbar disc disease as noted in the MRI per below who presents for evaluation of an acute exacerbation of her symptoms that happened when she was picking up her 9-month old child out of the pack and play.  She suddenly felt a sharp pain in her back which was rated 10 on a scale of 10.  She sat down and was able to calm it down to 7 on a scale of 10 just with resting, but she had called EMS due to the severe pain.  They establish an IV and gave her fentanyl.  Pain is currently rated 5-6 on a scale of 10.  The pain is in her low back with some radiation off-and-on into the left lower extremity.  She does not have any numbness or tingling.  No loss of bowel/bladder function.  No fevers or chills.  Denies any symptoms leading up to this.  No recent fall or injury.  No abdominal pain, dysuria, frequency, urgency, flank pain, or gross hematuria.  She has seen neurosurgery before.        MRI LUMBAR SPINE WITHOUT CONTRAST   10/25/2024 11:48 AM      HISTORY: Low back pain; Low back pain, no complicating feature;  Chronic LBP duration >= 3 months; Worsening or not improving;  PT/chiropractic in the last 60 days. Chronic bilateral low back pain  without sciatica. Degeneration of intervertebral disc of lumbar  region.     TECHNIQUE: Multiplanar multisequence MRI of the lumbar spine without  contrast.     COMPARISON: None.      FINDINGS: Alignment is significant for multilevel subtle grade 1  spondylolisthesis. Bone marrow demonstrates minimal degenerative  endplate changes including mild edema (Modic 1) most conspicuous  surrounding L4-L5 disc joint. Scattered Schmorl's nodes. Conus  medullaris is unremarkable terminating at the level of L1-L2 disc.  Cauda equina is unremarkable. No appreciable extraspinal abnormality.     Segmental Analysis:      L1-L2:  Mild disc height loss. Disc bulge.  Mild bilateral facet  arthropathy. No foraminal stenosis. Mild if any spinal canal stenosis.        L2-L3:  Minimal disc height loss. Minimal bulge. Mild bilateral facet  arthropathy. No foraminal stenosis. Mild if any spinal canal stenosis.        L3-L4:  Disc height maintained. Minimal bulge. Mild bilateral facet  arthropathy. No foraminal or spinal canal stenosis.       L4-L5:  Severe disc height loss. Disc bulge with broad right central  component which may abut the traversing right L5 nerve root within the  lateral recess (series 6 image 43). Mild bilateral facet arthropathy.  Mild right foraminal stenosis. Mild if any left foraminal stenosis.  Mild spinal canal stenosis.       L5-S1:  Disc height maintained. Shallow right central protrusion which  may abut the traversing right S1 nerve root within the lateral recess  (series 8 image 7). Mild bilateral facet arthropathy. No right  foraminal stenosis. Mild left foraminal stenosis. Mild spinal canal  stenosis.                                                                      IMPRESSION: Multilevel degenerative change as detailed.     BRADLEY MOREIRA MD   Allergies:  No Known Allergies    Problem List:    Patient Active Problem List    Diagnosis Date Noted    S/P tubal ligation 12/02/2024     Priority: Medium    Thrombosed external hemorrhoids 07/07/2024     Priority: Medium    Rectal pain 07/07/2024     Priority: Medium    Type 2 diabetes mellitus with diabetic microalbuminuria, without long-term current use of insulin (H) 06/21/2024     Priority: Medium    Morbid obesity (H) 01/19/2024     Priority: Medium    Gastroesophageal reflux disease with esophagitis without hemorrhage 12/01/2023     Priority: Medium    Major depressive disorder, recurrent episode, moderate with peripartum onset (H) 08/02/2023     Priority: Medium    Autism spectrum disorder 08/02/2023     Priority: Medium    Type 2 diabetes mellitus with hyperglycemia, without long-term current use of  insulin (H) 2023     Priority: Medium    Papanicolaou smear of cervix with low grade squamous intraepithelial lesion (LGSIL) 2021     Priority: Medium     18 NIL pap  21 LSIL pap  21 [place] bx: LUCERO 1  - [facility]  23 NIL Pap, Neg HR HPV @ 29 yo. Plan: cotest in 3 years.         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    INNER EAR SURGERY Right     age 2    LAPAROSCOPIC TUBAL LIGATION Bilateral 2024    LTL    WISDOM TOOTH EXTRACTION         Family History:    Family History   Problem Relation Age of Onset    No Known Problems Mother     Hypertension Father     Mental Illness Sister         bipolar    Attention Deficit Disorder Sister     Birth defects Brother         bladder - reportedly related to nuchal cord    Cancer Maternal Grandfather     Diabetes Paternal Grandmother     Diabetes Paternal Grandfather     Kidney failure Paternal Grandfather        Social History:  Marital Status:   [2]  Social History     Tobacco Use    Smoking status: Former     Current packs/day: 0.00     Types: Cigarettes     Quit date: 2022     Years since quittin.2    Smokeless tobacco: Never    Tobacco comments:     I am a social smoker, I smoke sometimes but my  does more and I've started to hate the smell.   Vaping Use    Vaping status: Never Used   Substance Use Topics    Alcohol use: Yes     Comment: Maybe once a month    Drug use: Never        Medications:    omeprazole (PRILOSEC) 40 MG   "capsule  tiZANidine (ZANAFLEX) 4 MG tablet  diazepam (VALIUM) 5 MG tablet  HYDROcodone-acetaminophen (NORCO) 5-325 MG tablet  methylPREDNISolone (MEDROL DOSEPAK) 4 MG tablet therapy pack          Review of Systems   All other systems reviewed and are negative.      Physical Exam   BP: (!) 147/113 (R) lower forearm, regular/long cuff)  Pulse: 68  Temp: 97.8  F (36.6  C)  Resp: 16  Height: 180.3 cm (5' 11\")  Weight: 129 kg (284 lb 6.3 oz)  SpO2: 97 %      Physical Exam  Vitals and nursing note reviewed.   Constitutional:       General: She is not in acute distress.     Appearance: She is not diaphoretic.   HENT:      Head: Normocephalic and atraumatic.      Right Ear: External ear normal.      Left Ear: External ear normal.      Nose: Nose normal.      Mouth/Throat:      Pharynx: No oropharyngeal exudate.   Eyes:      General: No scleral icterus.        Right eye: No discharge.         Left eye: No discharge.      Conjunctiva/sclera: Conjunctivae normal.      Pupils: Pupils are equal, round, and reactive to light.   Neck:      Thyroid: No thyromegaly.   Cardiovascular:      Rate and Rhythm: Normal rate and regular rhythm.      Heart sounds: Normal heart sounds. No murmur heard.  Pulmonary:      Effort: Pulmonary effort is normal. No respiratory distress.      Breath sounds: Normal breath sounds. No wheezing or rales.   Chest:      Chest wall: No tenderness.   Abdominal:      General: Bowel sounds are normal. There is no distension.      Palpations: Abdomen is soft. There is no mass.      Tenderness: There is no abdominal tenderness. There is no guarding or rebound.   Musculoskeletal:         General: No deformity.      Cervical back: Normal range of motion and neck supple.      Comments: Lumbosacral spine area reveals no mass but there is tenderness of the paravertebral muscles. Limited and painful lumbosacral range of motion is noted. Straight leg raise is positive at 30 degrees on both sides. DTR's, motor strength " "and sensation normal, including heel and toe gait.  Peripheral pulses are palpable. Hips and knees have full range of motion without pain.      Lymphadenopathy:      Cervical: No cervical adenopathy.   Skin:     General: Skin is warm and dry.      Capillary Refill: Capillary refill takes less than 2 seconds.      Findings: No erythema or rash.   Neurological:      Mental Status: She is alert and oriented to person, place, and time.      Cranial Nerves: No cranial nerve deficit.   Psychiatric:         Behavior: Behavior normal.         Thought Content: Thought content normal.         ED Course        Procedures              Critical Care time:  none     None         No results found for this or any previous visit (from the past 24 hours).    Medications   diazepam (VALIUM) injection 5 mg (5 mg Intravenous $Given 4/8/25 8614)   ketorolac (TORADOL) injection 15 mg (15 mg Intravenous $Given 4/8/25 6026)   predniSONE (DELTASONE) tablet 50 mg (50 mg Oral $Given 4/8/25 9170)       Assessments & Plan (with Medical Decision Making)  Lumbar radiculopathy     29 year old female with a known history of lumbar disc disease presenting for evaluation of an acute exacerbation that happened at home when she was lifting her 9-month-old child out of a pack and play.  Severe 10 on scale of 10 pain was noted with radiation to the left lower leg.  No numbness or tingling.  No red flag symptoms.  Somewhat improved with fentanyl given via IV route from EMS.  See HPI above for details.  BP (!) 147/113   Pulse 68   Temp 97.8  F (36.6  C) (Oral)   Resp 16   Ht 1.803 m (5' 11\")   Wt 129 kg (284 lb 6.3 oz)   SpO2 97%   BMI 39.66 kg/m     General Healthy-appearing female in no acute distress.  She has limited range of motion of her back due to the pain.  SLR positive at 30 degrees.  She is very tender to palpation over the paravertebral musculature of the bilateral low back area.  DTRs intact.  Sensation intact.  Pulses 2+ distally.  " Abdomen soft and nontender.  No flank percussive tenderness.  This appears to be an acute exacerbation of her previously known chronic low back pain issue.  MRI was reviewed in detail as noted above from 10/2024.  She does not have any red flag symptoms.  IV was already in place from EMS.  We did give a dose of IV Valium, IV Toradol, and p.o. prednisone.  Patient reported significant improvement in her pain.  She is smiling and talkative when I came back into the exam room.  She stated that she was extremely comfortable and was able to change positions with limited pain.  She felt stable to return home.  She will follow-up with her primary in regards to a potential physical therapy referral given her known lumbar disc disease.  We will continue the anti-inflammatory process with a Medrol Dosepak starting tomorrow.  I did prescribe Valium as needed for muscle spasm given that it works so well here in the emergency department.  I also provided her with a prescription for hydrocodone for breakthrough pain in the event that ibuprofen and Tylenol were not sufficient and she was not able to sleep.  Sedation risks reviewed.  Indications for ED return discussed.  Her  was present in the ED to drive her home.     I have reviewed the nursing notes.    I have reviewed the findings, diagnosis, plan and need for follow up with the patient.           Medical Decision Making  The patient's presentation was of moderate complexity (an acute complicated injury).    The patient's evaluation involved:  review of 1 test result(s) ordered prior to this encounter (see separate area of note for details)    The patient's management necessitated high risk (a parenteral controlled substance).        New Prescriptions    DIAZEPAM (VALIUM) 5 MG TABLET    Take 1 tablet (5 mg) by mouth every 12 hours as needed for muscle spasms.    HYDROCODONE-ACETAMINOPHEN (NORCO) 5-325 MG TABLET    Take 1 tablet by mouth every 6 hours as needed.     METHYLPREDNISOLONE (MEDROL DOSEPAK) 4 MG TABLET THERAPY PACK    Follow package directions.       Final diagnoses:   Lumbar radiculopathy     Disclaimer: This note consists of symbols derived from keyboarding, dictation and/or voice recognition software. As a result, there may be errors in the script that have gone undetected. Please consider this when interpreting information found in this chart.      4/8/2025   [facility] EMERGENCY DEPT       Myles Biggs PA-C  04/08/25 1849

## 2025-04-08 NOTE — ED TRIAGE NOTES
Patient brought to ED via EMS with concerns for low left sided back pain that started after picking up her baby around 1500 today. She reports some radiation to her L) leg. Dipti Bae RN

## 2025-04-08 NOTE — DISCHARGE INSTRUCTIONS
It was a pleasure working with you today!  I hope your condition improves rapidly!     Please REST!!  Please alternate ice and heat to your painful low back for 20 minutes every couple hours.  Please do not bend, lift, or twist until released to do so.  Follow these restrictions for at least a week.  Specifically, do not lift anything over 5 pounds.  Take your next dose of the Medrol Dosepak tomorrow morning.  You were given the first dose here in the emergency department.  You could take your next dose of diazepam for the muscle spasms around 1 AM if needed.  Use ibuprofen 600 mg every 6 hours as needed for pain.  You can also use Tylenol 650 mg every 6 hours as needed for pain on top of the ibuprofen.  Reserve the hydrocodone for severe breakthrough pain.  Do not drive or operate machinery for 8 hours after taking diazepam or hydrocodone, as these medications can impair your judgment.

## 2025-04-22 ENCOUNTER — TELEPHONE (OUTPATIENT)
Dept: FAMILY MEDICINE | Facility: CLINIC | Age: 30
End: 2025-04-22
Payer: COMMERCIAL

## 2025-04-22 NOTE — TELEPHONE ENCOUNTER
Patient Quality Outreach    Patient is due for the following:   Diabetes -  A1C and Eye Exam  Depression  -  DAP    Action(s) Taken:   Patient has upcoming appointment, these items will be addressed at that time.    Type of outreach:    Chart review performed, no outreach needed.    Questions for provider review:    None         Merry Dietrich, VF  Chart routed to None.

## 2025-04-26 ENCOUNTER — HEALTH MAINTENANCE LETTER (OUTPATIENT)
Age: 30
End: 2025-04-26

## 2025-05-01 ENCOUNTER — VIRTUAL VISIT (OUTPATIENT)
Dept: BEHAVIORAL HEALTH | Facility: CLINIC | Age: 30
End: 2025-05-01
Payer: COMMERCIAL

## 2025-05-01 DIAGNOSIS — F41.1 GENERALIZED ANXIETY DISORDER: ICD-10-CM

## 2025-05-01 DIAGNOSIS — F84.0 AUTISM SPECTRUM DISORDER: ICD-10-CM

## 2025-05-01 DIAGNOSIS — F33.1 MAJOR DEPRESSIVE DISORDER, RECURRENT EPISODE, MODERATE WITH PERIPARTUM ONSET (H): Primary | ICD-10-CM

## 2025-05-01 NOTE — PROGRESS NOTES
"[facility]                                     Progress Note    Patient Name: Lidia Gallegos  Date: May 1, 2025          Service Type: Individual      Session Start Time: 11:33am  Session End Time: 1202pm     Session Length: 31 minutes    Session #: 2    Attendees: Client attended alone    Service Modality:  Telephone Visit:- We had to move from video visit to telephone due to audio issues with video visit.      Provider verified identity through the following two step process.  Patient provided:  Patient  and Patient is known previously to provider    Telemedicine Visit: The patient's condition can be safely assessed and treated via synchronous audio and visual telemedicine encounter.      Reason for Telemedicine Visit: Patient has requested telehealth visit    Originating Site (Patient Location): Patient's home    Distant Site (Provider Location): Provider Remote Setting- Home Office    Consent:  The patient/guardian has verbally consented to: the potential risks and benefits of telemedicine (video visit) versus in person care; bill my insurance or make self-payment for services provided; and responsibility for payment of non-covered services.     Patient would like the video invitation sent by:  My Chart  Phone [phone]    Mode of Communication:  Telephone Conference via Doximity due to patient being unable to hear provider during video visit attempts.     Distant Location (Provider):  Off-site    The patient has been notified of the following:      \"We have found that certain health care needs can be provided without the need for a face to face visit.  This service lets us provide the care you need with a phone conversation.       I will have full access to your [place] medical record during this entire phone call.   I will be taking notes for your medical record.      Since this is like an office visit, we will bill your insurance company for this service.       There are potential " "benefits and risks of telephone visits (e.g. limits to patient confidentiality) that differ from in-person visits.?  Confidentiality still applies for telephone services, and nobody will record the visit.  It is important to be in a quiet, private space that is free of distractions (including cell phone or other devices) during the visit.??      If during the course of the call I believe a telephone visit is not appropriate, you will not be charged for this service\"     Consent has been obtained for this service by care team member: Yes       As the provider I attest to compliance with applicable laws and regulations related to telemedicine.    DATA  Extended Session (53+ minutes): No  Interactive Complexity: No  Crisis: No          Progress Since Last Session (Related to Symptoms / Goals / Homework):   Symptoms: Improving depression and anxiety      Homework: Partially completed-  taking time for self. Bonding well with baby.  Coping better with times of being  from baby- less anxious.      Episode of Care Goals: Minimal progress - ACTION (Actively working towards change); Intervened by reinforcing change plan / affirming steps taken     Current / Ongoing Stressors and Concerns:   Parenting stress. Comments and feedback/unsolicited advice from others.   Patient and baby had been sick- recovering.        Treatment Objective(s) Addressed in This Session:   use at least 2 coping skills for anxiety management in the next 2 weeks, Increase interest, engagement, and pleasure in doing things  Decrease frequency and intensity of feeling down, depressed, hopeless  Identify negative self-talk and behaviors: challenge core beliefs, myths, and actions       Intervention:  Patient centered- Patient processed thoughts, feelings and experiences contributing to mental health symptoms. Provided empathic listening, support and validation.   Positive reinforcement for action steps taken and use of coping skills. "   [facility]-based.       Assessments completed prior to visit:  The following assessments were completed by patient for this visit:  PHQ9:       5/20/2024     1:40 PM 5/21/2024     1:06 PM 10/23/2024    12:57 PM 12/19/2024     4:21 PM 2/21/2025     1:56 PM 3/30/2025     1:29 AM 4/3/2025     9:43 AM   PHQ-9 SCORE   PHQ-9 Total Score MyChart  0 5 (Mild depression) 8 (Mild depression)  17 (Moderately severe depression) 5 (Mild depression)   PHQ-9 Total Score 0 0 5  8  3 17  5        Patient-reported    Proxy-reported     GAD7:       11/1/2022     5:04 PM 5/15/2023     9:23 AM 7/29/2023    11:32 PM 8/28/2023     9:45 AM 2/24/2025     7:07 PM 3/26/2025    10:07 AM 3/31/2025     2:31 PM   JIGAR-7 SCORE   Total Score 14 (moderate anxiety) 5 (mild anxiety) 0 (minimal anxiety) 3 (minimal anxiety) 11 (moderate anxiety) 8 (mild anxiety) 12 (moderate anxiety)   Total Score 14 5 0 3 11  8  12        Patient-reported     CAGE-AID:       11/1/2022     5:08 PM   CAGE-AID Total Score   Total Score 0   Total Score MyChart 0 (A total score of 2 or greater is considered clinically significant)     PROMIS 10-Global Health (all questions and answers displayed):       11/8/2022     8:18 AM 11/22/2022     5:22 PM 2/24/2025     7:07 PM 3/26/2025    10:08 AM   PROMIS 10   In general, would you say your health is:  Good Good Good   In general, would you say your quality of life is:  Very good Very good Very good   In general, how would you rate your physical health?  Good Very good Very good   In general, how would you rate your mental health, including your mood and your ability to think?  Good Fair Good   In general, how would you rate your satisfaction with your social activities and relationships?  Good Good Fair   In general, please rate how well you carry out your usual social activities and roles  Good Very good Good   To what extent are you able to carry out your everyday physical activities such as walking, climbing stairs, carrying  groceries, or moving a chair?  Mostly Moderately Moderately   In the past 7 days, how often have you been bothered by emotional problems such as feeling anxious, depressed, or irritable?  Sometimes Often Often   In the past 7 days, how would you rate your fatigue on average?  Mild Moderate Moderate   In the past 7 days, how would you rate your pain on average, where 0 means no pain, and 10 means worst imaginable pain?  2 4 5   In general, would you say your health is: 2 3 3 3   In general, would you say your quality of life is: 3 4 4 4   In general, how would you rate your physical health? 3 3 4 4   In general, how would you rate your mental health, including your mood and your ability to think? 2 3 2 3   In general, how would you rate your satisfaction with your social activities and relationships? 3 3 3 2   In general, please rate how well you carry out your usual social activities and roles. (This includes activities at home, at work and in your community, and responsibilities as a parent, child, spouse, employee, friend, etc.) 2 3 4 3   To what extent are you able to carry out your everyday physical activities such as walking, climbing stairs, carrying groceries, or moving a chair? 3 4 3 3   In the past 7 days, how often have you been bothered by emotional problems such as feeling anxious, depressed, or irritable? 4 3 4 4   In the past 7 days, how would you rate your fatigue on average? 1 2 3 3   In the past 7 days, how would you rate your pain on average, where 0 means no pain, and 10 means worst imaginable pain? 3 2 4 5   Global Mental Health Score 10 13 11  11    Global Physical Health Score 15 15 13  13    PROMIS TOTAL - SUBSCORES 25 28 24  24        Patient-reported     PROMIS 10-Global Health (only subscores and total score):       11/8/2022     8:18 AM 11/22/2022     5:22 PM 2/24/2025     7:07 PM 3/26/2025    10:08 AM   PROMIS-10 Scores Only   Global Mental Health Score 10 13 11  11    Global Physical  Health Score 15 15 13  13    PROMIS TOTAL - SUBSCORES 25 28 24  24        Patient-reported     [place] Suicide Severity Rating Scale (Lifetime/Recent)      7/7/2024     8:03 AM 7/7/2024     5:00 PM 8/21/2024    11:34 PM 10/14/2024     9:14 AM 10/17/2024    11:26 PM 3/31/2025     2:31 PM 4/8/2025     5:08 PM   [place] Suicide Severity Rating (Lifetime/Recent)   Q1 Wished to be Dead (Past Month) 0-->no 0-->no 0-->no 0-->no 0-->no  0-->no   Q2 Suicidal Thoughts (Past Month) 0-->no 0-->no 0-->no 0-->no 0-->no  0-->no   Q6 Suicide Behavior (Lifetime) 0-->no 0-->no 0-->no 0-->no 0-->no  0-->no   If yes to Q6, within past 3 months?  0-->no        Level of Risk per Screen no risks indicated moderate risk no risks indicated no risks indicated no risks indicated  no risks indicated   1. Wish to be Dead (Past 1 Month)      N    2. Non-Specific Active Suicidal Thoughts (Past 1 Month)      N    Calculated C-SSRS Risk Score (Lifetime/Recent)      No Risk Indicated         Patient-reported     [place] Suicide Severity Rating Scale (Short Version)      7/6/2024    12:04 PM 7/7/2024     8:03 AM 7/7/2024     5:00 PM 8/21/2024    11:34 PM 10/14/2024     9:14 AM 10/17/2024    11:26 PM 4/8/2025     5:08 PM   [place] Suicide Severity Rating (Short Version)   Q1 Wished to be Dead (Past Month) 0-->no 0-->no 0-->no 0-->no 0-->no 0-->no 0-->no   Q2 Suicidal Thoughts (Past Month) 0-->no 0-->no 0-->no 0-->no 0-->no 0-->no 0-->no   Q6 Suicide Behavior (Lifetime) 0-->no 0-->no 0-->no 0-->no 0-->no 0-->no 0-->no   If yes to Q6, within past 3 months?   0-->no       Level of Risk per Screen no risks indicated no risks indicated moderate risk no risks indicated no risks indicated no risks indicated no risks indicated         ASSESSMENT: Current Emotional / Mental Status (status of significant symptoms):   Risk status (Self / Other harm or suicidal ideation)   Patient denies current fears or concerns for personal safety.   Patient denies current  or recent suicidal ideation or behaviors.   Patient denies current or recent homicidal ideation or behaviors.   Patient denies current or recent self injurious behavior or ideation.   Patient denies other safety concerns.   Patient reports there has been no change in risk factors since their last session.     Patient reports there has been no change in protective factors since their last session.     Recommended that patient call 911 or go to the local ED should there be a change in any of these risk factors     Appearance:   Appropriate    Eye Contact:   Good    Psychomotor Behavior: Normal    Attitude:   Cooperative    Orientation:   All   Speech    Rate / Production: Normal/ Responsive    Volume:  Normal    Mood:    Normal    Affect:    Appropriate    Thought Content:  Clear    Thought Form:  Coherent  Logical    Insight:    Good      Medication Review:   No current psychiatric medications prescribed  Current Outpatient Medications   Medication Sig Dispense Refill    diazepam (VALIUM) 5 MG tablet Take 1 tablet (5 mg) by mouth every 12 hours as needed for muscle spasms. 10 tablet 0    methylPREDNISolone (MEDROL DOSEPAK) 4 MG tablet therapy pack Follow package directions. 21 tablet 0    omeprazole (PRILOSEC) 40 MG DR capsule Take 1 capsule (40 mg) by mouth daily. 90 capsule 4    tiZANidine (ZANAFLEX) 4 MG tablet Take 1 tablet (4 mg) by mouth 3 times daily as needed for muscle spasms. 90 tablet 2       Medication Compliance:   Yes     Changes in Health Issues:   None reported     Chemical Use Review:   Substance Use: Chemical use reviewed, no active concerns identified . Reports occasional alcohol use, maybe about one/week.     Tobacco Use: No change in amount of tobacco use since last session.  Reports smoking about 1 cigarette per week.     Diagnosis:  1. Major depressive disorder, recurrent episode, moderate with peripartum onset (H)    2. Generalized anxiety disorder    3. Autism spectrum disorder         Collateral Reports Completed:   Not Applicable    PLAN: (Patient Tasks / Therapist Tasks / Other)  Patient is scheduled for follow up psychotherapy on 5/7/2025.  Prior to next session,continue to implement time for self, such as sitting outside for relaxation and grounding in nature. Engage in walks outside at least 2x/week.    Recommended that patient call 911 or go to the local ED should there be a change in any risk factors.   Utilize Safety Plan as needed.     Emergency Walk-In Options:   EmPATH Unit @ [facility] ([place]): [phone] - Specialized mental health emergency area designed to be calming  [facility] ([place]): [phone]  [facility] ([place]): [phone]  [facility] ([place]): [phone]    Mental Health Crisis Numbers:   National Suicide Hotline: 988  Crisis Text Line: Text [place] to 394450     [facility] Crisis: 1-812.850.8750    [facility] (LGBTQIA+): 1-164.164.8410     Peer Support (non-crisis)  [place] Warmline: 821.149.1769    Or text  Support  to 58330          STEVE Jerome May 1, 2025                                                          ______________________________________________________________________    Individual Treatment Plan    Patient's Name: Lidia Gallegos  YOB: 1995    Date of Creation: April 8, 2025   Date Treatment Plan Last Reviewed/Revised: April 8, 2025     DSM5 Diagnoses:   1. Major depressive disorder, recurrent episode, moderate with peripartum onset (H)    2. Generalized anxiety disorder    3. Autism spectrum disorder        Psychosocial / Contextual Factors:  , new baby- staying at home with baby, 3 step-children. Master's degree. History of SI. Lacks support from biological family. Receiving [facility] support- [place], [place].    PROMIS (reviewed every 90 days):       11/8/2022     8:18 AM 11/22/2022     5:22 PM 2/24/2025     7:07 PM  "3/26/2025    10:08 AM   PROMIS-10 Scores Only   Global Mental Health Score 10 13 11  11    Global Physical Health Score 15 15 13  13    PROMIS TOTAL - SUBSCORES 25 28 24  24        Patient-reported       Referral / Collaboration:  Referral to another professional/service is not indicated at this time..    Anticipated number of session for this episode of care:  20  Anticipation frequency of session: Biweekly  Anticipated Duration of each session: 38-52 minutes  Treatment plan will be reviewed in 90 days or when goals have been changed.       MeasurableTreatment Goal(s) related to diagnosis / functional impairment(s)      Goal 1: Patient will decrease level of depression as evidenced by PHQ-9 score <5.    I will know I've met my goal when I don't feel as guilty for taking time for me.\"      Objective #A (Patient Action)                          Patient will Increase interest, engagement, and pleasure in doing things.  Status: New April 8, 2025      Intervention(s)  Therapist will assign homework for patient to engage in activities and socialization 3x/week..     Objective #B  Patient will Decrease frequency and intensity of feeling down, depressed, hopeless.  Status: New April 8, 2025      Intervention(s)  Therapist will coordinate care with PCP and refer to psychiatry if appropriate.  Therapist will assign homework for patient to engage in daily gratitude practice.   Therapist will teach sleep hygiene skills and assign homework for maintaining sleep routine.    Objective #C  Patient will Identify negative self-talk and behaviors: challenge core beliefs, myths, and actions.  Status: New April 8, 2025      Intervention(s)  Therapist will teach cognitive reframing skills and educate on programming/conditioning.    Therapist will assign homework for patient to implement cognitive reframing skills and engage in positive self talk. Therapist will assign homework to engage in journaling and daily positive self talk. " "        Goal 2: Patient will decrease level of anxiety as evidenced by JIGAR-7 score <5.    I will know I've met my goal when I don't over-think as much and I don't have that worry in the back of my head that something bad will happen.\"      Objective #A (Patient Action)                          Status: New April 8, 2025      Patient will use relaxation strategies 3x times per day to reduce the physical symptoms of anxiety.     Intervention(s)  Therapist will teach relaxation stategies, grounding and emotional regulation skills such as progressive muscle relaxation, deep breathing, meditation.  Therapist will assign homework for patient to implement relaxation strategies outside of session to feel grounded and decrease agitation.      Objective #B  Patient will practice deep breathing at least 3x a day.                         Status: New April 8, 2025      Intervention(s)  Therapist will guide patient in deep breathing practices, mindfulness skills and meditation in sessions. Therapist will assign homework for patient to implement these practices daily.        Objective #C  Patient will use cognitive strategies identified in therapy to challenge anxious thoughts.  Status: New April 8, 2025      Intervention(s)  Therapist will teach cognitive strategies for anxiety reduction. Therapist will assign homework for patient to implement these strategies outside of session.        Goal 3- Autism Spectrum Disorder: Patient will navigate social and communication in relationships.    I will know I've met my goal when I feel confident in implementing communication skills.      Objective #A (Patient Action)    Status: New - Date: 4/8/2025      Patient will learn & utilize at least 2 assertive communication skills weekly.    Intervention(s)  Therapist will role-play effective communication skills.      Patient has reviewed and agreed to the above plan.      Laura Sauceda [facility]  April 8, 2025    "

## 2025-05-14 ENCOUNTER — VIRTUAL VISIT (OUTPATIENT)
Dept: BEHAVIORAL HEALTH | Facility: CLINIC | Age: 30
End: 2025-05-14
Payer: COMMERCIAL

## 2025-05-14 DIAGNOSIS — F84.0 AUTISM SPECTRUM DISORDER: ICD-10-CM

## 2025-05-14 DIAGNOSIS — F33.1 MAJOR DEPRESSIVE DISORDER, RECURRENT EPISODE, MODERATE WITH PERIPARTUM ONSET (H): Primary | ICD-10-CM

## 2025-05-14 DIAGNOSIS — F41.1 GENERALIZED ANXIETY DISORDER: ICD-10-CM

## 2025-05-14 PROCEDURE — 90832 PSYTX W PT 30 MINUTES: CPT | Mod: 95 | Performed by: SOCIAL WORKER

## 2025-05-14 NOTE — PROGRESS NOTES
[facility]                                     Progress Note    Patient Name: Lidia Gallegos  Date: May 14, 2025          Service Type: Individual      Session Start Time: 12:01pm  Session End Time: 12:37pm     Session Length: 36 minutes    Session #: 3    Attendees: Client attended alone    Service Modality:  Video Visit:      Provider verified identity through the following two step process.  Patient provided:  Patient  and Patient is known previously to provider    Telemedicine Visit: The patient's condition can be safely assessed and treated via synchronous audio and visual telemedicine encounter.      Reason for Telemedicine Visit: Patient has requested telehealth visit    Originating Site (Patient Location): Patient's home    Distant Site (Provider Location): Provider Remote Setting- Home Office    Consent:  The patient/guardian has verbally consented to: the potential risks and benefits of telemedicine (video visit) versus in person care; bill my insurance or make self-payment for services provided; and responsibility for payment of non-covered services.     Patient would like the video invitation sent by:  My Chart  Phone [phone]    Mode of Communication:  Video Conference via [facility]well     Distant Location (Provider):  Off-site     Consent has been obtained for this service by care team member: Yes     As the provider I attest to compliance with applicable laws and regulations related to telemedicine.    DATA  Extended Session (53+ minutes): No  Interactive Complexity: No  Crisis: No       Progress Since Last Session (Related to Symptoms / Goals / Homework):   Symptoms: Improving mood and anxiety levels based on patient report and clinical measures below.     Homework: Partially completed-  spending time in nature. Ensuring time for self and relationship outside of parenting- has date night planned.      Episode of Care Goals: Minimal progress - ACTION (Actively working towards  change); Intervened by reinforcing change plan / affirming steps taken     Current / Ongoing Stressors and Concerns:   Parenting stress and kasia.   Moments of anxiety and overwhelm. Denies any panic attacks lately.     Treatment Objective(s) Addressed in This Session:   use at least 2 coping skills for anxiety management in the next 2 weeks, Increase interest, engagement, and pleasure in doing things  Decrease frequency and intensity of feeling down, depressed, hopeless  Identify negative self-talk and behaviors: challenge core beliefs, myths, and actions       Intervention:  Patient centered- Patient processed thoughts, feelings and experiences contributing to mental health symptoms. Provided empathic listening, support and validation.   Positive reinforcement for action steps taken and use of coping skills.   Strengths-based.  Cognitive processing. Supportive therapy.       Assessments completed prior to visit:  The following assessments were completed by patient for this visit:  PHQ9:       5/21/2024     1:06 PM 10/23/2024    12:57 PM 12/19/2024     4:21 PM 2/21/2025     1:56 PM 3/30/2025     1:29 AM 4/3/2025     9:43 AM 5/14/2025    10:07 AM   PHQ-9 SCORE   PHQ-9 Total Score MyChart 0 5 (Mild depression) 8 (Mild depression)  17 (Moderately severe depression) 5 (Mild depression) 5 (Mild depression)   PHQ-9 Total Score 0 5  8  3 17  5  5        Patient-reported    Proxy-reported     GAD7:       5/15/2023     9:23 AM 7/29/2023    11:32 PM 8/28/2023     9:45 AM 2/24/2025     7:07 PM 3/26/2025    10:07 AM 3/31/2025     2:31 PM 5/10/2025     9:16 AM   JIGAR-7 SCORE   Total Score 5 (mild anxiety) 0 (minimal anxiety) 3 (minimal anxiety) 11 (moderate anxiety) 8 (mild anxiety) 12 (moderate anxiety) 7 (mild anxiety)   Total Score 5 0 3 11  8  12  7        Patient-reported     CAGE-AID:       11/1/2022     5:08 PM   CAGE-AID Total Score   Total Score 0   Total Score MyChart 0 (A total score of 2 or greater is considered  clinically significant)     PROMIS 10-Global Health (all questions and answers displayed):       11/8/2022     8:18 AM 11/22/2022     5:22 PM 2/24/2025     7:07 PM 3/26/2025    10:08 AM   PROMIS 10   In general, would you say your health is:  Good Good Good   In general, would you say your quality of life is:  Very good Very good Very good   In general, how would you rate your physical health?  Good Very good Very good   In general, how would you rate your mental health, including your mood and your ability to think?  Good Fair Good   In general, how would you rate your satisfaction with your social activities and relationships?  Good Good Fair   In general, please rate how well you carry out your usual social activities and roles  Good Very good Good   To what extent are you able to carry out your everyday physical activities such as walking, climbing stairs, carrying groceries, or moving a chair?  Mostly Moderately Moderately   In the past 7 days, how often have you been bothered by emotional problems such as feeling anxious, depressed, or irritable?  Sometimes Often Often   In the past 7 days, how would you rate your fatigue on average?  Mild Moderate Moderate   In the past 7 days, how would you rate your pain on average, where 0 means no pain, and 10 means worst imaginable pain?  2 4 5   In general, would you say your health is: 2 3 3 3   In general, would you say your quality of life is: 3 4 4 4   In general, how would you rate your physical health? 3 3 4 4   In general, how would you rate your mental health, including your mood and your ability to think? 2 3 2 3   In general, how would you rate your satisfaction with your social activities and relationships? 3 3 3 2   In general, please rate how well you carry out your usual social activities and roles. (This includes activities at home, at work and in your community, and responsibilities as a parent, child, spouse, employee, friend, etc.) 2 3 4 3   To what  extent are you able to carry out your everyday physical activities such as walking, climbing stairs, carrying groceries, or moving a chair? 3 4 3 3   In the past 7 days, how often have you been bothered by emotional problems such as feeling anxious, depressed, or irritable? 4 3 4 4   In the past 7 days, how would you rate your fatigue on average? 1 2 3 3   In the past 7 days, how would you rate your pain on average, where 0 means no pain, and 10 means worst imaginable pain? 3 2 4 5   Global Mental Health Score 10 13 11  11    Global Physical Health Score 15 15 13  13    PROMIS TOTAL - SUBSCORES 25 28 24  24        Patient-reported     PROMIS 10-Global Health (only subscores and total score):       11/8/2022     8:18 AM 11/22/2022     5:22 PM 2/24/2025     7:07 PM 3/26/2025    10:08 AM   PROMIS-10 Scores Only   Global Mental Health Score 10 13 11  11    Global Physical Health Score 15 15 13  13    PROMIS TOTAL - SUBSCORES 25 28 24  24        Patient-reported     [place] Suicide Severity Rating Scale (Lifetime/Recent)      7/7/2024     8:03 AM 7/7/2024     5:00 PM 8/21/2024    11:34 PM 10/14/2024     9:14 AM 10/17/2024    11:26 PM 3/31/2025     2:31 PM 4/8/2025     5:08 PM   [place] Suicide Severity Rating (Lifetime/Recent)   Q1 Wished to be Dead (Past Month) 0-->no 0-->no 0-->no 0-->no 0-->no  0-->no   Q2 Suicidal Thoughts (Past Month) 0-->no 0-->no 0-->no 0-->no 0-->no  0-->no   Q6 Suicide Behavior (Lifetime) 0-->no 0-->no 0-->no 0-->no 0-->no  0-->no   If yes to Q6, within past 3 months?  0-->no        Level of Risk per Screen no risks indicated moderate risk no risks indicated no risks indicated no risks indicated  no risks indicated   1. Wish to be Dead (Past 1 Month)      N    2. Non-Specific Active Suicidal Thoughts (Past 1 Month)      N    Calculated C-SSRS Risk Score (Lifetime/Recent)      No Risk Indicated         Patient-reported     [place] Suicide Severity Rating Scale (Short Version)      7/6/2024     12:04 PM 7/7/2024     8:03 AM 7/7/2024     5:00 PM 8/21/2024    11:34 PM 10/14/2024     9:14 AM 10/17/2024    11:26 PM 4/8/2025     5:08 PM   [place] Suicide Severity Rating (Short Version)   Q1 Wished to be Dead (Past Month) 0-->no 0-->no 0-->no 0-->no 0-->no 0-->no 0-->no   Q2 Suicidal Thoughts (Past Month) 0-->no 0-->no 0-->no 0-->no 0-->no 0-->no 0-->no   Q6 Suicide Behavior (Lifetime) 0-->no 0-->no 0-->no 0-->no 0-->no 0-->no 0-->no   If yes to Q6, within past 3 months?   0-->no       Level of Risk per Screen no risks indicated no risks indicated moderate risk no risks indicated no risks indicated no risks indicated no risks indicated         ASSESSMENT: Current Emotional / Mental Status (status of significant symptoms):   Risk status (Self / Other harm or suicidal ideation)   Patient denies current fears or concerns for personal safety.   Patient denies current or recent suicidal ideation or behaviors.   Patient denies current or recent homicidal ideation or behaviors.   Patient denies current or recent self injurious behavior or ideation.   Patient denies other safety concerns.   Patient reports there has been no change in risk factors since their last session.     Patient reports there has been no change in protective factors since their last session.     Recommended that patient call 911 or go to the local ED should there be a change in any of these risk factors     Appearance:   Appropriate    Eye Contact:   Good    Psychomotor Behavior: Normal    Attitude:   Cooperative    Orientation:   All   Speech    Rate / Production: Normal/ Responsive    Volume:  Normal    Mood:    Normal Euthymic    Affect:    Appropriate  Bright    Thought Content:  Clear    Thought Form:  Coherent  Logical    Insight:    Good      Medication Review:   No current psychiatric medications prescribed  Current Outpatient Medications   Medication Sig Dispense Refill    diazepam (VALIUM) 5 MG tablet Take 1 tablet (5 mg) by mouth every  12 hours as needed for muscle spasms. 10 tablet 0    methylPREDNISolone (MEDROL DOSEPAK) 4 MG tablet therapy pack Follow package directions. 21 tablet 0    omeprazole (PRILOSEC) 40 MG DR capsule Take 1 capsule (40 mg) by mouth daily. 90 capsule 4    tiZANidine (ZANAFLEX) 4 MG tablet Take 1 tablet (4 mg) by mouth 3 times daily as needed for muscle spasms. 90 tablet 2       Medication Compliance:   Yes     Changes in Health Issues:   None reported     Chemical Use Review:   Substance Use: Chemical use reviewed, no active concerns identified . Alcohol use about one/week.     Tobacco Use: No change in amount of tobacco use since last session.  Reports smoking about 1 cigarette per week.     Diagnosis:  1. Major depressive disorder, recurrent episode, moderate with peripartum onset (H)    2. Generalized anxiety disorder    3. Autism spectrum disorder        Collateral Reports Completed:   Not Applicable    PLAN: (Patient Tasks / Therapist Tasks / Other)  Patient is scheduled for follow up psychotherapy on 6/12/2025.  Prior to next session, continue to implement time for self. Engage in walks outside at least 2x-3x/week  Patient has follow up appointment with PCP on 5/15/2025    Recommended that patient call 911 or go to the local ED should there be a change in any risk factors.   Utilize Safety Plan as needed.     Emergency Walk-In Options:   Em[facility] Unit @ [facility] ([place]): [phone] - Specialized mental health emergency area designed to be calming  [facility] ([place]): [phone]  [facility] ([place]): [phone]  [facility]): [phone]    Mental Health Crisis Numbers:   National Suicide Hotline: 988  Crisis Text Line: Text [place] to 055865     [facility] Numbers  [facility] Crisis: 1-201.796.6195    [facility] (LGBTQIA+): 1-253.872.4260     Peer Support (non-crisis)  [place] Warmline: 782.856.1930    Or text  Support   "to 45397          Laura Sauceda, [facility] May 14, 2025                                                          ______________________________________________________________________    Individual Treatment Plan    Patient's Name: Lidia Gallegos  YOB: 1995    Date of Creation: April 8, 2025   Date Treatment Plan Last Reviewed/Revised: April 8, 2025     DSM5 Diagnoses:   1. Major depressive disorder, recurrent episode, moderate with peripartum onset (H)    2. Generalized anxiety disorder    3. Autism spectrum disorder        Psychosocial / Contextual Factors:  , new baby- staying at home with baby, 3 step-children. Master's degree. History of SI. Lacks support from biological family. Receiving [facility] support- YONIS TERAN.    PROMIS (reviewed every 90 days):       11/8/2022     8:18 AM 11/22/2022     5:22 PM 2/24/2025     7:07 PM 3/26/2025    10:08 AM   PROMIS-10 Scores Only   Global Mental Health Score 10 13 11  11    Global Physical Health Score 15 15 13  13    PROMIS TOTAL - SUBSCORES 25 28 24  24        Patient-reported       Referral / Collaboration:  Referral to another professional/service is not indicated at this time..    Anticipated number of session for this episode of care: 20  Anticipation frequency of session: Biweekly  Anticipated Duration of each session: 38-52 minutes  Treatment plan will be reviewed in 90 days or when goals have been changed.       MeasurableTreatment Goal(s) related to diagnosis / functional impairment(s)      Goal 1: Patient will decrease level of depression as evidenced by PHQ-9 score <5.    I will know I've met my goal when I don't feel as guilty for taking time for me.\"      Objective #A (Patient Action)                          Patient will Increase interest, engagement, and pleasure in doing things.  Status: New April 8, 2025      Intervention(s)  Therapist will assign homework for patient to engage in activities and socialization 3x/week..     Objective " "#B  Patient will Decrease frequency and intensity of feeling down, depressed, hopeless.  Status: New April 8, 2025      Intervention(s)  Therapist will coordinate care with PCP and refer to psychiatry if appropriate.  Therapist will assign homework for patient to engage in daily gratitude practice.   Therapist will teach sleep hygiene skills and assign homework for maintaining sleep routine.    Objective #C  Patient will Identify negative self-talk and behaviors: challenge core beliefs, myths, and actions.  Status: New April 8, 2025      Intervention(s)  Therapist will teach cognitive reframing skills and educate on programming/conditioning.    Therapist will assign homework for patient to implement cognitive reframing skills and engage in positive self talk. Therapist will assign homework to engage in journaling and daily positive self talk.         Goal 2: Patient will decrease level of anxiety as evidenced by JIGAR-7 score <5.    I will know I've met my goal when I don't over-think as much and I don't have that worry in the back of my head that something bad will happen.\"      Objective #A (Patient Action)                          Status: New April 8, 2025      Patient will use relaxation strategies 3x times per day to reduce the physical symptoms of anxiety.     Intervention(s)  Therapist will teach relaxation stategies, grounding and emotional regulation skills such as progressive muscle relaxation, deep breathing, meditation.  Therapist will assign homework for patient to implement relaxation strategies outside of session to feel grounded and decrease agitation.      Objective #B  Patient will practice deep breathing at least 3x a day.                         Status: New April 8, 2025      Intervention(s)  Therapist will guide patient in deep breathing practices, mindfulness skills and meditation in sessions. Therapist will assign homework for patient to implement these practices daily.        Objective " #C  Patient will use cognitive strategies identified in therapy to challenge anxious thoughts.  Status: New April 8, 2025      Intervention(s)  Therapist will teach cognitive strategies for anxiety reduction. Therapist will assign homework for patient to implement these strategies outside of session.        Goal 3- Autism Spectrum Disorder: Patient will navigate social and communication in relationships.    I will know I've met my goal when I feel confident in implementing communication skills.      Objective #A (Patient Action)    Status: New - Date: 4/8/2025     Patient will learn & utilize at least 2 assertive communication skills weekly.    Intervention(s)  Therapist will role-play effective communication skills.      Patient has reviewed and agreed to the above plan.      Laura Sauceda, [facility]  April 8, 2025

## 2025-06-07 NOTE — RESULT ENCOUNTER NOTE
Talked to Dr. Goodman (bev??) at [facility] at the [facility] about the [place] findings:    1. Single live intrauterine pregnancy of 39 weeks 1 day gestation by  current ultrasound measurement. Fetal growth is 14 days further  advanced than what is expected from the reported previously  established due date.  2. Limited exam due to body habitus, but there appears to be some  fetal skin thickening and edema. No evidence for pleural effusion,  ascites, or pericardial effusion.   3. Biophysical profile score is 8 out of 8.    Based on the finding, Dr. Goodman believed that her diabetes is not controlled and he is recommended to transfer to the hospital with higher level of care to further evaluate, treatment and likely start the induction.  If she refuses to go to be transferred then should bring her tonight to better control her blood sugar, checking the blood sugar every hour and start insulin drip and then start the induction.  If she refuse then she should get BPP NST twice a week.  Per Dr. Mendez, she is at higher risk for delivery complication as well as  complication, including increased risk for 's ICU care and stillbirth.    I called patient and her 's numbers but no answer.  Left messages for them to call me back as soon as possible at my cell phone number.  Lab result note was also sent.    Will also try to call them again later and tomorrow.  L&D will also notify about the potential admission.     Yes

## (undated) DEVICE — DRSG PRIMAPORE 03 1/8X6" 66000318

## (undated) DEVICE — DRSG STERI STRIP 1/2X4" R1547

## (undated) DEVICE — GLOVE BIOGEL PI ULTRATOUCH G SZ 7.5 42175

## (undated) DEVICE — CLAMP CORD

## (undated) DEVICE — GOWN XLG DISP 9545

## (undated) DEVICE — DRSG KERLIX FLUFFS X5

## (undated) DEVICE — GLOVE BIOGEL PI MICRO INDICATOR UNDERGLOVE SZ 7.5 48975

## (undated) DEVICE — GLOVE BIOGEL PI ULTRATOUCH G SZ 7.0 42170

## (undated) DEVICE — CATH TRAY FOLEY 16FR DRAINAGE BAG STATLOCK 899916

## (undated) DEVICE — SOL ADH LIQUID BENZOIN SWAB 0.6ML C1544

## (undated) DEVICE — SOL NACL 0.9% IRRIG 1000ML BOTTLE 07138-09

## (undated) DEVICE — GLOVE PROTEXIS W/NEU-THERA 7.5  2D73TE75

## (undated) DEVICE — PREP CHLORAPREP 26ML TINTED ORANGE  260815

## (undated) DEVICE — STOCKING SLEEVE COMPRESSION CALF MED

## (undated) DEVICE — SU VICRYL 3-0 SH 27" UND J416H

## (undated) DEVICE — DRAPE LAP W/ARMBOARD 29410

## (undated) DEVICE — SUCTION MANIFOLD NEPTUNE 2 SYS 4 PORT 0702-020-000

## (undated) DEVICE — PACK C-SECTION

## (undated) DEVICE — STPL SKIN SUBCUTICULAR INSORB 2025

## (undated) DEVICE — SU MONOCRYL 4-0 PS-2 18" UND Y496G

## (undated) DEVICE — SOL WATER IRRIG 1000ML BOTTLE 2F7114

## (undated) DEVICE — SUCTION TIP YANKAUER W/O VENT BULBOUS TIP

## (undated) DEVICE — PACK MINOR PROCEDURE CUSTOM

## (undated) DEVICE — SU MONOCRYL 0 CT-1 36" UND Y946H

## (undated) DEVICE — ADH SKIN CLOSURE PREMIERPRO EXOFIN 1.0ML 3470

## (undated) DEVICE — ESU PENCIL SMOKE EVAC W/ROCKER SWITCH 0703-047-000

## (undated) DEVICE — LUBRICATING JELLY 4.25OZ

## (undated) DEVICE — SYR BULB IRRIG DOVER 60 ML LATEX FREE 67000

## (undated) RX ORDER — PHENYLEPHRINE HYDROCHLORIDE 10 MG/ML
INJECTION INTRAVENOUS
Status: DISPENSED
Start: 2024-06-22

## (undated) RX ORDER — DEXAMETHASONE SODIUM PHOSPHATE 10 MG/ML
INJECTION, SOLUTION INTRAMUSCULAR; INTRAVENOUS
Status: DISPENSED
Start: 2024-07-07

## (undated) RX ORDER — EPINEPHRINE 1 MG/ML
INJECTION, SOLUTION INTRAMUSCULAR; SUBCUTANEOUS
Status: DISPENSED
Start: 2022-06-03

## (undated) RX ORDER — OXYTOCIN 10 [USP'U]/ML
INJECTION, SOLUTION INTRAMUSCULAR; INTRAVENOUS
Status: DISPENSED
Start: 2024-06-22

## (undated) RX ORDER — BUPIVACAINE HYDROCHLORIDE AND EPINEPHRINE 2.5; 5 MG/ML; UG/ML
INJECTION, SOLUTION EPIDURAL; INFILTRATION; INTRACAUDAL; PERINEURAL
Status: DISPENSED
Start: 2024-07-07

## (undated) RX ORDER — LIDOCAINE HYDROCHLORIDE 10 MG/ML
INJECTION, SOLUTION EPIDURAL; INFILTRATION; INTRACAUDAL; PERINEURAL
Status: DISPENSED
Start: 2024-07-07

## (undated) RX ORDER — BUPIVACAINE HYDROCHLORIDE AND EPINEPHRINE 2.5; 5 MG/ML; UG/ML
INJECTION, SOLUTION EPIDURAL; INFILTRATION; INTRACAUDAL; PERINEURAL
Status: DISPENSED
Start: 2022-06-03

## (undated) RX ORDER — ONDANSETRON 2 MG/ML
INJECTION INTRAMUSCULAR; INTRAVENOUS
Status: DISPENSED
Start: 2024-07-07

## (undated) RX ORDER — BUPIVACAINE HYDROCHLORIDE 2.5 MG/ML
INJECTION, SOLUTION EPIDURAL; INFILTRATION; INTRACAUDAL
Status: DISPENSED
Start: 2024-06-22

## (undated) RX ORDER — FENTANYL CITRATE 50 UG/ML
INJECTION, SOLUTION INTRAMUSCULAR; INTRAVENOUS
Status: DISPENSED
Start: 2024-07-07

## (undated) RX ORDER — LIDOCAINE HYDROCHLORIDE 10 MG/ML
INJECTION, SOLUTION INFILTRATION; PERINEURAL
Status: DISPENSED
Start: 2024-07-07

## (undated) RX ORDER — TRANEXAMIC ACID 10 MG/ML
INJECTION, SOLUTION INTRAVENOUS
Status: DISPENSED
Start: 2024-06-22

## (undated) RX ORDER — PROPOFOL 10 MG/ML
INJECTION, EMULSION INTRAVENOUS
Status: DISPENSED
Start: 2024-07-07

## (undated) RX ORDER — LIDOCAINE HYDROCHLORIDE 10 MG/ML
INJECTION, SOLUTION INFILTRATION; PERINEURAL
Status: DISPENSED
Start: 2022-06-03